# Patient Record
Sex: FEMALE | Race: WHITE | Employment: UNEMPLOYED | URBAN - METROPOLITAN AREA
[De-identification: names, ages, dates, MRNs, and addresses within clinical notes are randomized per-mention and may not be internally consistent; named-entity substitution may affect disease eponyms.]

---

## 2021-02-08 ENCOUNTER — HOSPITAL ENCOUNTER (EMERGENCY)
Age: 21
Discharge: HOME | End: 2021-02-08
Attending: EMERGENCY MEDICINE
Payer: COMMERCIAL

## 2021-02-08 VITALS
RESPIRATION RATE: 16 BRPM | WEIGHT: 146 LBS | DIASTOLIC BLOOD PRESSURE: 68 MMHG | BODY MASS INDEX: 29.43 KG/M2 | HEART RATE: 88 BPM | SYSTOLIC BLOOD PRESSURE: 107 MMHG | OXYGEN SATURATION: 100 % | HEIGHT: 59 IN | TEMPERATURE: 98.2 F

## 2021-02-08 DIAGNOSIS — N39.0 ACUTE UTI: ICD-10-CM

## 2021-02-08 DIAGNOSIS — R10.11 RIGHT UPPER QUADRANT ABDOMINAL PAIN: Primary | ICD-10-CM

## 2021-02-08 LAB
-: ABNORMAL
ABSOLUTE EOS #: 0.2 K/UL (ref 0–0.4)
ABSOLUTE IMMATURE GRANULOCYTE: ABNORMAL K/UL (ref 0–0.3)
ABSOLUTE LYMPH #: 1.7 K/UL (ref 1.2–5.2)
ABSOLUTE MONO #: 0.5 K/UL (ref 0.1–1.4)
ALBUMIN SERPL-MCNC: 3.8 G/DL (ref 3.5–5.2)
ALBUMIN/GLOBULIN RATIO: 1.3 (ref 1–2.5)
ALP BLD-CCNC: 55 U/L (ref 35–104)
ALT SERPL-CCNC: 7 U/L (ref 5–33)
AMORPHOUS: ABNORMAL
AMYLASE: 56 U/L (ref 28–100)
ANION GAP SERPL CALCULATED.3IONS-SCNC: 12 MMOL/L (ref 9–17)
AST SERPL-CCNC: 11 U/L
BACTERIA: ABNORMAL
BASOPHILS # BLD: 0 % (ref 0–2)
BASOPHILS ABSOLUTE: 0 K/UL (ref 0–0.2)
BILIRUB SERPL-MCNC: 0.24 MG/DL (ref 0.3–1.2)
BILIRUBIN DIRECT: 0.08 MG/DL
BILIRUBIN URINE: NEGATIVE
BILIRUBIN, INDIRECT: 0.16 MG/DL (ref 0–1)
BUN BLDV-MCNC: 8 MG/DL (ref 6–20)
BUN/CREAT BLD: ABNORMAL (ref 9–20)
CALCIUM SERPL-MCNC: 9.1 MG/DL (ref 8.6–10.4)
CASTS UA: ABNORMAL /LPF
CHLORIDE BLD-SCNC: 101 MMOL/L (ref 98–107)
CO2: 20 MMOL/L (ref 20–31)
COLOR: YELLOW
COMMENT UA: ABNORMAL
CREAT SERPL-MCNC: <0.4 MG/DL (ref 0.5–0.9)
CRYSTALS, UA: ABNORMAL /HPF
DIFFERENTIAL TYPE: ABNORMAL
EOSINOPHILS RELATIVE PERCENT: 2 % (ref 1–4)
EPITHELIAL CELLS UA: ABNORMAL /HPF (ref 0–5)
GFR AFRICAN AMERICAN: ABNORMAL ML/MIN
GFR NON-AFRICAN AMERICAN: ABNORMAL ML/MIN
GFR SERPL CREATININE-BSD FRML MDRD: ABNORMAL ML/MIN/{1.73_M2}
GFR SERPL CREATININE-BSD FRML MDRD: ABNORMAL ML/MIN/{1.73_M2}
GLOBULIN: ABNORMAL G/DL (ref 1.5–3.8)
GLUCOSE BLD-MCNC: 80 MG/DL (ref 70–99)
GLUCOSE URINE: NEGATIVE
HCG QUANTITATIVE: ABNORMAL IU/L
HCT VFR BLD CALC: 37.4 % (ref 36–46)
HEMOGLOBIN: 12.3 G/DL (ref 12–16)
IMMATURE GRANULOCYTES: ABNORMAL %
KETONES, URINE: ABNORMAL
LEUKOCYTE ESTERASE, URINE: NEGATIVE
LIPASE: 16 U/L (ref 13–60)
LYMPHOCYTES # BLD: 16 % (ref 25–45)
MCH RBC QN AUTO: 28.5 PG (ref 26–34)
MCHC RBC AUTO-ENTMCNC: 32.9 G/DL (ref 31–37)
MCV RBC AUTO: 86.6 FL (ref 80–100)
MONOCYTES # BLD: 5 % (ref 2–8)
MUCUS: ABNORMAL
NITRITE, URINE: NEGATIVE
NRBC AUTOMATED: ABNORMAL PER 100 WBC
OTHER OBSERVATIONS UA: ABNORMAL
PDW BLD-RTO: 14.3 % (ref 12.5–15.4)
PH UA: 6 (ref 5–8)
PLATELET # BLD: 279 K/UL (ref 140–450)
PLATELET ESTIMATE: ABNORMAL
PMV BLD AUTO: 6.9 FL (ref 6–12)
POTASSIUM SERPL-SCNC: 3.6 MMOL/L (ref 3.7–5.3)
PROTEIN UA: NEGATIVE
RBC # BLD: 4.31 M/UL (ref 4–5.2)
RBC # BLD: ABNORMAL 10*6/UL
RBC UA: ABNORMAL /HPF (ref 0–2)
RENAL EPITHELIAL, UA: ABNORMAL /HPF
SEG NEUTROPHILS: 77 % (ref 34–64)
SEGMENTED NEUTROPHILS ABSOLUTE COUNT: 8.3 K/UL (ref 1.8–8)
SODIUM BLD-SCNC: 133 MMOL/L (ref 135–144)
SPECIFIC GRAVITY UA: 1.01 (ref 1–1.03)
TOTAL PROTEIN: 6.8 G/DL (ref 6.4–8.3)
TRICHOMONAS: ABNORMAL
TURBIDITY: CLEAR
URINE HGB: ABNORMAL
UROBILINOGEN, URINE: NORMAL
WBC # BLD: 10.7 K/UL (ref 4.5–13.5)
WBC # BLD: ABNORMAL 10*3/UL
WBC UA: ABNORMAL /HPF (ref 0–5)
YEAST: ABNORMAL

## 2021-02-08 PROCEDURE — 81001 URINALYSIS AUTO W/SCOPE: CPT

## 2021-02-08 PROCEDURE — 85025 COMPLETE CBC W/AUTO DIFF WBC: CPT

## 2021-02-08 PROCEDURE — 83690 ASSAY OF LIPASE: CPT

## 2021-02-08 PROCEDURE — 80076 HEPATIC FUNCTION PANEL: CPT

## 2021-02-08 PROCEDURE — 82150 ASSAY OF AMYLASE: CPT

## 2021-02-08 PROCEDURE — 36415 COLL VENOUS BLD VENIPUNCTURE: CPT

## 2021-02-08 PROCEDURE — 80048 BASIC METABOLIC PNL TOTAL CA: CPT

## 2021-02-08 PROCEDURE — 84702 CHORIONIC GONADOTROPIN TEST: CPT

## 2021-02-08 PROCEDURE — 99283 EMERGENCY DEPT VISIT LOW MDM: CPT

## 2021-02-08 RX ORDER — NITROFURANTOIN 25; 75 MG/1; MG/1
100 CAPSULE ORAL 2 TIMES DAILY
Qty: 14 CAPSULE | Refills: 0 | Status: SHIPPED | OUTPATIENT
Start: 2021-02-08 | End: 2021-02-15

## 2021-02-08 ASSESSMENT — PAIN SCALES - GENERAL: PAINLEVEL_OUTOF10: 7

## 2021-02-08 ASSESSMENT — PAIN DESCRIPTION - LOCATION: LOCATION: ABDOMEN

## 2021-02-08 ASSESSMENT — PAIN DESCRIPTION - ORIENTATION: ORIENTATION: RIGHT;UPPER

## 2021-02-08 NOTE — ED PROVIDER NOTES
[id number] [facility] ED  [address] [facility] [address] [facility] [address]  Phone: [phone]  Fax: Rocío Hutchison 6867      Pt Name: Radha Naqvi  MRN: [id number]  [place] 2000  Date of evaluation: 2/8/2021    CHIEF COMPLAINT       Chief Complaint   Patient presents with    Abdominal Pain       HISTORY OF PRESENT ILLNESS    Radha Naqvi is a 24 y.o. female who presents to the emergency department complaining of right upper quadrant and right lower quadrant abdominal pain that started today. 10 weeks pregnant has had a formal ultrasound at 6 weeks. Denies vaginal bleeding. No hematuria or dysuria. No fevers or chills. No diarrhea or constipation. Denies any other complaints. Called her OB who told her to come to the emergency department at [facility] and presented here. Denies any other symptoms. Nothing makes the pain better or worse. Nonradiating. REVIEW OF SYSTEMS       Constitutional: No fevers or chills   HEENT: No sore throat, rhinorrhea, or earache   Eyes: No blurry vision or double vision no drainage   Cardiovascular: No chest pain or tachycardia   Respiratory: No wheezing or shortness of breath no cough   Gastrointestinal: No nausea, vomiting, diarrhea, constipation, positive abdominal pain   : No hematuria or dysuria   Musculoskeletal: No swelling or pain   Skin: No rash   Neurological: No focal neurologic complaints, paresthesias, weakness, or headache     PAST MEDICAL HISTORY    has no past medical history on file. SURGICAL HISTORY      has no past surgical history on file. CURRENT MEDICATIONS       Previous Medications    No medications on file       ALLERGIES     has no allergies on file. FAMILY HISTORY     has no family status information on file. family history is not on file. SOCIAL HISTORY      reports that she has never smoked. She has never used smokeless tobacco. She reports previous alcohol use. She reports current drug use. Drug: Marijuana. PHYSICAL EXAM       ED Triage Vitals [02/08/21 1503]   BP Temp Temp Source Pulse Resp SpO2 Height Weight   107/68 98.2 °F (36.8 °C) Temporal 88 16 100 % 4' 11\" (1.499 m) 146 lb (66.2 kg)       Constitutional: Alert, oriented x3, nontoxic, answering questions appropriately, acting properly for age, in no acute distress   HEENT: Extraocular muscles intact,  Neck: Trachea midline   Cardiovascular: Regular rhythm and rate no S3, S4, or murmurs   Respiratory: Clear to auscultation bilaterally no wheezes, rhonchi, rales, no respiratory distress no tachypnea no retractions no hypoxia  Gastrointestinal: Soft, mild right upper quadrant and right middle abdominal tenderness to palpation, nondistended, positive bowel sounds. No rebound, rigidity, or guarding. Musculoskeletal: No extremity pain or swelling   Neurologic: Moving all 4 extremities without difficulty there are no gross focal neurologic deficits   Skin: Warm and dry       DIFFERENTIAL DIAGNOSIS/ MDM:     Fetal heart tones. Labs.     DIAGNOSTIC RESULTS     EKG: All EKG's are interpreted by the Emergency Department Physician who either signs or Co-signs this chart in the absence of a cardiologist.        Not indicated unless otherwise documented above    LABS:  Results for orders placed or performed during the hospital encounter of 02/08/21   CBC Auto Differential   Result Value Ref Range    WBC 10.7 4.5 - 13.5 k/uL    RBC 4.31 4.0 - 5.2 m/uL    Hemoglobin 12.3 12.0 - 16.0 g/dL    Hematocrit 37.4 36 - 46 %    MCV 86.6 80 - 100 fL    MCH 28.5 26 - 34 pg    MCHC 32.9 31 - 37 g/dL    RDW 14.3 12.5 - 15.4 %    Platelets 156 300 - 755 k/uL    MPV 6.9 6.0 - 12.0 fL    NRBC Automated NOT REPORTED per 100 WBC    Differential Type NOT REPORTED     Seg Neutrophils 77 (H) 34 - 64 %    Lymphocytes 16 (L) 25 - 45 %    Monocytes 5 2 - 8 %    Eosinophils % 2 1 - 4 %    Basophils 0 0 - 2 %    Immature Granulocytes NOT REPORTED 0 %    Segs Absolute 8.30 (H) 1.8 Quantitative, Pregnancy   Result Value Ref Range    hCG Quant 78,144 (H) <5 IU/L   Microscopic Urinalysis   Result Value Ref Range    -          WBC, UA 2 TO 5 0 - 5 /HPF    RBC, UA 5 TO 10 0 - 2 /HPF    Casts UA NOT REPORTED /LPF    Crystals, UA NOT REPORTED None /HPF    Epithelial Cells UA 5 TO 10 0 - 5 /HPF    Renal Epithelial, UA NOT REPORTED 0 /HPF    Bacteria, UA FEW (A) None    Mucus, UA 1+ (A) None    Trichomonas, UA NOT REPORTED None    Amorphous, UA NOT REPORTED None    Other Observations UA (A) NOT REQ. Utilizing a urinalysis as the only screening method to exclude a potential uropathogen can be unreliable in many patient populations. Rapid screening tests are less sensitive than culture and if UTI is a clinical possibility, culture should be considered despite a negative urinalysis. Yeast, UA NOT REPORTED None       Not indicated unless otherwise documented above    RADIOLOGY:   I reviewed the radiologist interpretations:    No orders to display       Not indicated unless otherwise documented above    EMERGENCY DEPARTMENT COURSE:     The patient was given the following medications:  Orders Placed This Encounter   Medications    nitrofurantoin, macrocrystal-monohydrate, (MACROBID) 100 MG capsule     Sig: Take 1 capsule by mouth 2 times daily for 7 days     Dispense:  14 capsule     Refill:  0        Vitals:   -------------------------  /68   Pulse 88   Temp 98.2 °F (36.8 °C) (Temporal)   Resp 16   Ht 4' 11\" (1.499 m)   Wt 66.2 kg (146 lb)   SpO2 100%   BMI 29.49 kg/m²     4:10 PM awaiting remaining labs. Urinalysis shows moderate ketones and small amount hemoglobin. Urinalysis 5-10 white blood cells with a few bacteria. Will cover for UTI secondary to pregnancy. Normal white blood cell count and no anemia. 4:35 PM remaining labs unremarkable. Will treat for possible UTI recommend following up with obstetrician. Fetal heart tones 156.   Return if any worsening pain, fevers, vaginal bleeding or any other concerns. The patient understands that at this time there is no evidence for a more malignant underlying process, but they also understand that early in the process of an illness or injury, an emergency department workup can be falsely reassuring. Routine discharge counseling was given, and they understand that worsening, changing or persistent symptoms should prompt an immediate call or follow up with their primary physician or return to the emergency department. The importance of appropriate follow up was also discussed. I have reviewed the disposition diagnosis with them. I have answered their questions and given discharge instructions. They voiced understanding of these instructions and did not have any further questions or complaints. CRITICAL CARE:    None    CONSULTS:    None    PROCEDURES:    None      OARRS Report if indicated             FINAL IMPRESSION      1. Right upper quadrant abdominal pain    2.  Acute UTI          DISPOSITION/PLAN   DISPOSITION Decision To Discharge 02/08/2021 04:35:55 PM        CONDITION ON DISPOSITION: STABLE       PATIENT REFERRED TO:  Obstetrician    Call in 1 day        DISCHARGE MEDICATIONS:  New Prescriptions    NITROFURANTOIN, MACROCRYSTAL-MONOHYDRATE, (MACROBID) 100 MG CAPSULE    Take 1 capsule by mouth 2 times daily for 7 days       (Please note that portions of thisnote were completed with a voice recognition program.  Efforts were made to edit the dictations but occasionally words are mis-transcribed.)    [facility],, DO  Attending Emergency Physician     [facility], DO  02/08/21 [address] [facility] 9[facility], DO  02/08/21 3006

## 2021-02-11 ENCOUNTER — OFFICE VISIT (OUTPATIENT)
Dept: PRIMARY CARE CLINIC | Age: 21
End: 2021-02-11
Payer: COMMERCIAL

## 2021-02-11 VITALS
WEIGHT: 149.6 LBS | HEART RATE: 89 BPM | BODY MASS INDEX: 30.16 KG/M2 | DIASTOLIC BLOOD PRESSURE: 74 MMHG | HEIGHT: 59 IN | SYSTOLIC BLOOD PRESSURE: 122 MMHG | RESPIRATION RATE: 16 BRPM | OXYGEN SATURATION: 99 % | TEMPERATURE: 97 F

## 2021-02-11 DIAGNOSIS — J45.20 MILD INTERMITTENT ASTHMA WITHOUT COMPLICATION: ICD-10-CM

## 2021-02-11 DIAGNOSIS — Z00.00 ANNUAL PHYSICAL EXAM: Primary | ICD-10-CM

## 2021-02-11 DIAGNOSIS — Z23 NEED FOR INFLUENZA VACCINATION: ICD-10-CM

## 2021-02-11 PROBLEM — J45.909 ASTHMA: Status: ACTIVE | Noted: 2021-02-11

## 2021-02-11 PROCEDURE — 90471 IMMUNIZATION ADMIN: CPT | Performed by: NURSE PRACTITIONER

## 2021-02-11 PROCEDURE — 99385 PREV VISIT NEW AGE 18-39: CPT | Performed by: NURSE PRACTITIONER

## 2021-02-11 PROCEDURE — 90686 IIV4 VACC NO PRSV 0.5 ML IM: CPT | Performed by: NURSE PRACTITIONER

## 2021-02-11 PROCEDURE — G8482 FLU IMMUNIZE ORDER/ADMIN: HCPCS | Performed by: NURSE PRACTITIONER

## 2021-02-11 RX ORDER — DOXYLAMINE SUCCINATE AND PYRIDOXINE HYDROCHLORIDE, DELAYED RELEASE TABLETS 10 MG/10 MG 10; 10 MG/1; MG/1
1 TABLET, DELAYED RELEASE ORAL EVERY 12 HOURS PRN
COMMUNITY
Start: 2021-01-14 | End: 2021-04-29 | Stop reason: SDUPTHER

## 2021-02-11 SDOH — ECONOMIC STABILITY: INCOME INSECURITY: HOW HARD IS IT FOR YOU TO PAY FOR THE VERY BASICS LIKE FOOD, HOUSING, MEDICAL CARE, AND HEATING?: NOT HARD AT ALL

## 2021-02-11 SDOH — ECONOMIC STABILITY: TRANSPORTATION INSECURITY
IN THE PAST 12 MONTHS, HAS THE LACK OF TRANSPORTATION KEPT YOU FROM MEDICAL APPOINTMENTS OR FROM GETTING MEDICATIONS?: NO

## 2021-02-11 SDOH — ECONOMIC STABILITY: FOOD INSECURITY: WITHIN THE PAST 12 MONTHS, THE FOOD YOU BOUGHT JUST DIDN'T LAST AND YOU DIDN'T HAVE MONEY TO GET MORE.: NEVER TRUE

## 2021-02-11 SDOH — ECONOMIC STABILITY: TRANSPORTATION INSECURITY
IN THE PAST 12 MONTHS, HAS LACK OF TRANSPORTATION KEPT YOU FROM MEETINGS, WORK, OR FROM GETTING THINGS NEEDED FOR DAILY LIVING?: NO

## 2021-02-11 ASSESSMENT — ENCOUNTER SYMPTOMS
SINUS PRESSURE: 0
SINUS PAIN: 0
VOMITING: 0
WHEEZING: 0
ABDOMINAL PAIN: 0
SHORTNESS OF BREATH: 0
DIARRHEA: 0
EYE ITCHING: 0
TROUBLE SWALLOWING: 0
EYE REDNESS: 0
EYE DISCHARGE: 0
CHEST TIGHTNESS: 0
COUGH: 0
SORE THROAT: 0
NAUSEA: 0

## 2021-02-11 ASSESSMENT — PATIENT HEALTH QUESTIONNAIRE - PHQ9
SUM OF ALL RESPONSES TO PHQ QUESTIONS 1-9: 0
2. FEELING DOWN, DEPRESSED OR HOPELESS: 0
SUM OF ALL RESPONSES TO PHQ QUESTIONS 1-9: 0
SUM OF ALL RESPONSES TO PHQ QUESTIONS 1-9: 0

## 2021-02-11 NOTE — PROGRESS NOTES
[address] PRIMARY CARE  [address] [facility] [address]  [address] [address]  Dept: [phone]  Dept Fax: [phone]    Darrel Georges is a 24 y.o. female who presents today for her medical conditions/complaintsas noted below. Darrel Georges is c/o of New Patient ([facility], Almita Hopers shooting pain in left leg)        HPI:     Pt presents to establish care. Her last pcp was her pediatrician   bp stable  Weight stable    Pt presents to establish care. She is 11 weeks pregnant. [id number]. She is going to be following with Dr. Dahiana Rain office. No complications. LMP 20    Hx of asthma. She has an albuterol inhaler. She does not sure any other inhalers. She uses it more in the winter d/t the cold air. She works at [employer]. She was having issues with nausea but its improved. Now its intermittent. If she eats more frequently the symptoms improve  Not . Lives with boyfriend    She does c/o a pain to the anterior L shin. No fall or injury. Started about a week ago. Constant. Tylenol does not help. No cp or sob. No personal or family history of a blood clot. No recent travel. History reviewed. No pertinent past medical history. History reviewed. No pertinent surgical history. History reviewed. No pertinent family history. Social History     Tobacco Use    Smoking status: Never Smoker    Smokeless tobacco: Never Used   Substance Use Topics    Alcohol use: Not Currently      Current Outpatient Medications   Medication Sig Dispense Refill    Prenatal Vit-Fe Fumarate-FA (PRENATAL/FOLIC ACID PO) Take by mouth      doxylamine-pyridoxine 10-10 MG TBEC Take 1 tablet by mouth every 12 hours as needed      nitrofurantoin, macrocrystal-monohydrate, (MACROBID) 100 MG capsule Take 1 capsule by mouth 2 times daily for 7 days 14 capsule 0     No current facility-administered medications for this visit.       No Known Allergies    Health Maintenance   Topic Date Due    Hepatitis C screen  2000    Varicella vaccine (1 of 2 - 2-dose childhood series) 04/16/2002    HPV vaccine (1 - 2-dose series) 04/16/2012    HIV screen  04/16/2016    Chlamydia screen  04/16/2017    DTaP/Tdap/Td vaccine (1 - Tdap) 04/16/2020    Flu vaccine  Completed    Hepatitis A vaccine  Aged Out    Hepatitis B vaccine  Aged Out    Hib vaccine  Aged Out    Meningococcal (ACWY) vaccine  Aged Out    Pneumococcal 0-64 years Vaccine  Aged Out       :     Review of Systems   Constitutional: Negative for chills, fatigue and fever. HENT: Negative for ear discharge, ear pain, sinus pressure, sinus pain, sore throat and trouble swallowing. Eyes: Negative for discharge, redness and itching. Respiratory: Negative for cough, chest tightness, shortness of breath and wheezing. Cardiovascular: Negative for chest pain. Gastrointestinal: Negative for abdominal pain, diarrhea, nausea and vomiting. Genitourinary: Negative for difficulty urinating. Musculoskeletal: Positive for arthralgias. Negative for neck pain. Skin: Negative for rash. Neurological: Negative for dizziness, weakness, light-headedness and headaches. All other systems reviewed and are negative. Objective:     Physical Exam  Constitutional:       Appearance: Normal appearance. She is normal weight. HENT:      Head: Normocephalic and atraumatic. Nose: Nose normal.   Eyes:      Extraocular Movements: Extraocular movements intact. Conjunctiva/sclera: Conjunctivae normal.      Pupils: Pupils are equal, round, and reactive to light. Neck:      Musculoskeletal: Neck supple. Cardiovascular:      Rate and Rhythm: Normal rate and regular rhythm. Pulses: Normal pulses. Heart sounds: Normal heart sounds. Pulmonary:      Effort: Pulmonary effort is normal.      Breath sounds: Normal breath sounds. Abdominal:      General: Abdomen is flat. Palpations: Abdomen is soft.    Musculoskeletal: Normal range of motion. Left lower leg: She exhibits no tenderness, no bony tenderness and no swelling. No edema. Comments: Minimal TTP   Skin:     General: Skin is warm and dry. Capillary Refill: Capillary refill takes less than 2 seconds. Neurological:      General: No focal deficit present. Mental Status: She is alert and oriented to person, place, and time. Psychiatric:         Mood and Affect: Mood normal.       /74   Pulse 89   Temp 97 °F (36.1 °C) (Temporal)   Resp 16   Ht 4' 11\" (1.499 m)   Wt 149 lb 9.6 oz (67.9 kg)   SpO2 99%   BMI 30.22 kg/m²     Assessment:       Diagnosis Orders   1. Annual physical exam     2. Need for influenza vaccination  INFLUENZA, QUADV, 3 YRS AND OLDER, IM PF, PREFILL SYR OR SDV, 0.5ML (AFLURIA QUADV, PF)   3. Mild intermittent asthma without complication         :      Return in about 1 year (around 2/11/2022) for physical.   1.  Annual physical  -Patient already had lab work done a few days ago. This was reviewed with the patient in the office. No abnormalities at this time. We can repeat lab work in 1 year  2. Need for influenza vaccine  -Flu shot given in the office  3. Mild asthma  -Continue with albuterol inhaler as needed. No maintenance inhalers needed at this time. Follow-up in 1 year for annual physical or as needed    Orders Placed This Encounter   Procedures    INFLUENZA, QUADV, 3 YRS AND OLDER, IM PF, PREFILL SYR OR SDV, 0.5ML (AFLURIA QUADV, PF)     No orders of the defined types were placed in this encounter. Patient given educational materials - seepatient instructions. Discussed use, benefit, and side effects of prescribed medications. All patient questions answered. Pt voiced understanding. Reviewed health maintenance. Instructed to continue current medications, diet and exercise. Patient agreedwith treatment plan. Follow up as directed.       Electronically signed by BEBA Jesus CNP on 2/11/2021at 11:44 AM

## 2021-02-21 ENCOUNTER — HOSPITAL ENCOUNTER (EMERGENCY)
Age: 21
Discharge: HOME | End: 2021-02-21
Attending: EMERGENCY MEDICINE
Payer: COMMERCIAL

## 2021-02-21 VITALS
OXYGEN SATURATION: 100 % | HEIGHT: 59 IN | HEART RATE: 82 BPM | WEIGHT: 150 LBS | RESPIRATION RATE: 20 BRPM | TEMPERATURE: 98.1 F | DIASTOLIC BLOOD PRESSURE: 48 MMHG | BODY MASS INDEX: 30.24 KG/M2 | SYSTOLIC BLOOD PRESSURE: 110 MMHG

## 2021-02-21 DIAGNOSIS — O21.0 HYPEREMESIS GRAVIDARUM: Primary | ICD-10-CM

## 2021-02-21 LAB
ABSOLUTE EOS #: 0.15 K/UL (ref 0–0.44)
ABSOLUTE IMMATURE GRANULOCYTE: 0.06 K/UL (ref 0–0.3)
ABSOLUTE LYMPH #: 2.11 K/UL (ref 1.2–5.2)
ABSOLUTE MONO #: 0.68 K/UL (ref 0.1–1.4)
ALBUMIN SERPL-MCNC: 4.1 G/DL (ref 3.5–5.2)
ALBUMIN/GLOBULIN RATIO: ABNORMAL (ref 1–2.5)
ALP BLD-CCNC: 60 U/L (ref 35–104)
ALT SERPL-CCNC: 9 U/L (ref 5–33)
ANION GAP SERPL CALCULATED.3IONS-SCNC: 15 MMOL/L (ref 9–17)
APPEARANCE: CLEAR
AST SERPL-CCNC: 16 U/L
BASOPHILS # BLD: 1 % (ref 0–2)
BASOPHILS ABSOLUTE: 0.06 K/UL (ref 0–0.2)
BILIRUB SERPL-MCNC: 0.27 MG/DL (ref 0.3–1.2)
BILIRUBIN, POC: NEGATIVE
BLOOD URINE, POC: NORMAL
BUN BLDV-MCNC: 6 MG/DL (ref 6–20)
BUN/CREAT BLD: ABNORMAL (ref 9–20)
CALCIUM SERPL-MCNC: 9.1 MG/DL (ref 8.6–10.4)
CHLORIDE BLD-SCNC: 99 MMOL/L (ref 98–107)
CHP ED QC CHECK: YES
CLARITY, POC: CLEAR
CO2: 20 MMOL/L (ref 20–31)
COLOR, POC: YELLOW
CREAT SERPL-MCNC: <0.4 MG/DL (ref 0.5–0.9)
DIFFERENTIAL TYPE: ABNORMAL
EOSINOPHILS RELATIVE PERCENT: 1 % (ref 1–4)
GFR AFRICAN AMERICAN: ABNORMAL ML/MIN
GFR NON-AFRICAN AMERICAN: ABNORMAL ML/MIN
GFR SERPL CREATININE-BSD FRML MDRD: ABNORMAL ML/MIN/{1.73_M2}
GFR SERPL CREATININE-BSD FRML MDRD: ABNORMAL ML/MIN/{1.73_M2}
GLUCOSE BLD-MCNC: 83 MG/DL (ref 70–99)
GLUCOSE URINE, POC: NEGATIVE
HCG QUANTITATIVE: ABNORMAL IU/L
HCT VFR BLD CALC: 40.3 % (ref 36.3–47.1)
HEMOGLOBIN: 12.9 G/DL (ref 11.9–15.1)
IMMATURE GRANULOCYTES: 1 %
KETONES, POC: NEGATIVE
LEUKOCYTE EST, POC: NEGATIVE
LIPASE: 13 U/L (ref 13–60)
LYMPHOCYTES # BLD: 17 % (ref 25–45)
MCH RBC QN AUTO: 28.4 PG (ref 25.2–33.5)
MCHC RBC AUTO-ENTMCNC: 32 G/DL (ref 28.4–34.8)
MCV RBC AUTO: 88.6 FL (ref 82.6–102.9)
MONOCYTES # BLD: 6 % (ref 2–8)
NITRITE, POC: NEGATIVE
NRBC AUTOMATED: 0 PER 100 WBC
PDW BLD-RTO: 13.4 % (ref 11.8–14.4)
PH, POC: 6.5
PLATELET # BLD: 263 K/UL (ref 138–453)
PLATELET ESTIMATE: ABNORMAL
PMV BLD AUTO: 8.8 FL (ref 8.1–13.5)
POTASSIUM SERPL-SCNC: 3.3 MMOL/L (ref 3.7–5.3)
PROTEIN, POC: NEGATIVE
RBC # BLD: 4.55 M/UL (ref 3.95–5.11)
RBC # BLD: ABNORMAL 10*6/UL
SEG NEUTROPHILS: 74 % (ref 34–64)
SEGMENTED NEUTROPHILS ABSOLUTE COUNT: 9.29 K/UL (ref 1.8–8)
SODIUM BLD-SCNC: 134 MMOL/L (ref 135–144)
SPECIFIC GRAVITY, POC: 1.01
TOTAL PROTEIN: 6.9 G/DL (ref 6.4–8.3)
UROBILINOGEN, POC: 0.2
WBC # BLD: 12.4 K/UL (ref 4.5–13.5)
WBC # BLD: ABNORMAL 10*3/UL

## 2021-02-21 PROCEDURE — 96375 TX/PRO/DX INJ NEW DRUG ADDON: CPT

## 2021-02-21 PROCEDURE — 85025 COMPLETE CBC W/AUTO DIFF WBC: CPT

## 2021-02-21 PROCEDURE — 81003 URINALYSIS AUTO W/O SCOPE: CPT

## 2021-02-21 PROCEDURE — 2500000003 HC RX 250 WO HCPCS: Performed by: PHYSICIAN ASSISTANT

## 2021-02-21 PROCEDURE — 83690 ASSAY OF LIPASE: CPT

## 2021-02-21 PROCEDURE — 84702 CHORIONIC GONADOTROPIN TEST: CPT

## 2021-02-21 PROCEDURE — 99283 EMERGENCY DEPT VISIT LOW MDM: CPT

## 2021-02-21 PROCEDURE — 96374 THER/PROPH/DIAG INJ IV PUSH: CPT

## 2021-02-21 PROCEDURE — 80053 COMPREHEN METABOLIC PANEL: CPT

## 2021-02-21 PROCEDURE — 2580000003 HC RX 258: Performed by: PHYSICIAN ASSISTANT

## 2021-02-21 PROCEDURE — 81025 URINE PREGNANCY TEST: CPT

## 2021-02-21 PROCEDURE — 6360000002 HC RX W HCPCS: Performed by: PHYSICIAN ASSISTANT

## 2021-02-21 RX ORDER — FAMOTIDINE 20 MG/1
20 TABLET, FILM COATED ORAL 2 TIMES DAILY
Qty: 60 TABLET | Refills: 0 | Status: SHIPPED | OUTPATIENT
Start: 2021-02-21 | End: 2021-04-29 | Stop reason: SDUPTHER

## 2021-02-21 RX ORDER — METOCLOPRAMIDE HYDROCHLORIDE 5 MG/ML
10 INJECTION INTRAMUSCULAR; INTRAVENOUS ONCE
Status: COMPLETED | OUTPATIENT
Start: 2021-02-21 | End: 2021-02-21

## 2021-02-21 RX ORDER — DIPHENHYDRAMINE HYDROCHLORIDE 25 MG/1
25 CAPSULE ORAL NIGHTLY
Qty: 30 TABLET | Refills: 0 | Status: SHIPPED | OUTPATIENT
Start: 2021-02-21 | End: 2021-04-23 | Stop reason: SDUPTHER

## 2021-02-21 RX ORDER — 0.9 % SODIUM CHLORIDE 0.9 %
1000 INTRAVENOUS SOLUTION INTRAVENOUS ONCE
Status: COMPLETED | OUTPATIENT
Start: 2021-02-21 | End: 2021-02-21

## 2021-02-21 RX ADMIN — FAMOTIDINE 20 MG: 10 INJECTION, SOLUTION INTRAVENOUS at 20:29

## 2021-02-21 RX ADMIN — SODIUM CHLORIDE 1000 ML: 9 INJECTION, SOLUTION INTRAVENOUS at 20:29

## 2021-02-21 RX ADMIN — METOCLOPRAMIDE 10 MG: 5 INJECTION, SOLUTION INTRAMUSCULAR; INTRAVENOUS at 21:01

## 2021-02-22 NOTE — ED PROVIDER NOTES
Except as noted above the remainder of the review of systems was reviewed and negative. PHYSICAL EXAM    (up to 7 for level 4, 8 or more for level 5)     ED Triage Vitals   BP Temp Temp Source Pulse Resp SpO2 Height Weight   02/21/21 1939 02/21/21 1939 02/21/21 1939 02/21/21 1936 02/21/21 1936 02/21/21 1936 02/21/21 1936 02/21/21 1936   (!) 141/53 98.1 °F (36.7 °C) Oral 91 18 100 % 4' 11\" (1.499 m) 150 lb (68 kg)      Physical Exam  Constitutional:       Appearance: She is well-developed. HENT:      Head: Normocephalic and atraumatic. Neck:      Musculoskeletal: Normal range of motion and neck supple. Cardiovascular:      Rate and Rhythm: Normal rate and regular rhythm. Pulmonary:      Effort: Pulmonary effort is normal.      Breath sounds: Normal breath sounds. Abdominal:      Palpations: Abdomen is soft. Tenderness: There is no abdominal tenderness. Musculoskeletal: Normal range of motion. Skin:     General: Skin is warm. Findings: No rash. Neurological:      Mental Status: She is alert and oriented to person, place, and time.    Psychiatric:         Behavior: Behavior normal.                 DIAGNOSTIC RESULTS     EKG: All EKG's are interpreted by the Emergency Department Physician who either signs or Co-signs this chart in the absence of a cardiologist.        RADIOLOGY:   Non-plain film images such as CT, Ultrasound and MRI are read by the radiologist. Plain radiographic images arevisualized and preliminarily interpreted by the emergency physician with the below findings:        Interpretation per the Radiologist below, if available at thetime of this note:          ED BEDSIDE ULTRASOUND:   Performed by ED Physician - none    LABS:  Labs Reviewed   CBC WITH AUTO DIFFERENTIAL - Abnormal; Notable for the following components:       Result Value    Seg Neutrophils 74 (*)     Lymphocytes 17 (*)     Immature Granulocytes 1 (*)     Segs Absolute 9.29 (*) All other components within normal limits   COMPREHENSIVE METABOLIC PANEL - Abnormal; Notable for the following components:    CREATININE <0.40 (*)     Sodium 134 (*)     Potassium 3.3 (*)     Total Bilirubin 0.27 (*)     All other components within normal limits   HCG, QUANTITATIVE, PREGNANCY - Abnormal; Notable for the following components:    hCG Quant 03,188 (*)     All other components within normal limits   POCT URINALYSIS DIPSTICK - Normal   LIPASE       All other labs were within normal range or not returned as of this dictation. EMERGENCY DEPARTMENT COURSE and DIFFERENTIAL DIAGNOSIS/MDM:   Vitals:    Vitals:    02/21/21 1936 02/21/21 1939 02/21/21 2159   BP:  (!) 141/53 (!) 110/48   Pulse: 91  82   Resp: 18  20   Temp:  98.1 °F (36.7 °C)    TempSrc:  Oral    SpO2: 100%  100%   Weight: 150 lb (68 kg)     Height: 4' 11\" (1.499 m)       Patient will be discharged home. Bedside ultrasound was done. Patient feels better after IV fluids and antiemetics. Discharged home with vitamin B6 and Unisom. Instructed to follow-up with her obgyn on Thursday but to call tomorrow as well. CONSULTS:  None    PROCEDURES:  Procedures        FINAL IMPRESSION      1. Hyperemesis gravidarum          DISPOSITION/PLAN   DISPOSITION Decision To Discharge 02/21/2021 09:48:29 PM      PATIENTREFERRED TO:   BEBA Bennett - CNP  [address]  Dr. Real Owens [address] [place] [id number]  [phone]    In 3 days        DISCHARGE MEDICATIONS:     Discharge Medication List as of 2/21/2021  9:52 PM      START taking these medications    Details   doxyLAMINE succinate (UNISOM SLEEPTABS) 25 MG tablet Take 1 tablet by mouth nightly, Disp-30 tablet, R-0Print      vitamin B-6 (PYRIDOXINE) 25 MG tablet Take 1 tablet by mouth nightly, Disp-30 tablet, R-0Print      famotidine (PEPCID) 20 MG tablet Take 1 tablet by mouth 2 times daily, Disp-60 tablet, R-0Print (Please note that portions of this note were completed with a voice recognition program.  Efforts were made to edit thedictations but occasionally words are mis-transcribed.)    FRANSISCO Hampton PA-C  02/21/21 2211       Vick Verde MD  02/28/21 2123

## 2021-02-23 LAB — HCG, PREGNANCY URINE (POC): POSITIVE

## 2021-02-25 ENCOUNTER — INITIAL PRENATAL (OUTPATIENT)
Dept: OBGYN CLINIC | Age: 21
End: 2021-02-25

## 2021-02-25 VITALS — WEIGHT: 153.5 LBS | BODY MASS INDEX: 31 KG/M2 | DIASTOLIC BLOOD PRESSURE: 72 MMHG | SYSTOLIC BLOOD PRESSURE: 116 MMHG

## 2021-02-25 DIAGNOSIS — Z3A.13 13 WEEKS GESTATION OF PREGNANCY: ICD-10-CM

## 2021-02-25 DIAGNOSIS — Z34.01 SUPERVISION OF NORMAL FIRST TEEN PREGNANCY IN FIRST TRIMESTER: Primary | ICD-10-CM

## 2021-02-25 PROCEDURE — 0500F INITIAL PRENATAL CARE VISIT: CPT | Performed by: NURSE PRACTITIONER

## 2021-02-25 NOTE — PROGRESS NOTES
OB History    Para Term  AB Living   1             SAB TAB Ectopic Molar Multiple Live Births                    # Outcome Date GA Lbr Ric/2nd Weight Sex Delivery Anes PTL Lv   1 Current               Michelletis Laurence is being seen today for her new obstetrical visit. She is transferring care from previous OB. The pregnancy is not a planned pregnancy. She is  accepting at this time. Her last period was Patient's last menstrual period was 2020. Yandy Iglesias This was a sure and definite menses. She was not on OCP at conception. Gestation 13w1d  Estimated Date of Delivery: 21    Last PAP has never had. Initial prenatal labs completed through previous OB: in care everywhere-   nonimmune rubella  Blood type O negative  +THC on UDS    Plans to deliver at: unsure  Plans to breastfeed: yes  SO/Partner:  Anibal Jurist  Involved:  yes  Patient Ethnicity:    FOB Ethnicity:         Occupation:  [employer]       Pets:  dog    Teratogen exposure since LMP: (OTC/prescription/ETOH/drugs):  Nicotine quit when she found out pregnant. She is still intermittently using THC for sleep/nausea-  Counseled on THC/marijuana use for N/V, prefer ACOG approved therapy and patient understands. She was on amoxicillin and hydrocodone for wisdom teeth when she found out pregnant she stopped. History of prior GBS-infected child:  NA  Recent travel outside of country (partner also):  no    Known COVID/Zika exposure (partner also): no  Any history of genetic or chromosomal aberations in herself the father to be or their respective families: no  Any histories of spina bifida or abdominal wall defects; omphalocele's or gastroschisis no  Hx Hypothyroidism: denies  Hx preeclampsia or HTN:  denies  Hx PPD: n/a  Hx Diabetes: denies  Hx GDM: n/a  First degree relative with diabetes: denies    She has hx of headaches, states tylenol has not been helping at all.   Encouarged increased water try plain excedrin tell us if persist worsen. No Known Allergies  Current Outpatient Medications   Medication Sig Dispense Refill    doxyLAMINE succinate (UNISOM SLEEPTABS) 25 MG tablet Take 1 tablet by mouth nightly 30 tablet 0    vitamin B-6 (PYRIDOXINE) 25 MG tablet Take 1 tablet by mouth nightly 30 tablet 0    famotidine (PEPCID) 20 MG tablet Take 1 tablet by mouth 2 times daily 60 tablet 0    Prenatal Vit-Fe Fumarate-FA (PRENATAL/FOLIC ACID PO) Take by mouth      doxylamine-pyridoxine 10-10 MG TBEC Take 1 tablet by mouth every 12 hours as needed       No current facility-administered medications for this visit. No past medical history on file. No past surgical history on file. Swelling: no  Bleeding: no  Discharge: no   Dysuria: no  Nausea: yes -  encouraged small frequent meals, and vitamin B6 and unisom if needed. Heartburn: yes - er called in pepcid she is going to start today. Office protocol reviewed, After hours number provided. Diet and exercise reveiwed  Warning signs reviewed  Testing reviewed     Q&A  Discussed in detail referral/orders for  for 1st trimester screen vs NIPSinfo provided for screening  Discussed with patient that if they proceed with the NIPs testing then they will be opting out of 1st trimester screening which can provide information in regards to placental health, congenital heart defects, metabolic abnormalities, and anatomic abnormalities. Patient is aware and has decided to: NIPs  Discussed dates and orders for Anatomy USd and fetal echo if indicated. Breastfeeding education. She verbally consented to HIV testing and drug screening. She was counseled on Toxoplasmosis/Listeriosis (cats/raw meat). Prenatal Vitamins recommended. Prenatal labs and dating us ordered.        RV prn/ 4 weeks     Of the 30 minute duration appointment visit, Ty Agrawal CNP spent at least 50% of the face-to-face time in counseling, explanation of diagnosis, planning of further management, and answering all questions.

## 2021-02-25 NOTE — PATIENT INSTRUCTIONS
After Hours Number: You can call the office [phone]  or [phone]  Call if you have:  1. Bleeding like a period  2. Cramps or contractions greater than 2 hours  3. If you are leaking fluid  4. If you've a fever greater than 100°  5. If you feel as if baby is not moving  6. If you have continuous vomiting over 3-4 hours        Patient was counseled on weight gain in pregnancy with the following recommendation made based on her BMI:     Singletons:  BMI <18.5: 28-40 lbs weight gain  BMI 18.5-24.9: 25-35 lbs weight gain   BMI 25-29.9: 15-25 lbs weight gain  BMI >/= 30: 11-20 lbs weight gain    Up to 16 weeks around 1 pound a month  16-28 weeks- 2-4 pounds a month  >28 weeks - around 1 pound a week due to increased growth and blood volume      Twins:  BMI <18.5: no reccomendations  BMI 18.5-24.9: 37-45 lbs weight gain  BMI 25-29.9: 31-50 lbs weight gain  BMI >/= 30: 25-42 lbs weight gain    During Pregnancy: Exercises  Your Care Instructions  Here are some examples of exercises to do during your pregnancy. Start each exercise slowly. Ease off the exercise if you start to have pain. Your doctor or physical therapist will tell you when you can start these exercises and which ones will work best for you. How to do the exercises  Neck rotation    1. Sit in a firm chair, or stand up straight. 2. Keeping your chin level, turn your head to the right, and hold for 15 to 30 seconds. 3. Turn your head to the left and hold for 15 to 30 seconds. 4. Repeat 2 to 4 times to each side. Forward neck flexion    1. Sit in a firm chair, or stand up straight. 2. Bend your head forward. 3. Hold for 15 to 30 seconds. 4. Repeat 2 to 4 times. Back press    1. Place your feet 10 to 12 inches from the wall. 2. Rest your back flat against the wall and slide down the wall until your knees are slightly bent. 3. Press your lower back against the wall by pulling in your stomach muscles.   4. Hold for 6 seconds, and then relax your stomach muscles and slide back up the wall. 5. Repeat 8 to 12 times. Full body twist    1. Sit with your legs crossed. 2. Reach your left hand toward your right foot, and place your right hand at your side for support. 3. Slowly twist your torso to your right. 4. Switch your hands and twist to your left. 5. Repeat 2 to 4 times. Pelvic rocking    1. Kneeling on hands and knees, place your hands directly under your shoulders and your knees under your hips. 2. Breathe in deeply. Tuck your head downward and round your back up, making a curve with your back in the shape of the letter C. Hold this position for a count of 6.  3. Breathe out slowly and bring your head back up. Relax, keeping your back straight (don't allow it to curve toward the floor). Hold this for a count of 6.  4. Do this exercise 8 times or to your comfort level. Pelvic tilt    1. Lie on your back. 2. Keep your knees relaxed. 3. Tighten your belly and buttocks muscles. 4. At the same time, gently shift your pelvis upward. This should flatten the curve in your back. 5. Hold for 6 seconds and then relax. 6. Gradually increase the number of tilts you do each day, to your comfort level. Backward stretch    1. Kneel on hands and knees with your knees 8 to 10 inches apart, hands directly under your shoulders, and arms and back straight. 2. Keeping your arms straight, slowly lower your buttocks toward your heels and tuck your head toward your knees. Hold for 15 to 30 seconds. 3. Slowly return to the kneeling position. 4. Repeat 2 to 4 times. Forward bend    1. Sit comfortably in a chair, with your arms relaxed. 2. Slowly bend forward, allowing your arms to hang down in front of you. Lean only as far as you can without feeling discomfort or pressure on your belly. 3. Hold for 15 to 30 seconds and then slowly sit up straight. 4. Repeat 2 to 4 times or to your comfort level. Leg lift crawl    1.  Kneeling on hands and knees, place your hands directly under your shoulders and straighten your arms. 2. Tighten your belly muscles by pulling in your belly button toward your spine. Be sure you continue to breathe normally and do not hold your breath. 3. Lift your left knee and bring it toward your elbow. 4. Slowly extend your leg behind you without completely straightening it. Be careful not to let your hip drop down. Avoid arching your back. 5. Hold your leg behind you for about 6 seconds. 6. Return to your starting position. 7. Do the same exercise with your other leg. 8. Repeat 8 to 12 times for each leg. Tailor sitting    1. Sit on the floor. 2. Bring your feet close to your body while crossing your ankles. 3. Hold this position for as long as you are comfortable. Tailor stretching    1. Sit on the floor with your back straight, legs about 12 inches apart, and feet relaxed outward. 2. Stretch your hands forward toward your left foot, then sit up. 3. Stretch your hands straight forward, then sit up. 4. Stretch your hands forward toward your right foot, then sit up. 5. Hold each stretch for 15 to 30 seconds. 6. Repeat 2 to 4 times. Follow-up care is a key part of your treatment and safety. Be sure to make and go to all appointments, and call your doctor if you are having problems. It's also a good idea to know your test results and keep a list of the medicines you take. Where can you learn more? Go to https://Adept CloudpeAircraft Logs.NephroPlus. org and sign in to your [facility] account. Enter O821 in the [facility] box to learn more about \"During Pregnancy: Exercises. \"     If you do not have an account, please click on the \"Sign Up Now\" link. Current as of: September 5, 2018  Content Version: 12.0  © 7992-6090 Healthwise, Incorporated. Care instructions adapted under license by [facility] ([facility]).  If you have questions about a medical condition or this instruction, always ask your healthcare professional. eat meat, pick lower-fat types. Good choices include lean cuts of meat and chicken or turkey without the skin. · Do not eat shark, swordfish, rema mackerel, or tilefish. They have high levels of mercury, which is dangerous to your baby. You can eat up to 12 ounces a week of fish or shellfish that have low mercury levels. Good choices include shrimp, wild salmon, pollock, and catfish. Do not eat more than 6 ounces of tuna each week. · Heat lunch meats (such as turkey, ham, or bologna) to 165°F before you eat them. This reduces your risk of getting sick from a kind of bacteria that can be found in lunch meats. · Do not eat unpasteurized soft cheeses, such as brie, feta, fresh mozzarella, and blue cheese. They have a bacteria that could harm your baby. · Limit caffeine. If you drink coffee or tea, have no more than 1 cup a day. Caffeine is also found in brenda. · Do not drink any alcohol. No amount of alcohol has been found to be safe during pregnancy. · Do not diet or try to lose weight. For example, do not follow a low-carbohydrate diet. If you are overweight at the start of your pregnancy, your doctor will work with you to manage your weight gain. · Tell your doctor about all vitamins and supplements you take. When should you call for help? Watch closely for changes in your health, and be sure to contact your doctor if you have any problems. Where can you learn more? Go to https://ForceManagerwanderIngen Technologies.healthE-Duction. org and sign in to your Cloudsnap account. Enter Y785 in the Applause box to learn more about \"Nutrition During Pregnancy: Care Instructions. \"     If you do not have an account, please click on the \"Sign Up Now\" link. Current as of: September 5, 2018  Content Version: 12.0  © 7705-8386 Healthwise, Incorporated. Care instructions adapted under license by [facility] ([facility]).  If you have questions about a medical condition or this instruction, always ask your healthcare professional. [employer] disclaims any warranty or liability for your use of this information. Managing Morning Sickness: Care Instructions  Your Care Instructions    For many women, the toughest part of early pregnancy is morning sickness. Morning sickness can range from mild nausea to severe nausea with bouts of vomiting. Symptoms may be worse in the morning, although they can strike at any time of the day or night. If you have nausea, vomiting, or both, look for safe measures that can bring you relief. You can take simple steps at home to manage morning sickness. These steps include changing what and when you eat and avoiding certain foods and smells. Some women find that acupuncture and acupressure wristbands also help. Follow-up care is a key part of your treatment and safety. Be sure to make and go to all appointments, and call your doctor if you are having problems. It's also a good idea to know your test results and keep a list of the medicines you take. How can you care for yourself at home? · Keep food in your stomach, but not too much at once. Your nausea may be worse if your stomach is empty. Eat five or six small meals a day instead of three large meals. · For morning nausea, eat a small snack, such as a couple of crackers or dry biscuits, before rising. Allow a few minutes for your stomach to settle before you get out of bed slowly. · Drink plenty of fluids, enough so that your urine is light yellow or clear like water. If you have kidney, heart, or liver disease and have to limit fluids, talk with your doctor before you increase the amount of fluids you drink. Some women find that peppermint tea helps with nausea. · Eat more protein, such as chicken, fish, lean meat, beans, nuts, and seeds. · Eat carbohydrate foods, such as potatoes, whole-grain cereals, rice, and pasta. · Avoid smells and foods that make you feel nauseated.  Spicy or high-fat foods, citrus juice, milk, coffee, and tea with caffeine often make nausea worse. · Do not drink alcohol. · Do not smoke. Try not to be around others who smoke. If you need help quitting, talk to your doctor about stop-smoking programs and medicines. These can increase your chances of quitting for good. · If you are taking iron supplements, ask your doctor if they are necessary. Iron can make nausea worse. · Get lots of rest. Stress and fatigue can make your morning sickness worse. · Ask your doctor about taking prescription medicine, or over-the-counter products such as vitamin B6, doxylamine, or emily, to relieve your symptoms. Your doctor can tell you the doses that are safe for you. · Take your prenatal vitamins at night on a full stomach. When should you call for help? Call 911 anytime you think you may need emergency care. For example, call if:    · You passed out (lost consciousness). Call your doctor now or seek immediate medical care if:    · You are sick to your stomach or cannot drink fluids. · You have symptoms of dehydration, such as:  ? Dry eyes and a dry mouth. ? Passing only a little urine. ? Feeling thirstier than usual.     · You are not able to keep down your medicine. · You have pain in your belly or pelvis. Watch closely for changes in your health, and be sure to contact your doctor if:    · You do not get better as expected. Where can you learn more? Go to https://Hennessey Wellnesspejaydeneb.Zhengtai Data. org and sign in to your Kadenze account. Enter W469 in the [facility] box to learn more about \"Managing Morning Sickness: Care Instructions. \"     If you do not have an account, please click on the \"Sign Up Now\" link. Current as of: September 5, 2018  Content Version: 12.0  © 2519-3600 Healthwise, Incorporated. Care instructions adapted under license by [facility] ([facility]).  If you have questions about a medical condition or this instruction, always ask your healthcare professional. [address] position keeps your baby off your belly. Tuck your baby under your arm, with his or her body along the side you will be feeding on. Support your baby's upper body with your arm. With that hand you can control your baby's head to bring his or her mouth to your breast.  ? Try different positions with each feeding. If you are having problems, ask for help from your doctor or a lactation consultant. · To get your baby to latch on:  ? Support and narrow your breast with one hand using a \"U hold,\" with your thumb on the outer side of your breast and your fingers on the inner side. You can also use a \"C hold,\" with all your fingers below the nipple and your thumb above it. Try the different holds to get the deepest latch for whichever breastfeeding position you use. Your other arm is behind your baby's back, with your hand supporting the base of the baby's head. Position your fingers and thumb to point toward your baby's ears. ? You can touch your baby's lower lip with your nipple to get your baby to open his or her mouth. Wait until your baby opens up really wide, like a big yawn. Then be sure to bring the baby quickly to your breast--not your breast to the baby. As you bring your baby toward your breast, use your other hand to support the breast and guide it into his or her mouth. ? Both the nipple and a large portion of the darker area around the nipple (areola) should be in the baby's mouth. The baby's lips should be flared outward, not folded in (inverted). ? Listen for a regular sucking and swallowing pattern while the baby is feeding. If you cannot see or hear a swallowing pattern, watch the baby's ears, which will wiggle slightly when the baby swallows. If the baby's nose appears to be blocked by your breast, bring your baby's body closer to you. This will help tilt the baby's head back slightly, so just the edge of one nostril is clear for breathing. ?  When your baby is latched, you can usually remove your hand from supporting your breast and bring it under your baby to cradle him or her. Now just relax and breastfeed your baby. · You will know that your baby is feeding well when:  ? His or her mouth covers a lot of the areola, and the lips are flared out.  ? His or her chin and nose rest against your breast.  ? Sucking is deep and rhythmic, with short pauses. ? You are able to see and hear your baby swallowing. ? You do not feel pain in your nipple. · Offer both breasts to your baby at each feeding. Each time you breastfeed, switch which breast you start with. · Anytime you need to remove your baby from the breast, put one finger in the corner of his or her mouth. Push your finger between your baby's gums to gently break the seal. If you do not break the tight seal before you remove your baby, your nipples can become sore, cracked, or bruised. · After feeding your baby, gently pat his or her back to let out any swallowed air. After your baby burps, offer the breast again, or offer the other breast. Sometimes a baby will want to keep feeding after being burped. When should you call for help? Call your doctor now or seek immediate medical care if:    · You have symptoms of a breast infection, such as:  ? Increased pain, swelling, redness, or warmth around a breast.  ? Red streaks extending from the breast.  ? Pus draining from a breast.  ? A fever. · Your baby has no wet diapers for 6 hours. Watch closely for changes in your health, and be sure to contact your doctor if:    · Your baby has trouble latching on to your breast.     · You continue to have pain or discomfort when breastfeeding. · You have other questions or concerns. Where can you learn more? Go to https://MD2UwanderGoEuro.Learnpedia Edutech Solutions. org and sign in to your Only-apartments account. Enter P492 in the Mediamind box to learn more about \"Breastfeeding: Care Instructions. \"     If you do not have an account, please click on the \"Sign Up Now\" link. Current as of: September 5, 2018  Content Version: 12.0  © 5873-3511 SkillsTrak. Care instructions adapted under license by [facility] ([facility]). If you have questions about a medical condition or this instruction, always ask your healthcare professional. [address] any warranty or liability for your use of this information. Learning About Birth Defects Testing  What is birth defects testing? Birth defects testing is done during pregnancy to look for possible problems with the baby (fetus). A birth defect may have only a minor impact on a child's life. Or it can have a major effect on quality or length of life. You and your doctor can choose from many tests. You may have no tests, one test, or many tests. Talking with a genetic counselor may help you make decisions about testing. The counselor is trained to help you understand these tests. He or she can also help you find resources for support. What are the types of tests? You may have a screening test or a diagnostic test. Or you may have both types of tests. Screening tests show the chance that a baby has a certain birth defect, such as Down syndrome, spina bifida, or trisomy 25. Diagnostic tests show if a baby has a certain birth defect. · Screening tests and when they are done:  ? Blood tests at 10 to 13 weeks (first trimester)  ? Cell free fetal DNA test at 10 weeks or later (first trimester)  ? Nuchal translucency test at 11 to 14 weeks (first trimester)  ? Triple or quad screening at 15 to 20 weeks (second trimester)  ? Ultrasound at 18 to 20 weeks (second trimester)  · Diagnostic tests and when they are done:  ? Chorionic villus sampling (CVS) at 10 to 13 weeks (first trimester)  ? Amniocentesis at 13 to 20 weeks (second trimester)  In some cases, the doctors look at the combined screenings that you've had over a period of time. This is called an integrated screening.   What are the screening tests?  · Blood tests are done to look at different substances in your blood. These tests include cell free fetal DNA and triple or quadruple (quad) blood tests. Both of these tests can help find genetic problems. · Nuchal translucency test uses ultrasound to measure the thickness of the area at the back of the baby's neck. An increase in thickness can be an early sign of certain birth defects. Ultrasound is a tool that uses sound waves to make pictures of your baby and placenta inside the uterus. · Ultrasound lets your doctor see an image of your baby. It can help your doctor look for problems of the heart, spine, belly, or other areas. What are the diagnostic tests? Chorionic villus sampling (CVS) looks at cells from the placenta. To do the test, your doctor may put a thin tube through your vagina and cervix to take out a small piece of the placenta. Or the doctor may take out the piece through a needle in your belly. This test can diagnose many genetic diseases. But it can't find problems with the spinal cord. Amniocentesis looks at the amniotic fluid that surrounds your baby. Your doctor will put a needle through your belly into your uterus and take out a very small amount of fluid to test.  What are the risks of these tests? There is a small risk of a miscarriage after a CVS or amniocentesis. Your doctor or genetic counselor can help you understand this risk. These tests are generally very safe. Where can you learn more? Go to https://VenaxispeNotch Wearable Movement Capture."[employer].". org and sign in to your Sweeten account. Enter G030 in the [facility] box to learn more about \"Learning About Birth Defects Testing. \"     If you do not have an account, please click on the \"Sign Up Now\" link. Current as of: September 5, 2018  Content Version: 12.0  © 8171-9517 Healthwise, Incorporated. Care instructions adapted under license by [facility] ([facility]).  If you have questions about a medical condition or this instruction, always ask your healthcare professional. [place] [address] any warranty or liability for your use of this information.

## 2021-02-26 ENCOUNTER — NURSE ONLY (OUTPATIENT)
Dept: PRIMARY CARE CLINIC | Age: 21
End: 2021-02-26
Payer: COMMERCIAL

## 2021-02-26 DIAGNOSIS — Z11.1 VISIT FOR MANTOUX TEST: Primary | ICD-10-CM

## 2021-02-26 PROCEDURE — 86580 TB INTRADERMAL TEST: CPT | Performed by: NURSE PRACTITIONER

## 2021-02-26 NOTE — PROGRESS NOTES
Tuberculin skin test applied to left ventral forearm. Explained how to read the test, measuring induration not just erythema; she will come into office if test appears positive. Patient will return Monday to be read.

## 2021-03-01 ENCOUNTER — NURSE ONLY (OUTPATIENT)
Dept: PRIMARY CARE CLINIC | Age: 21
End: 2021-03-01

## 2021-03-01 DIAGNOSIS — Z11.1 ENCOUNTER FOR PPD SKIN TEST READING: Primary | ICD-10-CM

## 2021-03-01 NOTE — PROGRESS NOTES
PPD Reading Note  PPD read and results entered in [address]. Result: 0 mm induration.   Interpretation: negative  If test not read within 48-72 hours of initial placement, patient advised to repeat in other arm 1-3 weeks after this test.  Allergic reaction: no

## 2021-03-01 NOTE — ED PROVIDER NOTES
[address]     Pt Name: Jm Rogers  MRN: [id number]  [place] 2000  Date of evaluation: 2/28/21   Jm Rogers is a 24 y.o. female with CC: Abdominal Pain, Nausea, and Headache    MDM:            CRITICAL CARE:       EKG: All EKG's are interpreted by the Emergency Department Physician who either signs or Co-signs this chart in the absence of a cardiologist.      RADIOLOGY:All plain film, CT, MRI, and formal ultrasound images (except ED bedside ultrasound) are read by the radiologist, see reports below, unless otherwise noted in MDM or here. No orders to display     LABS: All lab results were reviewed by myself, and all abnormals are listed below. Labs Reviewed   CBC WITH AUTO DIFFERENTIAL - Abnormal; Notable for the following components:       Result Value    Seg Neutrophils 74 (*)     Lymphocytes 17 (*)     Immature Granulocytes 1 (*)     Segs Absolute 9.29 (*)     All other components within normal limits   COMPREHENSIVE METABOLIC PANEL - Abnormal; Notable for the following components:    CREATININE <0.40 (*)     Sodium 134 (*)     Potassium 3.3 (*)     Total Bilirubin 0.27 (*)     All other components within normal limits   HCG, QUANTITATIVE, PREGNANCY - Abnormal; Notable for the following components:    hCG Quant 69,455 (*)     All other components within normal limits   POCT URINE PREGNANCY - Abnormal; Notable for the following components:    HCG, Pregnancy Urine (POC) POSITIVE (*)     All other components within normal limits   POCT URINALYSIS DIPSTICK - Normal   LIPASE     CONSULTS:  None  FINAL IMPRESSION      1. Hyperemesis gravidarum            PASTMEDICAL HISTORY   History reviewed. No pertinent past medical history. SURGICAL HISTORY     History reviewed. No pertinent surgical history.   CURRENT MEDICATIONS       Discharge Medication List as of 2/21/2021  9:52 PM      CONTINUE these medications which have NOT CHANGED    Details Prenatal Vit-Fe Fumarate-FA (PRENATAL/FOLIC ACID PO) Take by mouthHistorical Med      doxylamine-pyridoxine 10-10 MG TBEC Take 1 tablet by mouth every 12 hours as neededHistorical Med           ALLERGIES     has No Known Allergies. FAMILY HISTORY     has no family status information on file. SOCIAL HISTORY       Social History     Tobacco Use    Smoking status: Never Smoker    Smokeless tobacco: Never Used   Substance Use Topics    Alcohol use: Not Currently    Drug use: Yes     Types: Marijuana       I personally evaluated and examined the patient in conjunction with the APC and agree with the assessment, treatment plan, and disposition of the patient as recorded by the APC.    Janice Michelle MD  Attending Emergency Physician        Petey Maciel MD  02/28/21 [id number]

## 2021-03-03 ENCOUNTER — TELEPHONE (OUTPATIENT)
Dept: OBGYN CLINIC | Age: 21
End: 2021-03-03

## 2021-03-03 NOTE — TELEPHONE ENCOUNTER
NEG cfDNA/NIPS, please note in care coordination.   If she doesn't know gender and would like to know please notify her

## 2021-03-15 ENCOUNTER — TELEPHONE (OUTPATIENT)
Dept: OBGYN CLINIC | Age: 21
End: 2021-03-15

## 2021-03-15 NOTE — TELEPHONE ENCOUNTER
Analucita Gary called she had her wisdom teeth out on Thursday,wasnot able to get any \"after care meds\" because she is pregnant. She has a call out to the oral surgeon because she is still having jaw pain pretty bad, and a migraine since Thursday after the procedure. She is asking if they can not write her an off work note, are we able to for today- since she was not able to get anything due to being pregnant? Asked if she was taking/trying tylemol she stated she did no help.

## 2021-03-15 NOTE — TELEPHONE ENCOUNTER
Had an appointment with surgeon today- was going to call and let me know if she needed us to write note.

## 2021-03-22 ENCOUNTER — TELEPHONE (OUTPATIENT)
Dept: OBGYN CLINIC | Age: 21
End: 2021-03-22

## 2021-03-22 RX ORDER — PROMETHAZINE HYDROCHLORIDE 25 MG/1
25 TABLET ORAL 3 TIMES DAILY PRN
Qty: 30 TABLET | Refills: 0 | Status: SHIPPED | OUTPATIENT
Start: 2021-03-22 | End: 2021-03-29

## 2021-03-22 NOTE — TELEPHONE ENCOUNTER
Letter written.  She is only taking b12 so she is fine if something else can be called in.     Juan A Sibley

## 2021-03-22 NOTE — TELEPHONE ENCOUNTER
I sent in phenergan let her know can cause drowsiness if she continues to get nausea at work we can send her in zofran.

## 2021-03-22 NOTE — TELEPHONE ENCOUNTER
Works for [employer], she had vomiting all day Saturday and Sunday. They were not going to send her home unless she uses her own personal time. She ended up calling the on call doc on Sunday and they told her to call here today to get a note due to being sick. They are only allowing her to take an extended leave due to being sick this weekend. Feels woozy today, but feels much better. She needs a letter for Sun and Mon that she will miss and they are forcing her to take today off until she gets a letter. Is this OK to write? She can return tomorrow with a note if able.

## 2021-03-26 ENCOUNTER — ROUTINE PRENATAL (OUTPATIENT)
Dept: OBGYN CLINIC | Age: 21
End: 2021-03-26

## 2021-03-26 ENCOUNTER — TELEPHONE (OUTPATIENT)
Dept: PRIMARY CARE CLINIC | Age: 21
End: 2021-03-26

## 2021-03-26 VITALS — SYSTOLIC BLOOD PRESSURE: 120 MMHG | BODY MASS INDEX: 31.71 KG/M2 | WEIGHT: 157 LBS | DIASTOLIC BLOOD PRESSURE: 70 MMHG

## 2021-03-26 DIAGNOSIS — Z34.02 SUPERVISION OF NORMAL FIRST TEEN PREGNANCY IN SECOND TRIMESTER: Primary | ICD-10-CM

## 2021-03-26 DIAGNOSIS — Z3A.17 17 WEEKS GESTATION OF PREGNANCY: ICD-10-CM

## 2021-03-26 PROCEDURE — 0502F SUBSEQUENT PRENATAL CARE: CPT | Performed by: OBSTETRICS & GYNECOLOGY

## 2021-03-26 NOTE — TELEPHONE ENCOUNTER
Ok sounds good. I am not in today but anyone can see her. Since she is pregnant i'm not sure what we can do. i'd have to talk to maybe our surgeon.     Marquis Glez

## 2021-03-26 NOTE — TELEPHONE ENCOUNTER
Patient called stating she has been having what she thinks is GB pain, right sided pain under ribs to shoulder blade. She went to ER previously for this but because she is pregnant they did not want to do too much testing per patient. She has appt with her OB  today at 11:15, advised patient to see OB then if he thinks she should see PCP just walk across [address] and see if our office can see her.

## 2021-03-29 NOTE — PROGRESS NOTES
Koby Carmona is a 24 y.o. female 12w2d        OB History    Para Term  AB Living   1             SAB TAB Ectopic Molar Multiple Live Births                    # Outcome Date GA Lbr Ric/2nd Weight Sex Delivery Anes PTL Lv   1 Current                      Vitals  BP: 120/70  Weight: 157 lb (71.2 kg)  Patient Position: Sitting  Albumin: Negative  Glucose: Negative  Fetal Heart Rate: 155    Irregular fetal movement  No complaints or issues  Rh NEGATIVE  NO VB  NO LOF  NO CTX    NIPS Testing ordered- 21 Results NEGATIVE Gender- BOY! Fm Hx of Autism Sister   Initial prenatal labs in care everywhere:   nonimmune rubella  Blood type O negative  +THC on UDS        Assessment:  Ivonne Carmona is a 24 y.o. female  2.   3. 17w2d    Patient Active Problem List    Diagnosis Date Noted    Asthma 2021        Diagnosis Orders   1.  Supervision of normal first teen pregnancy in second trimester  Ambulatory referral to Maternal Fetal   2. 17 weeks gestation of pregnancy  Ambulatory referral to Maternal Fetal         Plan:  RTO 4 weeks routine OBV   Rx for MFM consult for fetal anatomy US sent

## 2021-04-01 ENCOUNTER — ROUTINE PRENATAL (OUTPATIENT)
Dept: OBGYN CLINIC | Age: 21
End: 2021-04-01

## 2021-04-01 VITALS — SYSTOLIC BLOOD PRESSURE: 118 MMHG | WEIGHT: 160 LBS | DIASTOLIC BLOOD PRESSURE: 70 MMHG | BODY MASS INDEX: 32.32 KG/M2

## 2021-04-01 DIAGNOSIS — Z3A.18 18 WEEKS GESTATION OF PREGNANCY: ICD-10-CM

## 2021-04-01 DIAGNOSIS — M54.50 LOW BACK PAIN, UNSPECIFIED BACK PAIN LATERALITY, UNSPECIFIED CHRONICITY, UNSPECIFIED WHETHER SCIATICA PRESENT: ICD-10-CM

## 2021-04-01 DIAGNOSIS — M79.604 PAIN IN BOTH LOWER EXTREMITIES: ICD-10-CM

## 2021-04-01 DIAGNOSIS — Z34.02 SUPERVISION OF NORMAL FIRST TEEN PREGNANCY IN SECOND TRIMESTER: Primary | ICD-10-CM

## 2021-04-01 DIAGNOSIS — M79.605 PAIN IN BOTH LOWER EXTREMITIES: ICD-10-CM

## 2021-04-01 PROCEDURE — 0502F SUBSEQUENT PRENATAL CARE: CPT | Performed by: OBSTETRICS & GYNECOLOGY

## 2021-04-01 NOTE — PROGRESS NOTES
Larisa Terry is a 24 y.o. female 21w2d        OB History    Para Term  AB Living   1             SAB TAB Ectopic Molar Multiple Live Births                    # Outcome Date GA Lbr Ric/2nd Weight Sex Delivery Anes PTL Lv   1 Current                Vitals  BP: 118/70  Weight: 160 lb (72.6 kg)  Patient Position: Sitting  Albumin: Negative  Glucose: Negative    The patient was seen and evaluated. There was positive fetal movements. No contractions or leakage of fluid. Signs and symptoms of  labor as well as labor were reviewed. NIPT reviewed - normal. The patients anatomy ultrasound has been scheduled and reviewed with patient. The S/S of Pre-Eclampsia were reviewed with the patient in detail. She is to report any of these if they occur. She currently denies any of these. Patient has been having some increase in lower back pain and instability with pregnancy. She is seeing a chiropractor and she states that this provides some relief but it is very hard to work on her feet for 8-10 hours and she is concerned about fulfilling her duties at work. She has nerve pain down both legs and tingling. She has been getting upper back pain and headaches as well. Due to her muskuloskeletal dysfunction she is having mild early LE swelling. The patient is RH negative Rhogam Ordered no - to be ordered for 28 weeks    The patient was instructed on fetal kick counts and was given a kick sheet to complete every 8 hours. This is to begin at 28 weeks gestation. She was instructed that the baby should move at a minimum of ten times within one hour after a meal. The patient was instructed to lay down on her left side twenty minutes after eating and count movements for up to one hour with a target value of ten movements. She was instructed to notify the office if she did not make that target after two attempts or if after any attempt there was less than four movements.       NIPS Testing ordered- 21 Results NEGATIVE Gender- BOY! Fm Hx of Autism Sister   Initial prenatal labs in care everywhere:   nonimmune rubella  Blood type O negative  +THC on UDS      Assessment:  1. Luly Yeager is a 24 y.o. female  2.   3. 18w1d    Patient Active Problem List    Diagnosis Date Noted    Asthma 2021        Diagnosis Orders   1. Supervision of normal first teen pregnancy in second trimester     2. 18 weeks gestation of pregnancy     3. Low back pain, unspecified back pain laterality, unspecified chronicity, unspecified whether sciatica present     4. Pain in both lower extremities           Plan:  The patient will return to the office for her next visit in 4 weeks. See antepartum flow sheet. LE mobile SCD, back brace recommended and referral sent  Continue with chiropractor, tylenol        Patient was seen with total face to face time of 20 minutes. More than 50% of this visit was on counseling and education regarding her    Diagnosis Orders   1. Supervision of normal first teen pregnancy in second trimester     2. 18 weeks gestation of pregnancy     3. Low back pain, unspecified back pain laterality, unspecified chronicity, unspecified whether sciatica present     4. Pain in both lower extremities      and her options. She was also counseled on her preventative health maintenance recommendations and follow-up.

## 2021-04-13 ENCOUNTER — NURSE TRIAGE (OUTPATIENT)
Dept: OTHER | Age: 21
End: 2021-04-13

## 2021-04-13 ENCOUNTER — OFFICE VISIT (OUTPATIENT)
Dept: PRIMARY CARE CLINIC | Age: 21
End: 2021-04-13
Payer: COMMERCIAL

## 2021-04-13 VITALS
BODY MASS INDEX: 32.9 KG/M2 | SYSTOLIC BLOOD PRESSURE: 102 MMHG | WEIGHT: 163.2 LBS | HEART RATE: 96 BPM | HEIGHT: 59 IN | DIASTOLIC BLOOD PRESSURE: 54 MMHG | OXYGEN SATURATION: 98 % | RESPIRATION RATE: 16 BRPM

## 2021-04-13 DIAGNOSIS — L25.5 DERMATITIS DUE TO PLANTS, INCLUDING POISON IVY, SUMAC, AND OAK: Primary | ICD-10-CM

## 2021-04-13 PROCEDURE — 99213 OFFICE O/P EST LOW 20 MIN: CPT | Performed by: NURSE PRACTITIONER

## 2021-04-13 PROCEDURE — G8427 DOCREV CUR MEDS BY ELIG CLIN: HCPCS | Performed by: NURSE PRACTITIONER

## 2021-04-13 PROCEDURE — 1036F TOBACCO NON-USER: CPT | Performed by: NURSE PRACTITIONER

## 2021-04-13 PROCEDURE — G8417 CALC BMI ABV UP PARAM F/U: HCPCS | Performed by: NURSE PRACTITIONER

## 2021-04-13 RX ORDER — HYDROXYZINE HYDROCHLORIDE 25 MG/1
25 TABLET, FILM COATED ORAL EVERY 8 HOURS PRN
Qty: 30 TABLET | Refills: 0 | Status: SHIPPED | OUTPATIENT
Start: 2021-04-13 | End: 2021-04-23

## 2021-04-13 RX ORDER — CETIRIZINE HYDROCHLORIDE 10 MG/1
10 TABLET ORAL DAILY
Qty: 30 TABLET | Refills: 0 | Status: SHIPPED | OUTPATIENT
Start: 2021-04-13 | End: 2021-04-29

## 2021-04-13 RX ORDER — FAMOTIDINE 40 MG/1
40 TABLET, FILM COATED ORAL EVERY EVENING
Qty: 30 TABLET | Refills: 0 | Status: SHIPPED | OUTPATIENT
Start: 2021-04-13 | End: 2021-04-23 | Stop reason: SDUPTHER

## 2021-04-13 ASSESSMENT — ENCOUNTER SYMPTOMS
EYE REDNESS: 0
NAUSEA: 0
ABDOMINAL PAIN: 0
EYE PAIN: 0
VOMITING: 0
WHEEZING: 0
SINUS PRESSURE: 0
EYE ITCHING: 0
SORE THROAT: 0
SINUS PAIN: 0
EYE DISCHARGE: 0
TROUBLE SWALLOWING: 0
RHINORRHEA: 0
COUGH: 0
NAIL CHANGES: 0
DIARRHEA: 0
CHEST TIGHTNESS: 0
SHORTNESS OF BREATH: 0

## 2021-04-13 NOTE — PROGRESS NOTES
[address] PRIMARY CARE  [facility] [address] [facility] [address]  [address] [address]  Dept: [phone]  Dept Fax: [phone]    Agnieszka Ng is a 24 y.o. female who presents today for her medical conditions/complaintsas noted below. Agnieszka Ng is c/o of Poison Ivy (since 2021, on arms and rt cheeck, hx of swelling when getting poison ivy, has used Benadryl and Calamine lotion with no relief)        HPI:     Pt presents for a same day appointment. bp stable  Weight stable. 19w6d pregnant    Pt presents with concerns of dermatitis d/t poison ivy. She was Helping boyfriend at work outside over the weekend. Her boyfriend has it really bad. Started on her arm, leg, vagina, And face. She has taken some benadryl. Rash started today. Last time she had poison ivy her face swelled. She denies any thickness of there tongue or SOB. No facial swelling. She is almost 20 weeks pregnant. Poison Timothy Juliano  This is a new problem. The problem has been gradually worsening since onset. The affected locations include the right lower leg, right arm, left arm, face and genitalia. The rash is characterized by itchiness and blistering. She was exposed to plant contact. Pertinent negatives include no anorexia, congestion, cough, diarrhea, eye pain, facial edema, fatigue, fever, joint pain, nail changes, rhinorrhea, shortness of breath, sore throat or vomiting. Past treatments include antihistamine. The treatment provided mild relief. Her past medical history is significant for asthma. There is no history of allergies or varicella. History reviewed. No pertinent past medical history. History reviewed. No pertinent surgical history. History reviewed. No pertinent family history.     Social History     Tobacco Use    Smoking status: Never Smoker    Smokeless tobacco: Never Used   Substance Use Topics    Alcohol use: Not Currently      Current Outpatient Medications   Medication Sig Dispense Refill    triamcinolone (KENALOG) 0.1 % ointment Apply topically 2 times daily for 7 days 30 g 1    famotidine (PEPCID) 40 MG tablet Take 1 tablet by mouth every evening 30 tablet 0    cetirizine (ZYRTEC) 10 MG tablet Take 1 tablet by mouth daily 30 tablet 0    hydrOXYzine (ATARAX) 25 MG tablet Take 1 tablet by mouth every 8 hours as needed for Itching 30 tablet 0    doxyLAMINE succinate (UNISOM SLEEPTABS) 25 MG tablet Take 1 tablet by mouth nightly 30 tablet 0    vitamin B-6 (PYRIDOXINE) 25 MG tablet Take 1 tablet by mouth nightly 30 tablet 0    famotidine (PEPCID) 20 MG tablet Take 1 tablet by mouth 2 times daily 60 tablet 0    Prenatal Vit-Fe Fumarate-FA (PRENATAL/FOLIC ACID PO) Take by mouth      doxylamine-pyridoxine 10-10 MG TBEC Take 1 tablet by mouth every 12 hours as needed       No current facility-administered medications for this visit. No Known Allergies    Health Maintenance   Topic Date Due    Hepatitis C screen  Never done    Pneumococcal 0-64 years Vaccine (1 of 1 - PPSV23) 04/16/2007    HIV screen  Never done    COVID-19 Vaccine (1) Never done    Chlamydia screen  Never done    DTaP/Tdap/Td vaccine (7 - Td) 08/26/2023    Hepatitis A vaccine  Completed    Hepatitis B vaccine  Completed    Hib vaccine  Completed    HPV vaccine  Completed    Varicella vaccine  Completed    Meningococcal (ACWY) vaccine  Completed    Flu vaccine  Completed       :     Review of Systems   Constitutional: Negative for chills, fatigue and fever. HENT: Negative for congestion, ear discharge, ear pain, rhinorrhea, sinus pressure, sinus pain, sore throat and trouble swallowing. Eyes: Negative for pain, discharge, redness and itching. Respiratory: Negative for cough, chest tightness, shortness of breath and wheezing. Cardiovascular: Negative for chest pain. Gastrointestinal: Negative for abdominal pain, anorexia, diarrhea, nausea and vomiting.    Genitourinary: Negative for difficulty urinating. Musculoskeletal: Negative for arthralgias, joint pain and neck pain. Skin: Positive for rash. Negative for nail changes. Neurological: Negative for dizziness, weakness, light-headedness and headaches. All other systems reviewed and are negative. Objective:     Physical Exam  Constitutional:       Appearance: Normal appearance. She is normal weight. HENT:      Head: Normocephalic and atraumatic. Nose: Nose normal.   Eyes:      Extraocular Movements: Extraocular movements intact. Conjunctiva/sclera: Conjunctivae normal.      Pupils: Pupils are equal, round, and reactive to light. Neck:      Musculoskeletal: Neck supple. Cardiovascular:      Rate and Rhythm: Normal rate and regular rhythm. Pulses: Normal pulses. Heart sounds: Normal heart sounds. Pulmonary:      Effort: Pulmonary effort is normal.      Breath sounds: Normal breath sounds. Abdominal:      General: Abdomen is flat. Palpations: Abdomen is soft. Musculoskeletal: Normal range of motion. Skin:     General: Skin is warm and dry. Capillary Refill: Capillary refill takes less than 2 seconds. Findings: Rash present. Rash is macular, papular and urticarial.      Comments: Rash noted to her bilateral forearms. Only a few small areas at this time. Small area noted on right side of cheek next to her nose. No facial swelling or angioedema. No vesicles yet. Minimal erythema. Neurological:      General: No focal deficit present. Mental Status: She is alert and oriented to person, place, and time. Psychiatric:         Mood and Affect: Mood normal.       BP (!) 102/54 (Site: Left Upper Arm, Position: Sitting, Cuff Size: Medium Adult)   Pulse 96   Resp 16   Ht 4' 11\" (1.499 m)   Wt 163 lb 3.2 oz (74 kg)   LMP 11/25/2020   SpO2 98%   Breastfeeding No   BMI 32.96 kg/m²     Assessment:       Diagnosis Orders   1.  Dermatitis due to plants, including poison ivy, sumac, and oak :      Return if symptoms worsen or fail to improve. 1. Dermatitis  -rx for kenalog, zyrtec, pepcid and atarax. She is aware we have to avoid steroids while pregnant. She can also use OTC calamine and benadryl cream.     F/u PRN. Orders Placed This Encounter   Medications    triamcinolone (KENALOG) 0.1 % ointment     Sig: Apply topically 2 times daily for 7 days     Dispense:  30 g     Refill:  1    famotidine (PEPCID) 40 MG tablet     Sig: Take 1 tablet by mouth every evening     Dispense:  30 tablet     Refill:  0    cetirizine (ZYRTEC) 10 MG tablet     Sig: Take 1 tablet by mouth daily     Dispense:  30 tablet     Refill:  0    hydrOXYzine (ATARAX) 25 MG tablet     Sig: Take 1 tablet by mouth every 8 hours as needed for Itching     Dispense:  30 tablet     Refill:  0       Patient given educational materials - seepatient instructions. Discussed use, benefit, and side effects of prescribed medications. All patient questions answered. Pt voiced understanding. Reviewed health maintenance. Instructed to continue current medications, diet and exercise. Patient agreedwith treatment plan. Follow up as directed.       Electronically signed by BEBA Cifuentes CNP on 4/13/2021at 10:19 AM

## 2021-04-13 NOTE — TELEPHONE ENCOUNTER
Reason for Disposition   [1] Increasing redness around poison ivy AND [2] larger than 2 inches (5 cm)    Answer Assessment - Initial Assessment Questions  1. APPEARANCE of RASH: \"Describe the rash. \"       Some are blisters and some are mosquito bite  2. LOCATION: \"Where is the rash located? \"       On both arms and also on her face  3. SIZE: \"How large is the rash? \"       It is mostly on the inside of her arms but is also on the outer side of her arms and the right side of her face  4. ONSET: \"When did the rash begin? \"       yesterday  5. ITCHING: \"Does the rash itch? \" If so, ask: \"How bad is it? \"    - MILD - doesn't interfere with normal activities    - MODERATE-SEVERE: interferes with work, school, sleep, or other activities       It itches really bad  6. PREGNANCY: \"Is there any chance you are pregnant? \" \"When was your last menstrual period? \"      She is 19 weeks pregnant    Protocols used: POISON IVY - OAK - [facility]-ADULT-    Caller believes that she got into some poison ivy over the weekend and now has a rash on both arms and one side of her face. When she was 15 or 16 she had a bad reaction to it. Writer gave care advice and I asked her to call the office as soon as they opened for an appointment.

## 2021-04-13 NOTE — LETTER
[facility] Primary Care  [address] [address]  Phone: [phone]  Fax: [phone]    BEBA Dunbar CNP        April 13, 2021     Patient: Smith Booker   YOB: 2000   Date of Visit: 4/13/2021       To Whom It May Concern: It is my medical opinion that Smith Booker may return to work on 4/15/21. If you have any questions or concerns, please don't hesitate to call.     Sincerely,        BEBA Dunbar CNP

## 2021-04-13 NOTE — PATIENT INSTRUCTIONS
Schedule a Vaccine  When you qualify to receive the vaccine, call the [facility]) COVID-19 Vaccination Hotline to schedule your appointment or to get additional information about the [facility]) locations which are offering the COVID-19 vaccine. To be 94% effective, it's important that you receive two doses of one of the COVID-19 vaccines. -If you are receiving the Ivan Peter vaccine, your second shot will be scheduled as close to 21 days after the first shot as possible. -If you are receiving the Moderna vaccine, your second shot will be scheduled as close to 28 days after the first shot as possible. [facility]) COVID-19 Vaccination Hotline: [phone]    Links to [facility]) website and [facility] website:    [employer]/[facility]-monitoring-coronavirus-covid-19/covid-19-vaccine/[place]-vaccine    [email or web address]  Patient Education        Poison [place], [place], and Sum[place]: Care Instructions  Your Care Instructions     Poison ivy, poison oak, and poison sumac are plants that can cause a skin rash upon contact. The red, itchy rash often shows up in lines or streaks and may cause fluid-filled blisters or large, raised hives. The rash is caused by an allergic reaction to an oil in poison ivy, oak, and sumac. The rash may occur when you touch the plant or when you touch clothing, pet fur, sporting gear, gardening tools, or other objects that have come in contact with one of these plants. You cannot catch or spread the rash, even if you touch it or the blister fluid, because the plant oil will already have been absorbed or washed off the skin. The rash may seem to be spreading, but either it is still developing from earlier contact or you have touched something that still has the plant oil on it. Follow-up care is a key part of your treatment and safety. Be sure to make and go to all appointments, and call your doctor if you are having problems.  It's also a good idea to know your test results and keep a list of the medicines you take. How can you care for yourself at home? · If your doctor prescribed a cream, use it as directed. If your doctor prescribed medicine, take it exactly as prescribed. Call your doctor if you think you are having a problem with your medicine. · Use cold, wet cloths to reduce itching. · Keep cool, and stay out of the sun. · Leave the rash open to the air. · Wash all clothing or other things that may have come in contact with the plant oil. · Avoid most lotions and ointments until the rash heals. Calamine lotion may help relieve symptoms of a plant rash. Use it 3 or 4 times a day. To prevent poison ivy exposure  If you know that you will be near poison ivy, oak, or sumac, you can try these options:  · Use a product designed to help prevent plant oil from getting on the skin. These products, such as Ivy X Pre-Contact Skin Solution, come in lotions, sprays, or towelettes. You put the product on your skin right before you go outdoors. · If you did not use a preventive product and you have had contact with plant oil, clean it off your skin as soon as possible. Use a product such as Tecnu Original Outdoor Skin Cleanser. These products can also be used to clean plant oil from clothing or tools. When should you call for help? Call your doctor now or seek immediate medical care if:    · Your rash gets worse, and you start to feel bad and have a fever, a stiff neck, nausea, and vomiting.     · You have signs of infection, such as:  ? Increased pain, swelling, warmth, or redness. ? Red streaks leading from the rash. ? Pus draining from the rash. ? A fever.    Watch closely for changes in your health, and be sure to contact your doctor if:    · You have new blisters or bruises, or the rash spreads and looks like a sunburn.     · The rash gets worse, or it comes back after nearly disappearing.     · You think a medicine you are using is making your rash worse.     · Your rash does not clear up after 1 to 2 weeks of home treatment.     · You have joint aches or body aches with your rash. Where can you learn more? Go to https://nuPSYSpesanchezeweb.[employer] Technologies. org and sign in to your [employer] account. Enter G925 in the [facility] box to learn more about \"Poison [place], [place], and [place]: Care Instructions. \"     If you do not have an account, please click on the \"Sign Up Now\" link. Current as of: July 2, 2020               Content Version: 12.8  © 7608-2957 [employer]. Care instructions adapted under license by [facility] ([facility]). If you have questions about a medical condition or this instruction, always ask your healthcare professional. [address] any warranty or liability for your use of this information. Patient Education        Dermatitis: Care Instructions  Your Care Instructions  Dermatitis is the general name used for any rash or inflammation of the skin. Different kinds of dermatitis cause different kinds of rashes. Common causes of a rash include new medicines, plants (such as poison oak or poison ivy), heat, and stress. Certain illnesses can also cause a rash. An allergic reaction to something that touches your skin, such as latex, nickel, or poison ivy, is called contact dermatitis. Contact dermatitis may also be caused by something that irritates the skin, such as bleach, a chemical, or soap. These types of rashes cannot be spread from person to person. How long your rash will last depends on what caused it. Rashes may last a few days or months. Follow-up care is a key part of your treatment and safety. Be sure to make and go to all appointments, and call your doctor if you are having problems. It's also a good idea to know your test results and keep a list of the medicines you take. How can you care for yourself at home? · Do not scratch the rash.  Cut your nails short, and file them smooth. Or wear gloves if this helps keep you from scratching. · Wash the area with water only. Pat dry. · Put cold, wet cloths on the rash to reduce itching. · Keep cool, and stay out of the sun. · Leave the rash open to the air as much as possible. · If the rash itches, use hydrocortisone cream. Follow the directions on the label. Calamine lotion may help for plant rashes. · Take an over-the-counter antihistamine, such as diphenhydramine (Benadryl) or loratadine (Claritin), to help calm the itching. Read and follow all instructions on the label. · If your doctor prescribed a cream, use it as directed. If your doctor prescribed medicine, take it exactly as directed. When should you call for help? Call your doctor now or seek immediate medical care if:    · You have symptoms of infection, such as:  ? Increased pain, swelling, warmth, or redness. ? Red streaks leading from the area. ? Pus draining from the area. ? A fever.     · You have joint pain along with the rash. Watch closely for changes in your health, and be sure to contact your doctor if:    · Your rash is changing or getting worse.     · You are not getting better as expected. Where can you learn more? Go to https://Kawaii Museum.[employer]. org and sign in to your my[employer] account. Enter [phone] in the [facility] box to learn more about \"Dermatitis: Care Instructions. \"     If you do not have an account, please click on the \"Sign Up Now\" link. Current as of: July 2, 2020               Content Version: 12.8  © 5412-1568 [employer]. Care instructions adapted under license by [facility]. If you have questions about a medical condition or this instruction, always ask your healthcare professional. [place] [address] any warranty or liability for your use of this information.

## 2021-04-18 ENCOUNTER — HOSPITAL ENCOUNTER (EMERGENCY)
Age: 21
Discharge: HOME | End: 2021-04-18
Attending: EMERGENCY MEDICINE
Payer: COMMERCIAL

## 2021-04-18 VITALS
DIASTOLIC BLOOD PRESSURE: 70 MMHG | OXYGEN SATURATION: 97 % | HEART RATE: 85 BPM | RESPIRATION RATE: 17 BRPM | SYSTOLIC BLOOD PRESSURE: 120 MMHG

## 2021-04-18 DIAGNOSIS — L23.7 POISON IVY DERMATITIS: Primary | ICD-10-CM

## 2021-04-18 PROCEDURE — 99283 EMERGENCY DEPT VISIT LOW MDM: CPT

## 2021-04-18 RX ORDER — PREDNISONE 20 MG/1
10 TABLET ORAL DAILY
Qty: 16 TABLET | Refills: 0 | Status: SHIPPED | OUTPATIENT
Start: 2021-04-18 | End: 2021-05-02

## 2021-04-18 RX ORDER — PREDNISONE 20 MG/1
10 TABLET ORAL DAILY
Qty: 7 TABLET | Refills: 0
Start: 2021-04-18 | End: 2021-04-18 | Stop reason: SDUPTHER

## 2021-04-18 RX ORDER — PREDNISONE 20 MG/1
10 TABLET ORAL DAILY
Qty: 7 TABLET | Refills: 0 | Status: SHIPPED | OUTPATIENT
Start: 2021-04-18 | End: 2021-04-18 | Stop reason: SDUPTHER

## 2021-04-18 ASSESSMENT — ENCOUNTER SYMPTOMS
SORE THROAT: 0
CHEST TIGHTNESS: 0
CHOKING: 0
ABDOMINAL DISTENTION: 0
APNEA: 0
VOMITING: 0
WHEEZING: 0
RHINORRHEA: 0
EYE PAIN: 0
STRIDOR: 0
DIARRHEA: 0
FACIAL SWELLING: 0
BACK PAIN: 0
ABDOMINAL PAIN: 0
SHORTNESS OF BREATH: 0
NAUSEA: 0
COUGH: 0
VOICE CHANGE: 0
BLOOD IN STOOL: 0
TROUBLE SWALLOWING: 0

## 2021-04-18 ASSESSMENT — PAIN DESCRIPTION - LOCATION: LOCATION: OTHER (COMMENT)

## 2021-04-18 ASSESSMENT — PAIN SCALES - GENERAL: PAINLEVEL_OUTOF10: 9

## 2021-04-18 NOTE — ED PROVIDER NOTES
[facility]  Emergency Department Encounter  Emergency Medicine Resident     Pt Name: Virgie Xiong  MRN: [id number]  [place] 2000  Date of evaluation: 4/18/21  PCP:  BEBA Canela CNP    CHIEF COMPLAINT       Chief Complaint   Patient presents with   [facility]       HISTORY OF PRESENT ILLNESS  (Location/Symptom, Timing/Onset, Context/Setting, Quality, Duration, Modifying Factors, Severity.)    Virgie Xiong is a 22 y.o. female no significant past medical history currently pregnant (20 week GA) who presents with dermatitis x1 week. Patient was seen by her PCP on the 13th for poison ivy exposure. At the time rash was on her arms and right cheek. She was assisting her boyfriend with yard work when she was exposed. She states the rash has spread and includes her bilateral upper extremities, and lower back. Facial and neck rash have improved since onset. History of periorbital swelling and facial swelling s/p poison ivy exposure at the age of 16. Patient is using over-the-counter Benadryl and calamine lotion for symptom relief. PCP prescribed topical Kenalog, Zyrtec, Pepcid and Atarax. Patient denies fever, chills, cough, rhinorrhea, oral lesions, shortness of breath, chest pain,  leg swelling, abdominal pain, vaginal bleeding or pain, weakness, numbness or tingling. PAST MEDICAL / SURGICAL / SOCIAL / FAMILY HISTORY    has no past medical history on file. Currently 20 week GA with appropriate prenatal care.     has no past surgical history on file.     Social History     Socioeconomic History    Marital status: Single     Spouse name: Not on file    Number of children: Not on file    Years of education: Not on file    Highest education level: Not on file   Occupational History    Not on file   Social Needs    Financial resource strain: Not hard at all    Food insecurity     Worry: Never true     Inability: Never true   [employer] needs     Medical: No Non-medical: No   Tobacco Use    Smoking status: Never Smoker    Smokeless tobacco: Never Used   Substance and Sexual Activity    Alcohol use: Not Currently    Drug use: Yes     Types: Marijuana    Sexual activity: Not on file   Lifestyle    Physical activity     Days per week: Not on file     Minutes per session: Not on file    Stress: Not on file   Relationships    Social connections     Talks on phone: Not on file     Gets together: Not on file     Attends Buddhism service: Not on file     Active member of club or organization: Not on file     Attends meetings of clubs or organizations: Not on file     Relationship status: Not on file    Intimate partner violence     Fear of current or ex partner: Not on file     Emotionally abused: Not on file     Physically abused: Not on file     Forced sexual activity: Not on file   Other Topics Concern    Not on file   Social History Narrative    Not on file       History reviewed. No pertinent family history. Allergies:    Patient has no known allergies. Home Medications:  Prior to Admission medications    Medication Sig Start Date End Date Taking? Authorizing Provider   predniSONE (DELTASONE) 20 MG tablet Take 0.5 tablets by mouth daily for 14 days Take 2 tablets daily for day 1-3. Take 1.5 tablets daily for day 4-6  Take 1 tablets daily for day 7-9.    Take 1/2 tablet daily for day 9-12  Take 1/4 tablet daily for day 12-14 4/18/21 5/2/21 Yes Roberta Friedman MD   triamcinolone (KENALOG) 0.1 % ointment Apply topically 2 times daily for 7 days 4/13/21 4/20/21  BEBA Cerna CNP   famotidine (PEPCID) 40 MG tablet Take 1 tablet by mouth every evening 4/13/21 5/13/21  BEBA Cerna CNP   cetirizine (ZYRTEC) 10 MG tablet Take 1 tablet by mouth daily 4/13/21 5/13/21  BEBA Cerna CNP   hydrOXYzine (ATARAX) 25 MG tablet Take 1 tablet by mouth every 8 hours as needed for Itching 4/13/21 4/23/21  BEBA Cerna CNP doxyLAMINE succinate (UNISOM SLEEPTABS) 25 MG tablet Take 1 tablet by mouth nightly 2/21/21   Becky Leonard PA-C   vitamin B-6 (PYRIDOXINE) 25 MG tablet Take 1 tablet by mouth nightly 2/21/21   Becky Leonard PA-C   famotidine (PEPCID) 20 MG tablet Take 1 tablet by mouth 2 times daily 2/21/21 4/13/21  Becky Leonard PA-C   Prenatal Vit-Fe Fumarate-FA (PRENATAL/FOLIC ACID PO) Take by mouth    Historical Provider, MD   doxylamine-pyridoxine 10-10 MG TBEC Take 1 tablet by mouth every 12 hours as needed 1/14/21   Historical Provider, MD       REVIEW OF SYSTEMS    (2-9 systems for level 4, 10 or more for level 5)    Review of Systems   Constitutional: Negative for appetite change, chills, diaphoresis, fatigue and fever. HENT: Negative for congestion, dental problem, ear pain, facial swelling, mouth sores, nosebleeds, postnasal drip, rhinorrhea, sore throat, trouble swallowing and voice change. Eyes: Negative for pain. Respiratory: Negative for apnea, cough, choking, chest tightness, shortness of breath, wheezing and stridor. Cardiovascular: Negative for chest pain, palpitations and leg swelling. Gastrointestinal: Negative for abdominal distention, abdominal pain, blood in stool, diarrhea, nausea and vomiting. Genitourinary: Negative for difficulty urinating, dysuria, vaginal bleeding, vaginal discharge and vaginal pain. Musculoskeletal: Negative for arthralgias and back pain. Skin: Positive for rash. Neurological: Negative for dizziness, seizures, weakness, numbness and headaches. Hematological: Negative for adenopathy. Psychiatric/Behavioral: Negative for agitation. PHYSICAL EXAM   (up to 7 for level 4, 8 or more for level 5)    INITIAL VITALS:   ED Triage Vitals [04/18/21 0925]   BP Temp Temp src Pulse Resp SpO2 Height Weight   120/70 -- -- 85 17 97 % -- --       Physical Exam  Vitals signs and nursing note reviewed.    Constitutional: 0.5 tablets by mouth daily for 14 days Take 2 tablets daily for day 1-3. Take 1.5 tablets daily for day 4-6  Take 1 tablets daily for day 7-9. Take 1/2 tablet daily for day 9-12  Take 1/4 tablet daily for day 12-14     Dispense:  7 tablet     Refill:  0         DIAGNOSTIC RESULTS / EMERGENCYDEPARTMENT COURSE / MDM   LABS:  Labs Reviewed - No data to display    RADIOLOGY:  No results found. EKG    None    Impression:  22year old female without significant PMHX and currently 20 weeks GA presents with diffuse rash consistent with poison ivy (contact dermatitis). This is her second visit since onset (first with PCP). Advanced treatment to systemic steroids with taper plan for the next 2 weeks (exposure was 1 week ago). EMERGENCY DEPARTMENT COURSE:  ED Course as of Apr 18 1021   Sun Apr 18, 2021   3560 Discussed plan with patient and advised close follow up with PCP. Explained steroid taper to patient. She was able to repeat dosing plan and verbalized understanding of taper. Discussed return precautions. [TH]      ED Course User Index  [TH] Ander Thapa MD       MDM    PROCEDURES:  None    CONSULTS:  None    CRITICAL CARE:  Please see attending note    FINAL IMPRESSION     1. Poison ivy dermatitis        DISPOSITION / PLAN   DISPOSITION Decision To Discharge 04/18/2021 09:42:37 AM      Evaluation and treatment course in the ED, and plan of care upon discharge was discussed in length with the patient. Patient had no further questions prior to being discharged and was instructed to return to the ED for new or worsening symptoms. Any changes to existing medications or new prescriptions were reviewed with patient and they expressed understanding of how to correctly take their medications and the possible side effects. PATIENT REFERRED TO:  BEBA Mayer - CNP  [address]  [address] [facility] [id number] [phone]    In 1 day  Follow up with PCP in 1-2 days.       DISCHARGE MEDICATIONS:  Discharge Medication List as of 4/18/2021 10:04 AM          Frank Renner MD  Emergency Medicine Resident Physician, PGY-1    (Please note that portions of this note were completed with a voice recognition program.  Efforts were made to edit the dictations but occasionally words are mis-transcribed.)       Frank Renner MD  Resident  04/18/21 [phone]

## 2021-04-19 ENCOUNTER — TELEPHONE (OUTPATIENT)
Dept: PRIMARY CARE CLINIC | Age: 21
End: 2021-04-19

## 2021-04-19 ENCOUNTER — PATIENT MESSAGE (OUTPATIENT)
Dept: PRIMARY CARE CLINIC | Age: 21
End: 2021-04-19

## 2021-04-19 NOTE — TELEPHONE ENCOUNTER
From: Miguel Hernández  To: Behzad Macedo, APRN - CNP  Sent: 4/19/2021 9:02 AM EDT  Subject: Non-Urgent Medical Question    Payton Peña I just put in for a request to see if you could write me some more of that ointment it seems to be the only thing working, I ended up going into the ER yesterday because my poison ivy got so bad and they wrote me another pill to try. I was also wondering if there was anyway I could get a letter for work today [employer] wouldn't let me just have the one day off yesterday when I went to the ER I already got a letter from the ER for being there yesterday but I need one for today too or even just another one for today and yesterday for it to be excused. If you could do that for me that be great.

## 2021-04-19 NOTE — TELEPHONE ENCOUNTER
Pt called in stating she saw you for poison ivy 4/13 & ended up in the ER yesterday as well because it has not fully cleared. Pt states the cream that you wrote a script for is the only thing that seems to be working for the redness & was hoping you would send in another script as she just picked up the last refill the other day. Please advise.

## 2021-04-23 ENCOUNTER — ROUTINE PRENATAL (OUTPATIENT)
Dept: OBGYN CLINIC | Age: 21
End: 2021-04-23

## 2021-04-23 VITALS
BODY MASS INDEX: 33.15 KG/M2 | WEIGHT: 164.13 LBS | DIASTOLIC BLOOD PRESSURE: 68 MMHG | SYSTOLIC BLOOD PRESSURE: 114 MMHG

## 2021-04-23 DIAGNOSIS — Z34.92 NORMAL PREGNANCY IN SECOND TRIMESTER: Primary | ICD-10-CM

## 2021-04-23 DIAGNOSIS — Z3A.21 21 WEEKS GESTATION OF PREGNANCY: ICD-10-CM

## 2021-04-23 PROCEDURE — 0502F SUBSEQUENT PRENATAL CARE: CPT | Performed by: OBSTETRICS & GYNECOLOGY

## 2021-04-23 RX ORDER — DIPHENHYDRAMINE HYDROCHLORIDE 25 MG/1
25 CAPSULE ORAL NIGHTLY
Qty: 30 TABLET | Refills: 2 | Status: ON HOLD | OUTPATIENT
Start: 2021-04-23 | End: 2021-08-19 | Stop reason: HOSPADM

## 2021-04-23 RX ORDER — FAMOTIDINE 40 MG/1
40 TABLET, FILM COATED ORAL EVERY EVENING
Qty: 30 TABLET | Refills: 2 | Status: SHIPPED | OUTPATIENT
Start: 2021-04-23 | End: 2021-07-20 | Stop reason: ALTCHOICE

## 2021-04-23 NOTE — PROGRESS NOTES
Familia Cerda is a 22 y.o. female 22w2d        OB History    Para Term  AB Living   1             SAB TAB Ectopic Molar Multiple Live Births                    # Outcome Date GA Lbr Ric/2nd Weight Sex Delivery Anes PTL Lv   1 Current                Vitals  BP: 114/68  Weight: 164 lb 2 oz (74.4 kg)  Patient Position: Sitting    The patient was seen and evaluated. There was positive fetal movements. No contractions or leakage of fluid. Signs and symptoms of  labor as well as labor were reviewed. NIPS done, normal, BOY. The patients anatomy ultrasound has been scheduled and reviewed with patient. [facility] Reviewed. A 28 week lab panel to be ordered at next appt. This includes a (HH, 1 hr GTT, U/A C&S). The S/S of Pre-Eclampsia were reviewed with the patient in detail. She is to report any of these if they occur. She currently denies any of these. The patient is RH negative Rhogam needed at 28 weeks    The patient was instructed on fetal kick counts and was given a kick sheet to complete every 8 hours. This is to begin at 28 weeks gestation. She was instructed that the baby should move at a minimum of ten times within one hour after a meal. The patient was instructed to lay down on her left side twenty minutes after eating and count movements for up to one hour with a target value of ten movements. She was instructed to notify the office if she did not make that target after two attempts or if after any attempt there was less than four movements. NIPS Testing ordered- 21 Results NEGATIVE Gender- BOY! Fm Hx of Autism Sister   Initial prenatal labs in care everywhere:   nonimmune rubella  Blood type O negative  +THC on UDS      Assessment:  1. Familia Cerda is a 22 y.o. female  2.   3. 21w2d    Patient Active Problem List    Diagnosis Date Noted    Asthma 2021        Diagnosis Orders   1.  Normal pregnancy in second trimester     2. 21 weeks gestation of pregnancy Plan:  The patient will return to the office for her next visit in 4 weeks. See antepartum flow sheet. Counseled on s/s of preeclampsia. Counseled on s/s of  labor. Patient was seen with total face to face time of 20 minutes. More than 50% of this visit was on counseling and education regarding her    Diagnosis Orders   1. Normal pregnancy in second trimester     2. 21 weeks gestation of pregnancy      and her options. She was also counseled on her preventative health maintenance recommendations and follow-up.

## 2021-04-29 ENCOUNTER — ROUTINE PRENATAL (OUTPATIENT)
Dept: PERINATAL CARE | Age: 21
End: 2021-04-29
Payer: COMMERCIAL

## 2021-04-29 VITALS
BODY MASS INDEX: 33.26 KG/M2 | TEMPERATURE: 97.2 F | SYSTOLIC BLOOD PRESSURE: 114 MMHG | HEART RATE: 80 BPM | DIASTOLIC BLOOD PRESSURE: 70 MMHG | WEIGHT: 165 LBS | RESPIRATION RATE: 16 BRPM | HEIGHT: 59 IN

## 2021-04-29 DIAGNOSIS — O99.212 OBESITY AFFECTING PREGNANCY IN SECOND TRIMESTER: ICD-10-CM

## 2021-04-29 DIAGNOSIS — O35.2XX0 HEREDITARY FAMILIAL DISEASE AFFECTING MANAGEMENT OF MOTHER AND POSSIBLY AFFECTING FETUS, ANTEPARTUM, SINGLE OR UNSPECIFIED FETUS: Primary | ICD-10-CM

## 2021-04-29 DIAGNOSIS — Z3A.22 22 WEEKS GESTATION OF PREGNANCY: ICD-10-CM

## 2021-04-29 DIAGNOSIS — O99.512 ASTHMA AFFECTING PREGNANCY IN SECOND TRIMESTER: ICD-10-CM

## 2021-04-29 DIAGNOSIS — J45.909 ASTHMA AFFECTING PREGNANCY IN SECOND TRIMESTER: ICD-10-CM

## 2021-04-29 DIAGNOSIS — Z36.86 SCREENING, ANTENATAL, FOR RISK OF PRE-TERM LABOR: ICD-10-CM

## 2021-04-29 LAB
ABDOMINAL CIRCUMFERENCE: NORMAL
ABDOMINAL CIRCUMFERENCE: NORMAL
BIPARIETAL DIAMETER: NORMAL
BIPARIETAL DIAMETER: NORMAL
ESTIMATED FETAL WEIGHT: NORMAL
ESTIMATED FETAL WEIGHT: NORMAL
FEMORAL DIAMETER: NORMAL
FEMORAL DIAMETER: NORMAL
HC/AC: NORMAL
HC/AC: NORMAL
HEAD CIRCUMFERENCE: NORMAL
HEAD CIRCUMFERENCE: NORMAL

## 2021-04-29 PROCEDURE — 76817 TRANSVAGINAL US OBSTETRIC: CPT | Performed by: OBSTETRICS & GYNECOLOGY

## 2021-04-29 PROCEDURE — 76811 OB US DETAILED SNGL FETUS: CPT | Performed by: OBSTETRICS & GYNECOLOGY

## 2021-04-29 PROCEDURE — 99999 PR OFFICE/OUTPT VISIT,PROCEDURE ONLY: CPT | Performed by: OBSTETRICS & GYNECOLOGY

## 2021-05-03 ENCOUNTER — HOSPITAL ENCOUNTER (OUTPATIENT)
Age: 21
Setting detail: SPECIMEN
Discharge: HOME | End: 2021-05-03
Payer: COMMERCIAL

## 2021-05-09 LAB
AFP INTERPRETATION: NORMAL
AFP MOM: 0.99
AFP SPECIMEN: NORMAL
AFP: 82 NG/ML
DATE OF BIRTH: NORMAL
DATING METHOD: NORMAL
DETERMINED BY: NORMAL
DIABETIC: NEGATIVE
DONOR EGG?: NORMAL
DUE DATE: NORMAL
ESTIMATED DUE DATE: NORMAL
FAMILY HISTORY NTD: NEGATIVE
GESTATIONAL AGE: NORMAL
IN VITRO FERTILIZATION: NORMAL
INSULIN REQ DIABETES: NO
LAST MENSTRUAL PERIOD: NORMAL
MATERNAL AGE AT EDD: 20.4 YR
MATERNAL WEIGHT: 165
MONOCHORIONIC TWINS: NORMAL
NUMBER OF FETUSES: NORMAL
PATIENT WEIGHT UNITS: NORMAL
PATIENT WEIGHT: NORMAL
RACE (MATERNAL): NORMAL
RACE: NORMAL
REPEAT SPECIMEN?: NORMAL
SMOKING: NORMAL
SMOKING: NORMAL
VALPROIC/CARBAMAZEP: NORMAL
ZZ NTE CLEAN UP: HISTORY: NO

## 2021-05-19 ENCOUNTER — HOSPITAL ENCOUNTER (OUTPATIENT)
Age: 21
Discharge: HOME | End: 2021-05-19
Attending: OBSTETRICS & GYNECOLOGY | Admitting: OBSTETRICS & GYNECOLOGY
Payer: COMMERCIAL

## 2021-05-19 VITALS
TEMPERATURE: 98.2 F | RESPIRATION RATE: 16 BRPM | DIASTOLIC BLOOD PRESSURE: 68 MMHG | SYSTOLIC BLOOD PRESSURE: 127 MMHG | HEART RATE: 98 BPM

## 2021-05-19 PROBLEM — Z3A.25 25 WEEKS GESTATION OF PREGNANCY: Status: ACTIVE | Noted: 2021-05-19

## 2021-05-19 LAB
-: NORMAL
AMORPHOUS: NORMAL
BACTERIA: NORMAL
BILIRUBIN URINE: NEGATIVE
CASTS UA: NORMAL /LPF (ref 0–8)
COLOR: YELLOW
CRYSTALS, UA: NORMAL /HPF
EPITHELIAL CELLS UA: NORMAL /HPF (ref 0–5)
GLUCOSE URINE: NEGATIVE
KETONES, URINE: NEGATIVE
LEUKOCYTE ESTERASE, URINE: NEGATIVE
MUCUS: NORMAL
NITRITE, URINE: NEGATIVE
OTHER OBSERVATIONS UA: NORMAL
PH UA: 7 (ref 5–8)
PROTEIN UA: NEGATIVE
RBC UA: NORMAL /HPF (ref 0–4)
RENAL EPITHELIAL, UA: NORMAL /HPF
SPECIFIC GRAVITY UA: 1.01 (ref 1–1.03)
TRICHOMONAS: NORMAL
TURBIDITY: CLEAR
URINE HGB: NEGATIVE
UROBILINOGEN, URINE: NORMAL
WBC UA: NORMAL /HPF (ref 0–5)
YEAST: NORMAL

## 2021-05-19 PROCEDURE — 6370000000 HC RX 637 (ALT 250 FOR IP): Performed by: STUDENT IN AN ORGANIZED HEALTH CARE EDUCATION/TRAINING PROGRAM

## 2021-05-19 PROCEDURE — 87086 URINE CULTURE/COLONY COUNT: CPT

## 2021-05-19 PROCEDURE — 87491 CHLMYD TRACH DNA AMP PROBE: CPT

## 2021-05-19 PROCEDURE — G0463 HOSPITAL OUTPT CLINIC VISIT: HCPCS

## 2021-05-19 PROCEDURE — 81001 URINALYSIS AUTO W/SCOPE: CPT

## 2021-05-19 PROCEDURE — 87591 N.GONORRHOEAE DNA AMP PROB: CPT

## 2021-05-19 PROCEDURE — 87210 SMEAR WET MOUNT SALINE/INK: CPT

## 2021-05-19 PROCEDURE — 99213 OFFICE O/P EST LOW 20 MIN: CPT

## 2021-05-19 RX ORDER — ACETAMINOPHEN 500 MG
1000 TABLET ORAL ONCE
Status: COMPLETED | OUTPATIENT
Start: 2021-05-19 | End: 2021-05-19

## 2021-05-19 RX ADMIN — ACETAMINOPHEN 1000 MG: 500 TABLET ORAL at 12:23

## 2021-05-19 NOTE — PROGRESS NOTES
OB/GYN Note    Patient reevaluated. She reports she feels much better and is requesting discharge home. Her cramping has resolved and she is feeling good fetal movement. She has no other complaints. Fetal tracing reviewed and reassuring for GA. LBUS performed and is reassuring with good fetal movement, anterior placenta, >2 x2cm pocket of fluid. Patient is stable for discharge. For all patients over 28 weeks gestation, Kick sheet parameters every 8 hours were reviewed and recommended. All questions answered. Patient vocalized understanding.       Patient is aware that she should return to the hospital if she has worsening contractions, LOF, VB or decreased fetal movements. She voices understanding. Patient is aware that she should return to hospital if she experiences unrelenting headache, vision changes, RUQ pain, nausea, vomiting, and peripheral edema. She voices understanding.     Vitals:    05/19/21 1013   BP: 127/68   Pulse: 98   Resp: 16   Temp: 98.2 °F (36.8 °C)     Palak Sparks  Ob-Gyn Resident PGY-4  Pager: [phone]

## 2021-05-19 NOTE — H&P
OBSTETRICAL HISTORY [facility]    Date: 2021       Time: 11:07 AM   Patient Name: Laury Blanca     Patient : 2000  Room/Bed: 8222/5518-13    Admission Date/Time: 2021  9:57 AM      CC: Abdominal cramping and decreased FM x 3days     HPI: Laury Blanca is a 22 y.o.  at 25w0d who presents with abdominal cramping for a few days that is in bilateral groin and radiating up to her bilateral sides. The patient reports fetal movement is present now but it has been 3 days with no fetal movement prior to presenting to triage today, unsure of contractions, denies loss of fluid, denies vaginal bleeding. Patient denies headache, vision changes, nausea, vomiting, fever, chills, shortness of breath, chest pain, RUQ pain, diarrhea, change in color/amount/odor of vaginal discharge, dysuria or, hematuria. She denies any hx of UTI, kidney stones. She last had sexual intercourse yesterday evening. DATING:  LMP: Patient's last menstrual period was 2020.   Estimated Date of Delivery: 21   Based on: LMP    PREGNANCY RISK FACTORS:  Patient Active Problem List   Diagnosis    Asthma    25 weeks gestation of pregnancy        Steroids Given In This Pregnancy:  no     REVIEW OF SYSTEMS:   Constitutional: negative fever, negative chills, negative weight changes   HEENT: negative visual disturbances, negative headaches, negative dizziness  Breast: negative breast abnormalities, negative breast lumps, negative nipple discharge  Respiratory: negative dyspnea, negative cough, negative SOB  Cardiovascular: negative chest pain,  negative palpitations, negative arrhythmia, negative syncope   Gastrointestinal: +abdominal pain, negative RUQ pain, negative N/V, negative diarrhea, negative constipation, negative bowel changes  Genitourinary: negative dysuria, negative hematuria, negative urinary incontinence, negative vaginal discharge, negative vaginal bleeding  Dermatological: negative rash, negative pruritis, negative mole or other skin changes  Hematologic: negative bruising, negative personal/family history of DVT/PE  Immunologic/Lymphatic: negative recent illness, negative recent sick contact  Musculoskeletal: negative back pain, negative myalgias, negative arthralgias  Neurological:  negative dizziness, negative migraines, negative seizures, negative weakness  Behavior/Psych: negative depression, negative anxiety, negative SI, negative HI    OBSTETRICAL HISTORY:   OB History    Para Term  AB Living   1 0 0 0 0 0   SAB TAB Ectopic Molar Multiple Live Births   0 0 0 0 0 0      # Outcome Date GA Lbr Ric/2nd Weight Sex Delivery Anes PTL Lv   1 Current                PAST MEDICAL HISTORY:   has no past medical history on file. PAST SURGICAL HISTORY:   has no past surgical history on file. ALLERGIES:  has No Known Allergies. MEDICATIONS:  Prior to Admission medications    Medication Sig Start Date End Date Taking? Authorizing Provider   vitamin B-6 (PYRIDOXINE) 25 MG tablet Take 1 tablet by mouth nightly 21  Yes Harsh Barlow DO   Prenatal Vit-Fe Fumarate-FA (PRENATAL/FOLIC ACID PO) Take by mouth   Yes Historical Provider, MD   famotidine (PEPCID) 40 MG tablet Take 1 tablet by mouth every evening 21  Harsh Barlow,    doxyLAMINE succinate (UNISOM SLEEPTABS) 25 MG tablet Take 1 tablet by mouth nightly  Patient not taking: Reported on 2021   Daryl Ashton PA-C       FAMILY HISTORY:  family history is not on file. SOCIAL HISTORY:   reports that she has never smoked. She has never used smokeless tobacco. She reports previous alcohol use. She reports current drug use. Drug: Marijuana.     VITALS:  Vitals:    21 1013   BP: 127/68   Pulse: 98   Resp: 16   Temp: 98.2 °F (36.8 °C)       PHYSICAL EXAM:  Fetal Heart Monitor:  Baseline Heart Rate 145, moderate variability, present accelerations, absent decelerations  [place]: none    General appearance:  no apparent distress, alert and cooperative  HEENT: head atraumatic, normocephalic, moist mucous membranes, trachea midline  Neurologic:  alert, oriented, normal speech, no focal findings or movement disorder noted  Lungs:  No increased work of breathing, good air exchange, clear to auscultation bilaterally, no crackles or wheezing  Heart:  regular rate and rhythm and no murmur, rubs, gallops  Abdomen:  soft, gravid, no rebound, guarding, rigidity, non-tender, no right upper quadrant tenderness, uterus non-tender, no signs of abruption and no signs of chorioamnionitis, Reproducible pain with distraction of the uterus with bilaterally with displacement and with cephalad traction, worse on the right side, bilateral TTP with deep palpation of the bilateral groin (round ligaments).   Extremities:  no calf tenderness, non edematous  Musculoskeletal: Gross strength equal and intact throughout, CVA tenderness: none  Psychiatric: Mood appropriate, normal affect   Rectal Exam: not indicated   Pelvic exam:  Chaperone for Intimate Exam: Chaperone was present for entire exam, Chaperone Name: Valentín Wolff RN    Sterile Speculum Exam:   Urethral meatus: normal appearing   Vulva: Normal hair distribution, normal appearing vulva, no masses, tenderness or lesions, normal clitoris   Vagina: Normal appearing vaginal mucosa without lesions, physiologic appearing vaginal discharge noted in the posterior vault, no lacerations   Cervix: Normal appearing cervix without lesions, external os visibly closed, no lacerations or abnormal lesions visualized    DATA:  Membranes Ruptured: No  Valsalva/Pooling: absent  Vaginal Bleeding: absent    Wet prep: Clue cells Absent , Trichomonas Absent   KOH:  Yeast or Hyphae Absent   Whiff Test: negative     OMM Structural Exam:  Chief Complaint:  Pregnancy    Anterior/ Posterior Spinal Curves: Lumbar Lordosis -  Increased  Scoliosis (Lateral Spinal Curves): None  Assessment Tool:  T= Tenderness, A= Asymmetry, R= Restricted Motion (A=Active, P=Passive), T=Tissue Texture Changes  Region Evaluated : Severity / Specific of Major Somatic Dysfunction  M99.03 Lumbar -  Minor TART - more than BG levels -   Major Correlations with:  Genitourinary  Structural Diagnosis: Increased lumbar lordosis  Treatment Plan: Outpatient     PRENATAL LAB RESULTS:   Blood Type/Rh: O neg  Antibody Screen: negative  Hemoglobin, Hematocrit, Platelets: 63.9 / 56.4 / 263  Rubella: non-immune  T. Pallidum, IgG: non-reactive   Hepatitis B Surface Antigen: non-reactive   HIV: non-reactive   Sickle Cell Screen: negative  Gonorrhea: negative  Chlamydia: negative  Urine culture: negative, date: 21    Early 1 hour Glucose Tolerance Test: not done  Early 3 hour Glucose Tolerance Test: not done  1 hour Glucose Tolerance Test: not done  3 hour Glucose Tolerance Test: not done    Group B Strep: not done  Cystic Fibrosis Screen: negative  First Trimester Screen: not done  MSAFP/Multiple Markers: normal  Non-Invasive Prenatal Testing: no aneuploidy detected  Anatomy US: Cephalic, anterior, 3VC, male,  Normal anatomy    ASSESSMENT & PLAN:  Bozena Tabares is a 22 y.o. female  at 19w0d IUP   - GBS unknown / Rh negative / R immune   - Pen G for GBS prophylaxis if delivery imminent    Abdominal pain   - VSS   - SSE: External os visually closed   - Wet prep negative for Trich, negative for yeast, negative for BV    - GC/C, UA/UCx pending   - Will try tylenol 1000 PO x1   - Pain is most likely musculoskeletal in nature. Encourged PO hydration, Tylenol PRN, belly band, heat PRN for discomfort.     Decreased FM   - Cat I FHT, Contractions none   - Will consider BSUS before DC for reassurance    Asthma   - Stable on albuterol inhaler PRN    FHx chiari malformation type 1 and cleft lip (FOB 1/2 bro)    FHx autism/dev delay (pt's sister)    THC use   - Encouraged cessation    Maternal short stature    Patient Active Problem List    Diagnosis Date Noted    25 weeks gestation of pregnancy 05/19/2021    Asthma 02/11/2021       Plan discussed with Dr. Gabriela Canales, who is agreeable. Steroids given this admission: No    Risks, benefits, alternatives and possible complications have been discussed in detail with the patient. Admission, and post admission procedures and expectations were discussed in detail. All questions were answered.     Attending's Name: Dr. Chanel Ochoa DO  Ob/Gyn Resident  5/19/2021, 11:07 AM

## 2021-05-20 LAB
C TRACH DNA GENITAL QL NAA+PROBE: NEGATIVE
CULTURE: NORMAL
Lab: NORMAL
N. GONORRHOEAE DNA: NEGATIVE
SPECIMEN DESCRIPTION: NORMAL
SPECIMEN DESCRIPTION: NORMAL

## 2021-05-24 LAB
KOH (POC): NORMAL
WET PREP (POC): NORMAL

## 2021-05-26 ENCOUNTER — TELEPHONE (OUTPATIENT)
Dept: OBGYN CLINIC | Age: 21
End: 2021-05-26

## 2021-05-26 NOTE — TELEPHONE ENCOUNTER
Has she tried imodium? If not I would recommend that and  [employer], Bananas, Toast, rice. She may need to see pcp also if diarrhea persists for further testing.

## 2021-05-26 NOTE — TELEPHONE ENCOUNTER
Lisbet Lainez was seen in ER a week ago for abdominal cramping- she complains of having diarrhea for about 1 week now. No other symptoms, denies fevers/chills, vomiting. She is able to keep food down and tries to stay hydrated. She stated there are times where she is having diarrhea up to 13 times a day. Any recommendations?     She has an appt with Dr. Cristobal Tafoya tomorrow at [address]

## 2021-05-27 ENCOUNTER — ROUTINE PRENATAL (OUTPATIENT)
Dept: OBGYN CLINIC | Age: 21
End: 2021-05-27

## 2021-05-27 VITALS — DIASTOLIC BLOOD PRESSURE: 72 MMHG | BODY MASS INDEX: 34.13 KG/M2 | SYSTOLIC BLOOD PRESSURE: 118 MMHG | WEIGHT: 169 LBS

## 2021-05-27 DIAGNOSIS — O26.892 RH NEGATIVE STATE IN ANTEPARTUM PERIOD, SECOND TRIMESTER: ICD-10-CM

## 2021-05-27 DIAGNOSIS — Z34.92 NORMAL PREGNANCY IN SECOND TRIMESTER: Primary | ICD-10-CM

## 2021-05-27 DIAGNOSIS — Z67.91 RH NEGATIVE STATE IN ANTEPARTUM PERIOD, SECOND TRIMESTER: ICD-10-CM

## 2021-05-27 DIAGNOSIS — Z3A.26 26 WEEKS GESTATION OF PREGNANCY: ICD-10-CM

## 2021-05-27 PROCEDURE — 0502F SUBSEQUENT PRENATAL CARE: CPT | Performed by: OBSTETRICS & GYNECOLOGY

## 2021-05-30 NOTE — PROGRESS NOTES
Chencho Summers is a 22 y.o. female 30w1d        OB History    Para Term  AB Living   1             SAB TAB Ectopic Molar Multiple Live Births                    # Outcome Date GA Lbr Ric/2nd Weight Sex Delivery Anes PTL Lv   1 Current                Vitals  BP: 118/72  Weight: 169 lb (76.7 kg)  Patient Position: Sitting    The patient was seen and evaluated. There was positive fetal movements. No contractions or leakage of fluid. Signs and symptoms of  labor as well as labor were reviewed. The patients anatomy ultrasound has been completed and reviewed with patient. TOP  OH Reviewed. A 28 week lab panel was ordered. This includes a (HH, 1 hr GTT, U/A C&S). The patient is to complete this in the next two to four weeks. The S/S of Pre-Eclampsia were reviewed with the patient in detail. She is to report any of these if they occur. She currently denies any of these. The patient is RH negative Rhogam Ordered yes    The patient was instructed on fetal kick counts and was given a kick sheet to complete every 8 hours. This is to begin at 28 weeks gestation. She was instructed that the baby should move at a minimum of ten times within one hour after a meal. The patient was instructed to lay down on her left side twenty minutes after eating and count movements for up to one hour with a target value of ten movements. She was instructed to notify the office if she did not make that target after two attempts or if after any attempt there was less than four movements. NIPS Testing ordered- 21 Results NEGATIVE Gender- BOY! - Enriqueta Phlegm  Fm Hx of Autism Sister   Initial prenatal labs in care everywhere:   nonimmune rubella  Blood type O negative  +THC on UDS  Will need Rhogam at 28wks      Assessment:  1Amparo Summers is a 22 y.o. female  2.    3. 26w4d    Patient Active Problem List    Diagnosis Date Noted    25 weeks gestation of pregnancy 2021    Asthma 2021        Diagnosis Orders   1. Normal pregnancy in second trimester  Glucose tolerance, 1 hour    CBC Auto Differential    Urinalysis Reflex to Culture    Antibody Screen   2. 26 weeks gestation of pregnancy     3. Rh negative state in antepartum period, second trimester  Antibody Screen         Plan:  The patient will return to the office for her next visit in 3 weeks. See antepartum flow sheet. Counseled on s/s of preeclampsia. Counseled on s/s of  labor. Patient was seen with total face to face time of 20 minutes. More than 50% of this visit was on counseling and education regarding her    Diagnosis Orders   1. Normal pregnancy in second trimester  Glucose tolerance, 1 hour    CBC Auto Differential    Urinalysis Reflex to Culture    Antibody Screen   2. 26 weeks gestation of pregnancy     3. Rh negative state in antepartum period, second trimester  Antibody Screen    and her options. She was also counseled on her preventative health maintenance recommendations and follow-up.

## 2021-06-03 ENCOUNTER — HOSPITAL ENCOUNTER (OUTPATIENT)
Age: 21
Setting detail: SPECIMEN
Discharge: HOME | End: 2021-06-03
Payer: COMMERCIAL

## 2021-06-03 DIAGNOSIS — Z67.91 RH NEGATIVE STATE IN ANTEPARTUM PERIOD, SECOND TRIMESTER: ICD-10-CM

## 2021-06-03 DIAGNOSIS — Z34.92 NORMAL PREGNANCY IN SECOND TRIMESTER: ICD-10-CM

## 2021-06-03 DIAGNOSIS — O26.892 RH NEGATIVE STATE IN ANTEPARTUM PERIOD, SECOND TRIMESTER: ICD-10-CM

## 2021-06-03 LAB
ABSOLUTE EOS #: 0.11 K/UL (ref 0–0.44)
ABSOLUTE IMMATURE GRANULOCYTE: 0.18 K/UL (ref 0–0.3)
ABSOLUTE LYMPH #: 1.81 K/UL (ref 1.2–5.2)
ABSOLUTE MONO #: 0.69 K/UL (ref 0.1–1.4)
ANTIBODY SCREEN: NEGATIVE
BASOPHILS # BLD: 1 % (ref 0–2)
BASOPHILS ABSOLUTE: 0.06 K/UL (ref 0–0.2)
BILIRUBIN URINE: NEGATIVE
COLOR: YELLOW
COMMENT UA: NORMAL
DIFFERENTIAL TYPE: ABNORMAL
EOSINOPHILS RELATIVE PERCENT: 1 % (ref 1–4)
GLUCOSE ADMINISTRATION: NORMAL
GLUCOSE TOLERANCE SCREEN 50G: 78 MG/DL (ref 70–135)
GLUCOSE URINE: NEGATIVE
HCT VFR BLD CALC: 39.2 % (ref 36.3–47.1)
HEMOGLOBIN: 11.9 G/DL (ref 11.9–15.1)
IMMATURE GRANULOCYTES: 2 %
KETONES, URINE: NEGATIVE
LEUKOCYTE ESTERASE, URINE: NEGATIVE
LYMPHOCYTES # BLD: 18 % (ref 25–45)
MCH RBC QN AUTO: 28.3 PG (ref 25.2–33.5)
MCHC RBC AUTO-ENTMCNC: 30.4 G/DL (ref 28.4–34.8)
MCV RBC AUTO: 93.3 FL (ref 82.6–102.9)
MONOCYTES # BLD: 7 % (ref 2–8)
NITRITE, URINE: NEGATIVE
NRBC AUTOMATED: 0 PER 100 WBC
PDW BLD-RTO: 14.3 % (ref 11.8–14.4)
PH UA: 7.5 (ref 5–8)
PLATELET # BLD: 304 K/UL (ref 138–453)
PLATELET ESTIMATE: ABNORMAL
PMV BLD AUTO: 9.6 FL (ref 8.1–13.5)
PROTEIN UA: NEGATIVE
RBC # BLD: 4.2 M/UL (ref 3.95–5.11)
RBC # BLD: ABNORMAL 10*6/UL
SEG NEUTROPHILS: 71 % (ref 34–64)
SEGMENTED NEUTROPHILS ABSOLUTE COUNT: 7.36 K/UL (ref 1.8–8)
SPECIFIC GRAVITY UA: 1.02 (ref 1–1.03)
TURBIDITY: CLEAR
URINE HGB: NEGATIVE
UROBILINOGEN, URINE: NORMAL
WBC # BLD: 10.2 K/UL (ref 4.5–13.5)
WBC # BLD: ABNORMAL 10*3/UL

## 2021-06-04 ENCOUNTER — NURSE ONLY (OUTPATIENT)
Dept: OBGYN CLINIC | Age: 21
End: 2021-06-04
Payer: COMMERCIAL

## 2021-06-04 DIAGNOSIS — O26.892 RH NEGATIVE STATE IN ANTEPARTUM PERIOD, SECOND TRIMESTER: Primary | ICD-10-CM

## 2021-06-04 DIAGNOSIS — Z3A.27 27 WEEKS GESTATION OF PREGNANCY: ICD-10-CM

## 2021-06-04 DIAGNOSIS — Z67.91 RH NEGATIVE STATE IN ANTEPARTUM PERIOD, SECOND TRIMESTER: Primary | ICD-10-CM

## 2021-06-04 PROCEDURE — 96372 THER/PROPH/DIAG INJ SC/IM: CPT | Performed by: OBSTETRICS & GYNECOLOGY

## 2021-06-15 ENCOUNTER — HOSPITAL ENCOUNTER (OUTPATIENT)
Age: 21
Discharge: HOME | End: 2021-06-15
Attending: OBSTETRICS & GYNECOLOGY | Admitting: OBSTETRICS & GYNECOLOGY
Payer: COMMERCIAL

## 2021-06-15 ENCOUNTER — TELEPHONE (OUTPATIENT)
Dept: OBGYN CLINIC | Age: 21
End: 2021-06-15

## 2021-06-15 VITALS
HEART RATE: 91 BPM | DIASTOLIC BLOOD PRESSURE: 62 MMHG | RESPIRATION RATE: 14 BRPM | TEMPERATURE: 98.6 F | SYSTOLIC BLOOD PRESSURE: 119 MMHG

## 2021-06-15 PROBLEM — O09.92 HIGH-RISK PREGNANCY IN SECOND TRIMESTER: Status: ACTIVE | Noted: 2021-06-15

## 2021-06-15 LAB
BILIRUBIN URINE: NEGATIVE
COLOR: YELLOW
COMMENT UA: NORMAL
DIRECT EXAM: ABNORMAL
GLUCOSE URINE: NEGATIVE
KETONES, URINE: NEGATIVE
LEUKOCYTE ESTERASE, URINE: NEGATIVE
Lab: ABNORMAL
NITRITE, URINE: NEGATIVE
PH UA: 7 (ref 5–8)
PROTEIN UA: NEGATIVE
SPECIFIC GRAVITY UA: 1.01 (ref 1–1.03)
SPECIMEN DESCRIPTION: ABNORMAL
TURBIDITY: CLEAR
URINE HGB: NEGATIVE
UROBILINOGEN, URINE: NORMAL

## 2021-06-15 PROCEDURE — 87591 N.GONORRHOEAE DNA AMP PROB: CPT

## 2021-06-15 PROCEDURE — 87660 TRICHOMONAS VAGIN DIR PROBE: CPT

## 2021-06-15 PROCEDURE — 87510 GARDNER VAG DNA DIR PROBE: CPT

## 2021-06-15 PROCEDURE — 81003 URINALYSIS AUTO W/O SCOPE: CPT

## 2021-06-15 PROCEDURE — 87491 CHLMYD TRACH DNA AMP PROBE: CPT

## 2021-06-15 PROCEDURE — 87480 CANDIDA DNA DIR PROBE: CPT

## 2021-06-15 PROCEDURE — 99213 OFFICE O/P EST LOW 20 MIN: CPT

## 2021-06-15 PROCEDURE — G0463 HOSPITAL OUTPT CLINIC VISIT: HCPCS

## 2021-06-15 NOTE — H&P
OBSTETRICAL HISTORY [facility]    Date: 6/15/2021       Time: 11:48 AM   Patient Name: Enrico Knight     Patient : 2000  Room[facility]Bed: [facility]01    Admission Date/Time: 6/15/2021  9:23 AM      CC: Vaginal Bleeding and loss of fluid     HPI: Enrico Knight is a 22 y.o.  at 30w11d who presents vaginal bleeding and loss of fluid. Pt states while she was at work around 7:30am, she went to go use the bathroom and noticed some spotting on the tissue paper, she did not have any recurrent bleeding. Pt states she also noticed that her underwear and pants were soaked with fluid, which she states she does not attribute to urine. The patient reports fetal movement is present, denies contractions. . Denies any chest pain, shortness of breath, RUQ pain, and bilateral lower extremity swelling      Pregnancy is complicated by asthma    DATING:  LMP: Patient's last menstrual period was 2020. Estimated Date of Delivery: 21   Based on: early ultrasound, at 15 3/7 weeks GA    PREGNANCY RISK FACTORS:  Patient Active Problem List   Diagnosis    Asthma    25 weeks gestation of pregnancy    High-risk pregnancy in second trimester        Steroids Given In This Pregnancy:  no     REVIEW OF SYSTEMS:   Constitutional: negative fever, negative chills, negative weight changes   HEENT: negative visual disturbances, negative headaches, negative dizziness, negative hearing loss  Breast: Negative breast abnormalities, negative breast lumps, negative nipple discharge  Respiratory: negative dyspnea, negative cough, negative SOB  Cardiovascular: negative chest pain,  negative palpitations, negative arrhythmia, negative syncope   Gastrointestinal: positive for abdominal pain/cramping, negative RUQ pain, negative N/V, negative diarrhea, negative constipation, negative bowel changes, negative heartburn   Genitourinary: positive for vaginal bleed, positive for loss of fluid.  negative decelerations  [place]: contractions, none    General appearance:  no apparent distress, alert and cooperative  HEENT: head atraumatic, normocephalic, trachea midline, moist mucous membranes   Neurologic:  alert, oriented, normal speech, no focal findings or movement disorder noted  Lungs:  no increased work of breathing, good air exchange, clear to auscultation bilaterally, no crackles or wheezing  Heart:  regular rate and rhythm and no murmur    Abdomen:  soft, gravid, non-tender on palpation, no right upper quadrant tenderness, no CVA tenderness bilaterally, uterus non-tender, no signs of abruption and no signs of chorioamnionitis  Extremities:  no calf tenderness bilaterally, non-edematous bilaterally, DTRs: normal    Musculoskeletal: no gross abnormalities, range of motion appropriate for age   Psychiatric: mood appropriate, normal affect   Pelvic Exam:   Vulva: normal appearing vulva, no masses, tenderness or lesions, normal clitoris    Vagina: normal appearing urethral meatus, normal appearing vaginal mucosa with normal color and discharge, no lesions, no vaginal bleeding , no fluid noted in the vaginal vault   Cervix: normal appearing cervix without pathologic appearing discharge or lesions, no cervical motion tenderness   Uterus: is gravid, normal size, shape, consistency and non-tender    Adnexa: non-tender, no palpable masses   Rectal Exam: not indicated     DATA:  Membranes Ruptured: No  Fern: negative  Nitrazine: Negative  Valsalva/Pooling: absent  Vaginal Bleeding: absent    OMM Structural Exam:  Chief Complaint:  Pregnancy    Anterior/ Posterior Spinal Curves: Lumbar Lordosis -  Increased  Scoliosis (Lateral Spinal Curves): None  Assessment Tool:  T= Tenderness, A= Asymmetry, R= Restricted Motion (A=Active, P=Passive), T=Tissue Texture Changes  Region Evaluated : Severity / Specific of Major Somatic Dysfunction  M99.03 Lumbar -  Minor TART - more than BG levels -   Major Correlations

## 2021-06-15 NOTE — FLOWSHEET NOTE
Pt presents to L and D, accompanied by FOB, via wheelchair. Pt here for vaginal bleeding at work this morning around 0730 am. Pt states she noticed bright red blood when wiping, but when she went to check again there was nothing. Now she just feels \"wet. \" Pt states she had some cramping a few days ago, but thought it was because baby moves so much; she's in no acute discomfort today. Pt reports +FM. Pt gowned and in bed, oriented to room and call light. EFM explained and applied. Dr. Edi Spain notified of admission.

## 2021-06-15 NOTE — FLOWSHEET NOTE
Pt given discharge instructions,  labor precautions and educated on fetal kick counts. Pt agreeable. Pt aware of upcoming appts and offers no concerns at this time. Pt ambulates with FOB to leave L&D.

## 2021-06-15 NOTE — FLOWSHEET NOTE
Pt resting comfortably on the stretcher, but wishes to get up and move around. EFM tracing intermittently. Pt requests to order food while she waits.

## 2021-06-16 ENCOUNTER — PATIENT MESSAGE (OUTPATIENT)
Dept: OBGYN CLINIC | Age: 21
End: 2021-06-16

## 2021-06-16 LAB
C TRACH DNA GENITAL QL NAA+PROBE: NEGATIVE
N. GONORRHOEAE DNA: NEGATIVE
SPECIMEN DESCRIPTION: NORMAL

## 2021-06-16 NOTE — TELEPHONE ENCOUNTER
From: Luís Hogan  To: Fred Chanel DO  Sent: 6/16/2021 1:10 PM EDT  Subject: Test Results Question    I saw that it said a positive result for canida species which is a yeast infection i believe? Am i okay to use monistat for that? Or should i use something else? Ramy Lopez Thank you.

## 2021-06-22 ENCOUNTER — HOSPITAL ENCOUNTER (OUTPATIENT)
Age: 21
Discharge: HOME | End: 2021-06-22
Attending: OBSTETRICS & GYNECOLOGY | Admitting: OBSTETRICS & GYNECOLOGY
Payer: COMMERCIAL

## 2021-06-22 VITALS
RESPIRATION RATE: 20 BRPM | TEMPERATURE: 98.6 F | SYSTOLIC BLOOD PRESSURE: 112 MMHG | DIASTOLIC BLOOD PRESSURE: 50 MMHG | HEART RATE: 100 BPM

## 2021-06-22 PROBLEM — Z3A.29 29 WEEKS GESTATION OF PREGNANCY: Status: ACTIVE | Noted: 2021-06-22

## 2021-06-22 LAB
ABSOLUTE EOS #: 0.09 K/UL (ref 0–0.44)
ABSOLUTE IMMATURE GRANULOCYTE: 0.15 K/UL (ref 0–0.3)
ABSOLUTE LYMPH #: 2.03 K/UL (ref 1.2–5.2)
ABSOLUTE MONO #: 0.78 K/UL (ref 0.1–1.4)
ALBUMIN SERPL-MCNC: 3.5 G/DL (ref 3.5–5.2)
ALBUMIN/GLOBULIN RATIO: 1.3 (ref 1–2.5)
ALP BLD-CCNC: 89 U/L (ref 35–104)
ALT SERPL-CCNC: 11 U/L (ref 5–33)
ANION GAP SERPL CALCULATED.3IONS-SCNC: 8 MMOL/L (ref 9–17)
AST SERPL-CCNC: 18 U/L
BASOPHILS # BLD: 0 % (ref 0–2)
BASOPHILS ABSOLUTE: 0.04 K/UL (ref 0–0.2)
BILIRUB SERPL-MCNC: 0.18 MG/DL (ref 0.3–1.2)
BILIRUBIN URINE: NEGATIVE
BUN BLDV-MCNC: 7 MG/DL (ref 6–20)
BUN/CREAT BLD: ABNORMAL (ref 9–20)
CALCIUM SERPL-MCNC: 8.5 MG/DL (ref 8.6–10.4)
CHLORIDE BLD-SCNC: 98 MMOL/L (ref 98–107)
CO2: 24 MMOL/L (ref 20–31)
COLOR: YELLOW
COMMENT UA: NORMAL
CREAT SERPL-MCNC: 0.54 MG/DL (ref 0.5–0.9)
CREATININE URINE: 74.6 MG/DL (ref 28–217)
DIFFERENTIAL TYPE: ABNORMAL
EOSINOPHILS RELATIVE PERCENT: 1 % (ref 1–4)
GFR AFRICAN AMERICAN: >60 ML/MIN
GFR NON-AFRICAN AMERICAN: >60 ML/MIN
GFR SERPL CREATININE-BSD FRML MDRD: ABNORMAL ML/MIN/{1.73_M2}
GFR SERPL CREATININE-BSD FRML MDRD: ABNORMAL ML/MIN/{1.73_M2}
GLUCOSE BLD-MCNC: 78 MG/DL (ref 70–99)
GLUCOSE URINE: NEGATIVE
HCT VFR BLD CALC: 34.8 % (ref 36.3–47.1)
HEMOGLOBIN: 11 G/DL (ref 11.9–15.1)
IMMATURE GRANULOCYTES: 1 %
KETONES, URINE: NEGATIVE
LEUKOCYTE ESTERASE, URINE: NEGATIVE
LYMPHOCYTES # BLD: 18 % (ref 25–45)
MCH RBC QN AUTO: 28.6 PG (ref 25.2–33.5)
MCHC RBC AUTO-ENTMCNC: 31.6 G/DL (ref 28.4–34.8)
MCV RBC AUTO: 90.6 FL (ref 82.6–102.9)
MONOCYTES # BLD: 7 % (ref 2–8)
NITRITE, URINE: NEGATIVE
NRBC AUTOMATED: 0 PER 100 WBC
PDW BLD-RTO: 14.6 % (ref 11.8–14.4)
PH UA: 7.5 (ref 5–8)
PLATELET # BLD: 274 K/UL (ref 138–453)
PLATELET ESTIMATE: ABNORMAL
PMV BLD AUTO: 9 FL (ref 8.1–13.5)
POTASSIUM SERPL-SCNC: 3.7 MMOL/L (ref 3.7–5.3)
PROTEIN UA: NEGATIVE
RBC # BLD: 3.84 M/UL (ref 3.95–5.11)
RBC # BLD: ABNORMAL 10*6/UL
SEG NEUTROPHILS: 73 % (ref 34–64)
SEGMENTED NEUTROPHILS ABSOLUTE COUNT: 8.36 K/UL (ref 1.8–8)
SODIUM BLD-SCNC: 130 MMOL/L (ref 135–144)
SPECIFIC GRAVITY UA: 1.01 (ref 1–1.03)
TOTAL PROTEIN, URINE: 6 MG/DL
TOTAL PROTEIN: 6.1 G/DL (ref 6.4–8.3)
TURBIDITY: CLEAR
URINE HGB: NEGATIVE
URINE TOTAL PROTEIN CREATININE RATIO: 0.08 (ref 0–0.2)
UROBILINOGEN, URINE: NORMAL
WBC # BLD: 11.5 K/UL (ref 4.5–13.5)
WBC # BLD: ABNORMAL 10*3/UL

## 2021-06-22 PROCEDURE — 80053 COMPREHEN METABOLIC PANEL: CPT

## 2021-06-22 PROCEDURE — 99213 OFFICE O/P EST LOW 20 MIN: CPT

## 2021-06-22 PROCEDURE — 85025 COMPLETE CBC W/AUTO DIFF WBC: CPT

## 2021-06-22 PROCEDURE — 82570 ASSAY OF URINE CREATININE: CPT

## 2021-06-22 PROCEDURE — 36415 COLL VENOUS BLD VENIPUNCTURE: CPT

## 2021-06-22 PROCEDURE — 84156 ASSAY OF PROTEIN URINE: CPT

## 2021-06-22 PROCEDURE — 81003 URINALYSIS AUTO W/O SCOPE: CPT

## 2021-06-22 RX ORDER — ACETAMINOPHEN 500 MG
1000 TABLET ORAL EVERY 6 HOURS PRN
Status: DISCONTINUED | OUTPATIENT
Start: 2021-06-22 | End: 2021-06-22 | Stop reason: HOSPADM

## 2021-06-22 NOTE — H&P
OBSTETRICAL HISTORY [facility]    Date: 2021       Time: 1:38 PM   Patient Name: Marito Huston     Patient : 2000  Room/Bed: 0710/0710-01    Admission Date/Time: 2021  1:29 PM      CC: Lightheadedness and palpations      HPI: Marito Huston is a 22 y.o.  at 29w6d who presents to L&D from her work reporting an episode of lightheadedness and racing heart. She states prior to the episode she had just come back from break and began lifting boxes. She suddenly felt faint and her heart began to race. She sat down and her symptoms improved slightly. Her BP was taken by the \"safety team\" and was told \"it was elevated\" but she does not know what it was. Upon arrival her symptoms had resolved. She states she drinks lots of water throughout the day and this has never happened before. The patient reports fetal movement is present, denies contractions, denies loss of fluid, denies vaginal bleeding. DATING:  LMP: Patient's last menstrual period was 2020.   Estimated Date of Delivery: 21   Based on: early ultrasound, at 15 3/7 weeks GA    PREGNANCY RISK FACTORS:  Patient Active Problem List   Diagnosis    Asthma    25 weeks gestation of pregnancy    High-risk pregnancy in second trimester    29 weeks gestation of pregnancy        Steroids Given In This Pregnancy:  no     REVIEW OF SYSTEMS:   Constitutional: negative fever, negative chills, negative weight changes   HEENT: negative visual disturbances, negative headaches, negative dizziness, negative hearing loss  Breast: Negative breast abnormalities, negative breast lumps, negative nipple discharge  Respiratory: negative dyspnea, negative cough, negative SOB  Cardiovascular: negative chest pain,  negative palpitations, negative arrhythmia, negative syncope   Gastrointestinal: negative abdominal pain, negative RUQ pain, negative N/V, negative diarrhea, negative constipation, negative bowel changes, negative heartburn   Genitourinary: negative dysuria, negative hematuria, negative urinary incontinence, negative vaginal discharge, negative vaginal bleeding or spotting  Dermatological: negative rash, negative pruritis, negative mole or other skin changes  Hematologic: negative bruising  Immunologic/Lymphatic: negative recent illness, negative recent sick contact  Musculoskeletal: negative back pain, negative myalgias, negative arthralgias  Neurological:  negative dizziness, negative migraines, negative seizures, negative weakness  Behavior/Psych: negative depression, negative anxiety, negative SI, negative HI    OBSTETRICAL HISTORY:   OB History    Para Term  AB Living   1 0 0 0 0 0   SAB TAB Ectopic Molar Multiple Live Births   0 0 0 0 0 0      # Outcome Date GA Lbr Ric/2nd Weight Sex Delivery Anes PTL Lv   1 Current                PAST MEDICAL HISTORY:   has no past medical history on file. PAST SURGICAL HISTORY:   has no past surgical history on file. ALLERGIES:  has No Known Allergies. MEDICATIONS:  Prior to Admission medications    Medication Sig Start Date End Date Taking? Authorizing Provider   miconazole (MICONAZOLE 7) 2 % vaginal cream Place vaginally nightly for 7 nights. 21  BEBA Vyas - CNP   famotidine (PEPCID) 40 MG tablet Take 1 tablet by mouth every evening 21  Prince Barlow,    vitamin B-6 (PYRIDOXINE) 25 MG tablet Take 1 tablet by mouth nightly 21   Prince Barlow,    doxyLAMINE succinate (UNISOM SLEEPTABS) 25 MG tablet Take 1 tablet by mouth nightly  Patient not taking: Reported on 2021   Christiano Schultz PA-C   Prenatal Vit-Fe Fumarate-FA (PRENATAL/FOLIC ACID PO) Take by mouth    Historical Provider, MD       FAMILY HISTORY:  family history is not on file. SOCIAL HISTORY:   reports that she has never smoked. She has never used smokeless tobacco. She reports previous alcohol use.  She reports current drug use. Drug: Marijuana. VITALS:  Vitals:    21 1404   BP: 112/66   Pulse: 95   Resp: 17   Temp: 98.6 °F (37 °C)       PHYSICAL EXAM:  Fetal Heart Monitor:  Baseline Heart Rate 135, moderate variability, present accelerations, absent decelerations  [place]: contractions, none    General appearance:  no apparent distress, alert and cooperative  HEENT: head atraumatic, normocephalic, moist mucous membranes, trachea midline  Neurologic:  alert, oriented, normal speech, no focal findings or movement disorder noted  Lungs:  No increased work of breathing, good air exchange, clear to auscultation bilaterally, no crackles or wheezing  Heart:  regular rate and rhythm and no murmur, rubs, gallops  Abdomen:  soft, gravid, non-tender, no rebound, guarding, or rigidity, no RUQ or epigastric tenderness, no signs or symptoms of abruption, no signs or symptoms of chorioamnionitis  Extremities:  no calf tenderness, non edematous, no varicosities, full range of motion in all four extremities  Musculoskeletal: Gross strength equal and intact throughout, no gross abnormalities, range of motion normal in hips, knees, shoulders and spine  Psychiatric: Mood appropriate, normal affect   Rectal Exam: not indicated    PRENATAL LAB RESULTS:   Blood Type/Rh: O neg  Antibody Screen: negative  Hemoglobin, Hematocrit, Platelets: 78.7 / 97.7 / 251  Rubella: non-immune  T.  Pallidum, IgG: non-reactive   Hepatitis B Surface Antigen: non-reactive   HIV: non-reactive   Sickle Cell Screen: not done  Gonorrhea: negative  Chlamydia: negative  Urine culture: negative, date: 21    1 hour Glucose Tolerance Test: 78    Group B Strep: not done  Cystic Fibrosis Screen: negative  First Trimester Screen: not available  MSAFP/Multiple Markers: normal  Non-Invasive Prenatal Testing: no aneuploidy detected  Anatomy US: Male anatomy, 3VC, normal insertion, Anterior placenta     ASSESSMENT & PLAN:  Laury Blanca is a 22 y.o. female  at 29w6d    - GBS unknown / Rh positive / R immune   - No indication for GBS prophylaxis     Lightheadedness/Palpatations    - VSS, Afebrile              - cEFM/TOCO: Cat 1, no contractions               - CBC, CMP unremarkable   - UA low suspicions for UTI   - PO hydration encouraged   - Tylenol for pain control    - She reports resolution of symptoms upon arrival to L&D   - Her next Ob apt is 6/24/21   - Will plan to DC at this time. Patient is aware that she should return to the hospital if she has worsening contractions, LOF, VB or decreased fetal movements. She voices understanding       Asthma   - Albuterol prn    THC Use   - Cessation encouraged     FHx of Chiari Malformation (FOB half brother)    FHx of Autism (Pts Sister)        Patient Active Problem List    Diagnosis Date Noted    33 weeks gestation of pregnancy 06/22/2021    High-risk pregnancy in second trimester 06/15/2021    25 weeks gestation of pregnancy 05/19/2021    Asthma 02/11/2021       Plan discussed with Dr. Kirit White, who is agreeable. Steroids given this admission: No    Risks, benefits, alternatives and possible complications have been discussed in detail with the patient. Admission, and post admission procedures and expectations were discussed in detail. All questions were answered.     Attending's Name: Dr. Idris Britt,   Ob/Gyn Resident  6/22/2021, 1:38 PM

## 2021-06-24 ENCOUNTER — ROUTINE PRENATAL (OUTPATIENT)
Dept: OBGYN CLINIC | Age: 21
End: 2021-06-24
Payer: COMMERCIAL

## 2021-06-24 VITALS — SYSTOLIC BLOOD PRESSURE: 110 MMHG | DIASTOLIC BLOOD PRESSURE: 62 MMHG | BODY MASS INDEX: 34.13 KG/M2 | WEIGHT: 169 LBS

## 2021-06-24 DIAGNOSIS — O99.519 ASTHMA DURING PREGNANCY: ICD-10-CM

## 2021-06-24 DIAGNOSIS — J45.909 ASTHMA DURING PREGNANCY: ICD-10-CM

## 2021-06-24 DIAGNOSIS — Z34.93 NORMAL PREGNANCY IN THIRD TRIMESTER: Primary | ICD-10-CM

## 2021-06-24 DIAGNOSIS — Z3A.30 30 WEEKS GESTATION OF PREGNANCY: ICD-10-CM

## 2021-06-24 PROCEDURE — 90471 IMMUNIZATION ADMIN: CPT | Performed by: NURSE PRACTITIONER

## 2021-06-24 PROCEDURE — 90715 TDAP VACCINE 7 YRS/> IM: CPT | Performed by: NURSE PRACTITIONER

## 2021-06-24 PROCEDURE — 0502F SUBSEQUENT PRENATAL CARE: CPT | Performed by: NURSE PRACTITIONER

## 2021-06-24 NOTE — PATIENT INSTRUCTIONS
than normal.    Watch closely for changes in your health, and be sure to contact your doctor if you have any problems. Where can you learn more? Go to [email or web address] org and sign in to your [employer] account. Enter U965 in the [employer] box to learn more about \"Counting Your Baby's Kicks: Care Instructions. \"     If you do not have an account, please click on the \"Sign Up Now\" link. Current as of: September 5, 2018  Content Version: 12.0  © 6739-3150 [employer] How To. Care instructions adapted under license by [facility] ([facility]). If you have questions about a medical condition or this instruction, always ask your healthcare professional. [address] any warranty or liability for your use of this information. Preeclampsia: Care Instructions  Overview    Preeclampsia occurs when a woman's blood pressure rises during pregnancy. Often with preeclampsia, you also have swelling in your legs, hands, and face. A test may show too much protein in your urine. Preeclampsia is also called toxemia. If preeclampsia is severe and not treated, it can lead to seizures (eclampsia) and damage to your liver or kidneys. Preeclampsia can prevent your baby from getting enough food and oxygen. This can cause a low birth weight or other problems. Your doctor will watch you closely to prevent these problems. He or she also may recommend that you reduce your activity. If your preeclampsia is a danger to your health or the health of your baby, your doctor may need to deliver your baby early. While preeclampsia is a concern, most women with preeclampsia have healthy babies. After a woman gives birth, preeclampsia usually goes away on its own. But symptoms may last a few weeks or more and can get worse after delivery. Rarely, symptoms of preeclampsia don't show up until days or even weeks after childbirth. Follow-up care is a key part of your treatment and safety.  Be sure to make and go to all appointments, and call your doctor if you are having problems. It's also a good idea to know your test results and keep a list of the medicines you take. How can you care for yourself at home? · Take and record your blood pressure at home if your doctor tells you to. ? Learn the importance of the two measures of blood pressure (such as 120 over 80, or 120/80). The first number is the systolic pressure, which is the force of blood on the artery walls as the heart pumps. The second number is the diastolic pressure, which is the force of blood on the artery walls between heartbeats, when the heart is at rest. You have a choice of monitors to use. ? Manual monitor: You pump up the cuff and use a stethoscope to listen for your pulse. ? Electronic monitor: The cuff inflates, and a gauge shows your pulse rate. ? To take your blood pressure:  ? Ask your doctor to check your blood pressure monitor to be sure that it is accurate and that the cuff fits you. Also ask your doctor to watch you to make sure that you are using it right. ? You should not eat, use tobacco products, or use medicine known to raise blood pressure (such as some nasal decongestant sprays) before you take your blood pressure. ? Avoid taking your blood pressure if you have just exercised. Also avoid taking it if you are nervous or upset. Rest at least 15 minutes before you take your blood pressure. · If your doctor advises, check the protein levels in your urine. Your doctor or nurse will show you how to do this. · Take your medicines exactly as prescribed. Call your doctor if you think you are having a problem with your medicine. · Do not smoke. Quitting smoking will help improve your baby's growth and health. If you need help quitting, talk to your doctor about stop-smoking programs and medicines. These can increase your chances of quitting for good.   · Eat a balanced and healthy diet that has lots of fruits and vegetables. · If your doctor advised bed rest, be sure to stay off your feet and rest as much as possible. ? Keep a phone, phone book, notepad, and pen near the bed where you can easily reach them. ? Gently stretch your legs every hour to maintain good blood flow. ? Have another family member pack snacks and lunch food in a cooler close to your bed. ? Use this time for activities that you usually cannot find time for, such as reading, craft projects, or letter writing. · You can keep track of your baby's health by noting the length of time it takes to count 10 movements (such as kicks, flutters, or rolls). Feeling 10 movements in less than 1 hour is considered normal. Track your baby's movements once each day. Bring this record with you to each prenatal visit. When should you call for help? Call 911 anytime you think you may need emergency care. For example, call if:    · You passed out (lost consciousness). · You have a seizure. Call your doctor now or seek immediate medical care if:    · You have symptoms of preeclampsia, such as:  ? Sudden swelling of your face, hands, or feet. ? New vision problems (such as dimness or blurring). ? A severe headache. · Your blood pressure is higher than it should be, or it rises suddenly. · You have new nausea or vomiting. · You think that you are in labor. · You have pain in your belly or pelvis. Watch closely for changes in your health, and be sure to contact your doctor if:    · You gain weight rapidly. Where can you learn more? Go to [email or web address].[email or web address] [email or web address] org and sign in to your [employer] account. Enter V053 in the [employer] box to learn more about \"Preeclampsia: Care Instructions. \"     If you do not have an account, please click on the \"Sign Up Now\" link. Current as of: September 5, 2018  Content Version: 12.0  © 1827-5150 Healthwise, Inc[employer]. Care instructions adapted under license by [facility] ([facility]).  If allow others to smoke around you. If you need help quitting, talk to your doctor about stop-smoking programs and medicines. These can increase your chances of quitting for good. When should you call for help? Call 911 anytime you think you may need emergency care. For example, call if:    · You passed out (lost consciousness). · You have severe vaginal bleeding. · You have severe pain in your belly or pelvis. · You have had fluid gushing or leaking from your vagina and you know or think the umbilical cord is bulging into your vagina. If this happens, immediately get down on your knees so your rear end (buttocks) is higher than your head. This will decrease the pressure on the cord until help arrives. Call your doctor now or seek immediate medical care if:    · You have signs of preeclampsia, such as:  ? Sudden swelling of your face, hands, or feet. ? New vision problems (such as dimness or blurring). ? A severe headache. · You have any vaginal bleeding. · You have belly pain or cramping. · You have a fever. · You have had regular contractions (with or without pain) for an hour. This means that you have 6 or more within 1 hour after you change your position and drink fluids. · You have a sudden release of fluid from the vagina. · You have low back pain or pelvic pressure that does not go away. · You notice that your baby has stopped moving or is moving much less than normal.    Watch closely for changes in your health, and be sure to contact your doctor if you have any problems. Where can you learn more? Go to https://TR Fleet LimitedwanderEncarnate.BoardBookit. org and sign in to your [employer] account. Enter Q400 in the [employer] box to learn more about \" Labor: Care Instructions. \"     If you do not have an account, please click on the \"Sign Up Now\" link. Current as of: 2018  Content Version: 12.0  © 5138-0009 Healthwise, [facility].  Care

## 2021-06-24 NOTE — PROGRESS NOTES
Presents for OB visit  Gestation 30w1d  Estimated Date of Delivery: 21    NIPS Testing ordered- 21 Results NEGATIVE Gender- BOY! - Omega Sink  Fm Hx of Autism Sister   Initial prenatal labs in care everywhere:   nonimmune rubella  Blood type O negative  +THC on UDS   Rhogam given 21                         OB History    Para Term  AB Living   1             SAB TAB Ectopic Molar Multiple Live Births                    # Outcome Date GA Lbr Ric/2nd Weight Sex Delivery Anes PTL Lv   1 Current                     No Known Allergies  Current Outpatient Medications   Medication Sig Dispense Refill    famotidine (PEPCID) 40 MG tablet Take 1 tablet by mouth every evening 30 tablet 2    vitamin B-6 (PYRIDOXINE) 25 MG tablet Take 1 tablet by mouth nightly 30 tablet 2    doxyLAMINE succinate (UNISOM SLEEPTABS) 25 MG tablet Take 1 tablet by mouth nightly (Patient not taking: Reported on 2021) 30 tablet 0    Prenatal Vit-Fe Fumarate-FA (PRENATAL/FOLIC ACID PO) Take by mouth       No current facility-administered medications for this visit. No past medical history on file. No past surgical history on file. Headaches: yes - she has hx of migraines has had frequent headaches, sometimes sleep will helps she has hd more persistent lately and also c/o allergy symptoms and asthma exacerbations. Using inhaler more than 2 times daily. She was in hospital on  for dizziness at work BP normal. She had CBC, CMP, prot /creat ratio, US were unremarkable. Denies severe headaches, vision changes, cp,  midepigastric pain or swelling.      Swelling: no  Bleeding: no  Discharge: no   Dysuria: no  Nausea: no  Heartburn: no  Epigastric pain: no  Contractions: no  Leakage of fluid: no  + fetal movement     Asthma in pregnancy consider antihistamine having some allergy symptoms also and order peak flow meter    Return 2 WEEKS, 1 week BP check (persistent headaches, hx of migraines) monitor   severe or persistent  headaches, vision changes, cp, worsening sob, midepigastric pain or swelling. Labs as indicated n/a    PTL signs reviewed, if indicated  Kick Count Instructions given if indicated: The patient was instructed on fetal kick counts and was given a kick sheet to complete every 8 hours. This is to begin at 28 weeks gestation. She was instructed that the baby should move at a minimum of ten times within one hour after a meal. The patient was instructed to lay down on her left side twenty minutes after eating and count movements for up to one hour with a target value of ten movements. She was instructed to notify the office if she did not make that target after two attempts or if after any attempt there was less than four movements. After hour numbers reviewed , along with labor signs if indicated. Contractions/cramping between 5-7 minutes and persisting even with attempts of increased water and laying on side. Fluid leakage, bleeding  NST information given no    The patient was counseled on the mandatory call ahead policy. She has been instructed to call the office at anytime prior to going into the hospital so the on-call physician may direct her to the appropriate facility for care. Exceptions were reviewed including but not limited to: Decreased fetal movement, vaginal Bleeding or hemorrhage, trauma, readily expectant delivery, or any instance where she feels 911 should be utilized. Of the 20 minute duration appointment visit, [facility] spent at least 50% of the face-to-face time in counseling, explanation of diagnosis, planning of further management, and answering all questions.

## 2021-06-29 ENCOUNTER — PATIENT MESSAGE (OUTPATIENT)
Dept: OBGYN CLINIC | Age: 21
End: 2021-06-29

## 2021-06-29 ENCOUNTER — HOSPITAL ENCOUNTER (OUTPATIENT)
Age: 21
Discharge: HOME | End: 2021-06-29
Attending: OBSTETRICS & GYNECOLOGY | Admitting: OBSTETRICS & GYNECOLOGY
Payer: COMMERCIAL

## 2021-06-29 ENCOUNTER — APPOINTMENT (OUTPATIENT)
Dept: GENERAL RADIOLOGY | Age: 21
End: 2021-06-29
Payer: COMMERCIAL

## 2021-06-29 ENCOUNTER — HOSPITAL ENCOUNTER (EMERGENCY)
Age: 21
Discharge: HOME | End: 2021-06-29
Attending: EMERGENCY MEDICINE
Payer: COMMERCIAL

## 2021-06-29 VITALS
RESPIRATION RATE: 20 BRPM | HEART RATE: 92 BPM | OXYGEN SATURATION: 99 % | SYSTOLIC BLOOD PRESSURE: 122 MMHG | DIASTOLIC BLOOD PRESSURE: 75 MMHG | TEMPERATURE: 97.3 F

## 2021-06-29 DIAGNOSIS — J45.909 ASTHMA DURING PREGNANCY: ICD-10-CM

## 2021-06-29 DIAGNOSIS — Z3A.30 30 WEEKS GESTATION OF PREGNANCY: Primary | ICD-10-CM

## 2021-06-29 DIAGNOSIS — O99.519 ASTHMA DURING PREGNANCY: ICD-10-CM

## 2021-06-29 DIAGNOSIS — Z3A.31 31 WEEKS GESTATION OF PREGNANCY: ICD-10-CM

## 2021-06-29 DIAGNOSIS — R19.7 DIARRHEA, UNSPECIFIED TYPE: ICD-10-CM

## 2021-06-29 DIAGNOSIS — R11.2 NON-INTRACTABLE VOMITING WITH NAUSEA, UNSPECIFIED VOMITING TYPE: ICD-10-CM

## 2021-06-29 DIAGNOSIS — R10.11 ABDOMINAL PAIN, RIGHT UPPER QUADRANT: Primary | ICD-10-CM

## 2021-06-29 LAB
ABSOLUTE EOS #: 0.11 K/UL (ref 0–0.44)
ABSOLUTE IMMATURE GRANULOCYTE: 0.18 K/UL (ref 0–0.3)
ABSOLUTE LYMPH #: 2.09 K/UL (ref 1.2–5.2)
ABSOLUTE MONO #: 0.72 K/UL (ref 0.1–1.4)
ALBUMIN SERPL-MCNC: 3.6 G/DL (ref 3.5–5.2)
ALBUMIN/GLOBULIN RATIO: 1.3 (ref 1–2.5)
ALP BLD-CCNC: 97 U/L (ref 35–104)
ALT SERPL-CCNC: 13 U/L (ref 5–33)
AMYLASE: 68 U/L (ref 28–100)
ANION GAP SERPL CALCULATED.3IONS-SCNC: 10 MMOL/L (ref 9–17)
AST SERPL-CCNC: 24 U/L
BASOPHILS # BLD: 0 % (ref 0–2)
BASOPHILS ABSOLUTE: 0.04 K/UL (ref 0–0.2)
BILIRUB SERPL-MCNC: 0.2 MG/DL (ref 0.3–1.2)
BILIRUBIN URINE: NEGATIVE
BUN BLDV-MCNC: 5 MG/DL (ref 6–20)
BUN/CREAT BLD: ABNORMAL (ref 9–20)
CALCIUM SERPL-MCNC: 8.8 MG/DL (ref 8.6–10.4)
CHLORIDE BLD-SCNC: 102 MMOL/L (ref 98–107)
CO2: 22 MMOL/L (ref 20–31)
COLOR: YELLOW
COMMENT UA: ABNORMAL
CREAT SERPL-MCNC: 0.26 MG/DL (ref 0.5–0.9)
DIFFERENTIAL TYPE: ABNORMAL
EOSINOPHILS RELATIVE PERCENT: 1 % (ref 1–4)
GFR AFRICAN AMERICAN: >60 ML/MIN
GFR NON-AFRICAN AMERICAN: >60 ML/MIN
GFR SERPL CREATININE-BSD FRML MDRD: ABNORMAL ML/MIN/{1.73_M2}
GFR SERPL CREATININE-BSD FRML MDRD: ABNORMAL ML/MIN/{1.73_M2}
GLUCOSE BLD-MCNC: 86 MG/DL (ref 70–99)
GLUCOSE URINE: NEGATIVE
HCT VFR BLD CALC: 37.7 % (ref 36.3–47.1)
HEMOGLOBIN: 11.9 G/DL (ref 11.9–15.1)
IMMATURE GRANULOCYTES: 2 %
KETONES, URINE: NEGATIVE
LACTATE DEHYDROGENASE: 279 U/L (ref 135–214)
LEUKOCYTE ESTERASE, URINE: NEGATIVE
LIPASE: 19 U/L (ref 13–60)
LYMPHOCYTES # BLD: 19 % (ref 25–45)
MCH RBC QN AUTO: 28.5 PG (ref 25.2–33.5)
MCHC RBC AUTO-ENTMCNC: 31.6 G/DL (ref 28.4–34.8)
MCV RBC AUTO: 90.4 FL (ref 82.6–102.9)
MONOCYTES # BLD: 7 % (ref 2–8)
NITRITE, URINE: NEGATIVE
NRBC AUTOMATED: 0 PER 100 WBC
PDW BLD-RTO: 14.6 % (ref 11.8–14.4)
PH UA: 7.5 (ref 5–8)
PLATELET # BLD: 290 K/UL (ref 138–453)
PLATELET ESTIMATE: ABNORMAL
PMV BLD AUTO: 9.1 FL (ref 8.1–13.5)
POTASSIUM SERPL-SCNC: 4.2 MMOL/L (ref 3.7–5.3)
PROTEIN UA: NEGATIVE
RBC # BLD: 4.17 M/UL (ref 3.95–5.11)
RBC # BLD: ABNORMAL 10*6/UL
SEG NEUTROPHILS: 71 % (ref 34–64)
SEGMENTED NEUTROPHILS ABSOLUTE COUNT: 7.91 K/UL (ref 1.8–8)
SODIUM BLD-SCNC: 134 MMOL/L (ref 135–144)
SPECIFIC GRAVITY UA: 1 (ref 1–1.03)
TOTAL PROTEIN: 6.3 G/DL (ref 6.4–8.3)
TURBIDITY: CLEAR
URINE HGB: NEGATIVE
UROBILINOGEN, URINE: NORMAL
WBC # BLD: 11.1 K/UL (ref 4.5–13.5)
WBC # BLD: ABNORMAL 10*3/UL

## 2021-06-29 PROCEDURE — 83690 ASSAY OF LIPASE: CPT

## 2021-06-29 PROCEDURE — 81003 URINALYSIS AUTO W/O SCOPE: CPT

## 2021-06-29 PROCEDURE — 85025 COMPLETE CBC W/AUTO DIFF WBC: CPT

## 2021-06-29 PROCEDURE — 6360000002 HC RX W HCPCS

## 2021-06-29 PROCEDURE — 99284 EMERGENCY DEPT VISIT MOD MDM: CPT

## 2021-06-29 PROCEDURE — 71045 X-RAY EXAM CHEST 1 VIEW: CPT

## 2021-06-29 PROCEDURE — 99213 OFFICE O/P EST LOW 20 MIN: CPT

## 2021-06-29 PROCEDURE — 80053 COMPREHEN METABOLIC PANEL: CPT

## 2021-06-29 PROCEDURE — 96374 THER/PROPH/DIAG INJ IV PUSH: CPT

## 2021-06-29 PROCEDURE — 82150 ASSAY OF AMYLASE: CPT

## 2021-06-29 PROCEDURE — 87086 URINE CULTURE/COLONY COUNT: CPT

## 2021-06-29 PROCEDURE — 2580000003 HC RX 258: Performed by: GENERAL PRACTICE

## 2021-06-29 PROCEDURE — 83615 LACTATE (LD) (LDH) ENZYME: CPT

## 2021-06-29 PROCEDURE — 93005 ELECTROCARDIOGRAM TRACING: CPT | Performed by: GENERAL PRACTICE

## 2021-06-29 RX ORDER — ACETAMINOPHEN 500 MG
1000 TABLET ORAL EVERY 6 HOURS PRN
Status: DISCONTINUED | OUTPATIENT
Start: 2021-06-29 | End: 2021-06-29 | Stop reason: HOSPADM

## 2021-06-29 RX ORDER — 0.9 % SODIUM CHLORIDE 0.9 %
1000 INTRAVENOUS SOLUTION INTRAVENOUS ONCE
Status: COMPLETED | OUTPATIENT
Start: 2021-06-29 | End: 2021-06-29

## 2021-06-29 RX ORDER — PROMETHAZINE HYDROCHLORIDE 25 MG/1
25 TABLET ORAL 3 TIMES DAILY PRN
Qty: 12 TABLET | Refills: 0 | Status: SHIPPED | OUTPATIENT
Start: 2021-06-29 | End: 2021-07-06

## 2021-06-29 RX ORDER — ONDANSETRON 2 MG/ML
INJECTION INTRAMUSCULAR; INTRAVENOUS
Status: COMPLETED
Start: 2021-06-29 | End: 2021-06-29

## 2021-06-29 RX ORDER — ONDANSETRON 2 MG/ML
4 INJECTION INTRAMUSCULAR; INTRAVENOUS ONCE
Status: COMPLETED | OUTPATIENT
Start: 2021-06-29 | End: 2021-06-29

## 2021-06-29 RX ORDER — ONDANSETRON 4 MG/1
4 TABLET, ORALLY DISINTEGRATING ORAL 3 TIMES DAILY PRN
Qty: 21 TABLET | Refills: 0 | Status: ON HOLD | OUTPATIENT
Start: 2021-06-29 | End: 2021-08-19 | Stop reason: HOSPADM

## 2021-06-29 RX ADMIN — ONDANSETRON 4 MG: 2 INJECTION INTRAMUSCULAR; INTRAVENOUS at 14:01

## 2021-06-29 RX ADMIN — SODIUM CHLORIDE 1000 ML: 9 INJECTION, SOLUTION INTRAVENOUS at 14:02

## 2021-06-29 ASSESSMENT — ENCOUNTER SYMPTOMS
COUGH: 0
DIARRHEA: 1
ABDOMINAL PAIN: 1
EYES NEGATIVE: 1
VOMITING: 1
SHORTNESS OF BREATH: 1
NAUSEA: 1

## 2021-06-29 NOTE — ED PROVIDER NOTES
[facility]  ED  eMERGENCY dEPARTMENT eNCOUnter   Attending Attestation     Pt Name: Ilia Rivera  MRN: [id number]  [place] 2000  Date of evaluation: 6/29/21       Ilia Rivera is a 22 y.o. female who presents with Nausea and Emesis      History: Pt with nausea and vomiting. 31 weeks pregnant. Pt has chronic RUQ pain. Pt has no other complaints. Symptoms have been going on for some time. Exam: Abdomen soft and non tender except for mild RUQ pain. Gravid uterus. Plan for call to OB to send patient up to see them. Bedside U/S showed Fetal HR at ~180. I performed a history and physical examination of the patient and discussed management with the resident. I reviewed the residents note and agree with the documented findings and plan of care. Any areas of disagreement are noted on the chart. I was personally present for the key portions of any procedures. I have documented in the chart those procedures where I was not present during the key portions. I have personally reviewed all images and agree with the resident's interpretation. I have reviewed the emergency nurses triage note. I agree with the chief complaint, past medical history, past surgical history, allergies, medications, social and family history as documented unless otherwise noted below. Documentation of the HPI, Physical Exam and Medical Decision Making performed by medical students or scribes is based on my personal performance of the HPI, PE and MDM. For Phys Assistant/ Nurse Practitioner cases/documentation I have had a face to face evaluation of this patient and have completed at least one if not all key elements of the E/M (history, physical exam, and MDM). Additional findings are as noted. For APC cases I have personally evaluated and examined the patient in conjunction with the APC and agree with the treatment plan and disposition of the patient as recorded by the APC.     Juan Carlos Campa MD  Attending Emergency Physician       Jenna Weinstein MD  06/29/21 [id number]

## 2021-06-29 NOTE — H&P
OBSTETRICAL HISTORY Jeff Saeed    Date: 2021       Time: 6:12 PM   Patient Name: Belinda Miranda     Patient : 2000  Room/Bed: [place] [address]    Admission Date/Time: 2021  3:01 PM      CC: N/V     HPI: Belinda Miranda is a 22 y.o.  at 30w6d who presents from the ED c/o persistent N/V throughout her pregnancy that worsened today. She states some days she vomits 9 times other days are not so bad. She states she hasnt vomited since prior to being seen in the ED. She has Vitamin B6 and Doxalymine at home that do not relieve her symptoms. She denies any sick contacts, F/C, or myalgias. Her lab work in the ED were unremarkable. She received IV Zofran and an IVF bolus and reports her symptoms have resolved. The patient reports fetal movement is present, denies contractions, denies loss of fluid, denies vaginal bleeding. DATING:  LMP: Patient's last menstrual period was 2020.   Estimated Date of Delivery: 21   Based on: date of conception, at 15 3/7 weeks GA    PREGNANCY RISK FACTORS:  Patient Active Problem List   Diagnosis    Asthma    25 weeks gestation of pregnancy    High-risk pregnancy in second trimester    29 weeks gestation of pregnancy    30 weeks gestation of pregnancy        Steroids Given In This Pregnancy:  no     REVIEW OF SYSTEMS:   Constitutional: negative fever, negative chills, negative weight changes   HEENT: negative visual disturbances, negative headaches, negative dizziness, negative hearing loss  Breast: Negative breast abnormalities, negative breast lumps, negative nipple discharge  Respiratory: negative dyspnea, negative cough, negative SOB  Cardiovascular: negative chest pain,  negative palpitations, negative arrhythmia, negative syncope   Gastrointestinal: negative abdominal pain, negative RUQ pain, negative N/V, negative diarrhea, negative constipation, negative bowel changes, negative heartburn   Genitourinary: negative dysuria, negative hematuria, negative urinary incontinence, negative vaginal discharge, negative vaginal bleeding or spotting  Dermatological: negative rash, negative pruritis, negative mole or other skin changes  Hematologic: negative bruising  Immunologic/Lymphatic: negative recent illness, negative recent sick contact  Musculoskeletal: negative back pain, negative myalgias, negative arthralgias  Neurological:  negative dizziness, negative migraines, negative seizures, negative weakness  Behavior/Psych: negative depression, negative anxiety, negative SI, negative HI    OBSTETRICAL HISTORY:   OB History    Para Term  AB Living   1 0 0 0 0 0   SAB TAB Ectopic Molar Multiple Live Births   0 0 0 0 0 0      # Outcome Date GA Lbr Ric/2nd Weight Sex Delivery Anes PTL Lv   1 Current                PAST MEDICAL HISTORY:   has no past medical history on file. PAST SURGICAL HISTORY:   has no past surgical history on file. ALLERGIES:  has No Known Allergies. MEDICATIONS:  Prior to Admission medications    Medication Sig Start Date End Date Taking?  Authorizing Provider   promethazine (PHENERGAN) 25 MG tablet Take 1 tablet by mouth 3 times daily as needed for Nausea 21 Yes Brittany Fall DO   ondansetron (ZOFRAN-ODT) 4 MG disintegrating tablet Take 1 tablet by mouth 3 times daily as needed for Nausea or Vomiting 21  Yes Brittany Fall DO   Peak Flow Meter PATRICIA Use 1 to 2 times daily 21   BEBA Hsu - CNP   famotidine (PEPCID) 40 MG tablet Take 1 tablet by mouth every evening 21  Harsh Barlow,    vitamin B-6 (PYRIDOXINE) 25 MG tablet Take 1 tablet by mouth nightly 21   Judith Barlow,    doxyLAMINE succinate (UNISOM SLEEPTABS) 25 MG tablet Take 1 tablet by mouth nightly  Patient not taking: Reported on 2021   Charmayne Roys, PA-C   Prenatal Vit-Fe Fumarate-FA (PRENATAL/FOLIC ACID PO) Take by mouth Historical Provider, MD       FAMILY HISTORY:  family history is not on file. SOCIAL HISTORY:   reports that she has never smoked. She has never used smokeless tobacco. She reports previous alcohol use. She reports current drug use. Drug: Marijuana. VITALS:  BP: 122/75  Pulse 92  Temp 97.3F  Resp 20  Sp02  99%    PHYSICAL EXAM:  Fetal Heart Monitor:  Baseline Heart Rate 135, moderate variability, present accelerations, absent decelerations  Schubert: contractions, none    General appearance:  no apparent distress, alert and cooperative  HEENT: head atraumatic, normocephalic, moist mucous membranes, trachea midline  Neurologic:  alert, oriented, normal speech, no focal findings or movement disorder noted  Lungs:  No increased work of breathing, good air exchange, clear to auscultation bilaterally, no crackles or wheezing  Heart:  regular rate and rhythm and no murmur, rubs, gallops  Abdomen:  soft, gravid, non-tender, no rebound, guarding, or rigidity, no RUQ or epigastric tenderness, no signs or symptoms of abruption, no signs or symptoms of chorioamnionitis,  Extremities:  no calf tenderness, non edematous, no varicosities, full range of motion in all four extremities  Musculoskeletal: Gross strength equal and intact throughout, no gross abnormalities, range of motion normal in hips, knees, shoulders and spine  Psychiatric: Mood appropriate, normal affect     PRENATAL LAB RESULTS:   Blood Type/Rh: O neg  Antibody Screen: negative  Hemoglobin, Hematocrit, Platelets: 70.3 / 64.5 / 251  Rubella: non-immune  T.  Pallidum, IgG: non-reactive   Hepatitis B Surface Antigen: non-reactive   HIV: non-reactive   Sickle Cell Screen: not done  Gonorrhea: negative  Chlamydia: negative  Urine culture: negative, date: 5/19/21     1 hour Glucose Tolerance Test: 78     Group B Strep: not done  Cystic Fibrosis Screen: negative  First Trimester Screen: not available  MSAFP/Multiple Markers: normal  Non-Invasive Prenatal Testing: no aneuploidy detected  Anatomy US: Male anatomy, 3VC, normal insertion, Anterior placenta       ASSESSMENT & PLAN:  Jeremias Kaiser is a 22 y.o. female  at 27w9d    - GBS unknown / Rh positive / R non-immune   - No indication for GBS prophylaxis       N/V    - VSS, Afebrile              - cEFM/TOCO: Cat 1, no contractions               - CBC, CMP, Lipase, amylase were unremarkable  - UA with low suspicion for UTI, Ucx pending               - IVF bolus and IV Zofran per ED   - Rx for Phenergan and Zofran given   - PO hydration encouraged   - Tylenol for pain control    - She reports improvement in symptoms following Zofran and fluids   - Her next Ob apt is 21   - Will plan to DC at this time. Patient is aware that she should return to the hospital if she has worsening contractions, LOF, VB or decreased fetal movements. She voices understanding       Asthma   - Albuterol prn     THC Use   - Cessation encouraged    FHx of Chiari Malformation (FOB half Brother)    FHx of Autism (Pts Sister)      Patient Active Problem List    Diagnosis Date Noted    30 weeks gestation of pregnancy 2021    29 weeks gestation of pregnancy 2021    High-risk pregnancy in second trimester 06/15/2021    25 weeks gestation of pregnancy 2021    Asthma 2021       Plan discussed with Dr. Chao Godinez, who is agreeable. Steroids given this admission: No    Risks, benefits, alternatives and possible complications have been discussed in detail with the patient. Admission, and post admission procedures and expectations were discussed in detail. All questions were answered.     Attending's Name: Dr. Saravanan Soto DO  Ob/Gyn Resident  2021, 6:12 PM

## 2021-06-29 NOTE — ED PROVIDER NOTES
[facility]  ED  Emergency Department Encounter  EmergencyMedicine Resident     Pt Name:Tylor Dinh  MRN: [id number]  [place] 2000  Date of evaluation: 21  PCP:  BEBA Barton CNP    CHIEF COMPLAINT       Chief Complaint   Patient presents with    Nausea    Emesis       HISTORY OF PRESENT ILLNESS  (Location/Symptom, Timing/Onset, Context/Setting, Quality, Duration, Modifying Factors, Severity.)       Jaki Nava is a 22 y.o. female, , who reports to be at 31 weeks of pregnancy. She presents with nausea and vomiting associated with pregnancy, and diarrhea. She also reports RUQ pain, chest tightness, and daily migraines or head aches. She had n/v early in pregnancy that improved but has become almost daily over the last few weeks. She came to the ER today after having multiple episodes of emesis and diarrhea at work. She has previously been prescribed vitamin B6 for nausea and continues to take it. She denies any blood or dark color to either emesis or stool. Patient reports RUQ pain for 2 months, described as sharp and non-radiating.      She also reports chest tightness that has been present throughout pregnancy. Patient notes history of seasonal asthma and that it may be related. She takes albuterol daily and it provides only minor relief.        PAST MEDICAL / SURGICAL / SOCIAL / FAMILY HISTORY      has no past medical history on file. Denies further past medical hx     has no past surgical history on file.   Denies further past surgical hx    Social History     Socioeconomic History    Marital status: Single     Spouse name: Not on file    Number of children: Not on file    Years of education: Not on file    Highest education level: Not on file   Occupational History    Not on file   Tobacco Use    Smoking status: Never Smoker    Smokeless tobacco: Never Used   Vaping Use    Vaping Use: Never used   Substance and Sexual Activity    Alcohol use: Not Currently  Drug use: Yes     Types: Marijuana     Comment: \"couple times a week\"  4/29/2021    Sexual activity: Not on file   Other Topics Concern    Not on file   Social History Narrative    Not on file     Social Determinants of Health     Financial Resource Strain: Low Risk     Difficulty of Paying Living Expenses: Not hard at all   Food Insecurity: No Food Insecurity    Worried About Running Out of Food in the Last Year: Never true    Angelina of Food in the Last Year: Never true   Transportation Needs: No Transportation Needs    Lack of Transportation (Medical): No    Lack of Transportation (Non-Medical): No   Physical Activity:     Days of Exercise per Week:     Minutes of Exercise per Session:    Stress:     Feeling of Stress :    Social Connections:     Frequency of Communication with Friends and Family:     Frequency of Social Gatherings with Friends and Family:     Attends [employer] Services:     Active Member of Clubs or Organizations:     Attends Club or Organization Meetings:     Marital Status:    Intimate Partner Violence:     Fear of Current or Ex-Partner:     Emotionally Abused:     Physically Abused:     Sexually Abused:        No family history on file. Allergies:  Patient has no known allergies. Home Medications:  Prior to Admission medications    Medication Sig Start Date End Date Taking?  Authorizing Provider   promethazine (PHENERGAN) 25 MG tablet Take 1 tablet by mouth 3 times daily as needed for Nausea 6/29/21 7/6/21  [facility], DO   ondansetron (ZOFRAN-ODT) 4 MG disintegrating tablet Take 1 tablet by mouth 3 times daily as needed for Nausea or Vomiting 6/29/21   [facility]   Peak Flow Meter PATRICIA Use 1 to 2 times daily 6/24/21   BEBA Bridges - CNP   famotidine (PEPCID) 40 MG tablet Take 1 tablet by mouth every evening 4/23/21 5/23/21  Tan Barlow DO   vitamin B-6 (PYRIDOXINE) 25 MG tablet Take 1 tablet by mouth nightly 4/23/21   Tan March DO Yen   doxyLAMINE succinate (UNISOM SLEEPTABS) 25 MG tablet Take 1 tablet by mouth nightly  Patient not taking: Reported on 4/29/2021 2/21/21   Kt Owens PA-C   Prenatal Vit-Fe Fumarate-FA (PRENATAL/FOLIC ACID PO) Take by mouth    Historical Provider, MD       REVIEW OF SYSTEMS    (2-9 systems for level 4, 10 or more for level 5)      Review of Systems   Constitutional: Negative for fever. HENT: Negative. Eyes: Negative. Respiratory: Positive for shortness of breath. Negative for cough. Cardiovascular: Negative for chest pain. Gastrointestinal: Positive for abdominal pain, diarrhea, nausea and vomiting. Endocrine: Negative. Genitourinary: Negative. Musculoskeletal: Negative. Neurological: Negative. Hematological: Negative. Psychiatric/Behavioral: Negative. PHYSICAL EXAM   (up to 7 for level 4, 8 or more for level 5)      INITIAL VITALS:   /75   Pulse 92   Temp 97.3 °F (36.3 °C) (Oral)   Resp 20   LMP 11/25/2020   SpO2 99%     Physical Exam   Gen. Appearance: patient appears well, nondistressed. Head: head atraumatic, normocephalic. Eyes: Extraocular movements intact. No scleral icterus  Mouth: Oropharynx clear and moist.  No oral lesions  Neck: Supple. No lymphadenopathy. Pulmonary: Lungs clear to auscultation bilaterally. No wheezing, rales or rhonchi   Cardiovascular: Regular rate and rhythm, no murmurs   Abdomen: Appropriately enlarged, soft, nontender, no guarding or rebound, normal bowel sounds  Neurology: GCS 15. Oriented to person place and time.   moving all extremities         DIFFERENTIAL  DIAGNOSIS     PLAN (LABS / IMAGING / EKG):  Orders Placed This Encounter   Procedures    Culture, Urine    XR CHEST PORTABLE    CBC Auto Differential    Comprehensive Metabolic Panel w/ Reflex to MG    Urinalysis Reflex to Culture    Amylase    Lactate Dehydrogenase    Lipase    Inpatient consult to Obstetrics / Gynecology    EKG 12 Lead       MEDICATIONS ORDERED:  Orders Placed This Encounter   Medications    0.9 % sodium chloride bolus    ondansetron (ZOFRAN) injection 4 mg    ondansetron (ZOFRAN) 4 MG/2ML injection     Negrita Davis: cabinet override           DIAGNOSTIC RESULTS / [address] [place] [address] / [facility]     LABS:  Results for orders placed or performed during the hospital encounter of 06/29/21   CBC Auto Differential   Result Value Ref Range    WBC 11.1 4.5 - 13.5 k/uL    RBC 4.17 3.95 - 5.11 m/uL    Hemoglobin 11.9 11.9 - 15.1 g/dL    Hematocrit 37.7 36.3 - 47.1 %    MCV 90.4 82.6 - 102.9 fL    MCH 28.5 25.2 - 33.5 pg    MCHC 31.6 28.4 - 34.8 g/dL    RDW 14.6 (H) 11.8 - 14.4 %    Platelets 054 642 - 653 k/uL    MPV 9.1 8.1 - 13.5 fL    NRBC Automated 0.0 0.0 per 100 WBC    Differential Type NOT REPORTED     Seg Neutrophils 71 (H) 34 - 64 %    Lymphocytes 19 (L) 25 - 45 %    Monocytes 7 2 - 8 %    Eosinophils % 1 1 - 4 %    Basophils 0 0 - 2 %    Immature Granulocytes 2 (H) 0 %    Segs Absolute 7.91 1.80 - 8.00 k/uL    Absolute Lymph # 2.09 1.20 - 5.20 k/uL    Absolute Mono # 0.72 0.10 - 1.40 k/uL    Absolute Eos # 0.11 0.00 - 0.44 k/uL    Basophils Absolute 0.04 0.00 - 0.20 k/uL    Absolute Immature Granulocyte 0.18 0.00 - 0.30 k/uL    WBC Morphology NOT REPORTED     RBC Morphology ANISOCYTOSIS PRESENT     Platelet Estimate NOT REPORTED    Comprehensive Metabolic Panel w/ Reflex to MG   Result Value Ref Range    Glucose 86 70 - 99 mg/dL    BUN 5 (L) 6 - 20 mg/dL    CREATININE 0.26 (L) 0.50 - 0.90 mg/dL    Bun/Cre Ratio NOT REPORTED 9 - 20    Calcium 8.8 8.6 - 10.4 mg/dL    Sodium 134 (L) 135 - 144 mmol/L    Potassium 4.2 3.7 - 5.3 mmol/L    Chloride 102 98 - 107 mmol/L    CO2 22 20 - 31 mmol/L    Anion Gap 10 9 - 17 mmol/L    Alkaline Phosphatase 97 35 - 104 U/L    ALT 13 5 - 33 U/L    AST 24 <32 U/L    Total Bilirubin 0.20 (L) 0.3 - 1.2 mg/dL    Total Protein 6.3 (L) 6.4 - 8.3 g/dL    Albumin 3.6 3.5 - 5.2 g/dL Albumin/Globulin Ratio 1.3 1.0 - 2.5    GFR Non-African American >60 >60 mL/min    GFR African American >60 >60 mL/min    GFR Comment          GFR Staging NOT REPORTED    Urinalysis Reflex to Culture    Specimen: Urine, clean catch   Result Value Ref Range    Color, UA YELLOW YELLOW    Turbidity UA CLEAR CLEAR    Glucose, Ur NEGATIVE NEGATIVE    Bilirubin Urine NEGATIVE NEGATIVE    Ketones, Urine NEGATIVE NEGATIVE    Specific Gravity, UA 1.003 (L) 1.005 - 1.030    Urine Hgb NEGATIVE NEGATIVE    pH, UA 7.5 5.0 - 8.0    Protein, UA NEGATIVE NEGATIVE    Urobilinogen, Urine Normal Normal    Nitrite, Urine NEGATIVE NEGATIVE    Leukocyte Esterase, Urine NEGATIVE NEGATIVE    Urinalysis Comments       Microscopic exam not performed based on chemical results unless requested in original order. Amylase   Result Value Ref Range    Amylase 68 28 - 100 U/L   Lactate Dehydrogenase   Result Value Ref Range     (H) 135 - 214 U/L   Lipase   Result Value Ref Range    Lipase 19 13 - 60 U/L   EKG 12 Lead   Result Value Ref Range    Ventricular Rate 84 BPM    Atrial Rate 84 BPM    P-R Interval 212 ms    QRS Duration 80 ms    Q-T Interval 368 ms    QTc Calculation (Bazett) 434 ms    P Axis 68 degrees    R Axis 69 degrees    T Axis 53 degrees       RADIOLOGY:  None    EKG  None    All EKG's are interpreted by the Emergency Department Physician who either signs or Co-signs this chart in the absence of a cardiologist.    [address] [facility] MDM:  22 y.o. female who presents with nausea vomiting and diarrhea during pregnancy. Abdominal cramping. Work-up here in the ER is unremarkable, no leukocytosis. Normal kidney function. Feels improved after IV fluids and Zofran. Patient will report to labor and delivery. ED Course as of Jun 29 1710   Tue Jun 29, 2021   1425 Fetal movements on bedside ultrasound.   Fetal heart rate 185    [SAMIRA]      ED Course User Index  [SAMIRA] Pascual Vizcaino DO PROCEDURES:  None    CONSULTS:  IP CONSULT TO OB GYN    CRITICAL CARE:  None    FINAL IMPRESSION      1. Abdominal pain, right upper quadrant    2. 31 weeks gestation of pregnancy            DISPOSITION / PLAN     DISPOSITION Decision To Discharge 06/29/2021 02:51:12 PM      PATIENT REFERRED TO:  No follow-up provider specified.     DISCHARGE MEDICATIONS:  Discharge Medication List as of 6/29/2021  2:51 PM          Page Doe DO  Emergency Medicine Resident    (Please note that portions of thisnote were completed with a voice recognition program.  Efforts were made to edit the dictations but occasionally words are mis-transcribed.)        Page Doe DO  Resident  06/29/21 [id number]

## 2021-06-30 LAB
CULTURE: NORMAL
EKG ATRIAL RATE: 84 BPM
EKG P AXIS: 68 DEGREES
EKG P-R INTERVAL: 212 MS
EKG Q-T INTERVAL: 368 MS
EKG QRS DURATION: 80 MS
EKG QTC CALCULATION (BAZETT): 434 MS
EKG R AXIS: 69 DEGREES
EKG T AXIS: 53 DEGREES
EKG VENTRICULAR RATE: 84 BPM
Lab: NORMAL
SPECIMEN DESCRIPTION: NORMAL

## 2021-06-30 NOTE — TELEPHONE ENCOUNTER
From: Tyler Dowd  To: Jacqueline Best, APRN - CNP  Sent: 6/29/2021 6:54 PM EDT  Subject: Prescription Question    I was ordered the peak flow meter and went to  my prescriptions today and they said they never received the prescription request. In my my chart belen it shows that it got sent over but [facility] said that they never got it so I wasn't sure if you guys could resend that or not.

## 2021-07-06 ENCOUNTER — HOSPITAL ENCOUNTER (EMERGENCY)
Age: 21
Discharge: HOME | DRG: 833 | End: 2021-07-06
Attending: EMERGENCY MEDICINE
Payer: COMMERCIAL

## 2021-07-06 ENCOUNTER — HOSPITAL ENCOUNTER (INPATIENT)
Age: 21
LOS: 1 days | Discharge: HOME | DRG: 833 | End: 2021-07-07
Attending: OBSTETRICS & GYNECOLOGY | Admitting: OBSTETRICS & GYNECOLOGY
Payer: COMMERCIAL

## 2021-07-06 VITALS
SYSTOLIC BLOOD PRESSURE: 108 MMHG | OXYGEN SATURATION: 96 % | TEMPERATURE: 97.6 F | RESPIRATION RATE: 16 BRPM | HEART RATE: 89 BPM | DIASTOLIC BLOOD PRESSURE: 66 MMHG
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
## 2021-07-06 PROBLEM — O09.90 HRP (HIGH RISK PREGNANCY): Status: ACTIVE | Noted: 2021-07-06

## 2021-07-06 PROBLEM — Z3A.31 31 WEEKS GESTATION OF PREGNANCY: Status: ACTIVE | Noted: 2021-07-06

## 2021-07-06 PROBLEM — G93.5 CHIARI MALFORMATION TYPE I (HCC): Status: ACTIVE | Noted: 2021-07-06

## 2021-07-06 PROBLEM — Z3A.29 29 WEEKS GESTATION OF PREGNANCY: Status: RESOLVED | Noted: 2021-06-22 | Resolved: 2021-07-06

## 2021-07-06 PROBLEM — S39.91XA ABDOMINAL TRAUMA: Status: ACTIVE | Noted: 2021-07-06

## 2021-07-06 PROBLEM — F12.10 TETRAHYDROCANNABINOL (THC) USE DISORDER, MILD, ABUSE: Status: ACTIVE | Noted: 2021-07-06

## 2021-07-06 PROBLEM — Z3A.30 30 WEEKS GESTATION OF PREGNANCY: Status: RESOLVED | Noted: 2021-06-29 | Resolved: 2021-07-06

## 2021-07-06 PROBLEM — Z28.39 RUBELLA NONIMMUNE STATUS, DELIVERED, CURRENT HOSPITALIZATION: Status: ACTIVE | Noted: 2021-07-06

## 2021-07-06 PROBLEM — Z3A.25 25 WEEKS GESTATION OF PREGNANCY: Status: RESOLVED | Noted: 2021-05-19 | Resolved: 2021-07-06

## 2021-07-06 LAB
% FETAL BLEED: NORMAL %
ABO/RH: NORMAL
ABSOLUTE EOS #: 0.16 K/UL (ref 0–0.44)
ABSOLUTE IMMATURE GRANULOCYTE: 0.26 K/UL (ref 0–0.3)
ABSOLUTE LYMPH #: 2.14 K/UL (ref 1.2–5.2)
ABSOLUTE MONO #: 0.79 K/UL (ref 0.1–1.4)
ANTIBODY IDENTIFICATION: NORMAL
ANTIBODY SCREEN: POSITIVE
ARM BAND NUMBER: NORMAL
BASOPHILS # BLD: 1 % (ref 0–2)
BASOPHILS ABSOLUTE: 0.06 K/UL (ref 0–0.2)
BILIRUBIN URINE: NEGATIVE
COLOR: YELLOW
COMMENT UA: NORMAL
DIFFERENTIAL TYPE: ABNORMAL
DIRECT EXAM: ABNORMAL
EOSINOPHILS RELATIVE PERCENT: 1 % (ref 1–4)
EXPIRATION DATE: NORMAL
FETAL BLEED VOLUME: NORMAL ML
GLUCOSE URINE: NEGATIVE
HCT VFR BLD CALC: 35.9 % (ref 36.3–47.1)
HEMOGLOBIN: 11.2 G/DL (ref 11.9–15.1)
IMMATURE GRANULOCYTES: 2 %
KETONES, URINE: NEGATIVE
LEUKOCYTE ESTERASE, URINE: NEGATIVE
LYMPHOCYTES # BLD: 17 % (ref 25–45)
Lab: ABNORMAL
MCH RBC QN AUTO: 28.6 PG (ref 25.2–33.5)
MCHC RBC AUTO-ENTMCNC: 31.2 G/DL (ref 28.4–34.8)
MCV RBC AUTO: 91.8 FL (ref 82.6–102.9)
MONOCYTES # BLD: 6 % (ref 2–8)
NITRITE, URINE: NEGATIVE
NRBC AUTOMATED: 0 PER 100 WBC
PDW BLD-RTO: 14.7 % (ref 11.8–14.4)
PH UA: 6.5 (ref 5–8)
PLATELET # BLD: 261 K/UL (ref 138–453)
PLATELET ESTIMATE: ABNORMAL
PMV BLD AUTO: 9.3 FL (ref 8.1–13.5)
PROTEIN UA: NEGATIVE
RBC # BLD: 3.91 M/UL (ref 3.95–5.11)
RBC # BLD: ABNORMAL 10*6/UL
RHOGAM DOSES REQUIRED: 1
SEG NEUTROPHILS: 73 % (ref 34–64)
SEGMENTED NEUTROPHILS ABSOLUTE COUNT: 8.91 K/UL (ref 1.8–8)
SPECIFIC GRAVITY UA: 1.01 (ref 1–1.03)
SPECIMEN DESCRIPTION: ABNORMAL
TURBIDITY: CLEAR
URINE HGB: NEGATIVE
UROBILINOGEN, URINE: NORMAL
WBC # BLD: 12.3 K/UL (ref 4.5–13.5)
WBC # BLD: ABNORMAL 10*3/UL

## 2021-07-06 PROCEDURE — 87591 N.GONORRHOEAE DNA AMP PROB: CPT

## 2021-07-06 PROCEDURE — 99282 EMERGENCY DEPT VISIT SF MDM: CPT

## 2021-07-06 PROCEDURE — 87491 CHLMYD TRACH DNA AMP PROBE: CPT

## 2021-07-06 PROCEDURE — 6370000000 HC RX 637 (ALT 250 FOR IP): Performed by: STUDENT IN AN ORGANIZED HEALTH CARE EDUCATION/TRAINING PROGRAM

## 2021-07-06 PROCEDURE — 85460 HEMOGLOBIN FETAL: CPT

## 2021-07-06 PROCEDURE — 87510 GARDNER VAG DNA DIR PROBE: CPT

## 2021-07-06 PROCEDURE — 86850 RBC ANTIBODY SCREEN: CPT

## 2021-07-06 PROCEDURE — 87480 CANDIDA DNA DIR PROBE: CPT

## 2021-07-06 PROCEDURE — 36415 COLL VENOUS BLD VENIPUNCTURE: CPT

## 2021-07-06 PROCEDURE — 87660 TRICHOMONAS VAGIN DIR PROBE: CPT

## 2021-07-06 PROCEDURE — 86900 BLOOD TYPING SEROLOGIC ABO: CPT

## 2021-07-06 PROCEDURE — 85025 COMPLETE CBC W/AUTO DIFF WBC: CPT

## 2021-07-06 PROCEDURE — 86901 BLOOD TYPING SEROLOGIC RH(D): CPT

## 2021-07-06 PROCEDURE — 99234 HOSP IP/OBS SM DT SF/LOW 45: CPT | Performed by: OBSTETRICS & GYNECOLOGY

## 2021-07-06 PROCEDURE — 86870 RBC ANTIBODY IDENTIFICATION: CPT

## 2021-07-06 PROCEDURE — 81003 URINALYSIS AUTO W/O SCOPE: CPT

## 2021-07-06 PROCEDURE — 1220000000 HC SEMI PRIVATE OB R&B

## 2021-07-06 RX ORDER — ACETAMINOPHEN 500 MG
1000 TABLET ORAL EVERY 6 HOURS PRN
Status: DISCONTINUED | OUTPATIENT
Start: 2021-07-06 | End: 2021-07-07 | Stop reason: HOSPADM

## 2021-07-06 RX ORDER — METRONIDAZOLE 500 MG/1
500 TABLET ORAL EVERY 12 HOURS SCHEDULED
Status: DISCONTINUED | OUTPATIENT
Start: 2021-07-06 | End: 2021-07-07 | Stop reason: HOSPADM

## 2021-07-06 RX ORDER — ASPIRIN 81 MG/1
81 TABLET, CHEWABLE ORAL DAILY
Status: DISCONTINUED | OUTPATIENT
Start: 2021-07-06 | End: 2021-07-07 | Stop reason: HOSPADM

## 2021-07-06 RX ORDER — PROMETHAZINE HYDROCHLORIDE 12.5 MG/1
12.5 TABLET ORAL EVERY 6 HOURS PRN
Status: DISCONTINUED | OUTPATIENT
Start: 2021-07-06 | End: 2021-07-07 | Stop reason: HOSPADM

## 2021-07-06 RX ORDER — VITAMIN A, ASCORBIC ACID, CHOLECALCIFEROL, .ALPHA.-TOCOPHEROL ACETATE, DL-, THIAMINE MONONITRATE, RIBOFLAVIN, NIACINAMIDE, PYRIDOXINE HYDROCHLORIDE, FOLIC ACID, CYANOCOBALAMIN, CALCIUM CARBONATE, IRON, ZINC OXIDE, AND CUPRIC OXIDE 4000; 120; 400; 22; 1.84; 3; 20; 10; 1; 12; 200; 29; 25; 2 [IU]/1; MG/1; [IU]/1; [IU]/1; MG/1; MG/1; MG/1; MG/1; MG/1; UG/1; MG/1; MG/1; MG/1; MG/1
1 TABLET ORAL DAILY
Status: DISCONTINUED | OUTPATIENT
Start: 2021-07-06 | End: 2021-07-07 | Stop reason: HOSPADM

## 2021-07-06 RX ORDER — ONDANSETRON 2 MG/ML
4 INJECTION INTRAMUSCULAR; INTRAVENOUS EVERY 6 HOURS PRN
Status: DISCONTINUED | OUTPATIENT
Start: 2021-07-06 | End: 2021-07-07 | Stop reason: HOSPADM

## 2021-07-06 RX ORDER — BUTALBITAL, ACETAMINOPHEN AND CAFFEINE 50; 325; 40 MG/1; MG/1; MG/1
1 TABLET ORAL ONCE
Status: COMPLETED | OUTPATIENT
Start: 2021-07-06 | End: 2021-07-06

## 2021-07-06 RX ORDER — METRONIDAZOLE 500 MG/1
500 TABLET ORAL 2 TIMES DAILY
Qty: 12 TABLET | Refills: 0 | Status: SHIPPED | OUTPATIENT
Start: 2021-07-06 | End: 2021-07-12

## 2021-07-06 RX ADMIN — BUTALBITAL, ACETAMINOPHEN AND CAFFEINE 1 TABLET: 50; 325; 40 TABLET ORAL at 21:05

## 2021-07-06 RX ADMIN — ACETAMINOPHEN 1000 MG: 500 TABLET ORAL at 16:09

## 2021-07-06 RX ADMIN — METRONIDAZOLE 500 MG: 500 TABLET, FILM COATED ORAL at 19:40

## 2021-07-06 RX ADMIN — Medication 1 TABLET: at 19:39

## 2021-07-06 RX ADMIN — ASPIRIN 81 MG: 81 TABLET, CHEWABLE ORAL at 19:39

## 2021-07-06 RX ADMIN — ACETAMINOPHEN 1000 MG: 500 TABLET ORAL at 10:27

## 2021-07-06 RX ADMIN — PROMETHAZINE HYDROCHLORIDE 12.5 MG: 12.5 TABLET ORAL at 21:05

## 2021-07-06 ASSESSMENT — PAIN SCALES - GENERAL
PAINLEVEL_OUTOF10: 8
PAINLEVEL_OUTOF10: 7
PAINLEVEL_OUTOF10: 4
PAINLEVEL_OUTOF10: 7

## 2021-07-06 ASSESSMENT — PAIN DESCRIPTION - PAIN TYPE: TYPE: ACUTE PAIN

## 2021-07-06 ASSESSMENT — PAIN DESCRIPTION - LOCATION: LOCATION: BACK;ABDOMEN

## 2021-07-06 NOTE — FLOWSHEET NOTE
Pt received to L&D per w/c from ER with c/o falling down 6 stairs at 0700. Placed in triage 2. Up to BR et voided. EFM applied. Pt c/o \"shooting pains in back et bilat flank pain.

## 2021-07-06 NOTE — H&P
OBSTETRICAL HISTORY [facility]    Date: 2021       Time: 11:05 AM   Patient Name: Cristhian Avitia     Patient : 2000  Room/Bed: 0703/0703-01    Admission Date/Time: 2021  8:26 AM      CC: S/p fall     HPI: Cristhian Avitia is a 22 y.o.  at [address] who presents after falling down 5-6 stairs at 0700, she states she caught herself with her hands but slightly bumped her abdomen. She reports lower back pain that comes and goes. She also reports to intermittent lower abdominal cramping. Patient denies any fever, chills, N/V, headaches, vision changes, chest pain, shortness of breath, RUQ pain, and increased swelling/tenderness in bilateral lower extremities. Patient denies any vaginal discharge and any urinary complaints. The patient reports fetal movement is present, denies contractions, denies loss of fluid, denies vaginal bleeding. DATING:  LMP: Patient's last menstrual period was 2020.   Estimated Date of Delivery: 21   Based on: LMP c/w US, at 13 3/7 weeks GA    PREGNANCY RISK FACTORS:  Patient Active Problem List   Diagnosis    Asthma    High-risk pregnancy in second trimester    31 weeks gestation of pregnancy    FHx Chiari malformation type I and cleft lip    THC use    Rubella NI    HRP (high risk pregnancy)        Steroids Given In This Pregnancy:  no     REVIEW OF SYSTEMS:  Constitutional: negative fever, negative chills  HEENT: negative visual disturbances, negative headaches  Respiratory: negative dyspnea, negative cough  Cardiovascular: negative chest pain,  negative palpitations  Gastrointestinal: positive lower abdominal cramping, negative RUQ pain, negative N/V, negative diarrhea, negative constipation  Genitourinary: negative dysuria, negative vaginal discharge, negative vaginal bleeding  Dermatological: negative rash  Hematologic: negative bruising  Immunologic/Lymphatic: negative recent illness, negative recent sick Baseline Heart Rate 135, moderate variability, present accelerations, absent decelerations  [place]: none contractions    General appearance:  no apparent distress, alert, and cooperative  HEENT: head atraumatic, normocephalic, moist mucous membranes, trachea midline  Neurologic:  alert, oriented, normal speech, no focal findings or movement disorder noted  Lungs:  No increased work of breathing, good air exchange, clear to auscultation bilaterally, no crackles or wheezing  Heart:  regular rate and rhythm and no murmur    Abdomen:  soft, gravid, non-tender, no rebound, guarding, or rigidity, and no RUQ or epigastric tenderness  Extremities:  no calf tenderness, non edematous, DTR's: +2/4 bilateral lower extremities   Musculoskeletal: Gross strength equal and intact throughout, no gross abnormalities, range of motion normal in hips, knees, shoulders and spine, CVA tenderness: none  Psychiatric: Mood appropriate, normal affect   Rectal Exam: not indicated  Sterile Speculum Exam:   Urethral meatus: normal appearing   Vulva: Normal hair distribution, normal appearing vulva, no masses, tenderness or lesions, normal clitoris   Vagina: Normal appearing vaginal mucosa without lesions, minimal white vaginal discharge noted in the posterior vault, no lacerations, no blood noted in posterior vaginal vault   Cervix: Normal appearing cervix without lesions, external os visibly closed, no lacerations or abnormal lesions visualized, no bleeding noted from cervical os    DATA:  Valsalva/Pooling: absent  Vaginal Bleeding: absent      OMM EXAM:  Chief Complaint: Pregnancy  Reason for No Exam (if applicable): not applicable  Anterior/ Posterior Spinal Curves: Lumbar Lordosis -  Slightly increased  Assessment Tool: T= Tenderness, A= Asymmetry, R= Restricted Motion (A=Active, P=Passive), T=Tissue Texture Changes  Region Evaluated : Severity / Specific of Major Somatic Dysfunction: M99.03 Lumbar -  Minor TART  Major Correlations with: Gravid  Structural Diagnosis: Lumbar lordosis slightly increased 2/2 pregnancy  Treatment Plan: Outpatient       LIMITED BEDSIDE US: performed with Dr. Aquino Friendly bedside  Position: Cephalic  Placental Location: anterior  Fetal Heart Tones: Present  Fetal Movement: Present  Amniotic Fluid Index/Volume: >2x2 cm MVP    PRENATAL LAB RESULTS:   Blood Type/Rh: O negative  Antibody Screen: negative  Hemoglobin, Hematocrit, Platelets: 20.5 / 78.9 / 251  Rubella: non-immune  T. Pallidum, IgG: non-reactive   Hepatitis B Surface Antigen: negative   HIV: non-reactive   Sickle Cell Screen: negative  Gonorrhea: negative  Chlamydia: negative  Urine culture: negative, date: 21, 21    1 hour Glucose Tolerance Test: 78    Group B Strep: unknown  Cystic Fibrosis Screen: negative  First Trimester Screen: not available  MSAFP/Multiple Markers: normal  Non-Invasive Prenatal Testing: no aneuploidy detected  Anatomy US: normal male anatomy, 3vc, anterior placenta, normal cord insertion    ASSESSMENT & PLAN:  Radha Ashraf is a 22 y.o. female  at [place] [address]   - GBS unknown / Rh negative / R non immune   - No indication for GBS prophylaxis    S/p Fall w/ abdominal trauma   - Patient fell down stairs @ 0700 and hit her abdomen.   - Denies contractions or vaginal bleeding.   - BSUS showing good fetal movement with no evidence of placental abruption.   - SSE: no blood noted in posterior vaginal vault or coming from cervical os   - CBC, T&S and KB ordered   - Rhogam given 21   - Will admit for 24 hours of cEFM to monitor. Patient agreeable.    - Admit to labor and delivery under the service of Dr. Hayley Grimes   - VSS   - cEFM and TOCO   - Cat 1 FHT and TOCO showing no contractions   - PNV, ASA, and SCDs ordered   - Continue to monitor closely    Asthma   - Controlled with albuterol prn   - Clinically asymptomatic    Rubella NI   - Will need MMR PP    FHx Chiari malformation and cleft lip   - Anatomy scan wnl   - NIPT normal    THC use   - Encouraged cessation    BMI 34.13    Patient Active Problem List    Diagnosis Date Noted    31 weeks gestation of pregnancy 07/06/2021    FHx Chiari malformation type I and cleft lip 07/06/2021     FOB half brother  Anatomy scan wnl  NIPT normal      THC use 07/06/2021    Rubella NI 07/06/2021     Needs MMR PP      HRP (high risk pregnancy) 07/06/2021    High-risk pregnancy in second trimester 06/15/2021    Asthma 02/11/2021       Plan discussed with Dr. Myra Arteaga, who is agreeable. Steroids given this admission: No    Risks, benefits, alternatives and possible complications have been discussed in detail with the patient. Admission, and post admission procedures and expectations were discussed in detail. All questions were answered.     Attending's Name: Dr. Ozella Essex, DO  Ob/Gyn Resident  7/6/2021, 11:05 AM

## 2021-07-06 NOTE — CARE COORDINATION
Antepartum Note      Patient, 22 y.o.  at [address], presents for monitoring s/p abdominal trauma. Case management will continue to follow for needs. No needs identified at present, await M scan.

## 2021-07-06 NOTE — PROGRESS NOTES
Obstetric/Gynecology Resident Interval Note    Labs reviewed and KB noted to have fetal bleed volume up to 15 mL however, no fetal cells seen. I spoke with the lab who reported that they did not see any fetal cells on the screen however, stated bleed up to 15 mL \"just to be safe. \" Patient is Rh negative and received rhogam on 6/4/21. She has no complaints of vaginal bleeding or abdominal pain and no blood noted in vaginal vault or coming from cervical on SSE performed on arrival by Dr. Robison Mt. Dr. Melanie Ch is requesting MFM ultrasound today or tomorrow given KB results. Will notify MFM. Patient also noted to have bacterial vaginosis. Will treat with Flagyl.     John Carpenter DO  OB/GYN Resident, PGY3  [facility]  7/6/2021, 1:59 PM

## 2021-07-06 NOTE — ED PROVIDER NOTES
[facility]  ED  Emergency Department Encounter  Emergency Medicine Resident     Pt Name: Jose Drummond  MRN: [id number]  [place] 2000  Date of evaluation: 7/6/21  PCP:  BEBA Thomas CNP    CHIEF COMPLAINT       Chief Complaint   Patient presents with    Fall       HISTORY Josephzahida  (Location/Symptom, Timing/Onset, Context/Setting, Quality, Duration, Modifying Factors,Severity.)      Jose Drummond is a 22 y. o.yo female who presents with complaint of falling down 6 stairs. Patient is 32 weeks gestation. Patient was on her way to work this morning when she got a cramp in her right calf and subsequently slipped on 6 stairs. Patient denies any loss of consciousness. Does admit to mild wrist pain although is able to move it through full range of motion. Denies any chest pain, shortness of breath. Does state that she was able to catch herself with her hands although she did skid mildly with her abdomen across the stairs. She is noting that she has had some cramping in her lower back and left side since the incident. Has had some nausea this morning took a Zofran prior to the incident. Since incident she noted that her nausea has increased. Is unsure whether she has had vaginal bleeding due to the fact that she has not use the restroom since the incident. She came straight to the ER to be evaluated. No radiation of pain. PAST MEDICAL / SURGICAL / SOCIAL / FAMILY HISTORY      has no past medical history on file. has no past surgical history on file. Social:  reports that she has never smoked. She has never used smokeless tobacco. She reports previous alcohol use. She reports current drug use. Drug: Marijuana. Family Hx: History reviewed. No pertinent family history. Allergies:  Patient has no known allergies. Home Medications:  Prior to Admission medications    Medication Sig Start Date End Date Taking?  Authorizing Provider   metroNIDAZOLE (FLAGYL) 500 MG tablet Take 1 tablet by mouth 2 times daily for 6 days 7/6/21 7/12/21  Gabby Mason DO Akira   Peak Flow Meter PATRICIA Use 1 to 2 times daily 6/30/21   BEBA Arias CNP   ondansetron (ZOFRAN-ODT) 4 MG disintegrating tablet Take 1 tablet by mouth 3 times daily as needed for Nausea or Vomiting 6/29/21   Donneta Comfort, DO   famotidine (PEPCID) 40 MG tablet Take 1 tablet by mouth every evening 4/23/21 5/23/21  Jordan Barlow, DO   vitamin B-6 (PYRIDOXINE) 25 MG tablet Take 1 tablet by mouth nightly 4/23/21   Jordan Barlow, DO   doxyLAMINE succinate (UNISOM SLEEPTABS) 25 MG tablet Take 1 tablet by mouth nightly  Patient not taking: Reported on 4/29/2021 2/21/21   Jeanne Blackwood PA-C   Prenatal Vit-Fe Fumarate-FA (PRENATAL/FOLIC ACID PO) Take by mouth    Historical Provider, MD       REVIEW OFSYSTEMS    (2-9 systems for level 4, 10 or more for level 5)      Review of Systems   Constitutional: Negative for activity change, chills and fever. HENT: Negative for congestion, rhinorrhea and sore throat. Eyes: Negative for pain and visual disturbance. Respiratory: Negative for cough and shortness of breath. Cardiovascular: Negative for chest pain and palpitations. Gastrointestinal: Negative for abdominal pain, constipation and diarrhea. Genitourinary: Negative for difficulty urinating and frequency. Musculoskeletal:        Right wrist pain   Back pain/cramping, flank pain/ cramping   Skin: Negative for rash and wound. Neurological: Negative for dizziness and headaches. PHYSICAL EXAM   (up to 7 for level 4, 8 or more forlevel 5)      INITIAL VITALS:   Vitals:    07/06/21 0744   BP: 108/66   Pulse: 89   Resp: 16   Temp: 97.6 °F (36.4 °C)   SpO2: 96%        Physical Exam  Vitals and nursing note reviewed. Constitutional:       General: She is not in acute distress. Appearance: Normal appearance. She is not ill-appearing.    HENT:      Head: Normocephalic and atraumatic. Nose: Nose normal.      Mouth/Throat:      Mouth: Mucous membranes are moist.      Pharynx: Oropharynx is clear. Eyes:      General:         Right eye: No discharge. Left eye: No discharge. Extraocular Movements: Extraocular movements intact. Pupils: Pupils are equal, round, and reactive to light. Neck:      Comments: Moving neck freely upon my examination   Cardiovascular:      Rate and Rhythm: Normal rate and regular rhythm. Heart sounds: No murmur heard. No friction rub. No gallop. Pulmonary:      Effort: Pulmonary effort is normal. No respiratory distress. Breath sounds: Normal breath sounds. Abdominal:      General: There is no distension. Palpations: Abdomen is soft. Tenderness: There is no abdominal tenderness. There is no guarding or rebound. Comments: Gravid abdomen, nondistended, nonperitoneal, no obvious signs of trauma over the abdomen, no guarding, no rebound tenderness. Musculoskeletal:         General: No tenderness or deformity. Cervical back: Normal range of motion. Right lower leg: No edema. Left lower leg: No edema. Skin:     General: Skin is warm and dry. Neurological:      General: No focal deficit present. Mental Status: She is alert and oriented to person, place, and time. Comments:  strength intact and equal bilateral upper extremities. Dorsiflexion plantarflexion intact equal bilateral lower extremities. Patient able to ambulate with steady gait. Psychiatric:         Mood and Affect: Mood normal.         Thought Content: Thought content normal.         DIFFERENTIAL  DIAGNOSIS       Initial MDM/Plan: 22 y.o. female who presents with acute complaint of falling down the stairs this morning around 7 AM, 6 stairs. No loss of consciousness. Upon my initial examination patient is resting comfortably in the cot in no acute distress, conversational, speaking in full sentences.   Vital signs are stable upon arrival.  Mother denies any loss of consciousness, no trauma to the head, neck, chest or back. No obvious signs of trauma over the abdomen on physical exam.  Abdomen is soft, nontender, nondistended. Patient did use the bathroom in the interim and denied any vaginal bleeding. Does continue to have back pain/cramping that radiates into the left side. Bedside ultrasound performed showing fetal heart rate of 737, baby is cephalic, good fetal movement. Of note patient does note that child has had decreased fetal movement over the last 3 days, this is not new this morning. She states that she feels to move intermittently although has been less over the 3 days than previously, he has not been doing fetal kick counts. No acute traumatic injuries. Will page OB/GYN for evaluation and disposition. OB/GYN team recommending monitoring in L&D. Patient discharged to labor and delivery. DIAGNOSTIC RESULTS / EMERGENCYDEPARTMENT COURSE / MDM     LABS:  Labs Reviewed - No data to display      RADIOLOGY:  No results found. EKG      All EKG's are interpreted by the Emergency Department Physicianwho either signs or Co-signs this chart in the absence of a cardiologist.    EMERGENCY DEPARTMENT COURSE:          PROCEDURES:  None    CONSULTS:  IP CONSULT TO OB GYN      FINAL IMPRESSION      1. Fall, initial encounter          DISPOSITION / PLAN     DISPOSITION Decision To Discharge 07/06/2021 08:03:10 AM      PATIENT REFERRED TO:  [facility]  [address] [place] [address]  [phone]    As needed, If symptoms worsen    BEBA Tang - CNP  [address]  Dr. Xu Carlos [address] [place] [id number]  [phone]      As needed, If symptoms worsen      DISCHARGE MEDICATIONS:  Discharge Medication List as of 7/6/2021  8:15 AM          Alban Larson DO  Emergency Medicine Resident    (Please note that portions of this note were completed with a voice recognition

## 2021-07-06 NOTE — LETTER
Ned Brizuela was seen and treated in our labor and delivery department on 7/6/2021 after a fall at home. She is discharged today (7/7/2021) She may return to work on 7/8/2021     If you have any questions or concerns, please don't hesitate to call.       Zakiya Chau RN  Labor and delivery

## 2021-07-06 NOTE — ED PROVIDER NOTES
[facility]     Emergency Department     Faculty Note/ Attestation      Pt Name: Raffi Thomas                                       MRN: [id number]  [place] 2000  Date of evaluation: 7/6/2021  Patients PCP:    BEBA García CNP    Attestation  I performed a history and physical examination of the patient/ or directly observed  and discussed management with the resident. I reviewed the residents note and agree with the documented findings and plan of care. Any areas of disagreement are noted on the chart. I was personally present for the key portions of any procedures. I have documented in the chart those procedures where I was not present during the key portions. I have reviewed the emergency nurses triage note. I agree with the chief complaint, past medical history, past surgical history, allergies, medications, social and family history as documented unless otherwise noted below. For Physician Assistant/ Nurse Practitioner cases/documentation I have personally evaluated this patient and have completed at least one if not all key elements of the E/M (history, physical exam, and MDM). Additional findings are as noted. This patient was evaluated in the Emergency Department for symptoms described in the history of present illness. The patient was evaluated in the context of the global COVID-19 pandemic, which necessitated consideration that the patient might be at risk for infection with the SARS-CoV-2 virus that causes COVID-19. Institutional protocols and algorithms that pertain to the evaluation of patients at risk for COVID-19 are in a state of rapid change based on information released by regulatory bodies including the CDC and federal and state organizations. These policies and algorithms were followed during the patient's care in the ED. Initial Screens:             Vitals: There were no vitals filed for this visit.     Chief Complaint      Chief Complaint Patient presents with    Fall          vitals were not taken for this visit. DIAGNOSTIC RESULTS       RADIOLOGY:   No orders to display         LABS:  Labs Reviewed - No data to display      EMERGENCY DEPARTMENT COURSE:     -------------------------       ,  ,  ,      System Problem List     Patient Active Problem List   Diagnosis    Asthma    25 weeks gestation of pregnancy    High-risk pregnancy in second trimester    29 weeks gestation of pregnancy    30 weeks gestation of pregnancy         Comments  Chronic Prob List noted  Patient history as noted  Total number of stairs is about 5-6  No specific abdominal pain vaginal bleeding or contractions apparent but she does have some back discomfort  Quick screening exam here, ultrasound, and expedited upstairs for OB/GYN noninvasive monitoring  mohan Gaitan MD,, MD, F.A.C.E.P.   Attending Emergency Physician         Kenn Gaitan MD  07/06/21 1626

## 2021-07-07 VITALS
HEART RATE: 85 BPM | RESPIRATION RATE: 16 BRPM | TEMPERATURE: 97.7 F | SYSTOLIC BLOOD PRESSURE: 90 MMHG | DIASTOLIC BLOOD PRESSURE: 38 MMHG

## 2021-07-07 DIAGNOSIS — Z3A.32 32 WEEKS GESTATION OF PREGNANCY: ICD-10-CM

## 2021-07-07 DIAGNOSIS — O35.BXX1: Primary | ICD-10-CM

## 2021-07-07 PROCEDURE — 6370000000 HC RX 637 (ALT 250 FOR IP): Performed by: STUDENT IN AN ORGANIZED HEALTH CARE EDUCATION/TRAINING PROGRAM

## 2021-07-07 PROCEDURE — 76805 OB US >/= 14 WKS SNGL FETUS: CPT | Performed by: OBSTETRICS & GYNECOLOGY

## 2021-07-07 PROCEDURE — 76819 FETAL BIOPHYS PROFIL W/O NST: CPT | Performed by: OBSTETRICS & GYNECOLOGY

## 2021-07-07 RX ADMIN — METRONIDAZOLE 500 MG: 500 TABLET, FILM COATED ORAL at 09:56

## 2021-07-07 ASSESSMENT — ENCOUNTER SYMPTOMS
ABDOMINAL PAIN: 0
DIARRHEA: 0
SHORTNESS OF BREATH: 0
RHINORRHEA: 0
EYE PAIN: 0
SORE THROAT: 0
CONSTIPATION: 0
COUGH: 0

## 2021-07-07 NOTE — DISCHARGE SUMMARY
known as: Unisom SleepTabs  Take 1 tablet by mouth nightly     famotidine 40 MG tablet  Commonly known as: PEPCID  Take 1 tablet by mouth every evening     ondansetron 4 MG disintegrating tablet  Commonly known as: ZOFRAN-ODT  Take 1 tablet by mouth 3 times daily as needed for Nausea or Vomiting     Peak Flow Meter Estelita  Use 1 to 2 times daily     PRENATAL/FOLIC ACID PO     vitamin B-6 25 MG tablet  Commonly known as: PYRIDOXINE  Take 1 tablet by mouth nightly        ASK your doctor about these medications    promethazine 25 MG tablet  Commonly known as: PHENERGAN  Take 1 tablet by mouth 3 times daily as needed for Nausea  Ask about: Should I take this medication?            Where to Get Your Medications      You can get these medications from any pharmacy    Bring a paper prescription for each of these medications  · metroNIDAZOLE 500 MG tablet         Follow up: with scheduled appointment in primary Ob/Gyn's office 7/8/21    Condition on discharge: stable    Discharge date: 07/07/21      Atiya Fraknlin DO  Ob/Gyn Resident

## 2021-07-07 NOTE — PROGRESS NOTES
Maternal Fetal Medicine   Ultrasound Interval Note    Carmela Rothman is a 22 y.o. female  at 32w0d     Case discussed with Dr. Natacha Maya Report (Verbal): BPP . Cephalic. Anterior placenta w/ normal cord insertion. EFW 4#1. Fetal ASD noted. Plan: Patient stable for discharged. Follow up outpatient regarding fetal ASD. Appointment scheduled with M 21. Please refer to report for further details. Dr. Ravindra Roa updated, OB Residents updated, RN updated.      Leopold Jack, DO  Ob/Gyn Resident  2021, 8:51 AM

## 2021-07-07 NOTE — PROGRESS NOTES
OB/GYN PROGRESS NOTE    Ilia Rivera is a 22 y.o. female  at [address] [facility] Day: 2    Subjective:   Patient has been seen and examined. Patient has no complaints this morning and rested comfortably overnight. Patient denies any vaginal discharge and any urinary complaints. The patient reports fetal movement is present, denies contractions, denies loss of fluid, denies vaginal bleeding. Patient denies headache, vision changes, nausea, vomiting, fever, chills, shortness of breath, chest pain, RUQ pain, abdominal pain, diarrhea, change in color/amount/odor of vaginal discharge, dysuria or, hematuria.      Objective:   Vitals:  Vitals:    21 1937 21 1939 21 0036 21 0037   BP:  117/62  (!) 90/38   Pulse:  85  85   Resp: 16  16    Temp: 98 °F (36.7 °C)  97.7 °F (36.5 °C)        FHT: 135, moderate variability, accelerations present, decelerations absent  Contractions: none    Physical Exam:  General appearance:  no apparent distress, alert and cooperative  HEENT: head atraumatic, normocephalic, moist mucous membranes, trachea midline  Neurologic:  alert, oriented, normal speech, no focal findings or movement disorder noted  Lungs:  No increased work of breathing, good air exchange, clear to auscultation bilaterally, no crackles or wheezing  Heart:  regular rate and rhythm and no murmur    Abdomen:  soft, gravid, non-tender and no rebound, guarding, or rigidity  Extremities:  no calf tenderness, non edematous  Musculoskeletal: Gross strength equal and intact throughout, no gross abnormalities, range of motion normal in hips, knees, shoulders and spine, CVA tenderness: none  Psychiatric: Mood appropriate, normal affect   Rectal Exam: not indicated  Pelvic Exam: not indicated at this time      Assessment/Plan:  Iila Rivera is a 22 y.o. female  at 32w0d IUP   - Rh negative/ Rubella non-immune/ GBS unknown   - Pen G for GBS prophylaxis if delivery imminent   - Rhogam: given 21   - VSS   - Cat 1 FHT, TOCO quiet    S/p Fall w/ Abdominal Trauma 7/5 @ 0700   - KB noted to have fetal blood volume up to 15 mL, but after discussion with lab it appears that no fetal cells were actually seen and this result was reported \"just to be safe.  Patient already received rhogam on 6/4/21   - SSE 7/6: no blood noted in vaginal vault or coming from cervical os   - Denies LOF, VB, Cx, decreased fetal movement    - Plan for MFM US 7/7/21 and possible DC home following pending normal US     Bacterial Vaginosis   - Noted on admission    - Flagyl Rx upon DC home    Asthma   - Controlled with albuterol PRN    - Asymptomatic    FHx Chiari malformation & cleft lip (FOB brother)   - Anatomy scan wnl   - NIPT nml    THC use   - Encouraged cessation and advised close follow up     BMI 34    Patient Active Problem List    Diagnosis Date Noted    31 weeks gestation of pregnancy 07/06/2021    FHx Chiari malformation type I and cleft lip 07/06/2021     Overview Note:     FOB half brother  Anatomy scan wnl  NIPT normal      THC use 07/06/2021    Rubella NI 07/06/2021     Overview Note:     Needs MMR PP      HRP (high risk pregnancy) 07/06/2021    Abdominal trauma 07/06/2021     Overview Note:     S/p fall 7/6/21      High-risk pregnancy in second trimester 06/15/2021    Asthma 02/11/2021       Will update Dr. Ethel Odonnell, [address] [facility]  Ob/Gyn Resident  7/7/2021, 7:06 AM

## 2021-07-08 ENCOUNTER — ROUTINE PRENATAL (OUTPATIENT)
Dept: OBGYN CLINIC | Age: 21
End: 2021-07-08

## 2021-07-08 VITALS — WEIGHT: 172 LBS | BODY MASS INDEX: 34.74 KG/M2 | DIASTOLIC BLOOD PRESSURE: 62 MMHG | SYSTOLIC BLOOD PRESSURE: 112 MMHG

## 2021-07-08 DIAGNOSIS — O35.BXX1: ICD-10-CM

## 2021-07-08 DIAGNOSIS — Z3A.32 32 WEEKS GESTATION OF PREGNANCY: ICD-10-CM

## 2021-07-08 DIAGNOSIS — Z34.93 NORMAL PREGNANCY IN THIRD TRIMESTER: Primary | ICD-10-CM

## 2021-07-08 PROBLEM — O35.BXX0 ATRIAL SEPTAL DEFECT OF FETUS AFFECTING ANTEPARTUM CARE OF MOTHER: Status: ACTIVE | Noted: 2021-07-08

## 2021-07-08 PROCEDURE — 0502F SUBSEQUENT PRENATAL CARE: CPT | Performed by: NURSE PRACTITIONER

## 2021-07-08 NOTE — PATIENT INSTRUCTIONS

## 2021-07-08 NOTE — PROGRESS NOTES
Presents for OB visit  Gestation 32w1d  Estimated Date of Delivery: 21    NIPS Testing ordered- 21 Results NEGATIVE Gender- BOY! - Bepadmini Lyons  Fm Hx of Autism Sister   Initial prenatal labs in care everywhere:   nonimmune rubella  Blood type O negative  +THC on UDS  TDAP given   Rhogam given     ASD on 21                      OB History    Para Term  AB Living   1             SAB TAB Ectopic Molar Multiple Live Births                    # Outcome Date GA Lbr Ric/2nd Weight Sex Delivery Anes PTL Lv   1 Current                     No Known Allergies  Current Outpatient Medications   Medication Sig Dispense Refill    metroNIDAZOLE (FLAGYL) 500 MG tablet Take 1 tablet by mouth 2 times daily for 6 days 12 tablet 0    Peak Flow Meter PATRICIA Use 1 to 2 times daily 1 Device 0    ondansetron (ZOFRAN-ODT) 4 MG disintegrating tablet Take 1 tablet by mouth 3 times daily as needed for Nausea or Vomiting 21 tablet 0    famotidine (PEPCID) 40 MG tablet Take 1 tablet by mouth every evening 30 tablet 2    vitamin B-6 (PYRIDOXINE) 25 MG tablet Take 1 tablet by mouth nightly 30 tablet 2    doxyLAMINE succinate (UNISOM SLEEPTABS) 25 MG tablet Take 1 tablet by mouth nightly (Patient not taking: Reported on 2021) 30 tablet 0    Prenatal Vit-Fe Fumarate-FA (PRENATAL/FOLIC ACID PO) Take by mouth       No current facility-administered medications for this visit. No past medical history on file. No past surgical history on file. Headaches: no  Swelling: no  Bleeding: no  Discharge: no   Dysuria: no  Nausea: no  Heartburn: no  Epigastric pain: no  Contractions: no  Leakage of fluid: no  + fetal movement       Return 2 WEEKS    Labs as indicated n/a    PTL signs reviewed, if indicated  Kick Count Instructions given if indicated: The patient was instructed on fetal kick counts and was given a kick sheet to complete every 8 hours. This is to begin at 28 weeks gestation.  She was instructed that the baby should move at a minimum of ten times within one hour after a meal. The patient was instructed to lay down on her left side twenty minutes after eating and count movements for up to one hour with a target value of ten movements. She was instructed to notify the office if she did not make that target after two attempts or if after any attempt there was less than four movements. After hour numbers reviewed , along with labor signs if indicated. Contractions/cramping between 5-7 minutes and persisting even with attempts of increased water and laying on side. Fluid leakage, bleeding  NST information given no    The patient was counseled on the mandatory call ahead policy. She has been instructed to call the office at anytime prior to going into the hospital so the on-call physician may direct her to the appropriate facility for care. Exceptions were reviewed including but not limited to: Decreased fetal movement, vaginal Bleeding or hemorrhage, trauma, readily expectant delivery, or any instance where she feels 911 should be utilized. Of the 20 minute duration appointment visit, Raisa Cortez CNP spent at least 50% of the face-to-face time in counseling, explanation of diagnosis, planning of further management, and answering all questions.

## 2021-07-12 ENCOUNTER — HOSPITAL ENCOUNTER (OUTPATIENT)
Age: 21
LOS: 1 days | Discharge: HOME | End: 2021-07-12
Attending: OBSTETRICS & GYNECOLOGY | Admitting: OBSTETRICS & GYNECOLOGY
Payer: COMMERCIAL

## 2021-07-12 VITALS
DIASTOLIC BLOOD PRESSURE: 74 MMHG | TEMPERATURE: 97.5 F | OXYGEN SATURATION: 98 % | RESPIRATION RATE: 16 BRPM | SYSTOLIC BLOOD PRESSURE: 131 MMHG | HEART RATE: 107 BPM

## 2021-07-12 PROBLEM — Z3A.31 31 WEEKS GESTATION OF PREGNANCY: Status: RESOLVED | Noted: 2021-07-06 | Resolved: 2021-07-12

## 2021-07-12 PROBLEM — Z3A.32 32 WEEKS GESTATION OF PREGNANCY: Status: ACTIVE | Noted: 2021-07-12

## 2021-07-12 PROBLEM — O09.92 HIGH-RISK PREGNANCY IN SECOND TRIMESTER: Status: RESOLVED | Noted: 2021-06-15 | Resolved: 2021-07-12

## 2021-07-12 PROBLEM — Z81.8 FAMILY HISTORY OF AUTISM: Status: ACTIVE | Noted: 2021-07-12

## 2021-07-12 LAB
BILIRUBIN URINE: NEGATIVE
COLOR: YELLOW
COMMENT UA: NORMAL
DIRECT EXAM: NORMAL
GLUCOSE URINE: NEGATIVE
KETONES, URINE: NEGATIVE
LEUKOCYTE ESTERASE, URINE: NEGATIVE
Lab: NORMAL
NITRITE, URINE: NEGATIVE
PH UA: 6.5 (ref 5–8)
PROTEIN UA: NEGATIVE
SPECIFIC GRAVITY UA: 1.01 (ref 1–1.03)
SPECIMEN DESCRIPTION: NORMAL
TURBIDITY: CLEAR
URINE HGB: NEGATIVE
UROBILINOGEN, URINE: NORMAL

## 2021-07-12 PROCEDURE — 87510 GARDNER VAG DNA DIR PROBE: CPT

## 2021-07-12 PROCEDURE — 87591 N.GONORRHOEAE DNA AMP PROB: CPT

## 2021-07-12 PROCEDURE — 81003 URINALYSIS AUTO W/O SCOPE: CPT

## 2021-07-12 PROCEDURE — 87660 TRICHOMONAS VAGIN DIR PROBE: CPT

## 2021-07-12 PROCEDURE — 87480 CANDIDA DNA DIR PROBE: CPT

## 2021-07-12 PROCEDURE — 87491 CHLMYD TRACH DNA AMP PROBE: CPT

## 2021-07-12 RX ORDER — ONDANSETRON 2 MG/ML
4 INJECTION INTRAMUSCULAR; INTRAVENOUS EVERY 6 HOURS PRN
Status: DISCONTINUED | OUTPATIENT
Start: 2021-07-12 | End: 2021-07-12 | Stop reason: HOSPADM

## 2021-07-12 RX ORDER — ACETAMINOPHEN 500 MG
1000 TABLET ORAL EVERY 6 HOURS PRN
Status: DISCONTINUED | OUTPATIENT
Start: 2021-07-12 | End: 2021-07-12 | Stop reason: HOSPADM

## 2021-07-12 RX ORDER — PROMETHAZINE HYDROCHLORIDE 12.5 MG/1
12.5 TABLET ORAL EVERY 6 HOURS PRN
Status: DISCONTINUED | OUTPATIENT
Start: 2021-07-12 | End: 2021-07-12 | Stop reason: HOSPADM

## 2021-07-12 NOTE — H&P
OBSTETRICAL HISTORY Jeff Saeed    Date: 2021       Time: 12:20 PM {  Patient Name: Stephanie Salvador     Patient : 2000  Room/Bed: [place] [address]    Admission Date/Time: 2021 10:12 AM      CC:  pelvic pressure     HPI: Stephanie Salvador is a 22 y.o.  at 32w5d who presents with the complaint of suprapubic/pelvic pressure. She says the pressure is constant and feels like an ache. She says it began this morning. States the discomfort is worse when she urinates. She denies urinary burning or frequency. Patient denies any fever, chills, N/V, headaches, vision changes, chest pain, shortness of breath, RUQ pain, and increased swelling/tenderness in bilateral lower extremities. Patient denies any vaginal discharge. The patient reports fetal movement is present, denies contractions, denies loss of fluid, denies vaginal bleeding. DATING:  LMP: Patient's last menstrual period was 2020.   Estimated Date of Delivery: 21   Based on: LMP c/w early US at 15 3/7 weeks GA    PREGNANCY RISK FACTORS:  Patient Active Problem List   Diagnosis    Asthma    FHx Chiari malformation type I and cleft lip    THC use    Rubella NI    HRP (high risk pregnancy)    Abdominal trauma    Traumatic injury during pregnancy in third trimester    Encounter for routine screening for malformation using ultrasonics     screening for fetal growth retardation using ultrasonics    Atrial septal defect of fetus affecting antepartum care of mother    Family history of autism    32 weeks gestation of pregnancy        Steroids Given In This Pregnancy:  no     REVIEW OF SYSTEMS:   Constitutional: negative fever, negative chills  HEENT: negative visual disturbances, negative headaches  Respiratory: negative dyspnea, negative cough  Cardiovascular: negative chest pain,  negative palpitations  Gastrointestinal: positive abdominal pain-- suprapubic, negative RUQ pain, negative N/V, negative diarrhea, negative constipation  Genitourinary: positive dysuria, negative vaginal discharge, negative vaginal bleeding  Dermatological: negative rash  Hematologic: negative bruising  Immunologic/Lymphatic: negative recent illness, negative recent sick contact  Musculoskeletal: negative back pain, negative myalgias, negative arthralgias  Neurological:  negative dizziness, negative weakness  Behavior/Psych: negative depression, negative anxiety    OBSTETRICAL HISTORY:   OB History    Para Term  AB Living   1 0 0 0 0 0   SAB TAB Ectopic Molar Multiple Live Births   0 0 0 0 0 0      # Outcome Date GA Lbr Ric/2nd Weight Sex Delivery Anes PTL Lv   1 Current                PAST MEDICAL HISTORY:   has no past medical history on file. PAST SURGICAL HISTORY:   has no past surgical history on file. ALLERGIES:  has No Known Allergies. MEDICATIONS:  Prior to Admission medications    Medication Sig Start Date End Date Taking? Authorizing Provider   metroNIDAZOLE (FLAGYL) 500 MG tablet Take 1 tablet by mouth 2 times daily for 6 days 21 Yes Charis Champion DO   Peak Flow Meter PATRICIA Use 1 to 2 times daily 21  Yes BEBA Oconnor CNP   ondansetron (ZOFRAN-ODT) 4 MG disintegrating tablet Take 1 tablet by mouth 3 times daily as needed for Nausea or Vomiting 21  Yes Nadya Paul DO   vitamin B-6 (PYRIDOXINE) 25 MG tablet Take 1 tablet by mouth nightly 21  Yes Harsh Barlow DO   doxyLAMINE succinate (UNISOM SLEEPTABS) 25 MG tablet Take 1 tablet by mouth nightly 21  Yes Cherie Haines PA-C   Prenatal Vit-Fe Fumarate-FA (PRENATAL/FOLIC ACID PO) Take by mouth   Yes Historical Provider, MD   famotidine (PEPCID) 40 MG tablet Take 1 tablet by mouth every evening 21  Guillermo Barlow DO       FAMILY HISTORY:  family history is not on file. SOCIAL HISTORY:   reports that she has never smoked.  She has never used smokeless tobacco. She reports previous alcohol use. She reports current drug use. Drug: Marijuana.     VITALS:  Vitals:    07/12/21 1145 07/12/21 1150 07/12/21 1155 07/12/21 1200   BP:       Pulse:       Resp:       Temp:       SpO2: 97% 98% 98% 98%         PHYSICAL EXAM:  Fetal Heart Monitor:  Baseline Heart Rate 140 bpm, moderate variability, present accelerations, absent decelerations  Revloc: no contractions    General appearance:  no apparent distress, alert and cooperative  HEENT: head atraumatic, normocephalic, moist mucous membranes, trachea midline  Neurologic:  alert, oriented, normal speech, no focal findings or movement disorder noted  Lungs:  No increased work of breathing, good air exchange  Heart:  regular rate and rhythm  Abdomen:  soft, gravid, non-tender and no rebound, guarding, or rigidity, pain reproducible with lateral translation of the uterus  Extremities:  no calf tenderness, non-edematous  Musculoskeletal: Gross strength equal and intact throughout, no gross abnormalities, range of motion normal in hips, knees, shoulders and spine, CVA tenderness: none  Psychiatric: Mood appropriate, normal affect   Rectal Exam: not indicated  Pelvic Exam:  Chaperone for Intimate Exam: Chaperone was present for entire exam, Chaperone Name: Annabel Joseph RN  Sterile Speculum Exam:   Urethral meatus: normal appearing   Vulva: Normal hair distribution, normal appearing vulva, no masses, tenderness or lesions, normal clitoris   Vagina: Normal appearing vaginal mucosa without lesions, scant physiologic vaginal discharge noted in the posterior vault, no lacerations   Cervix: Normal appearing cervix without lesions, external os visibly closed, no lacerations or abnormal lesions visualized  Sterile Vaginal Exam:  Cervix: No cervical motion tenderness  Cervix: closed/0%/high  per senior resident      PRENATAL LAB RESULTS:  Blood Type/Rh: O neg  Antibody Screen: negative  Hemoglobin, Hematocrit, Platelets: Hgb 52.2/KTQ 37.4/Plt 251  Rubella: non-immune  T. Pallidum, IgG: non-reactive  Hepatitis B Surface Antigen: non-reactive   Hepatitis C Antibody: non-reactive   HIV: non-reactive   Sickle Cell Screen: negative  Gonorrhea: negative  Chlamydia: negative  Urine culture: negative, date: 21, 21    1 hour Glucose Tolerance Test: 78    Group B Strep: not done yet  Cystic Fibrosis Screen: negative  First Trimester Screen: not available  MSAFP/Multiple Markers: low risk of NTD  Non-Invasive Prenatal Testing: no aneuploidy  Anatomy US: anterior placenta, 3 VC, male gender, normal anatomy    ASSESSMENT & PLAN:  Mila Crawford is a 22 y.o. female  at 30w10d with the CC of pelvic pressure   - GBS unknown / Rh negative / R non-immune   - No indication for GBS prophylaxis   - VSS, afebrile   - cEFM/TOCO: Cat I, no contractions   - SSE: scant physiologic discharge, external os visibly closed, no lacerations   - SVE: closed/0   - Vaginitis negative   - GC/C pending   - UA not suspicious for UTI, UCx pending   - Patient's pressure is most likely 2/2 round ligament pain. Low suspicion for infection or PTL. Patient counseled on return precautions including PTL, decreased fetal movement, vaginal bleeding, vaginal discharge, or loss of fluid. Patient verbalized understanding. All concerns were addressed and questions answered. Patient encouraged to keep her routine OB follow up appointment on 21.    - Asthma   - Albuterol PRN   - Patient reports little relief at times with her inhaler.  Stable at this time   - Encouraged follow up with her PCP   - SpO2 98%    - Rubella NI   - MMR PP    - Suspected fetal ASD    - Noted on MFM US 21   - Fetal echo scheduled 21    - FHx Chiari malformation type I and cleft lip   - FOB's half brother   - Anatomy scan wnl   - NIPT wnl    - FHx autism   - Maternal sister    - THC use   - Encouraged cessation    - BMI 34.7    Patient Active Problem List    Diagnosis Date Noted    Family history of autism 2021    28 weeks gestation of pregnancy 2021    Atrial septal defect of fetus affecting antepartum care of mother 2021    Traumatic injury during pregnancy in third trimester     Encounter for routine screening for malformation using ultrasonics      screening for fetal growth retardation using ultrasonics     FHx Chiari malformation type I and cleft lip 2021     FOB half brother  Anatomy scan wnl  NIPT normal      THC use 2021    Rubella NI 2021     Needs MMR PP      HRP (high risk pregnancy) 2021    Abdominal trauma 2021     S/p fall 21      Asthma 2021       Plan discussed with Dr. Stewart Pugh, who is agreeable. Steroids given this admission: No    Risks, benefits, alternatives and possible complications have been discussed in detail with the patient. Admission, and post admission procedures and expectations were discussed in detail. All questions were answered. Attending's Name: Dr. Saeid Porter,   Ob/Gyn Resident  2021, 12:20 PM      Resident Physician Statement  I have discussed the case, including pertinent history and exam findings with the above resident. I have personally seen the patient. I agree with the assessment, plan and orders as documented. I have made changes to the above note as needed. I have discussed the case with above named attending. All discharge instructions reviewed with the patient. Patient is in agreement with plan. All questions answered.     Lizandro Singh DO  OB/GYN Resident  2021  12:47 PM

## 2021-07-15 ENCOUNTER — TELEMEDICINE (OUTPATIENT)
Dept: OBGYN CLINIC | Age: 21
End: 2021-07-15
Payer: COMMERCIAL

## 2021-07-15 DIAGNOSIS — Z3A.33 33 WEEKS GESTATION OF PREGNANCY: ICD-10-CM

## 2021-07-15 DIAGNOSIS — F32.A ANXIETY AND DEPRESSION: Primary | ICD-10-CM

## 2021-07-15 DIAGNOSIS — F41.9 ANXIETY AND DEPRESSION: Primary | ICD-10-CM

## 2021-07-15 PROCEDURE — G8427 DOCREV CUR MEDS BY ELIG CLIN: HCPCS | Performed by: NURSE PRACTITIONER

## 2021-07-15 PROCEDURE — 99213 OFFICE O/P EST LOW 20 MIN: CPT | Performed by: NURSE PRACTITIONER

## 2021-07-15 PROCEDURE — 1111F DSCHRG MED/CURRENT MED MERGE: CPT | Performed by: NURSE PRACTITIONER

## 2021-07-15 ASSESSMENT — ENCOUNTER SYMPTOMS
NAUSEA: 0
WHEEZING: 0
SHORTNESS OF BREATH: 0
VOMITING: 0

## 2021-07-15 NOTE — PATIENT INSTRUCTIONS
Patient Education        sertraline  Pronunciation:  SER katerin terrell  Brand:  Zoloft  What is the most important information I should know about sertraline? You should not use sertraline if you also take pimozide, or if you are being treated with methylene blue injection. Do not use this medicine if you have used an MAO inhibitor in the past 14 days, such as isocarboxazid, linezolid, methylene blue injection, phenelzine, rasagiline, selegiline, or tranylcypromine. Some young people have thoughts about suicide when first taking an antidepressant. Stay alert to changes in your mood or symptoms. Report any new or worsening symptoms to your doctor. Seek medical attention right away if you have symptoms of serotonin syndrome, such as: agitation, hallucinations, fever, sweating, shivering, fast heart rate, muscle stiffness, twitching, loss of coordination, nausea, vomiting, or diarrhea. What is sertraline? Sertraline is an antidepressant in a group of drugs called selective serotonin reuptake inhibitors (SSRIs). Sertraline affects chemicals in the brain that may be unbalanced in people with depression, panic, anxiety, or obsessive-compulsive symptoms. Sertraline is used to treat depression, obsessive-compulsive disorder, panic disorder, anxiety disorders, post-traumatic stress disorder (PTSD), and premenstrual dysphoric disorder (PMDD). Sertraline may also be used for purposes not listed in this medication guide. What should I discuss with my healthcare provider before taking sertraline? You should not use sertraline if you are allergic to it, or if you also take pimozide. Do not use the liquid form of sertraline  if you are taking disulfiram (Antabuse) or you could have a severe reaction to the disulfiram.  Do not take sertraline within 14 days before or 14 days after you take an MAO inhibitor. A dangerous drug interaction could occur.  MAO inhibitors include isocarboxazid, linezolid, phenelzine, rasagiline, selegiline, and tranylcypromine. To make sure sertraline is safe for you, tell your doctor if you have ever had:  · heart disease, high blood pressure, or a stroke;  · liver or kidney disease;  · a seizure;  · bleeding problems, or if you take warfarin (Coumadin, Jantoven);  · bipolar disorder (manic depression); or  · low levels of sodium in your blood. Some medicines can interact with sertraline and cause a serious condition called serotonin syndrome. Be sure your doctor knows if you also take stimulant medicine, opioid medicine, herbal products, other antidepressants, or medicine for mental illness, Parkinson's disease, migraine headaches, serious infections, or prevention of nausea and vomiting. Ask your doctor before making any changes in how or when you take your medications. Some young people have thoughts about suicide when first taking an antidepressant. Your doctor should check your progress at regular visits. Your family or other caregivers should also be alert to changes in your mood or symptoms. Taking an SSRI antidepressant during pregnancy may cause serious lung problems or other complications in the baby. However, you may have a relapse of depression if you stop taking your antidepressant. Tell your doctor right away if you become pregnant. Do not start or stop taking this medicine during pregnancy without your doctor's advice. It is not known whether sertraline passes into breast milk or if it could harm a nursing baby. Tell your doctor if you are breast-feeding a baby. Do not give sertraline to anyone younger than 25years old without the advice of a doctor. Sertraline is FDA-approved for children with obsessive-compulsive disorder (OCD). It is not approved for treating depression in children. How should I take sertraline? Follow all directions on your prescription label. Your doctor may occasionally change your dose.  Do not take this medicine in larger or smaller amounts or for longer than recommended. Sertraline may be taken with or without food. Try to take the medicine at the same time each day. The liquid (oral concentrate) form of sertraline must be diluted before you take it. To be sure you get the correct dose, measure the liquid with the medicine dropper provided. Mix the dose with 4 ounces (one-half cup) of water, ginger ale, lemon/lime soda, lemonade, or orange juice. Do not use any other liquids to dilute the medicine. Stir this mixture and drink all of it right away. To make sure you get the entire dose, add a little more water to the same glass, swirl gently and drink right away. This medicine can cause you to have a false positive drug screening test. If you provide a urine sample for drug screening, tell the laboratory staff that you are taking sertraline. It may take up to 4 weeks before your symptoms improve. Keep using the medication as directed and tell your doctor if your symptoms do not improve. Do not stop using sertraline suddenly, or you could have unpleasant withdrawal symptoms. Ask your doctor how to safely stop using sertraline. Store at room temperature away from moisture and heat. What happens if I miss a dose? Take the missed dose as soon as you remember. Skip the missed dose if it is almost time for your next scheduled dose. Do not take extra medicine to make up the missed dose. What happens if I overdose? Seek emergency medical attention or call the Poison Help line at 1-728.104.7118. What should I avoid while taking sertraline? Do not drink alcohol. Ask your doctor before taking a nonsteroidal anti-inflammatory drug (NSAID) for pain, arthritis, fever, or swelling. This includes aspirin, ibuprofen (Advil, Motrin), naproxen (Aleve), celecoxib (Celebrex), diclofenac, indomethacin, meloxicam, and others. Using an NSAID with sertraline may cause you to bruise or bleed easily. This medication may impair your thinking or reactions.  Be careful if you drive or do anything that requires you to be alert. What are the possible side effects of sertraline? Get emergency medical help if you have signs of an allergic reaction: skin rash or hives (with or without fever or joint pain); difficulty breathing; swelling of your face, lips, tongue, or throat. Report any new or worsening symptoms to your doctor, such as: mood or behavior changes, anxiety, panic attacks, trouble sleeping, or if you feel impulsive, irritable, agitated, hostile, aggressive, restless, hyperactive (mentally or physically), more depressed, or have thoughts about suicide or hurting yourself. Call your doctor at once if you have:  · a seizure (convulsions);  · blurred vision, tunnel vision, eye pain or swelling;  · low levels of sodium in the body --headache, confusion, memory problems, severe weakness, feeling unsteady; or  · manic episodes --racing thoughts, increased energy, unusual risk-taking behavior, extreme happiness, being irritable or talkative. Seek medical attention right away if you have symptoms of serotonin syndrome, such as: agitation, hallucinations, fever, sweating, shivering, fast heart rate, muscle stiffness, twitching, loss of coordination, nausea, vomiting, or diarrhea. Common side effects may include:  · drowsiness, tiredness, feeling anxious or agitated;  · indigestion, nausea, diarrhea, loss of appetite;  · sweating;  · tremors or shaking;  · sleep problems (insomnia); or  · decreased sex drive, impotence, or difficulty having an orgasm. This is not a complete list of side effects and others may occur. Call your doctor for medical advice about side effects. You may report side effects to FDA at 0-816-FDA-3224. What other drugs will affect sertraline? Taking sertraline with other drugs that make you sleepy can worsen this effect. Ask your doctor before taking a sleeping pill, narcotic medication, muscle relaxer, or medicine for anxiety, depression, or seizures.   Other drugs may interact with sertraline, including prescription and over-the-counter medicines, vitamins, and herbal products. Tell your doctor about all your current medicines and any medicine you start or stop using. Where can I get more information? Your pharmacist can provide more information about sertraline. Remember, keep this and all other medicines out of the reach of children, never share your medicines with others, and use this medication only for the indication prescribed. Every effort has been made to ensure that the information provided by [address] is accurate, up-to-date, and complete, but no guarantee is made to that effect. Drug information contained herein may be time sensitive. [facility] information has been compiled for use by healthcare practitioners and consumers in the [place] and therefore [facility] does not warrant that uses outside of the [place] are appropriate, unless specifically indicated otherwise. [facility]'s drug information does not endorse drugs, diagnose patients or recommend therapy. [facility] drug information is an informational resource designed to assist licensed healthcare practitioners in caring for their patients and/or to serve consumers viewing this service as a supplement to, and not a substitute for, the expertise, skill, knowledge and judgment of healthcare practitioners. The absence of a warning for a given drug or drug combination in no way should be construed to indicate that the drug or drug combination is safe, effective or appropriate for any given patient. [facility] does not assume any responsibility for any aspect of healthcare administered with the aid of information [facility] provides. The information contained herein is not intended to cover all possible uses, directions, precautions, warnings, drug interactions, allergic reactions, or adverse effects.  If you have questions about the drugs you are taking, check with your doctor, nurse or pharmacist.  Copyright 4693-1939 [employer]. Version: 21.01. Revision date: 8/9/2017. Care instructions adapted under license by [facility] ([facility]). If you have questions about a medical condition or this instruction, always ask your healthcare professional. [address] any warranty or liability for your use of this information.

## 2021-07-20 ENCOUNTER — OFFICE VISIT (OUTPATIENT)
Dept: PRIMARY CARE CLINIC | Age: 21
End: 2021-07-20
Payer: COMMERCIAL

## 2021-07-20 VITALS
SYSTOLIC BLOOD PRESSURE: 120 MMHG | WEIGHT: 180 LBS | BODY MASS INDEX: 36.36 KG/M2 | OXYGEN SATURATION: 99 % | DIASTOLIC BLOOD PRESSURE: 68 MMHG | HEART RATE: 83 BPM | RESPIRATION RATE: 14 BRPM

## 2021-07-20 DIAGNOSIS — T14.8XXA MUSCLE STRAIN: Primary | ICD-10-CM

## 2021-07-20 PROBLEM — F12.90 MARIJUANA USE: Status: ACTIVE | Noted: 2021-02-01

## 2021-07-20 PROCEDURE — G8427 DOCREV CUR MEDS BY ELIG CLIN: HCPCS | Performed by: NURSE PRACTITIONER

## 2021-07-20 PROCEDURE — 1111F DSCHRG MED/CURRENT MED MERGE: CPT | Performed by: NURSE PRACTITIONER

## 2021-07-20 PROCEDURE — 99213 OFFICE O/P EST LOW 20 MIN: CPT | Performed by: NURSE PRACTITIONER

## 2021-07-20 PROCEDURE — 1036F TOBACCO NON-USER: CPT | Performed by: NURSE PRACTITIONER

## 2021-07-20 PROCEDURE — G8417 CALC BMI ABV UP PARAM F/U: HCPCS | Performed by: NURSE PRACTITIONER

## 2021-07-20 ASSESSMENT — ENCOUNTER SYMPTOMS
SINUS PRESSURE: 0
SHORTNESS OF BREATH: 0
DIARRHEA: 0
BACK PAIN: 1
EYE ITCHING: 0
CHEST TIGHTNESS: 0
EYE DISCHARGE: 0
SORE THROAT: 0
ABDOMINAL PAIN: 0
COUGH: 0
EYE REDNESS: 0
SINUS PAIN: 0
WHEEZING: 0
VOMITING: 0
TROUBLE SWALLOWING: 0
NAUSEA: 0

## 2021-07-20 NOTE — PROGRESS NOTES
[facility] PRIMARY CARE  [facility] [address] [facility] [address]  [address] [address]  Dept: [phone]  Dept Fax: [phone]    Jhoanne Raygoza is a 22 y.o. female who presents today for her medical conditions/complaintsas noted below. Johanne Raygoza is c/o of Back Pain (midback pain, hurts to breathe)        HPI:     Pt presents for an office visit  bp stable  Weight stable    Pt presents for an office visit. She has a sharp pain to the left lower back. When she coughs or breaths its worse. Started yesterday/last night. She wasn't doing anything when the pain started. Constant pain. Nothing makes it better. She has been taking tylenol for the pain. She does feel constipated. She is pooping but not as often. She is having pelvic pain   34 weeks pregnant. History reviewed. No pertinent past medical history. History reviewed. No pertinent surgical history. History reviewed. No pertinent family history. Social History     Tobacco Use    Smoking status: Never Smoker    Smokeless tobacco: Never Used   Substance Use Topics    Alcohol use: Not Currently      Current Outpatient Medications   Medication Sig Dispense Refill    sertraline (ZOLOFT) 50 MG tablet TAKE 1/2 TABLET BY MOUTH ONE TIME A DAY for 1 week then increase to 1 tablet daily thereafter as tolerated      Peak Flow Meter PATRICIA Use 1 to 2 times daily 1 Device 0    ondansetron (ZOFRAN-ODT) 4 MG disintegrating tablet Take 1 tablet by mouth 3 times daily as needed for Nausea or Vomiting 21 tablet 0    vitamin B-6 (PYRIDOXINE) 25 MG tablet Take 1 tablet by mouth nightly 30 tablet 2    doxyLAMINE succinate (UNISOM SLEEPTABS) 25 MG tablet Take 1 tablet by mouth nightly 30 tablet 0    Prenatal Vit-Fe Fumarate-FA (PRENATAL/FOLIC ACID PO) Take by mouth       No current facility-administered medications for this visit.      No Known Allergies    Health Maintenance   Topic Date Due    Hepatitis C screen  Never done    Pneumococcal 0-64 years Vaccine (1 of 2 - PPSV23) 04/16/2007    COVID-19 Vaccine (1) Never done    HIV screen  Never done    Flu vaccine (1) 09/01/2021    Chlamydia screen  07/12/2022    DTaP/Tdap/Td vaccine (8 - Td or Tdap) 06/24/2031    Hepatitis A vaccine  Completed    Hepatitis B vaccine  Completed    Hib vaccine  Completed    HPV vaccine  Completed    Varicella vaccine  Completed    Meningococcal (ACWY) vaccine  Completed       :     Review of Systems   Constitutional: Negative for chills, fatigue and fever. HENT: Negative for ear discharge, ear pain, sinus pressure, sinus pain, sore throat and trouble swallowing. Eyes: Negative for discharge, redness and itching. Respiratory: Negative for cough, chest tightness, shortness of breath and wheezing. Cardiovascular: Negative for chest pain. Gastrointestinal: Negative for abdominal pain, diarrhea, nausea and vomiting. Genitourinary: Negative for difficulty urinating and dysuria. Musculoskeletal: Positive for back pain. Negative for arthralgias and neck pain. Skin: Negative for rash. Neurological: Negative for dizziness, weakness, light-headedness and headaches. All other systems reviewed and are negative. Objective:     Physical Exam  Constitutional:       Appearance: Normal appearance. She is normal weight. HENT:      Head: Normocephalic and atraumatic. Nose: Nose normal.   Eyes:      Extraocular Movements: Extraocular movements intact. Conjunctiva/sclera: Conjunctivae normal.      Pupils: Pupils are equal, round, and reactive to light. Cardiovascular:      Rate and Rhythm: Normal rate and regular rhythm. Pulses: Normal pulses. Heart sounds: Normal heart sounds. Pulmonary:      Effort: Pulmonary effort is normal.      Breath sounds: Normal breath sounds. Abdominal:      General: Abdomen is flat. Palpations: Abdomen is soft. Musculoskeletal:         General: Normal range of motion. Cervical back: Neck supple. Back:    Skin:     General: Skin is warm and dry. Capillary Refill: Capillary refill takes less than 2 seconds. Neurological:      General: No focal deficit present. Mental Status: She is alert and oriented to person, place, and time. Psychiatric:         Mood and Affect: Mood normal.       /68   Pulse 83   Resp 14   Wt 180 lb (81.6 kg)   LMP 11/25/2020   SpO2 99%   BMI 36.36 kg/m²     Assessment:       Diagnosis Orders   1. Muscle strain         :      Return if symptoms worsen or fail to improve. 1.  Muscle strain  -Continue with exercising Tylenol  Heat or ice for pain  May use over-the-counter topical agents  Pain may be either muscle related or from her pregnancy  Follow-up with GYN as needed       Patient given educational materials - seepatient instructions. Discussed use, benefit, and side effects of prescribed medications. All patient questions answered. Pt voiced understanding. Reviewed health maintenance. Instructed to continue current medications, diet and exercise. Patient agreedwith treatment plan. Follow up as directed.       Electronically signed by BEBA Fenton CNP on 7/20/2021at 1:28 PM

## 2021-07-22 ENCOUNTER — ROUTINE PRENATAL (OUTPATIENT)
Dept: PERINATAL CARE | Age: 21
End: 2021-07-22
Payer: COMMERCIAL

## 2021-07-22 VITALS
WEIGHT: 176 LBS | TEMPERATURE: 97.2 F | HEART RATE: 110 BPM | HEIGHT: 59 IN | BODY MASS INDEX: 35.48 KG/M2 | DIASTOLIC BLOOD PRESSURE: 64 MMHG | RESPIRATION RATE: 16 BRPM | SYSTOLIC BLOOD PRESSURE: 104 MMHG

## 2021-07-22 DIAGNOSIS — O35.BXX0 ATRIAL SEPTAL DEFECT OF FETUS AFFECTING ANTEPARTUM CARE OF MOTHER, SINGLE OR UNSPECIFIED FETUS: Primary | ICD-10-CM

## 2021-07-22 DIAGNOSIS — J45.909 ASTHMA AFFECTING PREGNANCY IN THIRD TRIMESTER: ICD-10-CM

## 2021-07-22 DIAGNOSIS — Z3A.34 34 WEEKS GESTATION OF PREGNANCY: ICD-10-CM

## 2021-07-22 DIAGNOSIS — O99.513 ASTHMA AFFECTING PREGNANCY IN THIRD TRIMESTER: ICD-10-CM

## 2021-07-22 DIAGNOSIS — O99.213 OBESITY AFFECTING PREGNANCY IN THIRD TRIMESTER: ICD-10-CM

## 2021-07-22 PROCEDURE — 76825 ECHO EXAM OF FETAL HEART: CPT | Performed by: OBSTETRICS & GYNECOLOGY

## 2021-07-22 PROCEDURE — 99243 OFF/OP CNSLTJ NEW/EST LOW 30: CPT | Performed by: OBSTETRICS & GYNECOLOGY

## 2021-07-22 PROCEDURE — 76815 OB US LIMITED FETUS(S): CPT | Performed by: OBSTETRICS & GYNECOLOGY

## 2021-07-22 PROCEDURE — 76819 FETAL BIOPHYS PROFIL W/O NST: CPT | Performed by: OBSTETRICS & GYNECOLOGY

## 2021-07-22 PROCEDURE — 76827 ECHO EXAM OF FETAL HEART: CPT | Performed by: OBSTETRICS & GYNECOLOGY

## 2021-07-22 PROCEDURE — G8427 DOCREV CUR MEDS BY ELIG CLIN: HCPCS | Performed by: OBSTETRICS & GYNECOLOGY

## 2021-07-22 PROCEDURE — 93325 DOPPLER ECHO COLOR FLOW MAPG: CPT | Performed by: OBSTETRICS & GYNECOLOGY

## 2021-07-22 PROCEDURE — G8417 CALC BMI ABV UP PARAM F/U: HCPCS | Performed by: OBSTETRICS & GYNECOLOGY

## 2021-07-27 ENCOUNTER — HOSPITAL ENCOUNTER (OUTPATIENT)
Age: 21
Discharge: HOME | End: 2021-07-27
Attending: OBSTETRICS & GYNECOLOGY | Admitting: OBSTETRICS & GYNECOLOGY
Payer: COMMERCIAL

## 2021-07-27 VITALS
DIASTOLIC BLOOD PRESSURE: 63 MMHG | TEMPERATURE: 98.2 F | RESPIRATION RATE: 20 BRPM | SYSTOLIC BLOOD PRESSURE: 108 MMHG | HEART RATE: 97 BPM

## 2021-07-27 PROBLEM — Z3A.34 34 WEEKS GESTATION OF PREGNANCY: Status: ACTIVE | Noted: 2021-07-27

## 2021-07-27 LAB
BILIRUBIN URINE: NEGATIVE
COLOR: YELLOW
COMMENT UA: NORMAL
DIRECT EXAM: NORMAL
GLUCOSE URINE: NEGATIVE
KETONES, URINE: NEGATIVE
LEUKOCYTE ESTERASE, URINE: NEGATIVE
Lab: NORMAL
NITRITE, URINE: NEGATIVE
PH UA: 7 (ref 5–8)
PROTEIN UA: NEGATIVE
SPECIFIC GRAVITY UA: 1.01 (ref 1–1.03)
SPECIMEN DESCRIPTION: NORMAL
TURBIDITY: CLEAR
URINE HGB: NEGATIVE
UROBILINOGEN, URINE: NORMAL

## 2021-07-27 PROCEDURE — 87491 CHLMYD TRACH DNA AMP PROBE: CPT

## 2021-07-27 PROCEDURE — 87660 TRICHOMONAS VAGIN DIR PROBE: CPT

## 2021-07-27 PROCEDURE — 87510 GARDNER VAG DNA DIR PROBE: CPT

## 2021-07-27 PROCEDURE — 87591 N.GONORRHOEAE DNA AMP PROB: CPT

## 2021-07-27 PROCEDURE — 87086 URINE CULTURE/COLONY COUNT: CPT

## 2021-07-27 PROCEDURE — 81003 URINALYSIS AUTO W/O SCOPE: CPT

## 2021-07-27 PROCEDURE — 99213 OFFICE O/P EST LOW 20 MIN: CPT

## 2021-07-27 PROCEDURE — 87480 CANDIDA DNA DIR PROBE: CPT

## 2021-07-27 RX ORDER — ACETAMINOPHEN 500 MG
1000 TABLET ORAL EVERY 6 HOURS PRN
Status: DISCONTINUED | OUTPATIENT
Start: 2021-07-27 | End: 2021-07-27 | Stop reason: HOSPADM

## 2021-07-27 NOTE — H&P
[facility]    Date: 2021       Time: 1:06 PM   Patient Name: Ned Brizuela     Patient : 2000  Room/Bed: [place] [address]    Admission Date/Time: 2021 12:42 PM      CC: Abdominal Pain     HPI: Ned Brizuela is a 22 y.o.  at 34w6d who presents to L&D for abdominal cramping that began this morning. She tried Tylenol and rest without improvement. She denies an recent intercourse. Patient denies vaginal bleeding, irritation, itching, hematuria, dysuria, chest pain, SOB, F/C, N/V/D, HA, RUQ pain, visual changes. The patient reports fetal movement is present, denies contractions, denies loss of fluid, denies vaginal bleeding. DATING:  LMP: Patient's last menstrual period was 2020.   Estimated Date of Delivery: 21   Based on: LMP, CW US at 13 3/7 weeks GA    PREGNANCY RISK FACTORS:  Patient Active Problem List   Diagnosis    Asthma    FHx Chiari malformation type I and cleft lip    THC use    Rubella NI    HRP (high risk pregnancy)    Abdominal trauma    Traumatic injury during pregnancy in third trimester    Encounter for routine screening for malformation using ultrasonics     screening for fetal growth retardation using ultrasonics    Atrial septal defect of fetus affecting antepartum care of mother    Family history of autism    32 weeks gestation of pregnancy    Marijuana use    34 weeks gestation of pregnancy        Steroids Given In This Pregnancy:  no     REVIEW OF SYSTEMS:   Constitutional: negative fever, negative chills, negative weight changes   HEENT: negative visual disturbances, negative headaches, negative dizziness, negative hearing loss  Breast: Negative breast abnormalities, negative breast lumps, negative nipple discharge  Respiratory: negative dyspnea, negative cough, negative SOB  Cardiovascular: negative chest pain,  negative palpitations, negative arrhythmia, negative syncope Sebastian Joya PA-C   Prenatal Vit-Fe Fumarate-FA (PRENATAL/FOLIC ACID PO) Take by mouth    Historical Provider, MD       FAMILY HISTORY:  family history is not on file. SOCIAL HISTORY:   reports that she has never smoked. She has never used smokeless tobacco. She reports previous alcohol use. She reports current drug use. Drug: Marijuana.     VITALS:  Vitals:    07/27/21 1254   BP: 108/63   Pulse: 97   Resp: 20   Temp: 98.2 °F (36.8 °C)       PHYSICAL EXAM:  Fetal Heart Monitor:  Baseline Heart Rate 135, moderate variability, present accelerations, absent decelerations  [place]: contractions, none    General appearance:  no apparent distress, alert and cooperative  HEENT: head atraumatic, normocephalic, moist mucous membranes, trachea midline  Neurologic:  alert, oriented, normal speech, no focal findings or movement disorder noted  Lungs:  No increased work of breathing, good air exchange, clear to auscultation bilaterally, no crackles or wheezing  Heart:  regular rate and rhythm and no murmur, rubs, gallops  Abdomen:  soft, gravid, non-tender, no rebound, guarding, or rigidity, no RUQ or epigastric tenderness, no signs or symptoms of abruption, no signs or symptoms of chorioamnionitis  Extremities:  no calf tenderness, non edematous, no varicosities, full range of motion in all four extremities  Musculoskeletal: Gross strength equal and intact throughout, no gross abnormalities, range of motion normal in hips, knees, shoulders and spine  Psychiatric: Mood appropriate, normal affect   Rectal Exam: not indicated  Pelvic Exam:   Chaperone for Intimate Exam: Chaperone was present for entire exam, Chaperone Name: Clhoe Toro RN  Sterile Speculum Exam:   Urethral meatus: normal appearing   Vulva: Normal hair distribution, normal appearing vulva, no masses, tenderness or lesions, normal clitoris   Vagina: Normal appearing vaginal mucosa without lesions, no vaginal discharge noted in the posterior vault, no lacerations   Cervix: Normal appearing cervix without lesions, external os visibly closed, no lacerations or abnormal lesions visualized    PRENATAL LAB RESULTS:   Blood Type/Rh: O neg  Antibody Screen: negative  Hemoglobin, Hematocrit, Platelets: 54.4/69.2/966  Rubella: non-immune  T. Pallidum, IgG: non-reactive  Hepatitis B Surface Antigen: non-reactive   Hepatitis C Antibody: non-reactive   HIV: non-reactive   Sickle Cell Screen: not done  Gonorrhea: negative  Chlamydia: negative  Urine culture: negative, date: 21    1 hour Glucose Tolerance Test: 78    Group B Strep: not done  Cystic Fibrosis Screen: not done  First Trimester Screen: not done  MSAFP/Multiple Markers: Normal   Non-Invasive Prenatal Testing: No aneuploidy detected   Anatomy US: Anterior placenta, 3VC, Male gender, Normal anatomy    ASSESSMENT & PLAN:  Jamison Daly is a 22 y.o. female  at 34w7d    - GBS unknown / Rh positive / R non-immune   - No indication for GBS prophylaxis     Abdominal cramping    - VSS, Afebrile              - cEFM/TOCO: Cat 1, no contractions               - SSE:  Cervix visually closed, minimal discharge, no bleeding, leaking, or lesions   - UA not suspicious for UTI, Ucx pending  - Vaginitis Probe negative  - Gc/C ordered   - PO hydration encouraged   - Tylenol for pain control    - She reports continued cramping, but states it is mild and tolerable   - Her next Ob apt is 21   - Will plan to DC at this time. Patient is aware that she should return to the hospital if she has worsening contractions, LOF, VB or decreased fetal movements.  She voices understanding       Asthma   - Controlled with albuterol prn    ASD   - Seen on Fetal ECHO 21   - The patient has an appointment with Peds cardio 21    FHx of Chiari Malformation    THC Use   - Positive UDS 21   - Cessation encouraged    BMI 35.55      Patient Active Problem List    Diagnosis Date Noted    34 weeks gestation of pregnancy 2021    Family history of autism 2021    28 weeks gestation of pregnancy 2021    Atrial septal defect of fetus affecting antepartum care of mother 2021    Traumatic injury during pregnancy in third trimester     Encounter for routine screening for malformation using ultrasonics      screening for fetal growth retardation using ultrasonics     FHx Chiari malformation type I and cleft lip 2021     FOB half brother  Anatomy scan wnl  NIPT normal      THC use 2021    Rubella NI 2021     Needs MMR PP      HRP (high risk pregnancy) 2021    Abdominal trauma 2021     S/p fall 21      Asthma 2021    Marijuana use 2021     Formatting of this note might be different from the original.  Positive screen in first trimester         Plan discussed with Dr. Gabi Cordova, who is agreeable. Steroids given this admission: No    Risks, benefits, alternatives and possible complications have been discussed in detail with the patient. Admission, and post admission procedures and expectations were discussed in detail. All questions were answered.     Attending's Name: Dr. Mila Atkins DO  Ob/Gyn Resident  2021, 1:06 PM

## 2021-07-27 NOTE — FLOWSHEET NOTE
Pt given discharge instructions,  labor precautions and educated on fetal kick counts. Pt aware of her appt on . Pt ambulates to leave L&D.

## 2021-07-27 NOTE — FLOWSHEET NOTE
Pt received to L&D per w/c from ER with c/o lower abdominal pain. Placed in triage 1. Up to BR et gowned.

## 2021-07-27 NOTE — FLOWSHEET NOTE
Dr. Álvaro Lane in to see pt.; sterile spec done et cultures obtained. Cervix visually closed. Plan of care discussed with pt.; verbalizes understanding.

## 2021-07-29 ENCOUNTER — OFFICE VISIT (OUTPATIENT)
Dept: PEDIATRIC CARDIOLOGY | Age: 21
End: 2021-07-29
Payer: COMMERCIAL

## 2021-07-29 ENCOUNTER — ROUTINE PRENATAL (OUTPATIENT)
Dept: OBGYN CLINIC | Age: 21
End: 2021-07-29

## 2021-07-29 ENCOUNTER — HOSPITAL ENCOUNTER (OUTPATIENT)
Dept: NON INVASIVE DIAGNOSTICS | Age: 21
Discharge: HOME | End: 2021-07-29
Payer: COMMERCIAL

## 2021-07-29 VITALS — BODY MASS INDEX: 35.55 KG/M2 | DIASTOLIC BLOOD PRESSURE: 64 MMHG | WEIGHT: 176 LBS | SYSTOLIC BLOOD PRESSURE: 112 MMHG

## 2021-07-29 DIAGNOSIS — Z3A.35 35 WEEKS GESTATION OF PREGNANCY: ICD-10-CM

## 2021-07-29 DIAGNOSIS — Z34.93 NORMAL PREGNANCY IN THIRD TRIMESTER: Primary | ICD-10-CM

## 2021-07-29 DIAGNOSIS — O35.BXX0 PREGNANCY COMPLICATED BY FETAL CONGENITAL HEART DISEASE, SINGLE OR UNSPECIFIED FETUS: Primary | ICD-10-CM

## 2021-07-29 PROCEDURE — 0502F SUBSEQUENT PRENATAL CARE: CPT | Performed by: NURSE PRACTITIONER

## 2021-07-29 PROCEDURE — 99245 OFF/OP CONSLTJ NEW/EST HI 55: CPT | Performed by: PEDIATRICS

## 2021-07-29 PROCEDURE — 76825 ECHO EXAM OF FETAL HEART: CPT | Performed by: PEDIATRICS

## 2021-07-29 PROCEDURE — G8417 CALC BMI ABV UP PARAM F/U: HCPCS | Performed by: PEDIATRICS

## 2021-07-29 PROCEDURE — G8427 DOCREV CUR MEDS BY ELIG CLIN: HCPCS | Performed by: PEDIATRICS

## 2021-07-29 NOTE — PATIENT INSTRUCTIONS

## 2021-07-29 NOTE — LETTER
[facility] Congenital Heart Specialist  [address]  [address] [address]  Phone: [phone]  Fax: [phone]           Salud Dickens MD      July 30, 2021     Patient: Radha Ashraf   MR Number: [id number]   YOB: 2000   Date of Visit: 7/29/2021       Dear Dr. Florian Velasquez:    Thank you for referring Radha Ashraf to me for evaluation/treatment. Below are the relevant portions of my assessment and plan of care. I had the opportunity to evaluate Radha Ashraf for an initial consultation per your request in the fetal cardiology clinic on 7/29/2021. As you know, Radha Ashraf is a 22 y.o. mother who presents at 34-36 weeks gestation for evaluation of fetal atrial septal defect (ASD) that was noted by MFM. According to the patient, she has been doing well without symptoms. Her current pregnancy, however, has remained uncomplicated. There has been no history of gestational diabetes, episodes of vaginal bleeding, infection, or placental compromise. MATERNAL MEDICAL HISTORY:  Elaina Martinez  has no past medical history on file. .  She  has no past surgical history on file. .    Current Outpatient Medications:     sertraline (ZOLOFT) 50 MG tablet, TAKE 1/2 TABLET BY MOUTH ONE TIME A DAY for 1 week then increase to 1 tablet daily thereafter as tolerated, Disp: , Rfl:     Peak Flow Meter PATRICIA, Use 1 to 2 times daily, Disp: 1 Device, Rfl: 0    ondansetron (ZOFRAN-ODT) 4 MG disintegrating tablet, Take 1 tablet by mouth 3 times daily as needed for Nausea or Vomiting, Disp: 21 tablet, Rfl: 0    vitamin B-6 (PYRIDOXINE) 25 MG tablet, Take 1 tablet by mouth nightly, Disp: 30 tablet, Rfl: 2    doxyLAMINE succinate (UNISOM SLEEPTABS) 25 MG tablet, Take 1 tablet by mouth nightly, Disp: 30 tablet, Rfl: 0    Prenatal Vit-Fe Fumarate-FA (PRENATAL/FOLIC ACID PO), Take by mouth, Disp: , Rfl: . No Known Allergies  She denies any tobacco or cigarette smoking, alcohol, or illicit or illegal drug use. FAMILY HISTORY:  Family history is unremarkable. PHYSICAL EXAMINATION:  Examination revealed a younger woman, in no apparent distress, lying in the supine position, who is comfortable. No JVD is seen. There is no evidence for dependent edema. Her skin appears normal, without rashes lesion, or jaundice. She is breathing comfortably and does not have audible breath sounds or stridor noted. Cardiac exam reveals normal S1 and S2, normal rhythm and rate, no murmur, gallop, rub and click. FETAL EXAMINATION:  Fetal examination utilizing the [facility] with the curvilinear transducer. Findings  Situs/Connections: There is atrial situs solitus with atrioventriuclar concordance and ventriculoarterial concordance. Pulmonary Veins: At least two pulmonary veins drain into the left atrium. Systemic Veins: The SVC and IVC drain into the right atrium. Atrial Septum: There is a normal flap of septum primum seen in the left atrium, and right to left shunting. Atria: Both atria appear normal.  AV Valves: Normal mitral and tricuspid valves without stenosis or regurgitation. Ventricular Septum: There are no ventricular level shunts. Ventricles: The biventricular size and ventricular systolic function is normal.  Aortic Valve: Normal aortic valve structure and function without stenosis or regurgitation. Pulmonic Valve: Pulmonic valve is structurally normal. No evidence of pulmonic valve stenosis. No evidence of any pulmonic regurgitation. Aorta: The ascending, transverse and descending aorta appear left-sided with no evidence of dilation, coarctation or aneurysm. Pulmonary Arteries: Ductal arch is patent without obstruction. The main and branch pulmonary arteries appear(s) normal.    DIAGNOSES:  1. Normal fetal cardiac anatomy       a) There is an interventricular communication that is most likely consistent with ASD or PFO  2.  Normal fetal biventricular size and systolic function      a) Heart to chest ration = 31%. 3. Normal cardiac rhythm      a) Fetal heart rate 140 bpm  4. No evidence of congenital heart defects. This is a fetal echocardiogram performed at 35 weeks gestation, the study reveals a single fetus in the cephalic position. RECOMMENDATIONS   1. She should be able to deliver at [facility] or [address] [place] [address] per her Elverna Alvine discretion  2. Further fetal Cardiology follow up with fetal echocardiography is not recommended unless new concerns arise. 3. Pediatric Cardiology follow up is needed when the baby is 34 weeks old    June Valdez 1934:    It is my impression that Sawyer Ferrara a 22 y.o. female who presents at 34-36 weeks gestation for evaluation of fetal Atrial septal defect (ASD) that was noted by MFM. I performed fetal echo during today's visit, the fetal ECHO demonstrated normal fetal cardiac structure, function and rhythm. The ECHO also showed there is an interatrial shunt that is likely consistent with ASD or PFO. Therefore, I don't think that the fetus has any significant cardiac defects that can cause any hemodynamic issue and affect fetal development.  Findings and the limitations of fetal echocardiography (ie, inability to conclusively rule out small ventricular septal defects, mild coarctation of the aorta, or mild valve anomalies) were also reviewed with the patient. My impression is listed above.        Utilizing over 50% of the appointment time in a face-to-face discussion which was approximately one hour in length, I reviewed today's results with the patient. Her questions were answered. She understands and agrees. Thank you for allowing us to share in the care of this wonderful patient. If you have anything further, please do not hesitate to call.         Sincerely,        Karyna Robison MD. PhD     Pediatric [facility] Professor  Division of Pediatric Cardiology

## 2021-07-29 NOTE — PROGRESS NOTES
Presents for OB visit  Gestation 35w1d  Estimated Date of Delivery: 21    NIPS Testing ordered- 21 Results NEGATIVE Gender- BOY! - Giovani Kumar  Fm Hx of Autism Sister   Initial prenatal labs in care everywhere:   nonimmune rubella  Blood type O negative  +THC on UDS  TDAP given   Rhogam given   ASD on 21 fetal echo scheduled 21                       OB History    Para Term  AB Living   1             SAB TAB Ectopic Molar Multiple Live Births                    # Outcome Date GA Lbr Ric/2nd Weight Sex Delivery Anes PTL Lv   1 Current                     No Known Allergies  Current Outpatient Medications   Medication Sig Dispense Refill    sertraline (ZOLOFT) 50 MG tablet TAKE 1/2 TABLET BY MOUTH ONE TIME A DAY for 1 week then increase to 1 tablet daily thereafter as tolerated (Patient not taking: Reported on 2021)      Peak Flow Meter PATRICIA Use 1 to 2 times daily 1 Device 0    ondansetron (ZOFRAN-ODT) 4 MG disintegrating tablet Take 1 tablet by mouth 3 times daily as needed for Nausea or Vomiting 21 tablet 0    vitamin B-6 (PYRIDOXINE) 25 MG tablet Take 1 tablet by mouth nightly 30 tablet 2    doxyLAMINE succinate (UNISOM SLEEPTABS) 25 MG tablet Take 1 tablet by mouth nightly 30 tablet 0    Prenatal Vit-Fe Fumarate-FA (PRENATAL/FOLIC ACID PO) Take by mouth       No current facility-administered medications for this visit. No past medical history on file. No past surgical history on file. Headaches: hx migraines denies significant severe headaches that are persistent or with vision chanegs  Swelling: no  Bleeding: no  Discharge: no   Dysuria: no  Nausea: yes still intermittent. Heartburn: no  Epigastric pain: no  Contractions: no  Leakage of fluid:no  + fetal movement       Return 1 WEEK    Labs as indicated n/a    PTL signs reviewed, if indicated  Kick Count Instructions given if indicated:   The patient was instructed on fetal kick counts and was given a

## 2021-07-29 NOTE — LETTER
[id number] [facility]enital [facility] Specialist  [facility] [address]  [place] [address]  Phone: [phone]  Fax: [phone]    Brandie Warren MD    July 30, 2021     Brinda Sarah, APRN - CNP  [address]  [facility] [address]    Patient: Carmela Rothman   MR Number: [id number]   YOB: 2000   Date of Visit: 7/29/2021     Dear Brinda Sarah: Thank you for referring Carmela Rothman to me for evaluation/treatment. Below are the relevant portions of my assessment and plan of care. I had the opportunity to evaluate Carmela Rothman for an initial consultation per your request in the fetal cardiology clinic on 7/29/2021. As you know, Carmela Rothman is a 22 y.o. mother who presents at 34-36 weeks gestation for evaluation of fetal atrial septal defect (ASD) that was noted by MFM. According to the patient, she has been doing well without symptoms. Her current pregnancy, however, has remained uncomplicated. There has been no history of gestational diabetes, episodes of vaginal bleeding, infection, or placental compromise. MATERNAL MEDICAL HISTORY:  Zak Julian  has no past medical history on file. .  She  has no past surgical history on file. .    Current Outpatient Medications:     sertraline (ZOLOFT) 50 MG tablet, TAKE 1/2 TABLET BY MOUTH ONE TIME A DAY for 1 week then increase to 1 tablet daily thereafter as tolerated, Disp: , Rfl:     Peak Flow Meter PATRICIA, Use 1 to 2 times daily, Disp: 1 Device, Rfl: 0    ondansetron (ZOFRAN-ODT) 4 MG disintegrating tablet, Take 1 tablet by mouth 3 times daily as needed for Nausea or Vomiting, Disp: 21 tablet, Rfl: 0    vitamin B-6 (PYRIDOXINE) 25 MG tablet, Take 1 tablet by mouth nightly, Disp: 30 tablet, Rfl: 2    doxyLAMINE succinate (UNISOM SLEEPTABS) 25 MG tablet, Take 1 tablet by mouth nightly, Disp: 30 tablet, Rfl: 0    Prenatal Vit-Fe Fumarate-FA (PRENATAL/FOLIC ACID PO), Take by mouth, Disp: , Rfl: .  No Known Allergies  She denies any tobacco or cigarette smoking, alcohol, or illicit or illegal drug use. FAMILY HISTORY:  Family history is unremarkable. PHYSICAL EXAMINATION:  Examination revealed a younger woman, in no apparent distress, lying in the supine position, who is comfortable. No JVD is seen. There is no evidence for dependent edema. Her skin appears normal, without rashes lesion, or jaundice. She is breathing comfortably and does not have audible breath sounds or stridor noted. Cardiac exam reveals normal S1 and S2, normal rhythm and rate, no murmur, gallop, rub and click. FETAL EXAMINATION:  Fetal examination utilizing the [facility] with the curvilinear transducer. Findings  Situs/Connections: There is atrial situs solitus with atrioventriuclar concordance and ventriculoarterial concordance. Pulmonary Veins: At least two pulmonary veins drain into the left atrium. Systemic Veins: The SVC and IVC drain into the right atrium. Atrial Septum: There is a normal flap of septum primum seen in the left atrium, and right to left shunting. Atria: Both atria appear normal.  AV Valves: Normal mitral and tricuspid valves without stenosis or regurgitation. Ventricular Septum: There are no ventricular level shunts. Ventricles: The biventricular size and ventricular systolic function is normal.  Aortic Valve: Normal aortic valve structure and function without stenosis or regurgitation. Pulmonic Valve: Pulmonic valve is structurally normal. No evidence of pulmonic valve stenosis. No evidence of any pulmonic regurgitation. Aorta: The ascending, transverse and descending aorta appear left-sided with no evidence of dilation, coarctation or aneurysm. Pulmonary Arteries: Ductal arch is patent without obstruction. The main and branch pulmonary arteries appear(s) normal.    DIAGNOSES:  1. Normal fetal cardiac anatomy       a) There is an interventricular communication that is most likely consistent with ASD or PFO  2.  Normal fetal biventricular size and systolic function      a) Heart to chest ration = 31%. 3. Normal cardiac rhythm      a) Fetal heart rate 140 bpm  4. No evidence of congenital heart defects. This is a fetal echocardiogram performed at 35 weeks gestation, the study reveals a single fetus in the cephalic position. RECOMMENDATIONS   1. She should be able to deliver at [facility] or [address] [place] [address] per her [facility] discretion  2. Further fetal Cardiology follow up with fetal echocardiography is not recommended unless new concerns arise. 3. Pediatric Cardiology follow up is needed when the baby is 34 weeks old    June Ureñakassie Valdez 1934:    It is my impression that Foster Bear a 22 y.o. female who presents at 34-36 weeks gestation for evaluation of fetal Atrial septal defect (ASD) that was noted by MFM. I performed fetal echo during today's visit, the fetal ECHO demonstrated normal fetal cardiac structure, function and rhythm. The ECHO also showed there is an interatrial shunt that is likely consistent with ASD or PFO. Therefore, I don't think that the fetus has any significant cardiac defects that can cause any hemodynamic issue and affect fetal development.  Findings and the limitations of fetal echocardiography (ie, inability to conclusively rule out small ventricular septal defects, mild coarctation of the aorta, or mild valve anomalies) were also reviewed with the patient. My impression is listed above.        Utilizing over 50% of the appointment time in a face-to-face discussion which was approximately one hour in length, I reviewed today's results with the patient. Her questions were answered. She understands and agrees. Thank you for allowing us to share in the care of this wonderful patient. If you have anything further, please do not hesitate to call.         Sincerely,    Samanta Dyer MD. PhD     [facility]  Division of Pediatric Cardiology

## 2021-07-30 NOTE — PROGRESS NOTES
I had the opportunity to evaluate Anel Perry for an initial consultation per your request in the fetal cardiology clinic on 7/29/2021. As you know, Anel Perry is a 22 y.o. mother who presents at 34-36 weeks gestation for evaluation of fetal atrial septal defect (ASD) that was noted by MFM. According to the patient, she has been doing well without symptoms. Her current pregnancy, however, has remained uncomplicated. There has been no history of gestational diabetes, episodes of vaginal bleeding, infection, or placental compromise. MATERNAL MEDICAL HISTORY:  Estrella Camacho  has no past medical history on file. .  She  has no past surgical history on file. .    Current Outpatient Medications:     sertraline (ZOLOFT) 50 MG tablet, TAKE 1/2 TABLET BY MOUTH ONE TIME A DAY for 1 week then increase to 1 tablet daily thereafter as tolerated, Disp: , Rfl:     Peak Flow Meter PATRICIA, Use 1 to 2 times daily, Disp: 1 Device, Rfl: 0    ondansetron (ZOFRAN-ODT) 4 MG disintegrating tablet, Take 1 tablet by mouth 3 times daily as needed for Nausea or Vomiting, Disp: 21 tablet, Rfl: 0    vitamin B-6 (PYRIDOXINE) 25 MG tablet, Take 1 tablet by mouth nightly, Disp: 30 tablet, Rfl: 2    doxyLAMINE succinate (UNISOM SLEEPTABS) 25 MG tablet, Take 1 tablet by mouth nightly, Disp: 30 tablet, Rfl: 0    Prenatal Vit-Fe Fumarate-FA (PRENATAL/FOLIC ACID PO), Take by mouth, Disp: , Rfl: . No Known Allergies  She denies any tobacco or cigarette smoking, alcohol, or illicit or illegal drug use. FAMILY HISTORY:  Family history is unremarkable. PHYSICAL EXAMINATION:  Examination revealed a younger woman, in no apparent distress, lying in the supine position, who is comfortable. No JVD is seen. There is no evidence for dependent edema. Her skin appears normal, without rashes lesion, or jaundice. She is breathing comfortably and does not have audible breath sounds or stridor noted.   Cardiac exam reveals normal S1 and S2, normal rhythm new concerns arise. 3. Pediatric Cardiology follow up is needed when the baby is 34 weeks old    Junejuani Elizabeth Jose Juan Jimenez5:    It is my impression that Mario Khoury a 22 y.o. female who presents at 34-36 weeks gestation for evaluation of fetal Atrial septal defect (ASD) that was noted by MFM. I performed fetal echo during today's visit, the fetal ECHO demonstrated normal fetal cardiac structure, function and rhythm. The ECHO also showed there is an interatrial shunt that is likely consistent with ASD or PFO. Therefore, I don't think that the fetus has any significant cardiac defects that can cause any hemodynamic issue and affect fetal development.  Findings and the limitations of fetal echocardiography (ie, inability to conclusively rule out small ventricular septal defects, mild coarctation of the aorta, or mild valve anomalies) were also reviewed with the patient. My impression is listed above.        Utilizing over 50% of the appointment time in a face-to-face discussion which was approximately one hour in length, I reviewed today's results with the patient. Her questions were answered. She understands and agrees. Thank you for allowing us to share in the care of this wonderful patient. If you have anything further, please do not hesitate to call.

## 2021-08-05 ENCOUNTER — HOSPITAL ENCOUNTER (OUTPATIENT)
Age: 21
Setting detail: SPECIMEN
Discharge: HOME | End: 2021-08-05
Payer: COMMERCIAL

## 2021-08-05 ENCOUNTER — ROUTINE PRENATAL (OUTPATIENT)
Dept: OBGYN CLINIC | Age: 21
End: 2021-08-05

## 2021-08-05 VITALS — SYSTOLIC BLOOD PRESSURE: 110 MMHG | DIASTOLIC BLOOD PRESSURE: 62 MMHG | BODY MASS INDEX: 36.15 KG/M2 | WEIGHT: 179 LBS

## 2021-08-05 DIAGNOSIS — Z34.93 NORMAL PREGNANCY IN THIRD TRIMESTER: Primary | ICD-10-CM

## 2021-08-05 DIAGNOSIS — O35.BXX1: ICD-10-CM

## 2021-08-05 DIAGNOSIS — Z34.93 NORMAL PREGNANCY IN THIRD TRIMESTER: ICD-10-CM

## 2021-08-05 DIAGNOSIS — Z3A.36 36 WEEKS GESTATION OF PREGNANCY: ICD-10-CM

## 2021-08-05 PROCEDURE — 0502F SUBSEQUENT PRENATAL CARE: CPT | Performed by: NURSE PRACTITIONER

## 2021-08-05 NOTE — PATIENT INSTRUCTIONS

## 2021-08-05 NOTE — PROGRESS NOTES
Presents for OB visit  Gestation 36w1d  Estimated Date of Delivery: 21    NIPS Testing ordered- 21 Results NEGATIVE Gender- BOY! - Leora Risk  Fm Hx of Autism Sister   Initial prenatal labs in care everywhere:   nonimmune rubella  Blood type O negative  +THC on UDS  TDAP given   Rhogam given   ASD on 21 fetal echo scheduled 21- Pediatric cardiology consult scheduled 2021   Started zoloft in 3rd trimester- prescribed by psychiatrist                  OB History    Para Term  AB Living   1             SAB TAB Ectopic Molar Multiple Live Births                    # Outcome Date GA Lbr Ric/2nd Weight Sex Delivery Anes PTL Lv   1 Current                     No Known Allergies  Current Outpatient Medications   Medication Sig Dispense Refill    sertraline (ZOLOFT) 50 MG tablet TAKE 1/2 TABLET BY MOUTH ONE TIME A DAY for 1 week then increase to 1 tablet daily thereafter as tolerated      Peak Flow Meter PATRICIA Use 1 to 2 times daily 1 Device 0    ondansetron (ZOFRAN-ODT) 4 MG disintegrating tablet Take 1 tablet by mouth 3 times daily as needed for Nausea or Vomiting 21 tablet 0    vitamin B-6 (PYRIDOXINE) 25 MG tablet Take 1 tablet by mouth nightly 30 tablet 2    doxyLAMINE succinate (UNISOM SLEEPTABS) 25 MG tablet Take 1 tablet by mouth nightly 30 tablet 0    Prenatal Vit-Fe Fumarate-FA (PRENATAL/FOLIC ACID PO) Take by mouth       No current facility-administered medications for this visit. No past medical history on file. No past surgical history on file. Headaches: no  Swelling: yes - intermittent ankles/feet improves with elevation and rest.   Bleeding: no  Discharge: no   Dysuria: no  Nausea: no  Heartburn: no  Epigastric pain: no  Contractions: no  Leakage of fluid: no  + fetal movement       Return 1 WEEK    Labs as indicated GBS    PTL signs reviewed, if indicated  Kick Count Instructions given if indicated:   The patient was instructed on fetal kick counts and was given a kick sheet to complete every 8 hours. This is to begin at 28 weeks gestation. She was instructed that the baby should move at a minimum of ten times within one hour after a meal. The patient was instructed to lay down on her left side twenty minutes after eating and count movements for up to one hour with a target value of ten movements. She was instructed to notify the office if she did not make that target after two attempts or if after any attempt there was less than four movements. After hour numbers reviewed , along with labor signs if indicated. Contractions/cramping between 5-7 minutes and persisting even with attempts of increased water and laying on side. Fluid leakage, bleeding  NST information given no    The patient was counseled on the mandatory call ahead policy. She has been instructed to call the office at anytime prior to going into the hospital so the on-call physician may direct her to the appropriate facility for care. Exceptions were reviewed including but not limited to: Decreased fetal movement, vaginal Bleeding or hemorrhage, trauma, readily expectant delivery, or any instance where she feels 911 should be utilized. Of the 20 minute duration appointment visit, Susannah Goodson CNP spent at least 50% of the face-to-face time in counseling, explanation of diagnosis, planning of further management, and answering all questions.

## 2021-08-08 LAB
CULTURE: NORMAL
Lab: NORMAL
SPECIMEN DESCRIPTION: NORMAL

## 2021-08-11 ENCOUNTER — ROUTINE PRENATAL (OUTPATIENT)
Dept: OBGYN CLINIC | Age: 21
End: 2021-08-11

## 2021-08-11 VITALS — SYSTOLIC BLOOD PRESSURE: 112 MMHG | BODY MASS INDEX: 36.36 KG/M2 | DIASTOLIC BLOOD PRESSURE: 62 MMHG | WEIGHT: 180 LBS

## 2021-08-11 DIAGNOSIS — Z3A.37 37 WEEKS GESTATION OF PREGNANCY: Primary | ICD-10-CM

## 2021-08-11 PROCEDURE — 0502F SUBSEQUENT PRENATAL CARE: CPT | Performed by: OBSTETRICS & GYNECOLOGY

## 2021-08-12 ENCOUNTER — ROUTINE PRENATAL (OUTPATIENT)
Dept: PERINATAL CARE | Age: 21
End: 2021-08-12
Payer: COMMERCIAL

## 2021-08-12 VITALS
WEIGHT: 181 LBS | HEIGHT: 59 IN | RESPIRATION RATE: 16 BRPM | DIASTOLIC BLOOD PRESSURE: 70 MMHG | HEART RATE: 83 BPM | BODY MASS INDEX: 36.49 KG/M2 | SYSTOLIC BLOOD PRESSURE: 99 MMHG | TEMPERATURE: 97.1 F

## 2021-08-12 DIAGNOSIS — O35.BXX0 ATRIAL SEPTAL DEFECT OF FETUS AFFECTING ANTEPARTUM CARE OF MOTHER, SINGLE OR UNSPECIFIED FETUS: Primary | ICD-10-CM

## 2021-08-12 DIAGNOSIS — Z13.89 ENCOUNTER FOR ROUTINE SCREENING FOR MALFORMATION USING ULTRASONICS: ICD-10-CM

## 2021-08-12 DIAGNOSIS — Z3A.37 37 WEEKS GESTATION OF PREGNANCY: ICD-10-CM

## 2021-08-12 DIAGNOSIS — O99.513 ASTHMA AFFECTING PREGNANCY IN THIRD TRIMESTER: ICD-10-CM

## 2021-08-12 DIAGNOSIS — J45.909 ASTHMA AFFECTING PREGNANCY IN THIRD TRIMESTER: ICD-10-CM

## 2021-08-12 DIAGNOSIS — Z36.4 ANTENATAL SCREENING FOR FETAL GROWTH RETARDATION USING ULTRASONICS: ICD-10-CM

## 2021-08-12 DIAGNOSIS — O99.213 OBESITY AFFECTING PREGNANCY IN THIRD TRIMESTER: ICD-10-CM

## 2021-08-12 LAB
ABDOMINAL CIRCUMFERENCE: NORMAL
BIPARIETAL DIAMETER: NORMAL
ESTIMATED FETAL WEIGHT: NORMAL
FEMORAL DIAMETER: NORMAL
HC/AC: NORMAL
HEAD CIRCUMFERENCE: NORMAL

## 2021-08-12 PROCEDURE — 76819 FETAL BIOPHYS PROFIL W/O NST: CPT | Performed by: OBSTETRICS & GYNECOLOGY

## 2021-08-12 PROCEDURE — 76805 OB US >/= 14 WKS SNGL FETUS: CPT | Performed by: OBSTETRICS & GYNECOLOGY

## 2021-08-13 NOTE — PROGRESS NOTES
Carmela Rothman is a 22 y.o. female 42w2d        OB History    Para Term  AB Living   1             SAB TAB Ectopic Molar Multiple Live Births                    # Outcome Date GA Lbr Ric/2nd Weight Sex Delivery Anes PTL Lv   1 Current                Vitals  BP: 112/62  Weight: 180 lb (81.6 kg)  Patient Position: Sitting  Albumin: Negative  Glucose: Negative      The patient was seen and evaluated. There was positive fetal movements. No contractions or leakage of fluid. Signs and symptoms of labor were reviewed. The S/S of Pre-Eclampsia were reviewed with the patient in detail. She is to report any of these if they occur. She currently denies any of these. The patient was instructed on fetal kick counts and was given a kick sheet to complete every 8 hours. She was instructed that the baby should move at a minimum of ten times within one hour after a meal. The patient was instructed to lay down on her left side twenty minutes after eating and count movements for up to one hour with a target value of ten movements. She was instructed to notify the office if she did not make that target after two attempts or if after any attempt there was less than four movements. The patient reports that the targets have been made Yes. NIPS Testing ordered- 21 Results NEGATIVE Gender- BOY! - Leroy Ben  Fm Hx of Autism Sister   Initial prenatal labs in care everywhere:   nonimmune rubella  Blood type O negative  +THC on UDS  TDAP given   Rhogam given   ASD on 21 fetal echo scheduled 21- Pediatric cardiology consult scheduled 2021   Started zoloft in 3rd trimester- prescribed by psychiatrist       T-Dap Vaccine Completed (27-36 weeks): Yes    Allergies: Allergies as of 2021    (No Known Allergies)         Group Beta Strep collection was completed.  Yes  GBS Results:   Hospital Outpatient Visit on 2021   Component Date Value Ref Range Status    Specimen Description 08/05/2021 . VAGINA   Final    Special Requests 08/05/2021 NOT REPORTED   Final    Culture 08/05/2021 NEGATIVE FOR GROUP B STREPTOCOCCI   Final   Admission on 07/27/2021, Discharged on 07/27/2021   Component Date Value Ref Range Status    Color, UA 07/27/2021 YELLOW  YELLOW Final    Turbidity UA 07/27/2021 CLEAR  CLEAR Final    Glucose, Ur 07/27/2021 NEGATIVE  NEGATIVE Final    Bilirubin Urine 07/27/2021 NEGATIVE  NEGATIVE Final    Ketones, Urine 07/27/2021 NEGATIVE  NEGATIVE Final    Specific Gravity, UA 07/27/2021 1.006  1.005 - 1.030 Final    Urine Hgb 07/27/2021 NEGATIVE  NEGATIVE Final    pH, UA 07/27/2021 7.0  5.0 - 8.0 Final    Protein, UA 07/27/2021 NEGATIVE  NEGATIVE Final    Urobilinogen, Urine 07/27/2021 Normal  Normal Final    Nitrite, Urine 07/27/2021 NEGATIVE  NEGATIVE Final    Leukocyte Esterase, Urine 07/27/2021 NEGATIVE  NEGATIVE Final    Urinalysis Comments 07/27/2021 Microscopic exam not performed based on chemical results unless requested in original order. Final    Specimen Description 07/27/2021 . CLEAN CATCH URINE   Final    Special Requests 07/27/2021 NOT REPORTED   Final    Culture 07/27/2021 NO SIGNIFICANT GROWTH   Final    Specimen Description 07/27/2021 . VAGINA   Final    Special Requests 07/27/2021 NOT REPORTED   Final    Direct Exam 07/27/2021 NEGATIVE for Candida sp. Final    Direct Exam 07/27/2021 NEGATIVE for Gardnerella vaginalis   Final    Direct Exam 07/27/2021 NEGATIVE for Trichomonas vaginalis   Final    Direct Exam 07/27/2021 Method of testing is a DNA probe intended for detection and identification of Candida species, Gardnerella vaginalis, and Trichomonas vaginalis nucleic acid in vaginal fluid specimens from patients with symptoms of vaginitis/vaginosis. Final    Specimen Description 07/27/2021 . VAGINAL SPECIMEN   Final    C. trachomatis DNA 07/27/2021 NEGATIVE  NEGATIVE Final    Comment: CHLAMYDIA TRACHOMATIS DNA not detected by nucleic acid amplification. This test is intended for medical purposes only and is not valid for the evaluation of   suspected sexual abuse or for other forensic purposes. In certain contexts, culture may be required to meet applicable laws and regulations for   diagnosis of C. trachomatis and N. gonorrhoeae infections. Per 2014  CDC recommendations, this test does not include confirmation of positive results   by an alternative nucleic acid target.  N. gonorrhoeae DNA 2021 NEGATIVE  NEGATIVE Final    Comment: NEISSERIA GONORRHOEAE DNA not detected by nucleic acid amplification. This test is intended for medical purposes only and is not valid for the evaluation of   suspected sexual abuse or for other forensic purposes. In certain contexts, culture may be required to meet applicable laws and regulations for   diagnosis of C. trachomatis and N. gonorrhoeae infections. Per 2014  CDC recommendations, this test does not include confirmation of positive results   by an alternative nucleic acid target.     ]        Cervical Exam was:   Pt declined      The patient was counseled on the mandatory call ahead policy. She has been instructed to call the office at anytime prior to going into the hospital so the on-call physician may direct her to the appropriate facility for care. Exceptions were reviewed including but not limited to: Decreased fetal movement, vaginal Bleeding or hemorrhage, trauma, readily expectant delivery, or any instance where she feels 911 should be utilized. The patient was counseled on Labor & Delivery. Route of delivery and counseling on vaginal, operative vaginal, and  sections were completed with the risks of each to both the patient as well as her baby. The possibility of a blood transfusion was discussed as well. The patient was not opposed to receiving a transfusion if needed.  The patient was counseled on types of analgesia during labor and is considering either Regional or IV medication the risks, benefits and alternatives were discussed.  Testing:  none        Assessment:  1Amparo Perry is a 22 y.o. female  2.   3. 37w2d    Patient Active Problem List    Diagnosis Date Noted    34 weeks gestation of pregnancy 2021    Family history of autism 2021    Atrial septal defect of fetus affecting antepartum care of mother 2021    Traumatic injury during pregnancy in third trimester     Encounter for routine screening for malformation using ultrasonics      screening for fetal growth retardation using ultrasonics     FHx Chiari malformation type I and cleft lip 2021     Overview Note:     FOB half brother  Anatomy scan wnl  NIPT normal      THC use 2021    Rubella NI 2021     Overview Note:     Needs MMR PP      Abdominal trauma 2021     Overview Note:     S/p fall 21      Asthma 2021        Diagnosis Orders   1. 37 weeks gestation of pregnancy             Plan:  The patient will return to the office for her next visit in 1 weeks. See antepartum flow sheet. Counseled on s/s of preeclampsia. Counseled on s/s of labor. Fetal movement discussed in detail                    Patient was seen with total face to face time of 20 minutes. More than 50% of this visit was on counseling and education regarding her    Diagnosis Orders   1. 37 weeks gestation of pregnancy      and her options. She was also counseled on her preventative health maintenance recommendations and follow-up.

## 2021-08-18 ENCOUNTER — HOSPITAL ENCOUNTER (INPATIENT)
Age: 21
LOS: 3 days | Discharge: HOME | End: 2021-08-21
Attending: OBSTETRICS & GYNECOLOGY | Admitting: OBSTETRICS & GYNECOLOGY
Payer: COMMERCIAL

## 2021-08-18 ENCOUNTER — ANESTHESIA (OUTPATIENT)
Dept: LABOR AND DELIVERY | Age: 21
End: 2021-08-18
Payer: COMMERCIAL

## 2021-08-18 ENCOUNTER — ANESTHESIA EVENT (OUTPATIENT)
Dept: LABOR AND DELIVERY | Age: 21
End: 2021-08-18
Payer: COMMERCIAL

## 2021-08-18 PROBLEM — O09.90 HIGH RISK PREGNANCY, ANTEPARTUM: Status: ACTIVE | Noted: 2021-08-18

## 2021-08-18 LAB
ABO/RH: NORMAL
AMPHETAMINE SCREEN URINE: NEGATIVE
ANTIBODY IDENTIFICATION: NORMAL
ANTIBODY SCREEN: POSITIVE
ARM BAND NUMBER: NORMAL
BARBITURATE SCREEN URINE: NEGATIVE
BENZODIAZEPINE SCREEN, URINE: NEGATIVE
BLOOD BANK COMMENT: NORMAL
BUPRENORPHINE URINE: ABNORMAL
CANNABINOID SCREEN URINE: POSITIVE
COCAINE METABOLITE, URINE: NEGATIVE
EXPIRATION DATE: NORMAL
HCT VFR BLD CALC: 36.4 % (ref 36–46)
HEMOGLOBIN: 12.1 G/DL (ref 12–16)
MCH RBC QN AUTO: 28.6 PG (ref 26–34)
MCHC RBC AUTO-ENTMCNC: 33.3 G/DL (ref 31–37)
MCV RBC AUTO: 85.8 FL (ref 80–100)
MDMA URINE: ABNORMAL
METHADONE SCREEN, URINE: NEGATIVE
METHAMPHETAMINE, URINE: ABNORMAL
NRBC AUTOMATED: ABNORMAL PER 100 WBC
OPIATES, URINE: NEGATIVE
OXYCODONE SCREEN URINE: NEGATIVE
PDW BLD-RTO: 15.2 % (ref 11.5–14.9)
PHENCYCLIDINE, URINE: NEGATIVE
PLATELET # BLD: 241 K/UL (ref 150–450)
PMV BLD AUTO: 7.4 FL (ref 6–12)
PROPOXYPHENE, URINE: ABNORMAL
RBC # BLD: 4.25 M/UL (ref 4–5.2)
SARS-COV-2, RAPID: NOT DETECTED
SPECIMEN DESCRIPTION: NORMAL
T. PALLIDUM, IGG: NONREACTIVE
TEST INFORMATION: ABNORMAL
TRICYCLIC ANTIDEPRESSANTS, UR: ABNORMAL
WBC # BLD: 10.2 K/UL (ref 4.5–13.5)

## 2021-08-18 PROCEDURE — 86870 RBC ANTIBODY IDENTIFICATION: CPT

## 2021-08-18 PROCEDURE — 83986 ASSAY PH BODY FLUID NOS: CPT

## 2021-08-18 PROCEDURE — 86850 RBC ANTIBODY SCREEN: CPT

## 2021-08-18 PROCEDURE — 1220000000 HC SEMI PRIVATE OB R&B

## 2021-08-18 PROCEDURE — 2500000003 HC RX 250 WO HCPCS: Performed by: ANESTHESIOLOGY

## 2021-08-18 PROCEDURE — 6370000000 HC RX 637 (ALT 250 FOR IP): Performed by: ADVANCED PRACTICE MIDWIFE

## 2021-08-18 PROCEDURE — 6370000000 HC RX 637 (ALT 250 FOR IP): Performed by: OBSTETRICS & GYNECOLOGY

## 2021-08-18 PROCEDURE — 2580000003 HC RX 258: Performed by: ADVANCED PRACTICE MIDWIFE

## 2021-08-18 PROCEDURE — 36415 COLL VENOUS BLD VENIPUNCTURE: CPT

## 2021-08-18 PROCEDURE — 86901 BLOOD TYPING SEROLOGIC RH(D): CPT

## 2021-08-18 PROCEDURE — 6360000002 HC RX W HCPCS: Performed by: ADVANCED PRACTICE MIDWIFE

## 2021-08-18 PROCEDURE — 76815 OB US LIMITED FETUS(S): CPT

## 2021-08-18 PROCEDURE — 80307 DRUG TEST PRSMV CHEM ANLYZR: CPT

## 2021-08-18 PROCEDURE — 85027 COMPLETE CBC AUTOMATED: CPT

## 2021-08-18 PROCEDURE — 87635 SARS-COV-2 COVID-19 AMP PRB: CPT

## 2021-08-18 PROCEDURE — 86900 BLOOD TYPING SEROLOGIC ABO: CPT

## 2021-08-18 PROCEDURE — 3700000025 EPIDURAL BLOCK: Performed by: ANESTHESIOLOGY

## 2021-08-18 PROCEDURE — 86780 TREPONEMA PALLIDUM: CPT

## 2021-08-18 RX ORDER — BUPIVACAINE HYDROCHLORIDE 2.5 MG/ML
INJECTION, SOLUTION EPIDURAL; INFILTRATION; INTRACAUDAL PRN
Status: DISCONTINUED | OUTPATIENT
Start: 2021-08-18 | End: 2021-08-19 | Stop reason: SDUPTHER

## 2021-08-18 RX ORDER — PROMETHAZINE HYDROCHLORIDE 25 MG/ML
25 INJECTION, SOLUTION INTRAMUSCULAR; INTRAVENOUS ONCE
Status: COMPLETED | OUTPATIENT
Start: 2021-08-18 | End: 2021-08-18

## 2021-08-18 RX ORDER — SODIUM CHLORIDE 0.9 % (FLUSH) 0.9 %
5-40 SYRINGE (ML) INJECTION EVERY 12 HOURS SCHEDULED
Status: DISCONTINUED | OUTPATIENT
Start: 2021-08-18 | End: 2021-08-19

## 2021-08-18 RX ORDER — SODIUM CHLORIDE 0.9 % (FLUSH) 0.9 %
5-40 SYRINGE (ML) INJECTION PRN
Status: DISCONTINUED | OUTPATIENT
Start: 2021-08-18 | End: 2021-08-19

## 2021-08-18 RX ORDER — ONDANSETRON 2 MG/ML
4 INJECTION INTRAMUSCULAR; INTRAVENOUS EVERY 6 HOURS PRN
Status: DISCONTINUED | OUTPATIENT
Start: 2021-08-18 | End: 2021-08-19

## 2021-08-18 RX ORDER — NALBUPHINE HCL 10 MG/ML
10 AMPUL (ML) INJECTION ONCE
Status: COMPLETED | OUTPATIENT
Start: 2021-08-18 | End: 2021-08-18

## 2021-08-18 RX ORDER — SODIUM CHLORIDE, SODIUM LACTATE, POTASSIUM CHLORIDE, AND CALCIUM CHLORIDE .6; .31; .03; .02 G/100ML; G/100ML; G/100ML; G/100ML
500 INJECTION, SOLUTION INTRAVENOUS PRN
Status: DISCONTINUED | OUTPATIENT
Start: 2021-08-18 | End: 2021-08-19

## 2021-08-18 RX ORDER — SODIUM CHLORIDE, SODIUM LACTATE, POTASSIUM CHLORIDE, CALCIUM CHLORIDE 600; 310; 30; 20 MG/100ML; MG/100ML; MG/100ML; MG/100ML
INJECTION, SOLUTION INTRAVENOUS CONTINUOUS
Status: DISCONTINUED | OUTPATIENT
Start: 2021-08-18 | End: 2021-08-19

## 2021-08-18 RX ORDER — EPHEDRINE SULFATE 50 MG/ML
10 INJECTION INTRAVENOUS EVERY 5 MIN PRN
Status: DISCONTINUED | OUTPATIENT
Start: 2021-08-18 | End: 2021-08-19

## 2021-08-18 RX ORDER — SODIUM CHLORIDE 9 MG/ML
25 INJECTION, SOLUTION INTRAVENOUS PRN
Status: DISCONTINUED | OUTPATIENT
Start: 2021-08-18 | End: 2021-08-19

## 2021-08-18 RX ORDER — MORPHINE SULFATE 10 MG/ML
10 INJECTION, SOLUTION INTRAMUSCULAR; INTRAVENOUS ONCE
Status: COMPLETED | OUTPATIENT
Start: 2021-08-18 | End: 2021-08-18

## 2021-08-18 RX ORDER — ONDANSETRON 2 MG/ML
4 INJECTION INTRAMUSCULAR; INTRAVENOUS EVERY 6 HOURS PRN
Status: DISCONTINUED | OUTPATIENT
Start: 2021-08-18 | End: 2021-08-21 | Stop reason: HOSPADM

## 2021-08-18 RX ORDER — NALBUPHINE HCL 10 MG/ML
5 AMPUL (ML) INJECTION EVERY 4 HOURS PRN
Status: DISCONTINUED | OUTPATIENT
Start: 2021-08-18 | End: 2021-08-19

## 2021-08-18 RX ORDER — NALOXONE HYDROCHLORIDE 0.4 MG/ML
0.4 INJECTION, SOLUTION INTRAMUSCULAR; INTRAVENOUS; SUBCUTANEOUS PRN
Status: DISCONTINUED | OUTPATIENT
Start: 2021-08-18 | End: 2021-08-19

## 2021-08-18 RX ORDER — SODIUM CHLORIDE, SODIUM LACTATE, POTASSIUM CHLORIDE, AND CALCIUM CHLORIDE .6; .31; .03; .02 G/100ML; G/100ML; G/100ML; G/100ML
1000 INJECTION, SOLUTION INTRAVENOUS PRN
Status: DISCONTINUED | OUTPATIENT
Start: 2021-08-18 | End: 2021-08-19

## 2021-08-18 RX ADMIN — SODIUM CHLORIDE, POTASSIUM CHLORIDE, SODIUM LACTATE AND CALCIUM CHLORIDE: 600; 310; 30; 20 INJECTION, SOLUTION INTRAVENOUS at 21:02

## 2021-08-18 RX ADMIN — SERTRALINE HYDROCHLORIDE 50 MG: 50 TABLET ORAL at 22:59

## 2021-08-18 RX ADMIN — MORPHINE SULFATE 10 MG: 10 INJECTION, SOLUTION INTRAMUSCULAR; INTRAVENOUS at 16:37

## 2021-08-18 RX ADMIN — SODIUM CHLORIDE, POTASSIUM CHLORIDE, SODIUM LACTATE AND CALCIUM CHLORIDE: 600; 310; 30; 20 INJECTION, SOLUTION INTRAVENOUS at 19:58

## 2021-08-18 RX ADMIN — Medication 10 ML/HR: at 20:40

## 2021-08-18 RX ADMIN — SODIUM CHLORIDE, POTASSIUM CHLORIDE, SODIUM LACTATE AND CALCIUM CHLORIDE: 600; 310; 30; 20 INJECTION, SOLUTION INTRAVENOUS at 08:10

## 2021-08-18 RX ADMIN — BUPIVACAINE HYDROCHLORIDE 8 ML: 2.5 INJECTION, SOLUTION EPIDURAL; INFILTRATION; INTRACAUDAL; PERINEURAL at 20:51

## 2021-08-18 RX ADMIN — PROMETHAZINE HYDROCHLORIDE 25 MG: 25 INJECTION INTRAMUSCULAR; INTRAVENOUS at 16:37

## 2021-08-18 RX ADMIN — Medication 50 MCG: at 08:11

## 2021-08-18 RX ADMIN — Medication 25 MCG: at 19:58

## 2021-08-18 RX ADMIN — ONDANSETRON 4 MG: 2 INJECTION INTRAMUSCULAR; INTRAVENOUS at 14:48

## 2021-08-18 RX ADMIN — NALBUPHINE HYDROCHLORIDE 10 MG: 10 INJECTION, SOLUTION INTRAMUSCULAR; INTRAVENOUS; SUBCUTANEOUS at 12:26

## 2021-08-18 RX ADMIN — Medication 25 MCG: at 14:48

## 2021-08-18 ASSESSMENT — PAIN SCALES - GENERAL
PAINLEVEL_OUTOF10: 9
PAINLEVEL_OUTOF10: 8

## 2021-08-18 ASSESSMENT — ENCOUNTER SYMPTOMS: STRIDOR: 0

## 2021-08-18 NOTE — PROGRESS NOTES
LABOR PROGRESS NOTE      Patient Name: Jeremias Kaiser  Patient : 2000  Room/Bed: 7653/5209-87  Admission Date/Time: 2021  5:11 AM  MRN #: [id number]  [facility] #: [id number]    Date: 2021  Time: 4:06 PM    The patient was seen and examined. Her pain is not well controlled. She reports fetal movement is present, complains of contractions, complains of loss of fluid, denies vaginal bleeding. Denies s/s of preeclampsia including headache vision changes, difficulty breathing, chest pain, RUQ pain or worsening swelling of extremities. Melissa Ramesh is tearful reporting pain untolerable        Vital Signs:  VS:  temperature is 97.6 °F (36.4 °C). Her blood pressure is 132/71 and her pulse is 71. Her respiration is 16 and oxygen saturation is 95%. FHT:    Baseline: 120   Variability: moderate              Accels: present   Decels: Absent      Contraction pattern:                                  Frequency: 2-4 minutes                                 Duration: 60 seconds                                 Intensity: mild                                 Pitocin: 0                                 IUPC:  No  Chaperone: Funmi TRISTAN  Cervix:             Dilation: tight 3            Effacement: 90            Station: -1            Position: anterior            Consistency: soft    Status of membranes: SROM 21 @ 0230    Pain control: inadequate.  Discussed morphine phenergan vs epidural    Maternal status (hydration, fatigue, voids): adequate    Interventions: SVE    Assessment/Plan:  Jeremias Kaiser is a 22 y.o. female  at 38w0d admitted for term prelabor rupture of membranes   - cEFM/ TOCO  - GBS negative,    - VSS, Afebrile    - IVF LR @ 125 ml/hr    - Induction method: 50mcg cytotec buccal x1, 25mcg cyctotec buccal x1   - Diet: reg   - Category 1 strip   - Patient optd for morphine/phenergan      Attending: Orquidea Gonzalez  Midwife  2021, 4:06 PM

## 2021-08-18 NOTE — PROGRESS NOTES
LABOR PROGRESS NOTE      Patient Name: Jaki Nava  Patient : 2000  Room/Bed: 0534/2747-35  Admission Date/Time: 2021  5:11 AM  MRN #: [id number]  [facility] #: [id number]    Date: 2021  Time: 12:16 PM    The patient was seen and examined. Her pain is not well controlled. She reports fetal movement is present, complains of contractions, denies loss of fluid, denies vaginal bleeding. Denies s/s of preeclampsia including headache vision changes, difficulty breathing, chest pain, RUQ pain or worsening swelling of extremities. Vital Signs:  VS:  temperature is 97 °F (36.1 °C). Her blood pressure is 118/61 and her pulse is 85. Her respiration is 16.      FHT:    Baseline: 130   Variability: moderate              Accels: present   Decels: Absent      Contraction pattern:                                  Frequency: 2-4 minutes                                 Duration: 60-80seconds                                 Intensity: mild/mod                                 Pitocin: 0                                 IUPC:  No  Chaperone: Funmi TRISTAN  Cervix:             Dilation: 1 loose            Effacement: 80            Station: -1            Position: ant            Consistency: soft    Status of membranes: SROM @ 21 @ 0230    Pain control: inadequate    Maternal status (hydration, fatigue, voids): adequate    Interventions: none    Assessment/Plan:  Jaki Nava is a 22 y.o. female  at 38w0d admitted for term prelabor rupture of membranes   - cEFM/ TOCO  - GBS negative,    - VSS, Afebrile    - IVF LR @ 125 ml/hr    - Induction method: Buccal 50mcg of cytotec x1   - Diet: reg   - Category 1 strip  - Nubain 10mg IV once for pain      Attending: . Reviewed plan of care with  to monitor contraction patter and given second dose of cytotec when able    BEBA Newberry CNM  Midwife  2021, 12:16 PM

## 2021-08-18 NOTE — H&P
OBSTETRICAL HISTORY [address]    Date: 2021       Time: 11:41 AM   Patient Name: Stephy Bruno     Patient : 2000  Room/Bed: [facility]/7953-60    Admission Date/Time: 2021  5:11 AM      CC: ANSELMO     HPI: Stephy Bruno is a 22 y.o.  at 38w0d who presents gush of fluid and spontaneous leaking since approximately 0230. The patient reports fetal movement is present, denies contractions, complains of loss of fluid, denies vaginal bleeding. DATING:  LMP: Patient's last menstrual period was 2020.   Estimated Date of Delivery: 21   Based on: LMP, confirmed with early ultrasound    PREGNANCY RISK FACTORS:  Patient Active Problem List   Diagnosis    Asthma    FHx Chiari malformation type I and cleft lip    THC use    Rubella NI    Abdominal trauma    Traumatic injury during pregnancy in third trimester    Encounter for routine screening for malformation using ultrasonics     screening for fetal growth retardation using ultrasonics    Atrial septal defect of fetus affecting antepartum care of mother    Family history of autism    34 weeks gestation of pregnancy    High risk pregnancy, antepartum      Steroids Given In This Pregnancy:  no     REVIEW OF SYSTEMS:  Constitutional: negative fever, negative chills  HEENT: negative visual disturbances, negative headaches  Respiratory: negative dyspnea, negative cough  Cardiovascular: negative chest pain,  negative palpitations  Gastrointestinal: negative abdominal pain, negative RUQ pain, negative N/V, negative diarrhea, negative constipation  Genitourinary: negative dysuria, negative vaginal discharge  Dermatological: negative rash  Hematologic: negative bruising  Immunologic/Lymphatic: negative recent illness, negative recent sick contact  Musculoskeletal: negative back pain, negative myalgias, negative arthralgias  Neurological:  negative dizziness, negative weakness  Behavior/Psych: negative depression, negative anxiety    OBSTETRICAL HISTORY:   OB History    Para Term  AB Living   1 0 0 0 0 0   SAB TAB Ectopic Molar Multiple Live Births   0 0 0 0 0 0      # Outcome Date GA Lbr Ric/2nd Weight Sex Delivery Anes PTL Lv   1 Current                PAST MEDICAL HISTORY:   has a past medical history of Mental disorder. PAST SURGICAL HISTORY:   has no past surgical history on file. ALLERGIES:  has No Known Allergies. MEDICATIONS:  Prior to Admission medications    Medication Sig Start Date End Date Taking? Authorizing Provider   sertraline (ZOLOFT) 50 MG tablet TAKE 1/2 TABLET BY MOUTH ONE TIME A DAY for 1 week then increase to 1 tablet daily thereafter as tolerated 21  Yes Historical Provider, MD   ondansetron (ZOFRAN-ODT) 4 MG disintegrating tablet Take 1 tablet by mouth 3 times daily as needed for Nausea or Vomiting 21  Yes Carlos Alberto Coleman,    vitamin B-6 (PYRIDOXINE) 25 MG tablet Take 1 tablet by mouth nightly 21  Yes Ebenezer Barlow DO   doxyLAMINE succinate (UNISOM SLEEPTABS) 25 MG tablet Take 1 tablet by mouth nightly 21  Yes Kt Owens PA-C   Prenatal Vit-Fe Fumarate-FA (PRENATAL/FOLIC ACID PO) Take by mouth   Yes Historical Provider, MD   Peak Flow Meter PATRICIA Use 1 to 2 times daily 21   Dennice Lanes, APRN - CNP       FAMILY HISTORY:  family history is not on file. SOCIAL HISTORY:   reports that she has never smoked. She has never used smokeless tobacco. She reports previous alcohol use. She reports current drug use. Drug: Marijuana.     VITALS:  Vitals:    21 0521 21 1222 21 1223   BP: 118/61  132/71   Pulse: 85  71   Resp: 16  16   Temp: 97 °F (36.1 °C)  97.6 °F (36.4 °C)   SpO2:  98%          PHYSICAL EXAM:  Fetal Heart Monitor: Cat 1  [place]: contractions, regular, every 4-5 minutes    General appearance:  no apparent distress, alert and cooperative  Neurologic:  CN intact  Lungs:  CTA  Heart: regular rate and rhythm    Abdomen:  soft, gravid, non-tender, no right upper quadrant tenderness, no CVA tenderness, uterus non-tender, no signs of abruption and no signs of chorioamnionitis  Extremities:  no calf tenderness, DTR's: normal    Pelvic Exam:   Speculum: closed, no lesions, +pooling  Cervix Check: Closed dilated, thick % effaced, -3 station, mid position, medium consistency, Cephalic    DATA:  Membranes Ruptured: Yes: 230am  Fern: positive  Nitrazine: Positive  Valsalva/Pooling: present  Vaginal Bleeding: absent    OMM EXAM:  Chief Complaint:  Pregnancy  Reason for No Exam (if applicable): Declined    LIMITED BEDSIDE US:  Position: Cephalic  Fetal Movement: Present  Amniotic Fluid Index/Volume: MVP 1.05    PRENATAL LAB RESULTS:   Blood Type/Rh:    ABO/Rh   Date Value Ref Range Status   2021 O NEGATIVE  Final     Antibody Screen:    Antibody Screen   Date Value Ref Range Status   2021 POSITIVE  Final     Hemoglobin:   Hemoglobin   Date Value Ref Range Status   2021 12.1 12.0 - 16.0 g/dL Final     Hematocrit:   Hematocrit   Date Value Ref Range Status   2021 36.4 36 - 46 % Final     Platelet Count:   Platelets   Date Value Ref Range Status   2021 241 150 - 450 k/uL Final       ASSESSMENT & PLAN:  Anel Perry is a 22 y.o. female  at 42w0d    - GBS negative / Rh negative / R non-immune   - No indication for GBS prophylaxis   - 2021 Rhogam given   - Needs MMR PP  SROM   - Admit for induction/augmentation   - analgesia per patient request   - admission labs  ASD   - consult done  Dr. Denisse Camacho  Asthma, controlled    Patient Active Problem List    Diagnosis Date Noted    High risk pregnancy, antepartum 2021    34 weeks gestation of pregnancy 2021    Family history of autism 2021    Atrial septal defect of fetus affecting antepartum care of mother 2021    Traumatic injury during pregnancy in third trimester     Encounter for routine screening for malformation using ultrasonics      screening for fetal growth retardation using ultrasonics     FHx Chiari malformation type I and cleft lip 2021     FOB half brother  Anatomy scan wnl  NIPT normal      THC use 2021    Rubella NI 2021     Needs MMR PP      Abdominal trauma 2021     S/p fall 21      Asthma 2021       Risks, benefits, alternatives and possible complications have been discussed in detail with the patient. Admission, and post admission procedures and expectations were discussed in detail. All questions were answered.     Arvind Luna DO  Ob/Gyn   2021, 11:41 AM

## 2021-08-18 NOTE — PROGRESS NOTES
LABOR PROGRESS NOTE      Patient Name: Sonny Landa  Patient : 2000  Room/Bed: 5797/4098-20  Admission Date/Time: 2021  5:11 AM  MRN #: [id number]  [facility] #: [id number]    Date: 2021  Time: 7:45 AM    The patient was seen and examined. Her pain is well controlled. She reports fetal movement is present, denies contractions, complains of loss of fluid, denies vaginal bleeding. Denies s/s of preeclampsia including headache vision changes, difficulty breathing, chest pain, RUQ pain or worsening swelling of extremities. Noticing some cramping less than period cramps. Vital Signs:  VS:  temperature is 97 °F (36.1 °C). Her blood pressure is 118/61 and her pulse is 85. Her respiration is 16. FHT:    Baseline: 130   Variability: moderate              Accels: present   Decels: Absent      Contraction pattern:                                  Frequency: irregular                                  Chaperone: n/a  Cervix: deferred           Status of membranes: SROM @ 21 @ 0200    Pain control: adequate    Maternal status (hydration, fatigue, voids): adequate    Interventions: none    Assessment/Plan:  Sonny Landa is a 22 y.o. female  at 42w0d admitted for Term Prelabor Rupture of Membranes   - cEFM/ TOCO  - GBS negative,    - VSS, Afebrile    - IVF LR @ 125 ml/hr    - Induction method: cytotec 50 mcg buccal. Reviewed with patient.  Questions answered.   - BOY, desires circ and breastfeeding   - Diet: regular   - Category 1 strip    SROM x 5 hours      Attending: BEBA Newton CNM  Midwife  2021, 7:45 AM

## 2021-08-18 NOTE — FLOWSHEET NOTE
Patient admitted to room 167 from ED registration per wheelchair. Here with c/o \"gush\" of fluid. Denies vaginal bleeding. Denies N/V/D. Denies fever/chills. Relates of + fetal movement. Assisted into bed, Siderails up x2. Call light in reach. Bed in low position. Oriented to room, surroundings, call system and plan of care. Patient verbalizes understanding. EFM applied and monitor test completed/passed.

## 2021-08-19 PROBLEM — Z3A.34 34 WEEKS GESTATION OF PREGNANCY: Status: RESOLVED | Noted: 2021-07-27 | Resolved: 2021-08-19

## 2021-08-19 LAB — FETAL ROSETTE: NEGATIVE

## 2021-08-19 PROCEDURE — 7200000001 HC VAGINAL DELIVERY

## 2021-08-19 PROCEDURE — 3E0P7VZ INTRODUCTION OF HORMONE INTO FEMALE REPRODUCTIVE, VIA NATURAL OR ARTIFICIAL OPENING: ICD-10-PCS | Performed by: OBSTETRICS & GYNECOLOGY

## 2021-08-19 PROCEDURE — 6360000002 HC RX W HCPCS: Performed by: ADVANCED PRACTICE MIDWIFE

## 2021-08-19 PROCEDURE — 0HQ9XZZ REPAIR PERINEUM SKIN, EXTERNAL APPROACH: ICD-10-PCS | Performed by: OBSTETRICS & GYNECOLOGY

## 2021-08-19 PROCEDURE — 36415 COLL VENOUS BLD VENIPUNCTURE: CPT

## 2021-08-19 PROCEDURE — 90471 IMMUNIZATION ADMIN: CPT | Performed by: ADVANCED PRACTICE MIDWIFE

## 2021-08-19 PROCEDURE — 90707 MMR VACCINE SC: CPT | Performed by: ADVANCED PRACTICE MIDWIFE

## 2021-08-19 PROCEDURE — 6370000000 HC RX 637 (ALT 250 FOR IP): Performed by: ADVANCED PRACTICE MIDWIFE

## 2021-08-19 PROCEDURE — 88307 TISSUE EXAM BY PATHOLOGIST: CPT

## 2021-08-19 PROCEDURE — 1220000000 HC SEMI PRIVATE OB R&B

## 2021-08-19 PROCEDURE — 6370000000 HC RX 637 (ALT 250 FOR IP): Performed by: OBSTETRICS & GYNECOLOGY

## 2021-08-19 PROCEDURE — 85461 HEMOGLOBIN FETAL: CPT

## 2021-08-19 RX ORDER — IBUPROFEN 600 MG/1
600 TABLET ORAL EVERY 6 HOURS PRN
Qty: 120 TABLET | Refills: 0 | Status: SHIPPED | OUTPATIENT
Start: 2021-08-19 | End: 2021-10-14 | Stop reason: ALTCHOICE

## 2021-08-19 RX ORDER — ACETAMINOPHEN 325 MG/1
650 TABLET ORAL EVERY 4 HOURS PRN
Status: DISCONTINUED | OUTPATIENT
Start: 2021-08-19 | End: 2021-08-21 | Stop reason: HOSPADM

## 2021-08-19 RX ORDER — IBUPROFEN 600 MG/1
600 TABLET ORAL EVERY 6 HOURS PRN
Status: DISCONTINUED | OUTPATIENT
Start: 2021-08-19 | End: 2021-08-21 | Stop reason: HOSPADM

## 2021-08-19 RX ORDER — DOCUSATE SODIUM 100 MG/1
100 CAPSULE, LIQUID FILLED ORAL DAILY PRN
Qty: 30 CAPSULE | Refills: 0 | Status: SHIPPED | OUTPATIENT
Start: 2021-08-19 | End: 2021-10-14 | Stop reason: ALTCHOICE

## 2021-08-19 RX ADMIN — IBUPROFEN 600 MG: 600 TABLET, FILM COATED ORAL at 04:00

## 2021-08-19 RX ADMIN — HUMAN RHO(D) IMMUNE GLOBULIN 300 MCG: 1500 SOLUTION INTRAMUSCULAR; INTRAVENOUS at 14:43

## 2021-08-19 RX ADMIN — IBUPROFEN 600 MG: 600 TABLET, FILM COATED ORAL at 16:00

## 2021-08-19 RX ADMIN — MEASLES, MUMPS, AND RUBELLA VIRUS VACCINE LIVE 0.5 ML: 1000; 12500; 1000 INJECTION, POWDER, LYOPHILIZED, FOR SUSPENSION SUBCUTANEOUS at 20:38

## 2021-08-19 RX ADMIN — Medication 166.7 ML: at 01:34

## 2021-08-19 RX ADMIN — SERTRALINE HYDROCHLORIDE 50 MG: 50 TABLET ORAL at 21:55

## 2021-08-19 ASSESSMENT — PAIN SCALES - GENERAL
PAINLEVEL_OUTOF10: 0
PAINLEVEL_OUTOF10: 2
PAINLEVEL_OUTOF10: 5

## 2021-08-19 NOTE — FLOWSHEET NOTE
2015 Anesthesia notified of request for epidural.  2026 -  Dr. Kyra Shankar at bedside and consent form signed. Everyone in room is wearing a mask. Risks and benefits discussed and patient verbalizes understanding. Patient verbalizes to proceed with procedure. Time out performed. 2024 - to dangle position. 2029  - procedure started. 2035  - test dose given. Patient tolerated procedure well. 2029 -to low fowlers position with hip wedge in place. 2040  -first dose given. 2040 - epidural pump started. See anesthesia note.

## 2021-08-19 NOTE — LACTATION NOTE
This note was copied from a baby's chart. Lactation round made. Mother wishes to breastfeed. Mother states baby is nursing well but sleepy. Mother denies painful latch. Mother has a Spectra 2 pump at home. Mother goes to [facility]. Mother educated on sleepy phase. Encouraged to put baby to breast every 2-3 hours unless baby is showing hunger signs. Encouraged to do skin to skin prior to assist waking baby. Writer educates mother on cluster feeding and encourages mother to put baby to breast rather than pacifier or artifical nipple. Family educated on nipple confusion. Family educated on paced bottle feedings. Writer encourages mother to call out for breastfeeding questions and assistance. Handouts given and explained to mother:  Breastfeeding resource Guide; Breastfeeding Log for the first week; When to Call a Lactation Consultant, Colostrum First, Norms in the First 3 days; feeding cues; Baby's second Night; ILCA's inside track, a resource for breastfeeding mothers: Milk Expression and Pumping; How to Achieve a Deep Latch; Tips for breastfeeding Moms/Daily meal plan; Perks Inside Track, a resource for breastfeeding mother: Managing Your Milk Supply:Going with the Flow;  IL[employer]'s Inside Track, a resource for breastfeeding mothers: Using Your Hands to Express Your Milk; Signs of a Good Feeding, Signs of a Poor Feeding. Discussed handouts with mother verbalizing understanding and encouraged mother  to view video clips.

## 2021-08-19 NOTE — ANESTHESIA PROCEDURE NOTES
Epidural Block    Patient location during procedure: OB  Start time: 8/18/2021 8:30 PM  End time: 8/18/2021 8:49 PM  Reason for block: labor epidural  Staffing  Anesthesiologist: Alessandro Kulkarni MD  Preanesthetic Checklist  Completed: patient identified, IV checked, site marked, risks and benefits discussed, surgical consent, monitors and equipment checked, pre-op evaluation, timeout performed, anesthesia consent given, oxygen available and patient being monitored  Epidural  Patient position: sitting  Prep: Betadine  Patient monitoring: cardiac monitor, continuous pulse ox and frequent blood pressure checks  Approach: midline  Location: lumbar (1-5)  Injection technique: GINETTE air and GINETTE saline  Provider prep: mask and sterile gloves  Needle  Needle type: Tuohy   Needle gauge: 20 G  Needle length: 3.5 in  Needle insertion depth: 7 cm  Catheter type: end hole  Catheter size: 19 G  Catheter at skin depth: 11 cm  Test dose: negative  Assessment  Sensory level: T8  Hemodynamics: stable  Attempts: 1

## 2021-08-19 NOTE — ANESTHESIA PRE PROCEDURE
Department of Anesthesiology  Preprocedure Note       Name:  Stephy Bruno   Age:  22 y.o.  :  2000                                          MRN:  [id number]         Date:  2021      Surgeon: * No surgeons listed *    Procedure: * No procedures listed *    Medications prior to admission:   Prior to Admission medications    Medication Sig Start Date End Date Taking?  Authorizing Provider   sertraline (ZOLOFT) 50 MG tablet TAKE 1/2 TABLET BY MOUTH ONE TIME A DAY for 1 week then increase to 1 tablet daily thereafter as tolerated 21  Yes Historical Provider, MD   ondansetron (ZOFRAN-ODT) 4 MG disintegrating tablet Take 1 tablet by mouth 3 times daily as needed for Nausea or Vomiting 21  Yes Leah Augustine, DO   vitamin B-6 (PYRIDOXINE) 25 MG tablet Take 1 tablet by mouth nightly 21  Yes Harsh Barlow, DO   doxyLAMINE succinate (UNISOM SLEEPTABS) 25 MG tablet Take 1 tablet by mouth nightly 21  Yes Kinjal Sotelo PA-C   Prenatal Vit-Fe Fumarate-FA (PRENATAL/FOLIC ACID PO) Take by mouth   Yes Historical Provider, MD   Peak Flow Meter PATRICIA Use 1 to 2 times daily 21   BEBA Jhaveri CNP       Current medications:    Current Facility-Administered Medications   Medication Dose Route Frequency Provider Last Rate Last Admin    lactated ringers infusion   Intravenous Continuous Blas Coral, APRN -  mL/hr at 21 Rate Change at 21    lactated ringers bolus  500 mL Intravenous PRN Blas Coral, APRN - CNM        Or    lactated ringers bolus  1,000 mL Intravenous PRN Blas Coral, APRN - CNM        sodium chloride flush 0.9 % injection 5-40 mL  5-40 mL Intravenous 2 times per day Blas Coral, APRN - CNM        sodium chloride flush 0.9 % injection 5-40 mL  5-40 mL Intravenous PRN Blas Coral, APRN - CNM        0.9 % sodium chloride infusion  25 mL Intravenous PRN Blas Coral, APRN - CNM        oxytocin (PITOCIN) 10 unit bolus from the (82.1 kg)   08/11/21 180 lb (81.6 kg)   08/05/21 179 lb (81.2 kg)     There is no height or weight on file to calculate BMI.    CBC:   Lab Results   Component Value Date    WBC 10.2 08/18/2021    RBC 4.25 08/18/2021    HGB 12.1 08/18/2021    HCT 36.4 08/18/2021    MCV 85.8 08/18/2021    RDW 15.2 08/18/2021     08/18/2021       CMP:   Lab Results   Component Value Date     06/29/2021    K 4.2 06/29/2021     06/29/2021    CO2 22 06/29/2021    BUN 5 06/29/2021    CREATININE 0.26 06/29/2021    GFRAA >60 06/29/2021    LABGLOM >60 06/29/2021    GLUCOSE 86 06/29/2021    PROT 6.3 06/29/2021    CALCIUM 8.8 06/29/2021    BILITOT 0.20 06/29/2021    ALKPHOS 97 06/29/2021    AST 24 06/29/2021    ALT 13 06/29/2021       POC Tests: No results for input(s): POCGLU, POCNA, POCK, POCCL, POCBUN, POCHEMO, POCHCT in the last 72 hours.     Coags: No results found for: PROTIME, INR, APTT    HCG (If Applicable):   Lab Results   Component Value Date    HCG POSITIVE (A) 02/21/2021    HCGQUANT 69,455 (H) 02/21/2021        ABGs: No results found for: PHART, PO2ART, QCC8TLQ, WJU0SNP, BEART, R5OCGRDU     Type & Screen (If Applicable):  No results found for: LABABO, LABRH    Drug/Infectious Status (If Applicable):  No results found for: HIV, HEPCAB    COVID-19 Screening (If Applicable):   Lab Results   Component Value Date    COVID19 Not Detected 08/18/2021           Anesthesia Evaluation  Patient summary reviewed and Nursing notes reviewed no history of anesthetic complications:   Airway: Mallampati: II  TM distance: >3 FB   Neck ROM: full  Mouth opening: > = 3 FB Dental: normal exam         Pulmonary: breath sounds clear to auscultation  (+) asthma:     (-) rhonchi, wheezes, rales, stridor and no decreased breath sounds                           Cardiovascular:Negative CV ROS            Rhythm: regular  Rate: normal                    Neuro/Psych:   (+) psychiatric history:            GI/Hepatic/Renal: Neg GI/Hepatic/Renal ROS            Endo/Other: Negative Endo/Other ROS                    Abdominal:             Vascular: negative vascular ROS. Other Findings:             Anesthesia Plan      epidural     ASA 2             Anesthetic plan and risks discussed with patient.                       Onel Jones MD   8/18/2021

## 2021-08-19 NOTE — PLAN OF CARE
Problem: Mood - Altered:  Goal: Mood stable  Description: Mood stable  Outcome: Met This Shift     Problem: Pain - Acute:  Goal: Pain level will decrease  Description: Pain level will decrease  Outcome: Met This Shift     Problem: Discharge Planning:  Goal: Discharged to appropriate level of care  Description: Discharged to appropriate level of care  Outcome: Ongoing     Problem: Constipation:  Goal: Bowel elimination is within specified parameters  Description: Bowel elimination is within specified parameters  Outcome: Met This Shift

## 2021-08-19 NOTE — ANESTHESIA POSTPROCEDURE EVALUATION
POST- ANESTHESIA EVALUATION       Pt Name: Blayne Bruno  MRN: [id number]  YOB: 2000  Date of evaluation: 8/19/2021  Time:  7:05 AM      BP (!) 111/50   Pulse 84   Temp 97 °F (36.1 °C) (Infrared)   Resp 14   Ht 4' 11\" (1.499 m)   Wt 181 lb (82.1 kg)   LMP 11/25/2020   SpO2 96%   Breastfeeding Unknown   BMI 36.56 kg/m²      Consciousness Level  Awake  Cardiopulmonary Status  Stable  Pain Adequately Treated YES  Nausea / Vomiting  NO  Adequate Hydration  YES  Anesthesia Related Complications NONE      Electronically signed by Magaly Jimenez MD on 8/19/2021 at 7:05 AM       Department of Anesthesiology  Postprocedure Note    Patient: Blayne Bruno  MRN: [id number]  YOB: 2000  Date of evaluation: 8/19/2021  Time:  7:05 AM     Procedure Summary     Date: 08/18/21 Room / Location:     Anesthesia Start: 2048 Anesthesia Stop: 08/19/21 0129    Procedure: Labor Analgesia Diagnosis:     Scheduled Providers:  Responsible Provider: Magaly Jimenez MD    Anesthesia Type: epidural ASA Status: 2          Anesthesia Type: epidural    Hiren Phase I: Hiren Score: 10    Hiren Phase II: Hiren Score: 10    Last vitals: Reviewed and per EMR flowsheets.      Epidural catheter removed with tip intact per RN  Anesthesia Post Evaluation

## 2021-08-19 NOTE — PROGRESS NOTES
Pt seen and examined. Mod uncomfortable, would like epidural redosed. ROS: as above    SVE: 6/90/-1    FHR: 135/+danilo/early decels  TOCO: q3min. A/P:  22yo IUP at term with SROM, clear fluid. Cytotec earlier PO. Ok to re-dose epidural.   Will hold stimulation at this time as she is making progress on her own.

## 2021-08-19 NOTE — L&D DELIVERY NOTE
Mother's Information    Labor Events     labor?: No  Rupture type: Spontaneous=SROM  Fluid color: Clear     Mother Delivery Information    Episiotomy: None  Lacerations: None  # of Repair Packets: 1  Surgical or Additional Est. Blood Loss (mL): 0 (View Only): Edit in Flowsheets   Combined Est. Blood Loss (mL): 0        Anibal Contreras [[id number]]    Labor Events     labor?: No   steroids?: None  Cervical ripening date/time: 21 08:11:00   Cervical ripening type: Misoprostol  Antibiotics received during labor?: No  Rupture Identifier: Sac 1   Rupture date/time: 21 02:30:00   Rupture type: Spontaneous=SROM  Fluid color: Clear  Fluid odor: None  Induction: Misoprostol  Indications for induction: Premature ROM  Augmentation: None  Labor complications: None          Labor Event Times    Labor onset date/time: 21   Dilation complete date/time:  21   Start pushin2021 0116   Decision time (emergent ):        Anesthesia    Method: Epidural     Assisted Delivery Details    Forceps attempted?: No  Vacuum extractor attempted?: No     Document Additional Attempt       Document Additional Attempt             Shoulder Dystocia    Shoulder dystocia present?: No  Add Second Maneuver  Add Third Maneuver  Add Fourth Maneuver  Add Fifth Maneuver  Add Sixth Maneuver  Add Seventh Maneuver  Add Eighth Maneuver  Add Ninth Maneuver     [place] Presentation    Presentation: Vertex  Position: Middle  _: Occiput  _: Anterior     [place] Information    Head delivery date/time: 2021 01:29:00   Changing the 's delivery date/time could affect patient care.:    Delivery date/time:  21   Delivery type: Vaginal, Spontaneous    Details:        Delivery Providers    Delivering clinician: Rae Rice MD   Provider Role    Marianne Stewart, RN Delivery Nurse    Chantal Pisano, [place]ALEA Registered Nurse    Sharon Sandoval Surgical Tech      Cord Complications: Nuchal  Nuchal intervention: reduced  Nuchal cord description: loose nuchal cord  Number of loops: 1  Delayed cord clamping?: Yes  Cord clamped date/time: 2021 0131  Cord blood disposition: Lab  Gases sent?: Yes  Stem cell collection (by provider): No     Placenta    Date/time: 2021 01:34:00  Removal: Spontaneous  Appearance: Intact  Disposition: Lab     Delivery Resuscitation    Method: Stimulation, Bulb Suction     Apgars    Living status: Living  Apgars   1 Minute:  5 Minute:  10 Minute 15 Minute 20 Minute   Skin Color: 0  1       Heart Rate: 2  2       Reflex Irritability: 1  2       Muscle Tone: 1  2       Respiratory Effort: 2  2       Total: 6  9               Apgars Assigned By: Beatris Spencer RN     Skin to Skin    Skin to skin initiation date/time: 21 01:29:00 EDT   Skin to skin with: Mother  Skin to skin end date/time:         Measurements       Delivery Information    Episiotomy: None  Perineal lacerations: None    Vaginal laceration: No    Cervical laceration: No    Surgical or additional est. blood loss (mL): 0 (View Only): Edit in Flowsheets   Combined est. blood loss (mL): 0     Vaginal Delivery Counts    Initial count personnel: DR. Lam Staley  Initial count verified by: YOSVANY GRIFFIN   4x4:  Needles:  Instruments:  Lap Pads:  Sponges:    Initial counts:         Final counts:         Final count personnel: DR. Lam Staley  Final count verified by: YOSVANY GRIFFIN  Accurate final count?: Yes  Final vaginal sweep completed: Yes     Other Procedures    Procedures: None          NASVD of viable baby boy, wt      pending    Gm, from OA, APGAR6/9 . Nuchal cord x 1 reduced on the perineum. Baby del and cord was clamped and cut. 3vessels. Baby bulb-suctioned and handed off to awaiting NICU team.   Placenta followed spontaneously, intact. All membranes and cotyledons present. Lacerations: 1st degree noted; Repaired: Yes . Hemostatic. Pitocin given prior to del of placenta.   EBL ~ less than 100 ml  Mom and baby are well.

## 2021-08-19 NOTE — CARE COORDINATION
Referral received for Zoloft use. Review of medical records indicated positive cannabinoid lab results on 1/28/2021 and 8/18/2021. Met with pt and significant other Jean-Paul Hanna; baby sleeping in mom's bed. Both welcoming of visit. Pt lives with SO and identified multiple support systems. Both her and SO work and have all necessary provisions for baby and has [address] Pt is linked with [facility] in [facility] and reached out to them when she found out she was pregnant. This is pt's 1st child; Help me grow packet provided. Pt acknowledged marijuana use during pregnancy; smoke and edibles. She stated it was due to morning sickness. Pt aware of report will be [facility] to [facility]. Contacted [facility] and report provided to [place].

## 2021-08-19 NOTE — FLOWSHEET NOTE
Patient rating pain of 9. Dr. Shaun Monroe at bedside to evaluate epidural. 5ml of marcaine . 25% given per Dr. Shaun Monroe.

## 2021-08-19 NOTE — FLOWSHEET NOTE
Dr. Leyda Dougherty at bedside to re-attempt Epidural. 2316 procedure started. 2313 patient dangling at bedside. 2322 test dose given. 2324 to semi fowlers. 2328 to pump.

## 2021-08-20 PROBLEM — S39.91XA ABDOMINAL TRAUMA: Status: RESOLVED | Noted: 2021-07-06 | Resolved: 2021-08-20

## 2021-08-20 LAB
ABSOLUTE EOS #: 0.3 K/UL (ref 0–0.4)
ABSOLUTE IMMATURE GRANULOCYTE: ABNORMAL K/UL (ref 0–0.3)
ABSOLUTE LYMPH #: 2.2 K/UL (ref 1.2–5.2)
ABSOLUTE MONO #: 0.8 K/UL (ref 0.1–1.3)
ALBUMIN SERPL-MCNC: 3.1 G/DL (ref 3.5–5.2)
ALBUMIN/GLOBULIN RATIO: ABNORMAL (ref 1–2.5)
ALP BLD-CCNC: 111 U/L (ref 35–104)
ALT SERPL-CCNC: 12 U/L (ref 5–33)
ANION GAP SERPL CALCULATED.3IONS-SCNC: 7 MMOL/L (ref 9–17)
AST SERPL-CCNC: 20 U/L
BASOPHILS # BLD: 1 % (ref 0–2)
BASOPHILS ABSOLUTE: 0.1 K/UL (ref 0–0.2)
BILIRUB SERPL-MCNC: <0.15 MG/DL (ref 0.3–1.2)
BLD PROD TYP BPU: NORMAL
BUN BLDV-MCNC: 8 MG/DL (ref 6–20)
BUN/CREAT BLD: ABNORMAL (ref 9–20)
CALCIUM SERPL-MCNC: 8.4 MG/DL (ref 8.6–10.4)
CHLORIDE BLD-SCNC: 106 MMOL/L (ref 98–107)
CO2: 24 MMOL/L (ref 20–31)
CREAT SERPL-MCNC: 0.6 MG/DL (ref 0.5–0.9)
CREATININE URINE: 56.2 MG/DL (ref 28–217)
DIFFERENTIAL TYPE: ABNORMAL
DISPENSE STATUS BLOOD BANK: NORMAL
EOSINOPHILS RELATIVE PERCENT: 3 % (ref 0–4)
GFR AFRICAN AMERICAN: >60 ML/MIN
GFR NON-AFRICAN AMERICAN: >60 ML/MIN
GFR SERPL CREATININE-BSD FRML MDRD: ABNORMAL ML/MIN/{1.73_M2}
GFR SERPL CREATININE-BSD FRML MDRD: ABNORMAL ML/MIN/{1.73_M2}
GLUCOSE BLD-MCNC: 74 MG/DL (ref 70–99)
HCT VFR BLD CALC: 34.1 % (ref 36–46)
HEMOGLOBIN: 11.2 G/DL (ref 12–16)
IMMATURE GRANULOCYTES: ABNORMAL %
LYMPHOCYTES # BLD: 19 % (ref 25–45)
MCH RBC QN AUTO: 28.7 PG (ref 26–34)
MCHC RBC AUTO-ENTMCNC: 32.9 G/DL (ref 31–37)
MCV RBC AUTO: 87.1 FL (ref 80–100)
MONOCYTES # BLD: 7 % (ref 2–8)
NRBC AUTOMATED: ABNORMAL PER 100 WBC
PDW BLD-RTO: 15.4 % (ref 11.5–14.9)
PLATELET # BLD: 230 K/UL (ref 150–450)
PLATELET ESTIMATE: ABNORMAL
PMV BLD AUTO: 6.9 FL (ref 6–12)
POTASSIUM SERPL-SCNC: 4.4 MMOL/L (ref 3.7–5.3)
RBC # BLD: 3.92 M/UL (ref 4–5.2)
RBC # BLD: ABNORMAL 10*6/UL
SEG NEUTROPHILS: 70 % (ref 34–64)
SEGMENTED NEUTROPHILS ABSOLUTE COUNT: 8.1 K/UL (ref 1.3–9.1)
SODIUM BLD-SCNC: 137 MMOL/L (ref 135–144)
SURGICAL PATHOLOGY REPORT: NORMAL
TOTAL PROTEIN, URINE: 27 MG/DL
TOTAL PROTEIN: 5.5 G/DL (ref 6.4–8.3)
TRANSFUSION STATUS: NORMAL
UNIT DIVISION: 0
UNIT NUMBER: NORMAL
URINE TOTAL PROTEIN CREATININE RATIO: 0.48 (ref 0–0.2)
WBC # BLD: 11.4 K/UL (ref 4.5–13.5)
WBC # BLD: ABNORMAL 10*3/UL

## 2021-08-20 PROCEDURE — 36415 COLL VENOUS BLD VENIPUNCTURE: CPT

## 2021-08-20 PROCEDURE — 1220000000 HC SEMI PRIVATE OB R&B

## 2021-08-20 PROCEDURE — 82570 ASSAY OF URINE CREATININE: CPT

## 2021-08-20 PROCEDURE — 84156 ASSAY OF PROTEIN URINE: CPT

## 2021-08-20 PROCEDURE — 80053 COMPREHEN METABOLIC PANEL: CPT

## 2021-08-20 PROCEDURE — 85025 COMPLETE CBC W/AUTO DIFF WBC: CPT

## 2021-08-20 PROCEDURE — 99024 POSTOP FOLLOW-UP VISIT: CPT | Performed by: OBSTETRICS & GYNECOLOGY

## 2021-08-20 ASSESSMENT — PAIN SCALES - GENERAL: PAINLEVEL_OUTOF10: 0

## 2021-08-20 NOTE — FLOWSHEET NOTE
RN verbally updated Ramond Galeazzi, CNM on patients most recent blood pressure. No new orders received at this time.

## 2021-08-20 NOTE — PROGRESS NOTES
POST PARTUM DAY # 1    Patient Name: French Rodríguez  Patient : 2000  Room/Bed: 4304/5654-59  Admission Date/Time: 2021  5:11 AM  MRN #: [id number]  [facility] #: [id number]    [facility] Provider: Loc Vale    Date: 2021  Time: 9:03 AM        French Rodríguez is a 22 y.o. female  This patient was seen today. She Delivered on 21. Her pregnancy was complicated by:     Patient Active Problem List   Diagnosis    Asthma    FHx Chiari malformation type I and cleft lip    THC use    Rubella NI    Abdominal trauma    Traumatic injury during pregnancy in third trimester    Encounter for routine screening for malformation using ultrasonics     screening for fetal growth retardation using ultrasonics    Atrial septal defect of fetus affecting antepartum care of mother    Family history of autism    High risk pregnancy, antepartum    Postpartum state     (spontaneous vaginal delivery)       Today she is doing well without any chief complaint. Her lochia is light. She denies chest pain, shortness of breath, headache, lightheadedness and blurred vision. She is  breast feeding and she denies any signs or symptoms of mastitis. These were reviewed with her in detail. She is ambulating well. Her bowels are regular. Her voiding pattern is Normal. I reviewed signs and symptoms of post partum depression with the patient, she currently denies any of these symptoms. I have counseled this patient on secondary smoke risks and the increased incidence of sudden infant death syndrome. Would like discharge home today    Vitals:    21 2045 21 0452 21 0807 21 0822   BP: 119/72 132/70 (!) 153/86 (!) 155/80   Pulse: 81 74 71 75   Resp: 14 14     Temp: 97.4 °F (36.3 °C) 97.6 °F (36.4 °C)     TempSrc: Infrared      SpO2: 98%      Weight:       Height:           Constitutional:  Awake, alert, cooperative, no apparent distress.                           Appears to be bonding well with baby, does not appear overly stressed with infant handling    Pain:  controlled    Breasts:  Soft without tenderness, nipples are intact    Abdominal Exam: Soft, non-tender, lax muscle tone                                Fundus is mid-line, firm @ U/-U. Non-tender with palpation. Bladder non-distended    Perineum: Intact and healing. Lochia: Small and Rubra    Extremities: Negative Marsha sign. Minimal edema     Voiding QS,     Labs:   Lab Results   Component Value Date    HGB 12.1 2021    HCT 36.4 2021       Assessment/Plan:  1. Jaki Nava is a [id number] PPD # 1 s/p         - Doing well, VSS               - male infant in [place] [address] Nursery   -  Encourage ambulation   - Motrin/Tylenol PRN as needed  - Discharge planned for today or tomorrow pending maternal/infant status  - RTO in 1 weeks  -  Counseling Completed: Home care, Follow up Care, and birth control review completed. Secondary Smoke risks and Sudden Infant Death Syndrome were reviewed with recommendations. Signs and Symptoms of Post Partum Depression were reviewed. The patient is to call if any occur. Signs and symptoms of Mastitis were reviewed. The patient is to call if any occur for follow up. 2. Breastfeeding without difficulty  - Breast care reviewed  - Infant has lost 10% feels is doing well    3.  Rh negative/Rubella non immune  - RhoGam received  -MMR to be offered prior to discharge    Attending: Jeff Vazquez    Electronically signed by Orquidea Newberry on 2021 at 9:03 AM

## 2021-08-20 NOTE — LACTATION NOTE
This note was copied from a baby's chart. Weight loss greater than 10%. Bilirubin 6.87, low intermediate risk zone. Voiding and stooling as expected. Baby was circumcised this morning. Mother educated on how jaundice and a circumcision can contribute to sleepy phase. Writer assist mother with trying to wake baby to nurse. Colostrum easily expressed and given to baby. Baby sleepy, no latch achieved. Pumping initiated. Mother pumped 18 ml of expressed colostrum. RN demonstrates to family how to give with syringe. Baby tolerated colostrum well. Baby alert after receiving colostrum. Writer educates mother on breastfeeding or pumping every 2 hours. Mother verbalizes understanding.

## 2021-08-20 NOTE — PROGRESS NOTES
Based on blood pressures 0822 155/80 with repeat 4 hours later 142/89 along with pc ratio of 0.48 patient meets criteria for preeclampsia without severe features (ALT 12/ AST 20/ Plt 230). Continue to monitor closely for signs and symptoms of preeclampsia including headache, blurry vision, vision changes, chest pain, and/or shortness of breath.   updated

## 2021-08-20 NOTE — FLOWSHEET NOTE
RN verbally updates Dr. Brad Lundberg regarding elevated blood pressure. Per Dr. Brad Lundberg, order CBC, CMP and [facility]

## 2021-08-20 NOTE — PROGRESS NOTES
POST PARTUM DAY # 1    Patient Name: Johanne Raygoza  Patient : 2000  Room/Bed: 4998/8930-64  Admission Date/Time: 2021  5:11 AM  MRN #: [id number]  [facility] #: [id number]        Date: 2021  Time: 7:53 AM        Johanne Raygoza is a 22 y.o. female  This patient was seen today. She Delivered on 2021. Her pregnancy was complicated by:     Patient Active Problem List   Diagnosis    Asthma    FHx Chiari malformation type I and cleft lip    THC use    Rubella NI    Abdominal trauma    Traumatic injury during pregnancy in third trimester    Encounter for routine screening for malformation using ultrasonics     screening for fetal growth retardation using ultrasonics    Atrial septal defect of fetus affecting antepartum care of mother    Family history of autism    High risk pregnancy, antepartum           Today she is doing well without any chief complaint. Her lochia is light. She denies chest pain, shortness of breath, headache, lightheadedness, blurred vision, peripheral edema, palpitations, dry cough and fatigue. She is  breast feeding and she denies any signs or symptoms of mastitis. These were reviewed with her in detail. She is ambulating well. Her bowels are regular. Her voiding pattern is Normal. I reviewed signs and symptoms of post partum depression with the patient, she currently denies any of these symptoms. I have counseled this patient on secondary smoke risks and the increased incidence of sudden infant death syndrome. Vitals:    21 1111 21 1558 21 2045 21 0452   BP: 132/88 133/85 119/72 132/70   Pulse: 93 84 81 74   Resp: 16 16 14 14   Temp: 97.2 °F (36.2 °C) 97.7 °F (36.5 °C) 97.4 °F (36.3 °C) 97.6 °F (36.4 °C)   TempSrc:   Infrared    SpO2: 98%  98%    Weight:       Height:           General appearance:   awake, alert, cooperative, no apparent distress, and appears stated age    Lungs:  Normal expansion. Clear to auscultation.   No rales, rhonchi, or wheezing. Heart:  normal rate, normal S1 and S2, no gallops, intact distal pulses and no carotid bruits    Abdomen:  Abdomen soft, non-tender. BS normal. No masses,  No organomegaly    Uterus:  normal size, well involuted, firm, non-tender    Extremities:  no cyanosis, clubbing or edema present      Perineum:  not inspected        Assessment:  1. Stephanie Salvador is PPD # 1    2. Patient Active Problem List    Diagnosis Date Noted    High risk pregnancy, antepartum 2021    Family history of autism 2021    Atrial septal defect of fetus affecting antepartum care of mother 2021    Traumatic injury during pregnancy in third trimester     Encounter for routine screening for malformation using ultrasonics      screening for fetal growth retardation using ultrasonics     FHx Chiari malformation type I and cleft lip 2021     Overview Note:     FOB half brother  Anatomy scan wnl  NIPT normal      THC use 2021    Rubella NI 2021     Overview Note:     Needs MMR PP      Abdominal trauma 2021     Overview Note:     S/p fall 21      Asthma 2021     3. Normal postpartum course      Condition: good          Plan:  1. Continue with current post partum care  2. Discharge home today if baby cleared for discharge    Home care, Follow up Care, and birth control review completed    RTO 2 weeks    Secondary Smoke risks and Sudden Infant Death Syndrome were reviewed with recommendations. Signs and Symptoms of Post Partum Depression were reviewed. The patient is to call if any occur. Signs and symptoms of Mastitis were reviewed. The patient is to call if any occur for follow up. [unfilled]            Discharge Instructions for Labor and Delivery, Vaginal Birth     A vaginal birth refers to the baby being delivered through the vagina. The amount of time that labor can take varies greatly. Labor for the average first-born baby is about 12 hours. Usually your hospital stay after a routine delivery is no more than two nights. Some new mothers go home the same day. Recovery from childbirth varies depending upon whether you had an episiotomy (an incision in the perineum, the area between your vaginal opening and your anus), the duration of labor and delivery, and the amount of rest you get. In general, it takes about 6-8 weeks for a woman's body to recover from childbirth. What You Will Need   Along with your medications, you will need the following:   · Sanitary pads    · Nursing pads    · Witch hazel pads    · Sitz bath    Steps to [address] will want to arrange for transportation home for you and your baby. The baby will need a car seat. You will receive instructions in the hospital for breastfeeding and taking care of the perineum area. You may use ointment for cracked nipples or warm water rinses to your perineum. · You will need to wear sanitary pads for about six weeks after delivery. · If you had an episiotomy or vaginal tear, you will be sent home with a plastic squirt bottle. Fill it with warm water and squirt over the vaginal and anal area every time you urinate and defecate. · Warm baths can be soothing to healing tissues. · Apply warm or cold cloths to sore breasts. · Apply ointment to cracked nipples. · Use nursing pads for leaky breast.    · Apply witch hazel pads to sore perineum (area between vagina and anus). · Ask your doctor about when it is safe for you to shower or bathe. · Sit in a sitz bath to soothe sore perineum and/or hemorrhoids. A sitz bath is soaking the hip and buttocks area in warm water. You can buy a plastic sitz bath at most [employer]. It will fit on your toilet. You can also use your bathtub. Diet    Eat a well balanced, healthy diet to help your recover from childbirth. If you are breastfeeding, you will need additional calories each day.  You may also be advised to avoid certain control options. · If you are breastfeeding, consider a breast pump. · Call your obstetrician and/or pediatrician for any questions that arise. · Understand the changes your body is going through as it recovers from childbirth:   ¨ Hot and cold flashes as your body adjusts to new hormone and blood flow levels   ¨ Urinary or fecal incontinence due to stretched muscles   ¨ After pains from uterine contractions as the uterus shrinks   ¨ Vaginal bleeding (called lochia), which is heavier than your period (generally stops within two months)   ¨ Baby blues, feelings of irritability, sadness, crying, or anxiety. Postpartum depression is more severe, occurring in 10%-20% of new moms. If you experience such symptoms, contact your doctor. · Consider joining a support group for new mothers. You can get encouragement and learn parenting strategies. Follow-up   Schedule a follow-up appointment as directed by your doctor. Call Your Doctor If Any of the Following Occurs   It is important for you to monitor your recovery once you leave the hospital. That way, you can alert your doctor to any problems immediately. If any of the following occurs, call your doctor:   · Signs of infection, including fever and chills    · Increased bleeding: soaking more than one sanitary pad an hour    · Wounds that become red, swollen or drain pus    · Vaginal discharge that smells foul    · New pain, swelling, or tenderness in your legs    · Pain that you can't control with the medications you've been given    · Pain, burning, urgency or frequency of urination, or persistent bleeding in the urine    · Cough, shortness of breath, or chest pain    · Depression, suicidal thoughts, or feelings of harming your baby    · Breasts that are hot, red and accompanied by fever    · Any cracking or bleeding from the nipple or areola (the dark-colored area of the breast)      In case of an emergency, call 911 immediately.                Providers Name: Robles Ashton DO      Electronically signed by Robles Ashton DO on 8/20/2021 at 7:53 AM

## 2021-08-20 NOTE — FLOWSHEET NOTE
RN updates Mel Mullins, [address] regarding elevated blood pressures. RN informs that she has spoken to Dr. Yolanda Eckert already and lab work has been ordered. CNM suggest PC ratio to be sent as well. RN will continue to update.

## 2021-08-20 NOTE — PROGRESS NOTES
Notified by RN of elevated blood pressure of 153/86 with repeat of 155/80. Patient denies any symptoms of preeclampsia. Preeclampsia labs ordered. RN notified Oscar Nielson who is agreeable with plan of care for preeclampsia labs. To continue to monitor closely.

## 2021-08-21 VITALS
SYSTOLIC BLOOD PRESSURE: 124 MMHG | TEMPERATURE: 98 F | DIASTOLIC BLOOD PRESSURE: 93 MMHG | HEART RATE: 94 BPM | OXYGEN SATURATION: 97 % | BODY MASS INDEX: 36.49 KG/M2 | WEIGHT: 181 LBS | RESPIRATION RATE: 16 BRPM | HEIGHT: 59 IN

## 2021-08-21 PROCEDURE — 6370000000 HC RX 637 (ALT 250 FOR IP): Performed by: ADVANCED PRACTICE MIDWIFE

## 2021-08-21 RX ADMIN — SERTRALINE HYDROCHLORIDE 50 MG: 50 TABLET ORAL at 00:12

## 2021-08-21 NOTE — PROGRESS NOTES
POST PARTUM DAY # 2    Patient Name: Sonny Landa  Patient : 2000  Room/Bed: 7881/2425-54  Admission Date/Time: 2021  5:11 AM  MRN #: [id number]  [facility] #: [id number]        Date: 2021  Time: 6:12 AM        Sonny Landa is a 22 y.o. female  This patient was seen today. She Delivered on 2021. Her pregnancy was complicated by:     Patient Active Problem List   Diagnosis    Asthma    FHx Chiari malformation type I and cleft lip    THC use    Rubella NI    Atrial septal defect of fetus affecting antepartum care of mother    Family history of autism    High risk pregnancy, antepartum    Postpartum state     22 M 6#8 Apg     Preeclampsia in postpartum period           Today she is doing well without any chief complaint. Her lochia is light. She denies chest pain, shortness of breath, headache, lightheadedness, blurred vision, peripheral edema, palpitations, dry cough and fatigue. She is  breast feeding and she denies any signs or symptoms of mastitis. These were reviewed with her in detail. She is ambulating well. Her bowels are regular. Her voiding pattern is Normal. I reviewed signs and symptoms of post partum depression with the patient, she currently denies any of these symptoms. I have counseled this patient on secondary smoke risks and the increased incidence of sudden infant death syndrome. Pt seen & examined. Pt without c/c. Pt requesting discharge home. Pts BPs improved  Vitals:    21 1104 21 1243 21 1615 21   BP: 129/78 (!) 142/89 130/67 139/75   Pulse: 89 68 74 80   Resp:    Temp: 98.1 °F (36.7 °C)  98.1 °F (36.7 °C) 97.6 °F (36.4 °C)   TempSrc:    Infrared   SpO2:    97%   Weight:       Height:           General appearance:   awake, alert, cooperative, no apparent distress, and appears stated age    Lungs:  Normal expansion. Clear to auscultation. No rales, rhonchi, or wheezing.     Heart:  normal rate, normal S1 and S2, no and vegetables   ¨ Legumes (eg, beans, lentils)   Physical Activity    Labor and delivery is tiring. Rest when you can to regain your energy. Your doctor will encourage you to exercise by walking. Ask your doctor when you will be able to return to more strenuous exercise. Your doctor may suggest you do Kegel exercises to strengthen your pelvic muscles. To do these tighten your muscles as if you were stopping your urine flow. Hold for a few seconds and then relax. Do these throughout the day. Medications    Breastfeeding can cause your uterus to contract. If painful, your doctor may recommend a pain reliever. If you are breastfeeding, it's important to ask your doctor about taking medications. You may receive from the hospital a list of medications to avoid. Once your doctor has approved your medications, it's important to:   · Take your medication as directed. Do not change the amount or the schedule. · Do not stop taking them without talking to your doctor. · Do not share them. · Know what the results and side effects may be. Report bothersome side effects to your doctor. · Some drugs can be dangerous when mixed. Talk to a doctor or pharmacist if you are taking more than one drug. This includes over-the-counter medication and herb or dietary supplements. · Plan ahead for refills so you don't run out. Lifestyle Changes    Having a baby requires significant lifestyle changes. You and your doctor will plan lifestyle changes that will help you recover. Some things to keep in mind include:   · It is important to get enough rest so you can recover. Try sleeping when the baby sleeps. · Ask your doctor when you can resume sexual relations. If you have not done so already, talk to your doctor about birth control options. · If you are breastfeeding, consider a breast pump. · Call your obstetrician and/or pediatrician for any questions that arise.    · Understand the changes your body is going through as it recovers from childbirth:   ¨ Hot and cold flashes as your body adjusts to new hormone and blood flow levels   ¨ Urinary or fecal incontinence due to stretched muscles   ¨ After pains from uterine contractions as the uterus shrinks   ¨ Vaginal bleeding (called lochia), which is heavier than your period (generally stops within two months)   ¨ Baby blues, feelings of irritability, sadness, crying, or anxiety. Postpartum depression is more severe, occurring in 10%-20% of new moms. If you experience such symptoms, contact your doctor. · Consider joining a support group for new mothers. You can get encouragement and learn parenting strategies. Follow-up   Schedule a follow-up appointment as directed by your doctor. Call Your Doctor If Any of the Following Occurs   It is important for you to monitor your recovery once you leave the hospital. That way, you can alert your doctor to any problems immediately. If any of the following occurs, call your doctor:   · Signs of infection, including fever and chills    · Increased bleeding: soaking more than one sanitary pad an hour    · Wounds that become red, swollen or drain pus    · Vaginal discharge that smells foul    · New pain, swelling, or tenderness in your legs    · Pain that you can't control with the medications you've been given    · Pain, burning, urgency or frequency of urination, or persistent bleeding in the urine    · Cough, shortness of breath, or chest pain    · Depression, suicidal thoughts, or feelings of harming your baby    · Breasts that are hot, red and accompanied by fever    · Any cracking or bleeding from the nipple or areola (the dark-colored area of the breast)      In case of an emergency, call 911 immediately.                Providers Name: Mick Sun DO      Electronically signed by Mick Sun DO on 8/21/2021 at 6:12 AM

## 2021-08-21 NOTE — FLOWSHEET NOTE
Signed             Show:Clear all  [x]Manual[]Template[]Copied    Added by:  Ollie Reno RN    []Hover for details  Dr Jasson De Dios at bedside. Educated mother on supplementing feeds with formula if baby does not nurse well or if pumped breast milk of 25 ml is not obtained and given every 2 hours. Reeducated to watch for s/s of poor intake and jaundice and instructed to call him until seen by peds. Instructed to have the baby seen on Monday. Mother verbalizes understanding. Discharge order received.

## 2021-08-21 NOTE — FLOWSHEET NOTE
Discharge teaching and instructions completed. AVS reviewed. MARYHOSHRUT[facility] your life: Post-Birth Warning Signs handout reviewed and given to mother. Understanding verbalized. Printed prescriptions given to mother. Patient voiced understanding regarding prescriptions, follow up appointments, and care of self at home. Discharged home ambulatory to car.

## 2021-08-21 NOTE — DISCHARGE SUMMARY
Obstetric Discharge Summary    Patient Name: Mila Crawford  Patient : 2000  Room/Bed: [address] [place], [place] [address]-  Primary Care Physician: BEBA Nguyen CNP  Admit Date: 2021  5:11 AM  MRN #: [id number]  [facility] #: [id number]    Admitting Diagnosis  Mila Crawford is a 22 y.o. female  at 38w1d  Admitting DX: SROM  OB History        1    Para   1    Term   1            AB        Living   1       SAB        TAB        Ectopic        Molar        Multiple   0    Live Births   1              Patient Active Problem List   Diagnosis    Asthma    FHx Chiari malformation type I and cleft lip    THC use    Rubella NI    Atrial septal defect of fetus affecting antepartum care of mother    Family history of autism    High risk pregnancy, antepartum    Postpartum state     21 M 6#8 Apg     Preeclampsia in postpartum period         Reasons for Admission on 2021  5:11 AM  High risk pregnancy, antepartum [O09.90]  No comment available  Onset of Labor  SROM     Prenatal Procedures  None    Intrapartum Procedures:  Spontaneous Vaginal Delivery: See Labor and Delivery Summary        Multiple birth?: No             Postpartum Procedures  None  Signs and symptoms of depression reviewed and evaluated: absent    Postpartum/Operative Complications:  none       Data  Information for the patient's :  Elaina Tenorio [[id number]]   male   Birth Weight: 6 lb 8.1 oz (2.95 kg)     Discharge With Mother  Complications: No    Discharge Diagnosis:  1. Mila Crawford is a 22 y.o. female  2.   3. 38w1d  4. Delivered a Live Born male by Vaginal delivery.     5. BPs improved follow up with OB for recheck 1 week    Discharge Information/Patient Instructions:   Latoya Cuenca Medication Instructions PAPITO:[id number]    Printed on:21 0614   Medication Information                      docusate sodium (COLACE) 100 MG capsule  Take 1 capsule by mouth daily as needed for Constipation             ibuprofen (ADVIL;MOTRIN) 600 MG tablet  Take 1 tablet by mouth every 6 hours as needed for Pain             Prenatal Vit-Fe Fumarate-FA (PRENATAL/FOLIC ACID PO)  Take by mouth             sertraline (ZOLOFT) 50 MG tablet  TAKE 1/2 TABLET BY MOUTH ONE TIME A DAY for 1 week then increase to 1 tablet daily thereafter as tolerated                 No discharge procedures on file. Activity: no driving for 2 weeks and no sex for 6 weeks  Diet: regular diet  Wound Care: none needed      Discharge to: Home  Follow up in 1-2 weeks       Discharge Date: 8/21/2021      Vinny Lira DO      Comments:   Home care, Follow-up care and birth control were reviewed. Signs and symptoms of mastitis and Post Partum Depression were reviewed. The patient is to notify her physician if any of these occur. The patient was counseled on secondary smoke risks and the increased risk of sudden infant death syndrome and respiratory problems to her baby with exposure. She was counseled on various alternate recommendations to decrease the exposure to secondary smoke to her children.

## 2021-08-21 NOTE — PLAN OF CARE
Problem: Mood - Altered:  Goal: Mood stable  Description: Mood stable  8/21/2021 1010 by Zay Song RN  Outcome: Completed    Problem: Pain - Acute:  Goal: Pain level will decrease  Description: Pain level will decrease  8/21/2021 1010 by Zay Song RN  Outcome: Completed     Problem: Discharge Planning:  Goal: Discharged to appropriate level of care  Description: Discharged to appropriate level of care  8/21/2021 1010 by Zay Song RN  Outcome: Completed     Problem: Constipation:  Goal: Bowel elimination is within specified parameters  Description: Bowel elimination is within specified parameters  8/21/2021 1010 by Zay Song RN  Outcome: Completed

## 2021-08-21 NOTE — PLAN OF CARE
Problem: Mood - Altered:  Goal: Mood stable  Description: Mood stable  Outcome: Met This Shift     Problem: Pain - Acute:  Goal: Pain level will decrease  Description: Pain level will decrease  Outcome: Met This Shift     Problem: Discharge Planning:  Goal: Discharged to appropriate level of care  Description: Discharged to appropriate level of care  Outcome: Met This Shift     Problem: Constipation:  Goal: Bowel elimination is within specified parameters  Description: Bowel elimination is within specified parameters  Outcome: Met This Shift

## 2021-08-31 ENCOUNTER — POSTPARTUM VISIT (OUTPATIENT)
Dept: OBGYN CLINIC | Age: 21
End: 2021-08-31

## 2021-08-31 VITALS — DIASTOLIC BLOOD PRESSURE: 62 MMHG | SYSTOLIC BLOOD PRESSURE: 110 MMHG | WEIGHT: 162 LBS | BODY MASS INDEX: 32.72 KG/M2

## 2021-08-31 PROCEDURE — 99024 POSTOP FOLLOW-UP VISIT: CPT | Performed by: OBSTETRICS & GYNECOLOGY

## 2021-08-31 NOTE — PROGRESS NOTES
Preeclampsia in postpartum period       Blood pressure 110/62, weight 162 lb (73.5 kg), last menstrual period 2020, currently breastfeeding. Abdomen: Soft and non-tender; good bowel sounds; no guarding, rebound or rigidity; no CVA tenderness bilaterally. Extremities: No calf tenderness bilaterally. DTR 2/4 bilaterally. No edema. Perineum: pt declined    Assessment:   Diagnosis Orders   1.  22 M 6#8 Apg 6       Chief Complaint   Patient presents with    Postpartum Care     Patient Active Problem List    Diagnosis Date Noted    Preeclampsia in postpartum period 2021     Diagnosed PPD1 elevated BP/elevated pc ratio      Postpartum state      22 M 6#8 Apg 69     High risk pregnancy, antepartum 2021    Family history of autism 2021    Atrial septal defect of fetus affecting antepartum care of mother 2021    FHx Chiari malformation type I and cleft lip 2021     FOB half brother  Anatomy scan wnl  NIPT normal      THC use 2021    Rubella NI 2021     Needs MMR PP      Asthma 2021         EPDS Score of 0        Plan:  1. Return to the office in  3-4 weeks  2. Signs & Symptoms of mastitis reviewed; notify if occurs  3. Secondary smoke risks reviewed. Increased risks of respiratory problems, Sudden     infant death syndrome, and potential malignancies. 4. Abstinence  5. Family planning counseling and STD counseling completed - unsure  6. Return in about 4 weeks (around 2021) for postpartum. Patient was seen with total face to face time of 20 minutes. More than 50% of this visit was on counseling and education regarding her    Diagnosis Orders   1.  22 M 6#8 Apg       and her options. She was also counseled on her preventative health maintenance recommendations and follow-up.

## 2021-09-27 ENCOUNTER — POSTPARTUM VISIT (OUTPATIENT)
Dept: OBGYN CLINIC | Age: 21
End: 2021-09-27

## 2021-09-27 ENCOUNTER — HOSPITAL ENCOUNTER (OUTPATIENT)
Age: 21
Setting detail: SPECIMEN
Discharge: HOME | End: 2021-09-27
Payer: COMMERCIAL

## 2021-09-27 VITALS
HEIGHT: 59 IN | WEIGHT: 156 LBS | SYSTOLIC BLOOD PRESSURE: 120 MMHG | BODY MASS INDEX: 31.45 KG/M2 | DIASTOLIC BLOOD PRESSURE: 70 MMHG

## 2021-09-27 DIAGNOSIS — F12.91 HISTORY OF MARIJUANA USE: ICD-10-CM

## 2021-09-27 LAB
AMPHETAMINE SCREEN URINE: NEGATIVE
BARBITURATE SCREEN URINE: NEGATIVE
BENZODIAZEPINE SCREEN, URINE: NEGATIVE
BUPRENORPHINE URINE: NORMAL
CANNABINOID SCREEN URINE: NEGATIVE
COCAINE METABOLITE, URINE: NEGATIVE
MDMA URINE: NORMAL
METHADONE SCREEN, URINE: NEGATIVE
METHAMPHETAMINE, URINE: NORMAL
OPIATES, URINE: NEGATIVE
OXYCODONE SCREEN URINE: NEGATIVE
PHENCYCLIDINE, URINE: NEGATIVE
PROPOXYPHENE, URINE: NORMAL
TEST INFORMATION: NORMAL
TRICYCLIC ANTIDEPRESSANTS, UR: NORMAL

## 2021-09-27 PROCEDURE — 0503F POSTPARTUM CARE VISIT: CPT | Performed by: NURSE PRACTITIONER

## 2021-09-27 RX ORDER — FLUOXETINE HYDROCHLORIDE 20 MG/1
20 CAPSULE ORAL DAILY
COMMUNITY
End: 2021-10-14 | Stop reason: ALTCHOICE

## 2021-09-27 ASSESSMENT — ENCOUNTER SYMPTOMS
WHEEZING: 0
CONSTIPATION: 0
VOMITING: 0
NAUSEA: 0
DIARRHEA: 0
SHORTNESS OF BREATH: 0
COLOR CHANGE: 0
ABDOMINAL PAIN: 0

## 2021-09-27 NOTE — PATIENT INSTRUCTIONS
about death, such as writing suicide notes or talking about guns, knives, or pills. Keep the numbers for these national suicide hotlines: 9-350-688-TALK (7-429.805.1885) and 3-975-NUZBGQN (0-957.651.6717). If you or someone you know talks about suicide or feeling hopeless, get help right away. How can you care for yourself at home? · Be safe with medicines. Take your medicines exactly as prescribed. Call your doctor if you think you are having a problem with your medicine. · Eat a healthy diet so that you can keep up your energy. · Get regular daily exercise, such as walks, to help improve your mood. · Get as much sunlight as possible. Keep your shades and curtains open. Get outside as much as you can. · Avoid using alcohol or other substances to feel better. · Get as much rest and sleep as possible. Avoid doing too much. Being too tired can increase depression. · Play stimulating music throughout your day and soothing music at night. · Schedule outings and visits with friends and family. Ask them to call you regularly, so that you do not feel alone. · Ask for help with preparing food and other daily tasks. Family and friends are often happy to help a mother with a . · Be honest with yourself and those who care about you. Tell them about your struggle. · Join a support group of new mothers. No one can better understand the challenges of caring for a  than other new mothers. · If you feel like life is not worth living or are feeling hopeless, get help right away. Keep the numbers for these national suicide hotlines: -TALK (9-404.483.9952) and 9-762-ULLYDQW (5-131.192.3930). When should you call for help? Call 911 anytime you think you may need emergency care. For example, call if:    · You feel you cannot stop from hurting yourself, your baby, or someone else. Call your doctor now or seek immediate medical care if:    · You are having trouble caring for yourself or your baby. · You hear voices. Watch closely for changes in your health, and be sure to contact your doctor if:    · You have problems with your depression medicine. · You do not get better as expected. Where can you learn more? Go to https://rina.4Blox. org and sign in to your [email or web address] account. Enter V976 in the Health Plotter box to learn more about \"Depression After Childbirth: Care Instructions. \"     If you do not have an account, please click on the \"Sign Up Now\" link. Current as of: September 11, 2018  Content Version: 12.0  © 4761-7694 [employer]. Care instructions adapted under license by [facility] ([facility]). If you have questions about a medical condition or this instruction, always ask your healthcare professional. [address] any warranty or liability for your use of this information. Preeclampsia: Care Instructions  Overview    Preeclampsia occurs when a woman's blood pressure rises during pregnancy. Often with preeclampsia, you also have swelling in your legs, hands, and face. A test may show too much protein in your urine. Preeclampsia is also called toxemia. If preeclampsia is severe and not treated, it can lead to seizures (eclampsia) and damage to your liver or kidneys. Preeclampsia can prevent your baby from getting enough food and oxygen. This can cause a low birth weight or other problems. Your doctor will watch you closely to prevent these problems. He or she also may recommend that you reduce your activity. If your preeclampsia is a danger to your health or the health of your baby, your doctor may need to deliver your baby early. While preeclampsia is a concern, most women with preeclampsia have healthy babies. After a woman gives birth, preeclampsia usually goes away on its own. But symptoms may last a few weeks or more and can get worse after delivery.  Rarely, symptoms of preeclampsia don't show up until days or even weeks quitting for good. · Eat a balanced and healthy diet that has lots of fruits and vegetables. · If your doctor advised bed rest, be sure to stay off your feet and rest as much as possible. ? Keep a phone, phone book, notepad, and pen near the bed where you can easily reach them. ? Gently stretch your legs every hour to maintain good blood flow. ? Have another family member pack snacks and lunch food in a cooler close to your bed. ? Use this time for activities that you usually cannot find time for, such as reading, craft projects, or letter writing. · You can keep track of your baby's health by noting the length of time it takes to count 10 movements (such as kicks, flutters, or rolls). Feeling 10 movements in less than 1 hour is considered normal. Track your baby's movements once each day. Bring this record with you to each prenatal visit. When should you call for help? Call 911 anytime you think you may need emergency care. For example, call if:    · You passed out (lost consciousness). · You have a seizure. Call your doctor now or seek immediate medical care if:    · You have symptoms of preeclampsia, such as:  ? Sudden swelling of your face, hands, or feet. ? New vision problems (such as dimness or blurring). ? A severe headache. · Your blood pressure is higher than it should be, or it rises suddenly. · You have new nausea or vomiting. · You think that you are in labor. · You have pain in your belly or pelvis. Watch closely for changes in your health, and be sure to contact your doctor if:    · You gain weight rapidly. Where can you learn more? Go to https://Alawar EntertainmentwanderLogoGarden.GetO2. org and sign in to your LegCyte account. Enter N242 in the VersionEye box to learn more about \"Preeclampsia: Care Instructions. \"     If you do not have an account, please click on the \"Sign Up Now\" link.   Current as of: September 5, 2018  Content Version: 12.0  © 0146-3427 Healthwise, Incorporated. Care instructions adapted under license by [facility] ([facility]). If you have questions about a medical condition or this instruction, always ask your healthcare professional. [address] any warranty or liability for your use of this information. Mastitis: Care Instructions  Your Care Instructions  Mastitis is an inflammation of the breast. It occurs most often in women who are breastfeeding, but it can affect any woman. Mastitis can be caused by poor milk flow from the breast. When milk builds up in a breast, it leaks into the nearby breast tissue. Infection can also develop when the nipples become cracked or irritated. The tissue can then become infected with bacteria. Antibiotics can usually cure mastitis. For women who are nursing, continued breastfeeding (or pumping) can help. If mastitis is not treated, a pocket of pus may form in the breast and need to be drained. Follow-up care is a key part of your treatment and safety. Be sure to make and go to all appointments, and call your doctor if you are having problems. It's also a good idea to know your test results and keep a list of the medicines you take. How can you care for yourself at home? · If your doctor prescribed antibiotics, take them as directed. Do not stop taking them just because you feel better. You need to take the full course of antibiotics. · If you are breastfeeding, continue breastfeeding or pumping breast milk. It is important to empty your breasts regularly, every 2 to 3 hours while you are awake. These tips may help:  ? Before breastfeeding, place a warm, wet washcloth over your breast for about 15 minutes. Try this at least 3 times a day. This increases milk flow in the breast. Massaging the affected breast may also increase milk flow. ? Breastfeed on both sides.  Try to start with your healthy breast first. Then, after your milk is flowing, breastfeed from the affected breast until better as expected. Where can you learn more? Go to https://chpepiceweb.Winshuttle. org and sign in to your PocketSuite account. Enter Y207 in the PLx Pharmahire box to learn more about \"Mastitis: Care Instructions. \"     If you do not have an account, please click on the \"Sign Up Now\" link. Current as of: September 5, 2018  Content Version: 12.0  © 2213-7629 [employer]. Care instructions adapted under license by [facility] ([facility]). If you have questions about a medical condition or this instruction, always ask your healthcare professional. [address] any warranty or liability for your use of this information. Learning About Safe Sleep for Babies  Why is safe sleep important? Enjoy your time with your baby, and know that you can do a few things to keep your baby safe. Following safe sleep guidelines can help prevent sudden infant death syndrome (SIDS) and reduce other sleep-related risks. SIDS is the death of a baby younger than 1 year with no known cause. Talk about these safety steps with your  providers, family, friends, and anyone else who spends time with your baby. Explain in detail what you expect them to do. Do not assume that people who care for your baby know these guidelines. What are the tips for safe sleep? Putting your baby to sleep  · Put your baby to sleep on his or her back, not on the side or tummy. This reduces the risk of SIDS. · Once your baby learns to roll from the back to the belly, you do not need to keep shifting your baby onto his or her back. But keep putting your baby down to sleep on his or her back. · Keep the room at a comfortable temperature so that your baby can sleep in lightweight clothes without a blanket. Usually, the temperature is about right if an adult can wear a long-sleeved T-shirt and pants without feeling cold. Make sure that your baby doesn't get too warm.  Your baby is likely too warm if he or she sweats or tosses and turns a lot. · Think about giving your baby a pacifier at nap time and bedtime if your doctor agrees. If your baby is , experts recommend waiting 3 or 4 weeks until breastfeeding is going well before offering a pacifier. · The American Academy of Pediatrics recommends that you do not sleep with your baby in the bed with you. · When your baby is awake and someone is watching, allow your baby to spend some time on his or her belly. This helps your baby get strong and may help prevent flat spots on the back of the head. Cribs, cradles, bassinets, and bedding  · For the first 6 months, have your baby sleep in a crib, cradle, or bassinet in the same room where you sleep. · Keep soft items and loose bedding out of the crib. Items such as blankets, stuffed animals, toys, and pillows could block your baby's mouth or trap your baby. Dress your baby in sleepers instead of using blankets. · Make sure that your baby's crib has a firm mattress (with a fitted sheet). Don't use sleep positioners, bumper pads, or other products that attach to crib slats or sides. They could block your baby's mouth or trap your baby. · Do not place your baby in a car seat, sling, swing, bouncer, or stroller to sleep. The safest place for a baby is in a crib, cradle, or bassinet that meets safety standards. What else is important to know? More about sudden infant death syndrome (SIDS)  SIDS is very rare. In most cases, a parent or other caregiver puts the baby--who seems healthy--down to sleep and returns later to find that the baby has . No one is at fault when a baby dies of SIDS. A SIDS death cannot be predicted, and in many cases it cannot be prevented. Doctors do not know what causes SIDS. It seems to happen more often in premature and low-birth-weight babies. It also is seen more often in babies whose mothers did not get medical care during the pregnancy and in babies whose mothers smoke.   Do not smoke or let anyone else smoke in the house or around your baby. Exposure to smoke increases the risk of SIDS. If you need help quitting, talk to your doctor about stop-smoking programs and medicines. These can increase your chances of quitting for good. Breastfeeding your child may help prevent SIDS. Be wary of products that are billed as helping prevent SIDS. Talk to your doctor before buying any product that claims to reduce SIDS risk. What to do while still pregnant  · See your doctor regularly. Women who see a doctor early in and throughout their pregnancies are less likely to have babies who die of SIDS. · Eat a healthy, balanced diet, which can help prevent a premature baby or a baby with a low birth weight. · Do not smoke or let anyone else smoke in the house or around you. Smoking or exposure to smoke during pregnancy increases the risk of SIDS. If you need help quitting, talk to your doctor about stop-smoking programs and medicines. These can increase your chances of quitting for good. · Do not drink alcohol or take illegal drugs. Alcohol or drug use may cause your baby to be born early. Follow-up care is a key part of your child's treatment and safety. Be sure to make and go to all appointments, and call your doctor if your child is having problems. It's also a good idea to know your child's test results and keep a list of the medicines your child takes. Where can you learn more? Go to https://chpejaydeneb.health41st Parameter. org and sign in to your [email or web address] account. Enter Q463 in the [facility] box to learn more about \"Learning About Safe Sleep for Babies. \"     If you do not have an account, please click on the \"Sign Up Now\" link. Current as of: December 12, 2018  Content Version: 12.0  © 1868-8203 Healthwise, Incorporated. Care instructions adapted under license by [facility] ([facility]).  If you have questions about a medical condition or this instruction, always ask your healthcare professional. [address] any warranty or liability for your use of this information.

## 2021-09-27 NOTE — PROGRESS NOTES
[facility] PHYSICIANS  [facility] [place] [address]  [address] [address]  Dept: [phone]  Dept Fax: [phone]    Nrom Holbrook is a 22 y.o. female who presents today for her medical conditions/complaintsas noted below. Norm Holbrook is c/o of Postpartum Care        HPI:     Samantha Poe presents for 6 week postpartum check up. Delivered Vaginally on 21 No complaints, of chest pain, S.O.B. Headaches, no abdominal pain, GI or  issues. Breastfeeding Yes Signs mastitis No  Bleeding- states stopped but then had spotting, no heavy bleeding or pain. Birth control options has abstained since delivery would like an IUD placed. She has hx of irregular periods has had non hormonal IUD in past.   The patient completed the E.P.D.S. Evaluation form and scored 0. Denies suicidal/homicidal ideations or plans. Last PAP: N/A    Hx marijuana use, states quit after delivery is requesting a UDS. OB History    Para Term  AB Living   1 1 1     1   SAB TAB Ectopic Molar Multiple Live Births           0 1      # Outcome Date GA Lbr Ric/2nd Weight Sex Delivery Anes PTL Lv   1 Term 21 38w1d 02:04 / 00:22 6 lb 8.1 oz (2.95 kg) M Vag-Spont EPI N GIANNI       Past Medical History:   Diagnosis Date    Mental disorder     depression      History reviewed. No pertinent surgical history. History reviewed. No pertinent family history.     Social History     Tobacco Use    Smoking status: Never Smoker    Smokeless tobacco: Never Used   Substance Use Topics    Alcohol use: Not Currently      Current Outpatient Medications   Medication Sig Dispense Refill    FLUoxetine (PROZAC) 20 MG capsule Take 20 mg by mouth daily      ibuprofen (ADVIL;MOTRIN) 600 MG tablet Take 1 tablet by mouth every 6 hours as needed for Pain 120 tablet 0    docusate sodium (COLACE) 100 MG capsule Take 1 capsule by mouth daily as needed for Constipation 30 capsule 0    Prenatal Vit-Fe Fumarate-FA (PRENATAL/FOLIC ACID PO) Take by mouth       No current facility-administered medications for this visit. No Known Allergies    Health Maintenance   Topic Date Due    Hepatitis C screen  Never done    Pneumococcal 0-64 years Vaccine (1 of 2 - PPSV23) 04/16/2007    COVID-19 Vaccine (1) Never done    HIV screen  Never done    Flu vaccine (1) 09/01/2021    Chlamydia screen  07/27/2022    DTaP/Tdap/Td vaccine (8 - Td or Tdap) 06/24/2031    Hepatitis A vaccine  Completed    Hepatitis B vaccine  Completed    Hib vaccine  Completed    HPV vaccine  Completed    Varicella vaccine  Completed    Meningococcal (ACWY) vaccine  Completed       Subjective:     Review of Systems   Constitutional: Negative for chills and fever. Respiratory: Negative for shortness of breath and wheezing. Cardiovascular: Negative for chest pain, palpitations and leg swelling. Gastrointestinal: Negative for abdominal pain, constipation, diarrhea, nausea and vomiting. Genitourinary: Negative for dysuria, flank pain, frequency, pelvic pain, vaginal bleeding, vaginal discharge and vaginal pain. Skin: Negative for color change and pallor. Neurological: Negative for dizziness, light-headedness and headaches. Hematological: Negative for adenopathy. Does not bruise/bleed easily. Psychiatric/Behavioral: Negative for self-injury and suicidal ideas. Objective:     Physical Exam  Vitals and nursing note reviewed. Constitutional:       General: She is not in acute distress. Appearance: She is well-developed. She is not diaphoretic. HENT:      Head: Normocephalic and atraumatic. Right Ear: External ear normal.      Left Ear: External ear normal.      Nose: Nose normal.   Eyes:      Pupils: Pupils are equal, round, and reactive to light. Neck:      Thyroid: No thyromegaly. Cardiovascular:      Rate and Rhythm: Normal rate and regular rhythm. Heart sounds: Normal heart sounds. No murmur heard. No friction rub.  No gallop. Pulmonary:      Effort: Pulmonary effort is normal.      Breath sounds: Normal breath sounds. No wheezing. Abdominal:      General: Bowel sounds are normal.      Palpations: Abdomen is soft. Tenderness: There is no abdominal tenderness. Musculoskeletal:         General: Normal range of motion. Cervical back: Normal range of motion and neck supple. Right lower leg: No edema. Left lower leg: No edema. Lymphadenopathy:      Cervical: No cervical adenopathy. Skin:     General: Skin is warm and dry. Findings: No rash. Neurological:      Mental Status: She is alert and oriented to person, place, and time. Cranial Nerves: No cranial nerve deficit. Psychiatric:         Behavior: Behavior normal.         Thought Content: Thought content normal.         Judgment: Judgment normal.       /70 (Site: Right Upper Arm, Position: Sitting, Cuff Size: Small Adult)   Ht 4' 11\" (1.499 m)   Wt 156 lb (70.8 kg)   LMP 2020   Breastfeeding Yes   BMI 31.51 kg/m²     Assessment:     Normal Postpartum        Diagnosis Orders   1.  / M 6#8 Apg      2. History of marijuana use  Urine Drug Screen       Plan:   Cont restrictions through 6 weeks- no lifting over 15 lb, pelvic rest  DVT prevention reviewed  Endometritis signs reviewed  Mastitis signs reviewed  PPD symptoms reviewed     SIDs prevention reviewed   Contraception choices reviewed-BC mini pill& condoms, IUD,  Vasectomy_  Discussed and counseled on all forms hormonal contraceptives and she would like to have IUD placed. Discussed R/B/A with IUD.    Cultures delcined  Abstain sexual intercourse until IUD placement, can have urine hcg   encouraged to return to office on menses for IUD placement     RV earliest convenience for IUD placement    Orders Placed This Encounter   Procedures    Urine Drug Screen     Standing Status:   Future     Standing Expiration Date:   2022     No orders of the defined types were placed in this encounter. Patient given educational materials - seepatient instructions. Discussed use, benefit, and side effects of prescribed medications. All patient questions answered. Pt voiced understanding. Reviewed health maintenance. Instructed to continue current medications, diet and exercise. Patient agreedwith treatment plan. Follow up as directed. Electronically signed by BEBA Lee CNP on 9/27/2021at 12:13 PM        Of the 20 minute duration appointment visit, Nina Galvez CNP spent at least 50% of the face-to-face time in counseling, explanation of diagnosis, planning of further management, and answering all questions.

## 2021-10-04 ENCOUNTER — PROCEDURE VISIT (OUTPATIENT)
Dept: OBGYN CLINIC | Age: 21
End: 2021-10-04
Payer: COMMERCIAL

## 2021-10-04 VITALS — DIASTOLIC BLOOD PRESSURE: 62 MMHG | BODY MASS INDEX: 31.51 KG/M2 | WEIGHT: 156 LBS | SYSTOLIC BLOOD PRESSURE: 118 MMHG

## 2021-10-04 DIAGNOSIS — Z30.430 ENCOUNTER FOR IUD INSERTION: Primary | ICD-10-CM

## 2021-10-04 DIAGNOSIS — Z01.812 PRE-PROCEDURE LAB EXAM: ICD-10-CM

## 2021-10-04 DIAGNOSIS — N93.9 ABNORMAL UTERINE BLEEDING (AUB): ICD-10-CM

## 2021-10-04 LAB
CONTROL: PRESENT
PREGNANCY TEST URINE, POC: NEGATIVE

## 2021-10-04 PROCEDURE — 58300 INSERT INTRAUTERINE DEVICE: CPT | Performed by: NURSE PRACTITIONER

## 2021-10-04 PROCEDURE — 81025 URINE PREGNANCY TEST: CPT | Performed by: NURSE PRACTITIONER

## 2021-10-04 NOTE — PROGRESS NOTES
SUBJECTIVE:    CC:    Chief Complaint   Patient presents with    Procedure       Malissa Soto presents today for insertion of Mirena IUD for her complaint of irregular menses. Malissa Soto understands the risks and benefits of the intrauterine device insertion and desires to proceed with its insertion. OBJECTIVE:    /62 (Site: Right Upper Arm, Position: Sitting, Cuff Size: Medium Adult)   Wt 156 lb (70.8 kg)   Breastfeeding No   BMI 31.51 kg/m²      pregnancy test: negative urine hcg  Gyn history: Cultures: collected today  Medical history: No known contraindication to progestin use. She is not a smoker. The patient denies any family member or herself as having a blood clot in their leg, lung, brain or that any member of her family has had a sudden cardiac death under the age of 35 yo. Procedure: The patient was positioned comfortably on the exam table. After a bi-manual exam; the uterus was found to be  anteverted. There was no cervical motion tenderness. A sterile speculum was inserted. The cervix was visualized and prepped with Betadine. An  allis clamp was applied to the anterior lip of the cervix. A sound was placed through the cervical os in sterile fashion and the uterus was sounded to 7 cm. The Mirena IUD was loaded in the applicator and the indicator placed according to the sound. The applicator was then inserted into the cervix and the Intrauterine Device was placed in the endometrial cavity. The applicator was removed and the strings were trimmed to 3 cm. The patient tolerated the procedure without difficulty. She was instructed to abstain for two weeks and use leno-pads for the first 8 weeks. She is to notify the office or go to the nearest Emergency Department if she experiences Abdominal Pain, Temperatures more than 101 F, Odiferous Vaginal Discharge, Dizziness or Shortness of breath. ASSESSMENT:  IUD insertion    PLAN:  1.  IUD counseling was done with Malissa Soto.   The  Mirena booklet was given to the patient. The consent was signed and scanned into the chart. The Lot # is charted on the CC. Carl Cowden was given the IUD identification card. 2.  IUD warning signs and symptoms were reviewed with Carl Cowden. 3.  Post-insertion instructions were also given   4. She will return in 1 month for follow up or as needed              Post procedure restrictions were reviewed and given to the patient.

## 2021-10-04 NOTE — PATIENT INSTRUCTIONS
Patient Education      We encourage you abstain for two weeks and use leno-pads for the first 8 weeks, no tampons. If you are sexually active prior to 7 days after IUD placed use barrier contraceptive. Notify the office or go to the nearest Emergency Department if she experiences Abdominal Pain, Temperatures more than 101 F, Odiferous Vaginal Discharge, Dizziness or Shortness of breath. Intrauterine Device (IUD) Insertion: Care Instructions  Your Care Instructions     An intrauterine device (IUD) is a very effective method of birth control. It is a small, plastic, T-shaped device that contains copper or hormones. The doctor inserts the IUD into your uterus. You can have an IUD inserted at any time, as long as you aren't pregnant and you don't have a pelvic infection. If you and your doctor discuss it before you give birth, this can be done right after you have your baby. A plastic string tied to the end of the IUD hangs down through the cervix into the vagina. Your doctor may have you feel for the IUD string right after insertion, to be sure you know what it feels like. There are two types of IUDs. The copper IUD works for up to 10 years. The hormonal IUD works for either 3 years or 6 years, depending on which IUD is used. But your doctor may talk to you about leaving it in for longer. The hormonal IUD also reduces menstrual bleeding and cramping. Follow-up care is a key part of your treatment and safety. Be sure to make and go to all appointments, and call your doctor if you are having problems. It's also a good idea to know your test results and keep a list of the medicines you take. How can you care for yourself at home? · It's safe to use while breastfeeding. · You may experience some mild cramping and light bleeding (spotting) for 1 or 2 days. Use a hot water bottle or a heating pad set on low on your belly for pain.   · Take an over-the-counter pain medicine, such as acetaminophen (Tylenol), ibuprofen (Advil, Motrin), and naproxen (Aleve) if needed. Read and follow all instructions on the label. · Do not take two or more pain medicines at the same time unless the doctor told you to. Many pain medicines have acetaminophen, which is Tylenol. Too much acetaminophen (Tylenol) can be harmful. · If you want to check the string of your IUD, insert a finger into your vagina and feel for the cervix, which is at the top of the vagina and feels harder than the rest of your vagina. You should be able to feel the thin, plastic string coming out of the opening of your cervix. If you cannot feel the string, use another form of birth control and make an appointment with your doctor to have the string checked. · If the IUD comes out, save it and call your doctor. Be sure to use another form of birth control while the IUD is out. · Use latex condoms to protect against sexually transmitted infections (STIs), such as gonorrhea and chlamydia. An IUD does not protect you from STIs. Having one sex partner (who does not have STIs and does not have sex with anyone else) is a good way to avoid STIs. When should you call for help? Call 911 anytime you think you may need emergency care. For example, call if:    · You passed out (lost consciousness).     · You have sudden, severe pain in your belly or pelvis. Call your doctor now or seek immediate medical care if:    · You have new belly or pelvic pain.     · You have severe vaginal bleeding. This means that you are soaking through your usual pads or tampons each hour for 2 or more hours.     · You are dizzy or lightheaded, or you feel like you may faint.     · You have a fever and pelvic pain or vaginal discharge.     · You have pelvic pain that is getting worse.    Watch closely for changes in your health, and be sure to contact your doctor if:    · You cannot feel the string, or the IUD comes out.     · You feel sick to your stomach, or you vomit.     · You think you may be pregnant. Where can you learn more? Go to https://chpepiceweb.[facility]. org and sign in to your [employer] account. Enter F431 in the [facility] box to learn more about \"Intrauterine Device (IUD) Insertion: Care Instructions. \"     If you do not have an account, please click on the \"Sign Up Now\" link. Current as of: June 16, 2021               Content Version: 13.0  © 2006-2021 Healthwise, Incorporated. Care instructions adapted under license by [facility] ([facility]). If you have questions about a medical condition or this instruction, always ask your healthcare professional. [address] any warranty or liability for your use of this information.

## 2021-10-05 ENCOUNTER — HOSPITAL ENCOUNTER (OUTPATIENT)
Age: 21
Setting detail: SPECIMEN
Discharge: HOME | End: 2021-10-05
Payer: COMMERCIAL

## 2021-10-05 DIAGNOSIS — Z01.812 PRE-PROCEDURE LAB EXAM: ICD-10-CM

## 2021-10-06 LAB
C TRACH DNA GENITAL QL NAA+PROBE: NEGATIVE
DIRECT EXAM: NORMAL
Lab: NORMAL
N. GONORRHOEAE DNA: NEGATIVE
SPECIMEN DESCRIPTION: NORMAL
SPECIMEN DESCRIPTION: NORMAL

## 2021-10-14 ENCOUNTER — TELEMEDICINE (OUTPATIENT)
Dept: PRIMARY CARE CLINIC | Age: 21
End: 2021-10-14
Payer: COMMERCIAL

## 2021-10-14 DIAGNOSIS — F32.A DEPRESSION, UNSPECIFIED DEPRESSION TYPE: Primary | ICD-10-CM

## 2021-10-14 DIAGNOSIS — J35.8 TONSIL STONE: ICD-10-CM

## 2021-10-14 DIAGNOSIS — J03.91 RECURRENT TONSILLITIS: ICD-10-CM

## 2021-10-14 PROCEDURE — 99214 OFFICE O/P EST MOD 30 MIN: CPT | Performed by: NURSE PRACTITIONER

## 2021-10-14 PROCEDURE — G8427 DOCREV CUR MEDS BY ELIG CLIN: HCPCS | Performed by: NURSE PRACTITIONER

## 2021-10-14 ASSESSMENT — PATIENT HEALTH QUESTIONNAIRE - PHQ9
SUM OF ALL RESPONSES TO PHQ QUESTIONS 1-9: 0
1. LITTLE INTEREST OR PLEASURE IN DOING THINGS: 0
SUM OF ALL RESPONSES TO PHQ9 QUESTIONS 1 & 2: 0
2. FEELING DOWN, DEPRESSED OR HOPELESS: 0

## 2021-10-14 ASSESSMENT — ENCOUNTER SYMPTOMS
SINUS PRESSURE: 0
EYE REDNESS: 0
NAUSEA: 0
EYE DISCHARGE: 0
CHEST TIGHTNESS: 0
DIARRHEA: 0
SHORTNESS OF BREATH: 0
COUGH: 0
VOMITING: 0
SINUS PAIN: 0
ABDOMINAL PAIN: 0
WHEEZING: 0
EYE ITCHING: 0
TROUBLE SWALLOWING: 0
SORE THROAT: 0

## 2021-10-14 NOTE — PROGRESS NOTES
10/14/2021    TELEHEALTH EVALUATION -- Audio/Visual (During BCNSL-08 public health emergency)    HPI:    Rinku Garza (:  2000) has requested an audio/video evaluation for the following concern(s):    Pt presents for a VV  She had a healthy baby boy. He water broke first. Vaginal delivery. No complications. She had him . Breastfeeding which is d  She is going back on 10/31. She is following psych. They started her on zoloft prior to her delivery to avoid postpartum depression d/t her history. She denies any feelings of depression. Doing well. She is happy. Adjusting well to being a mother. She got a IUD. Needs a referral to ENT d/t recurrent tonsil stones and tonsillitis. Review of Systems   Constitutional: Negative for chills, fatigue and fever. HENT: Negative for ear discharge, ear pain, sinus pressure, sinus pain, sore throat and trouble swallowing. Tonsil stones    Eyes: Negative for discharge, redness and itching. Respiratory: Negative for cough, chest tightness, shortness of breath and wheezing. Cardiovascular: Negative for chest pain. Gastrointestinal: Negative for abdominal pain, diarrhea, nausea and vomiting. Genitourinary: Negative for difficulty urinating. Musculoskeletal: Negative for arthralgias and neck pain. Skin: Negative for rash. Neurological: Negative for dizziness, weakness, light-headedness and headaches. All other systems reviewed and are negative. Prior to Visit Medications    Medication Sig Taking?  Authorizing Provider   sertraline (ZOLOFT) 50 MG tablet Take 50 mg by mouth daily Yes Historical Provider, MD   Prenatal Vit-Fe Fumarate-FA (PRENATAL/FOLIC ACID PO) Take by mouth Yes Historical Provider, MD       Social History     Tobacco Use    Smoking status: Never Smoker    Smokeless tobacco: Never Used   Vaping Use    Vaping Use: Never used   Substance Use Topics    Alcohol use: Not Currently    Drug use: Yes     Types: Marijuana     Comment: \"EVERY ONCE IN A WHILE- 2X'S/WEEK\" 7/22/2021        No Known Allergies,   Past Medical History:   Diagnosis Date    Mental disorder     depression   , History reviewed. No pertinent surgical history. ,   Social History     Tobacco Use    Smoking status: Never Smoker    Smokeless tobacco: Never Used   Vaping Use    Vaping Use: Never used   Substance Use Topics    Alcohol use: Not Currently    Drug use: Yes     Types: Marijuana     Comment: \"EVERY ONCE IN A WHILE- 2X'S/WEEK\" 7/22/2021   , History reviewed. No pertinent family history. ,   Immunization History   Administered Date(s) Administered    DTaP vaccine 2001, 2001, 2001, 09/24/2002, 10/10/2005    HPV Quadrivalent (Gardasil) 08/26/2013, 10/23/2013, 09/23/2014    Hep B/Hib (Comvax) 2001    Hepatitis A Ped/Adol (Havrix, Vaqta) 09/02/2002, 06/11/2009    Hepatitis B Ped/Adol (Engerix-B, Recombivax HB) 2001, 2001    Hib vaccine 2001, 2001, 05/14/2002    Influenza Whole 11/25/2003, 12/23/2003    Influenza, Live, Intranasal, Quadv, (Flumist 2-49 yrs) 09/23/2014    Influenza, Christine Roughen, IM, PF (6 mo and older Fluzone, Flulaval, Fluarix, and 3 yrs and older Afluria) 01/25/2020, 02/11/2021    MMR 05/14/2002, 10/10/2005, 08/19/2021    Meningococcal MCV4P (Menactra) 08/26/2013, 06/18/2018    PPD Test 02/26/2021    Pneumococcal Conjugate 7-valent (Regina Brink) 2001, 2001, 2001    Polio IPV (IPOL) 2001, 2001, 05/14/2002, 10/10/2005    Tdap (Boostrix, Adacel) 08/26/2013, 06/24/2021    Varicella (Varivax) 05/14/2002, 10/02/2007   ,   Health Maintenance   Topic Date Due    Hepatitis C screen  Never done    Pneumococcal 0-64 years Vaccine (1 of 2 - PPSV23) 04/16/2007    COVID-19 Vaccine (1) Never done    HIV screen  Never done    Flu vaccine (1) 09/01/2021    Potassium monitoring  08/20/2022    Creatinine monitoring  08/20/2022    Chlamydia screen  10/05/2022  DTaP/Tdap/Td vaccine (8 - Td or Tdap) 06/24/2031    Hepatitis A vaccine  Completed    Hepatitis B vaccine  Completed    Hib vaccine  Completed    HPV vaccine  Completed    Varicella vaccine  Completed    Meningococcal (ACWY) vaccine  Completed       PHYSICAL EXAMINATION:  [ INSTRUCTIONS:  \"[x]\" Indicates a positive item  \"[]\" Indicates a negative item  -- DELETE ALL ITEMS NOT EXAMINED]  Vital Signs: (As obtained by patient/caregiver or practitioner observation)    Constitutional: [x] Appears well-developed and well-nourished [x] No apparent distress      [] Abnormal-   Mental status  [x] Alert and awake  [x] Oriented to person/place/time [x]Able to follow commands      Eyes:  EOM    [x]  Normal  [] Abnormal-  Sclera  []  Normal  [] Abnormal -         Discharge []  None visible  [] Abnormal -    HENT:   [x] Normocephalic, atraumatic. [] Abnormal   [x] Mouth/Throat: Mucous membranes are moist.     External Ears [x] Normal  [] Abnormal-     Neck: [x] No visualized mass     Pulmonary/Chest: [x] Respiratory effort normal.  [x] No visualized signs of difficulty breathing or respiratory distress        [] Abnormal-      Musculoskeletal:   [] Normal gait with no signs of ataxia         [x] Normal range of motion of neck        [] Abnormal-       Neurological:        [] No Facial Asymmetry (Cranial nerve 7 motor function) (limited exam to video visit)          [x] No gaze palsy        [] Abnormal-         Skin:        [x] No significant exanthematous lesions or discoloration noted on facial skin         [] Abnormal-            Psychiatric:       [x] Normal Affect [] No Hallucinations        [] Abnormal-     Other pertinent observable physical exam findings-     ASSESSMENT/PLAN:  1. Depression, unspecified depression type  -Following psychiatry. Continue with Zoloft as already prescribed  2.  Tonsil stone  -Referral to ENT for further evaluations per the patient's request  - Michael Wisdom MD, Otolaryngology, [place]    3. Recurrent tonsillitis  - [facility] - Kady Rivera MD, Otolaryngology, [facility]    Patient to follow-up in 4 months for physical  Return in about 4 months (around 2/14/2022) for physical.    Caroline Vaughn, was evaluated through a synchronous (real-time) audio-video encounter. The patient (or guardian if applicable) is aware that this is a billable service. Verbal consent to proceed has been obtained within the past 12 months. The visit was conducted pursuant to the emergency declaration under the [address] [place] [address], [address] [place] [address] authority and the [employer] General Act. Patient identification was verified, and a caregiver was present when appropriate. The patient was located in a state where the provider was credentialed to provide care. Total time spent on this encounter: Not billed by time    --BEBA Ruiz CNP on 10/14/2021 at 1:00 PM    An electronic signature was used to authenticate this note.

## 2021-10-27 ENCOUNTER — NURSE TRIAGE (OUTPATIENT)
Dept: OTHER | Facility: CLINIC | Age: 21
End: 2021-10-27

## 2021-10-27 NOTE — TELEPHONE ENCOUNTER
was your last menstrual period? \"        No- 2 mos postpartum and breastfeeding    Protocols used: HEADACHE-ADULT-AH    Received call from Erasmo at [facility] with [employer]. Brief description of triage: headache, on going, blurred vision with spots at times, neck pain at times    Triage indicates for patient to be seen within 24 hours. Walk in clinic if no appointments    Care advice provided, patient verbalizes understanding; denies any other questions or concerns; instructed to call back for any new or worsening symptoms. Writer provided warm transfer to Soumya Cardenas at [facility] for appointment scheduling. Attention Provider: Thank you for allowing me to participate in the care of your patient. The patient was connected to triage in response to information provided to the ECC/PSC. Please do not respond through this encounter as the response is not directed to a shared pool.

## 2021-10-28 ENCOUNTER — OFFICE VISIT (OUTPATIENT)
Dept: PRIMARY CARE CLINIC | Age: 21
End: 2021-10-28
Payer: COMMERCIAL

## 2021-10-28 ENCOUNTER — TELEPHONE (OUTPATIENT)
Dept: PRIMARY CARE CLINIC | Age: 21
End: 2021-10-28

## 2021-10-28 VITALS
OXYGEN SATURATION: 97 % | RESPIRATION RATE: 14 BRPM | SYSTOLIC BLOOD PRESSURE: 116 MMHG | DIASTOLIC BLOOD PRESSURE: 64 MMHG | HEART RATE: 70 BPM | WEIGHT: 161.2 LBS | BODY MASS INDEX: 32.56 KG/M2

## 2021-10-28 DIAGNOSIS — G43.819 OTHER MIGRAINE WITHOUT STATUS MIGRAINOSUS, INTRACTABLE: Primary | ICD-10-CM

## 2021-10-28 DIAGNOSIS — Z23 NEED FOR INFLUENZA VACCINATION: ICD-10-CM

## 2021-10-28 PROCEDURE — G8482 FLU IMMUNIZE ORDER/ADMIN: HCPCS | Performed by: NURSE PRACTITIONER

## 2021-10-28 PROCEDURE — 90674 CCIIV4 VAC NO PRSV 0.5 ML IM: CPT | Performed by: NURSE PRACTITIONER

## 2021-10-28 PROCEDURE — 90471 IMMUNIZATION ADMIN: CPT | Performed by: NURSE PRACTITIONER

## 2021-10-28 PROCEDURE — G8427 DOCREV CUR MEDS BY ELIG CLIN: HCPCS | Performed by: NURSE PRACTITIONER

## 2021-10-28 PROCEDURE — 1036F TOBACCO NON-USER: CPT | Performed by: NURSE PRACTITIONER

## 2021-10-28 PROCEDURE — G8417 CALC BMI ABV UP PARAM F/U: HCPCS | Performed by: NURSE PRACTITIONER

## 2021-10-28 PROCEDURE — 99214 OFFICE O/P EST MOD 30 MIN: CPT | Performed by: NURSE PRACTITIONER

## 2021-10-28 RX ORDER — BUTALBITAL, ACETAMINOPHEN AND CAFFEINE 50; 325; 40 MG/1; MG/1; MG/1
1 TABLET ORAL EVERY 4 HOURS PRN
Qty: 30 TABLET | Refills: 3 | Status: SHIPPED | OUTPATIENT
Start: 2021-10-28

## 2021-10-28 RX ORDER — RIZATRIPTAN BENZOATE 10 MG/1
10 TABLET ORAL
Qty: 30 TABLET | Refills: 3 | Status: SHIPPED | OUTPATIENT
Start: 2021-10-28 | End: 2022-05-07 | Stop reason: SDUPTHER

## 2021-10-28 RX ORDER — PROPRANOLOL HCL 60 MG
60 CAPSULE, EXTENDED RELEASE 24HR ORAL DAILY
Qty: 30 CAPSULE | Refills: 0 | Status: SHIPPED | OUTPATIENT
Start: 2021-10-28 | End: 2022-05-07 | Stop reason: SDUPTHER

## 2021-10-28 ASSESSMENT — ENCOUNTER SYMPTOMS
RHINORRHEA: 0
WHEEZING: 0
SINUS PRESSURE: 0
EYE PAIN: 0
CHEST TIGHTNESS: 0
VISUAL CHANGE: 1
EYE REDNESS: 0
SORE THROAT: 0
SHORTNESS OF BREATH: 0
ABDOMINAL PAIN: 0
DIARRHEA: 0
EYE DISCHARGE: 0
BLURRED VISION: 1
VOMITING: 0
PHOTOPHOBIA: 1
COUGH: 0
EYE ITCHING: 0
TROUBLE SWALLOWING: 0
NAUSEA: 1
SINUS PAIN: 0

## 2021-10-28 ASSESSMENT — PATIENT HEALTH QUESTIONNAIRE - PHQ9
SUM OF ALL RESPONSES TO PHQ9 QUESTIONS 1 & 2: 0
SUM OF ALL RESPONSES TO PHQ QUESTIONS 1-9: 0
2. FEELING DOWN, DEPRESSED OR HOPELESS: 0
SUM OF ALL RESPONSES TO PHQ QUESTIONS 1-9: 0
SUM OF ALL RESPONSES TO PHQ QUESTIONS 1-9: 0
1. LITTLE INTEREST OR PLEASURE IN DOING THINGS: 0

## 2021-10-28 NOTE — TELEPHONE ENCOUNTER
----- Message from Jolanta Parham sent at 10/27/2021  4:41 PM EDT -----  Subject: Referral Request    QUESTIONS   Reason for referral request? Pt would like to check the status of her   referral or request a referral to an ENT please   Has the physician seen you for this condition before? Yes  Select a date? 2021-10-14  Select the Provider the patient wants to be referred to, if known (PCP or   Specialist)? Prince [place]   Preferred Specialist (if applicable)? Do you already have an appointment scheduled? No  Additional Information for Provider?   ---------------------------------------------------------------------------  --------------  CALL BACK INFO  What is the best way for the office to contact you? OK to leave message on   voicemail  Preferred Call Back Phone Number?  [phone]
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
## 2021-10-28 NOTE — PROGRESS NOTES
Vaccine Information Sheet, \"Influenza - Inactivated\"  given to Dalila Chaouise, or parent/legal guardian of  Dalila Garcia and verbalized understanding. Patient responses:    Have you ever had a reaction to a flu vaccine? No  Are you able to eat eggs without adverse effects? Yes  Do you have any current illness? No  Have you ever had Guillian Denver Syndrome? No    Flu vaccine given per order. Please see immunization tab.

## 2021-11-18 ENCOUNTER — PROCEDURE VISIT (OUTPATIENT)
Dept: OBGYN CLINIC | Age: 21
End: 2021-11-18
Payer: COMMERCIAL

## 2021-11-18 VITALS
SYSTOLIC BLOOD PRESSURE: 116 MMHG | WEIGHT: 161 LBS | BODY MASS INDEX: 32.52 KG/M2 | DIASTOLIC BLOOD PRESSURE: 74 MMHG | RESPIRATION RATE: 16 BRPM

## 2021-11-18 DIAGNOSIS — T83.32XA MALPOSITIONED INTRAUTERINE DEVICE (IUD), INITIAL ENCOUNTER: Primary | ICD-10-CM

## 2021-11-18 DIAGNOSIS — Z30.433 ENCOUNTER FOR REMOVAL AND REINSERTION OF INTRAUTERINE CONTRACEPTIVE DEVICE (IUD): ICD-10-CM

## 2021-11-18 PROCEDURE — 58301 REMOVE INTRAUTERINE DEVICE: CPT | Performed by: OBSTETRICS & GYNECOLOGY

## 2021-11-18 RX ORDER — MEDROXYPROGESTERONE ACETATE 150 MG/ML
150 INJECTION, SUSPENSION INTRAMUSCULAR ONCE
Qty: 1 ML | Refills: 3 | Status: SHIPPED | OUTPATIENT
Start: 2021-11-18 | End: 2022-11-04

## 2021-11-18 NOTE — PROGRESS NOTES
Shefali Santo  2021    YOB: 2000        HPI:  Shefali Santo is a 22 y.o. female      Patient agreeable to having replacement IUD today if possible, if not, patient would like depoProvera IM q 12 weeks. IUD noted on US to be in lower uterus/ cervix. OB History    Para Term  AB Living   1 1 1 0 0 1   SAB IAB Ectopic Molar Multiple Live Births   0 0 0 0 0 1      # Outcome Date GA Lbr Ric/2nd Weight Sex Delivery Anes PTL Lv   1 Term 22 38w1d 02:04 / 00:22 6 lb 8.1 oz (2.95 kg) M Vag-Spont EPI N GIANNI      Name: Pascual Hernandez: 6  Apgar5: 9       Past Medical History:   Diagnosis Date    Mental disorder     depression       Past Surgical History:   Procedure Laterality Date    INTRAUTERINE DEVICE REMOVAL  2021    dislodged IUD       No family history on file. Social History     Socioeconomic History    Marital status: Single     Spouse name: Not on file    Number of children: Not on file    Years of education: Not on file    Highest education level: Not on file   Occupational History    Not on file   Tobacco Use    Smoking status: Never Smoker    Smokeless tobacco: Never Used   Vaping Use    Vaping Use: Never used   Substance and Sexual Activity    Alcohol use: Not Currently    Drug use: Yes     Types: Marijuana Ady Blow)     Comment: \"EVERY ONCE IN A WHILE- 2X'S/WEEK\" 2021    Sexual activity: Not on file   Other Topics Concern    Not on file   Social History Narrative    Not on file     Social Determinants of Health     Financial Resource Strain: Low Risk     Difficulty of Paying Living Expenses: Not hard at all   Food Insecurity: No Food Insecurity    Worried About Running Out of Food in the Last Year: Never true    Angelina of Food in the Last Year: Never true   Transportation Needs: No Transportation Needs    Lack of Transportation (Medical): No    Lack of Transportation (Non-Medical):  No   Physical Activity:  Days of Exercise per Week: Not on file    Minutes of Exercise per Session: Not on file   Stress:     Feeling of Stress : Not on file   Social Connections:     Frequency of Communication with Friends and Family: Not on file    Frequency of Social Gatherings with Friends and Family: Not on file    Attends [employer] Services: Not on file    Active Member of [address] [place] [address] or Organizations: Not on file    Attends Club or Organization Meetings: Not on file    Marital Status: Not on file   Intimate Partner Violence:     Fear of Current or Ex-Partner: Not on file    Emotionally Abused: Not on file    Physically Abused: Not on file    Sexually Abused: Not on file   Housing Stability:     Unable to Pay for Housing in the Last Year: Not on file    Number of [place] in the Last Year: Not on file    Unstable Housing in the Last Year: Not on file         MEDICATIONS:  Current Outpatient Medications   Medication Sig Dispense Refill    medroxyPROGESTERone (DEPO-PROVERA) 150 MG/ML injection Inject 1 mL into the muscle once for 1 dose 1 mL 3    butalbital-acetaminophen-caffeine (FIORICET, ESGIC) -40 MG per tablet Take 1 tablet by mouth every 4 hours as needed for Headaches 30 tablet 3    rizatriptan (MAXALT) 10 MG tablet Take 1 tablet by mouth once as needed for Migraine May repeat in 2 hours if needed 30 tablet 3    propranolol (INDERAL LA) 60 MG extended release capsule Take 1 capsule by mouth daily 30 capsule 0    sertraline (ZOLOFT) 50 MG tablet Take 50 mg by mouth daily      Prenatal Vit-Fe Fumarate-FA (PRENATAL/FOLIC ACID PO) Take by mouth       No current facility-administered medications for this visit. ALLERGIES:  Allergies as of 11/18/2021    (No Known Allergies)       Review Of Systems (11 point):  Constitutional: No fever, chills or malaise;  No weight change or fatigue  Head and Eyes: No vision, Headache, Dizziness or trauma in last 12 months  ENT ROS: No hearing, Tinnitis, sinus or taste problems  Hematological and Lymphatic ROS:No Lymphoma, Von Willebrand's, Hemophillia or Bleeding History  Psych ROS: No Depression, Homicidal thoughts,suicidal thoughts, or anxiety  Breast ROS: No prior breast abnormalities or lumps  Respiratory ROS: No SOB, Pneumoniae,Cough, or Pulmonary Embolism History  Cardiovascular ROS: No Chest Pain with Exertion, Palpitations, Syncope, Edema, Arrhythmia  Gastrointestinal ROS: No Indigestion, Heartburn, Nausea, vomiting, Diarrhea, Constipation,or Bowel Changes; No Bloody Stools or melena  Genito-Urinary ROS: No Dysuria, Hematuria or Nocturia. No Urinary Incontinence or Vaginal Discharge  Musculoskeletal ROS: No Arthralgia, Arthritis,Gout,Osteoporosis or Rheumatism  Neurological ROS: No CVA, Migraines, Epilepsy, Seizure Hx, or Limb Weakness  Dermatological ROS: No Rash, Itching, Hives, Mole Changes or Cancer          Blood pressure 116/74, resp. rate 16, weight 161 lb (73 kg), currently breastfeeding. Abdomen: Soft non-tender; good bowel sounds. No guarding, rebound or rigidity. No CVA tenderness bilaterally. Extremities: No calf tenderness, DTR 2/4, and No edema bilaterally    Pelvic: + IUD noted to be partially ejected into vagina with the entire shaft in the upper vagina and the T bar arms still inside the cervix. IUD easiy removed. Attempt to sound uterus was unsuccessful and patient declined continuing to proceed with attempt of replacement of IUD secondary to increased risk of uterine perforation or IUD being displaced into abdomen. Diagnostics:  US NON OB TRANSVAGINAL    Result Date: 11/3/2021  Dx: IUD check Uterus: 7.2x4. 3x2.6cm, retroverted Endometrial stripe 4.7mm IUD visualized low in JL/cervix Bilateral ovaries visualized and appear normal in size and shape No free fluid in pelvis    US PELVIS COMPLETE    Result Date: 11/3/2021  Dx: IUD check Uterus: 7.2x4. 3x2.6cm, retroverted Endometrial stripe 4.7mm IUD visualized low in JL/cervix Bilateral ovaries visualized and appear normal in size and shape No free fluid in pelvis      Lab Results:  Results for orders placed or performed during the hospital encounter of 10/05/21   Chlamydia Trachomatis & Neisseria gonorrhoeae (GC) by amplified detection    Specimen: Cervix   Result Value Ref Range    Specimen Description . CERVIX     C. trachomatis DNA NEGATIVE NEGATIVE    N. gonorrhoeae DNA NEGATIVE NEGATIVE   VAGINITIS DNA PROBE    Specimen: Vaginal   Result Value Ref Range    Specimen Description . VAGINAL SWAB     Special Requests NOT REPORTED     Direct Exam NEGATIVE for Candida sp. Direct Exam NEGATIVE for Gardnerella vaginalis     Direct Exam NEGATIVE for Trichomonas vaginalis     Direct Exam       Method of testing is a DNA probe intended for detection and identification of Candida species, Gardnerella vaginalis, and Trichomonas vaginalis nucleic acid in vaginal fluid specimens from patients with symptoms of vaginitis/vaginosis. Assessment:   Diagnosis Orders   1. Malpositioned intrauterine device (IUD), initial encounter  SC REMOVE INTRAUTERINE DEVICE   2.  Encounter for removal and reinsertion of intrauterine contraceptive device (IUD)  SC REMOVE INTRAUTERINE DEVICE     Patient Active Problem List    Diagnosis Date Noted    Preeclampsia in postpartum period 2021     Diagnosed PPD1 elevated BP/elevated pc ratio      Postpartum state      22 M 6#8 Apg 6/9     High risk pregnancy, antepartum 2021    Family history of autism 2021    Atrial septal defect of fetus affecting antepartum care of mother 2021    FHx Chiari malformation type I and cleft lip 2021     FOB half brother  Anatomy scan wnl  NIPT normal      THC use 2021    Rubella NI 2021     Needs MMR PP      Asthma 2021       PLAN:  IUD easily removed today intact  DepoProvera IM q 12 weeks   Orders Placed This Encounter   Procedures    SC REMOVE INTRAUTERINE DEVICE

## 2021-11-24 ENCOUNTER — NURSE ONLY (OUTPATIENT)
Dept: OBGYN CLINIC | Age: 21
End: 2021-11-24
Payer: COMMERCIAL

## 2021-11-24 DIAGNOSIS — Z30.42 DEPO-PROVERA CONTRACEPTIVE STATUS: ICD-10-CM

## 2021-11-24 DIAGNOSIS — Z01.812 PRE-PROCEDURE LAB EXAM: ICD-10-CM

## 2021-11-24 DIAGNOSIS — N93.9 ABNORMAL UTERINE BLEEDING (AUB): Primary | ICD-10-CM

## 2021-11-24 LAB
CONTROL: NORMAL
PREGNANCY TEST URINE, POC: NEGATIVE

## 2021-11-24 PROCEDURE — 81025 URINE PREGNANCY TEST: CPT | Performed by: OBSTETRICS & GYNECOLOGY

## 2021-11-24 PROCEDURE — 96372 THER/PROPH/DIAG INJ SC/IM: CPT | Performed by: OBSTETRICS & GYNECOLOGY

## 2021-11-24 RX ORDER — MEDROXYPROGESTERONE ACETATE 150 MG/ML
150 INJECTION, SUSPENSION INTRAMUSCULAR ONCE
Status: COMPLETED | OUTPATIENT
Start: 2021-11-24 | End: 2021-11-24

## 2021-11-24 RX ADMIN — MEDROXYPROGESTERONE ACETATE 150 MG: 150 INJECTION, SUSPENSION INTRAMUSCULAR at 10:34

## 2021-11-24 NOTE — PROGRESS NOTES
First depo given; IUD was not in the correct place and was not able to be reinserted. Urine preg was negative. She prefers injections in her arm- given in left deltoid. Tolerated well. No issues. Will come back 2/9/-2/23 for next injection.

## 2021-11-29 RX ORDER — CEPHALEXIN 500 MG/1
500 CAPSULE ORAL 4 TIMES DAILY
Qty: 28 CAPSULE | Refills: 0 | Status: SHIPPED | OUTPATIENT
Start: 2021-11-29 | End: 2022-02-17 | Stop reason: ALTCHOICE

## 2021-11-29 NOTE — TELEPHONE ENCOUNTER
Patient called in with complaints of mastitis, right breast has lump- red, swollen very painful- she hasnt taken her temp but feels warm/feverish. Instructed to continue to feed/pump warm compresses, and to really work that are towards to nipple. rx pending.

## 2021-11-30 ENCOUNTER — OFFICE VISIT (OUTPATIENT)
Dept: PRIMARY CARE CLINIC | Age: 21
End: 2021-11-30
Payer: COMMERCIAL

## 2021-11-30 VITALS
DIASTOLIC BLOOD PRESSURE: 74 MMHG | OXYGEN SATURATION: 98 % | BODY MASS INDEX: 31.1 KG/M2 | RESPIRATION RATE: 14 BRPM | WEIGHT: 154 LBS | HEART RATE: 95 BPM | SYSTOLIC BLOOD PRESSURE: 92 MMHG

## 2021-11-30 DIAGNOSIS — G43.819 OTHER MIGRAINE WITHOUT STATUS MIGRAINOSUS, INTRACTABLE: Primary | ICD-10-CM

## 2021-11-30 DIAGNOSIS — H66.002 NON-RECURRENT ACUTE SUPPURATIVE OTITIS MEDIA OF LEFT EAR WITHOUT SPONTANEOUS RUPTURE OF TYMPANIC MEMBRANE: ICD-10-CM

## 2021-11-30 DIAGNOSIS — N61.0 MASTITIS, ACUTE: ICD-10-CM

## 2021-11-30 PROCEDURE — G8482 FLU IMMUNIZE ORDER/ADMIN: HCPCS | Performed by: NURSE PRACTITIONER

## 2021-11-30 PROCEDURE — G8427 DOCREV CUR MEDS BY ELIG CLIN: HCPCS | Performed by: NURSE PRACTITIONER

## 2021-11-30 PROCEDURE — G8417 CALC BMI ABV UP PARAM F/U: HCPCS | Performed by: NURSE PRACTITIONER

## 2021-11-30 PROCEDURE — 1036F TOBACCO NON-USER: CPT | Performed by: NURSE PRACTITIONER

## 2021-11-30 PROCEDURE — 99214 OFFICE O/P EST MOD 30 MIN: CPT | Performed by: NURSE PRACTITIONER

## 2021-11-30 ASSESSMENT — ENCOUNTER SYMPTOMS
NAUSEA: 0
SINUS PRESSURE: 0
COUGH: 0
WHEEZING: 0
ABDOMINAL PAIN: 0
EYE ITCHING: 0
VOMITING: 0
EYE REDNESS: 0
DIARRHEA: 0
CHEST TIGHTNESS: 0
SINUS PAIN: 0
ROS SKIN COMMENTS: RIGHT BREAST PAIN
TROUBLE SWALLOWING: 0
SORE THROAT: 0
EYE DISCHARGE: 0
SHORTNESS OF BREATH: 0

## 2021-11-30 ASSESSMENT — PATIENT HEALTH QUESTIONNAIRE - PHQ9
SUM OF ALL RESPONSES TO PHQ QUESTIONS 1-9: 0
2. FEELING DOWN, DEPRESSED OR HOPELESS: 0
SUM OF ALL RESPONSES TO PHQ9 QUESTIONS 1 & 2: 0
1. LITTLE INTEREST OR PLEASURE IN DOING THINGS: 0

## 2021-11-30 NOTE — PROGRESS NOTES
(FIORICET, ESGIC) -40 MG per tablet Take 1 tablet by mouth every 4 hours as needed for Headaches 30 tablet 3    rizatriptan (MAXALT) 10 MG tablet Take 1 tablet by mouth once as needed for Migraine May repeat in 2 hours if needed 30 tablet 3    propranolol (INDERAL LA) 60 MG extended release capsule Take 1 capsule by mouth daily 30 capsule 0    sertraline (ZOLOFT) 50 MG tablet Take 50 mg by mouth daily      Prenatal Vit-Fe Fumarate-FA (PRENATAL/FOLIC ACID PO) Take by mouth       No current facility-administered medications for this visit. No Known Allergies    Health Maintenance   Topic Date Due    Hepatitis C screen  Never done    COVID-19 Vaccine (1) Never done    Pneumococcal 0-64 years Vaccine (1 of 2 - PPSV23) 04/16/2007    HIV screen  Never done    Potassium monitoring  08/20/2022    Creatinine monitoring  08/20/2022    Chlamydia screen  10/05/2022    DTaP/Tdap/Td vaccine (8 - Td or Tdap) 06/24/2031    Hepatitis A vaccine  Completed    Hepatitis B vaccine  Completed    Hib vaccine  Completed    HPV vaccine  Completed    Varicella vaccine  Completed    Meningococcal (ACWY) vaccine  Completed    Flu vaccine  Completed       :     Review of Systems   Constitutional: Negative for chills, fatigue and fever. HENT: Positive for congestion and ear pain. Negative for ear discharge, sinus pressure, sinus pain, sore throat and trouble swallowing. Eyes: Negative for discharge, redness and itching. Respiratory: Negative for cough, chest tightness, shortness of breath and wheezing. Cardiovascular: Negative for chest pain. Gastrointestinal: Negative for abdominal pain, diarrhea, nausea and vomiting. Genitourinary: Negative for difficulty urinating. Musculoskeletal: Negative for arthralgias and neck pain. Skin: Negative for rash. Right breast pain    Neurological: Negative for dizziness, weakness, light-headedness and headaches.    All other systems reviewed and are negative. Objective:     Physical Exam  Constitutional:       Appearance: Normal appearance. She is normal weight. HENT:      Head: Normocephalic and atraumatic. Right Ear: Tympanic membrane normal.      Left Ear: Tympanic membrane is erythematous and bulging. Nose: Nose normal.   Eyes:      Extraocular Movements: Extraocular movements intact. Conjunctiva/sclera: Conjunctivae normal.      Pupils: Pupils are equal, round, and reactive to light. Cardiovascular:      Rate and Rhythm: Normal rate and regular rhythm. Pulses: Normal pulses. Heart sounds: Normal heart sounds. Pulmonary:      Effort: Pulmonary effort is normal.      Breath sounds: Normal breath sounds. Abdominal:      General: Abdomen is flat. Palpations: Abdomen is soft. Musculoskeletal:         General: Normal range of motion. Cervical back: Neck supple. Skin:     General: Skin is warm and dry. Capillary Refill: Capillary refill takes less than 2 seconds. Neurological:      General: No focal deficit present. Mental Status: She is alert and oriented to person, place, and time. Psychiatric:         Mood and Affect: Mood normal.       BP 92/74   Pulse 95   Resp 14   Wt 154 lb (69.9 kg)   SpO2 98%   Breastfeeding No   BMI 31.10 kg/m²     Assessment:       Diagnosis Orders   1. Other migraine without status migrainosus, intractable     2. Non-recurrent acute suppurative otitis media of left ear without spontaneous rupture of tympanic membrane     3. Mastitis, acute         :      Return in about 1 week (around 12/7/2021) for ear recheck . 1. Migraine  -improved  -c/w propranolol  -c/w maxalt and fiorcet as needed  2. Otitis media  -pt to start keflex today for mastitis. Will see if this helps ear. Discussed flonase and nasal saline rinses   -tylenol or motrin for pain   3.  Mastitis  -pt to  antibiotic from dr. Bakari Pires office, keflex     Pt to f/u in one week for an ear check Patient given educational materials - seepatient instructions. Discussed use, benefit, and side effects of prescribed medications. All patient questions answered. Pt voiced understanding. Reviewed health maintenance. Instructed to continue current medications, diet and exercise. Patient agreedwith treatment plan. Follow up as directed.       Electronically signed by BEBA Vega CNP on 11/30/2021at 11:22 AM

## 2021-12-01 ENCOUNTER — TELEPHONE (OUTPATIENT)
Dept: PRIMARY CARE CLINIC | Age: 21
End: 2021-12-01

## 2021-12-01 NOTE — TELEPHONE ENCOUNTER
No I can not order anything like that.  I can always send you to an allergist to see what they can do     [address]

## 2021-12-01 NOTE — TELEPHONE ENCOUNTER
----- Message from Blu sent at 11/30/2021  2:02 PM EST -----  Subject: Message to Provider    QUESTIONS  Information for Provider? Pt stated she has mold in her home and she is   wondering if she could get blood work or x-ray done to see if she has   spores in her lungs or if she is being affected by the mold.   ---------------------------------------------------------------------------  --------------  [email or web address]  What is the best way for the office to contact you? OK to leave message on   voicemail  Preferred Call Back Phone Number? [phone]  ---------------------------------------------------------------------------  --------------  SCRIPT ANSWERS  Relationship to Patient?  Self

## 2021-12-03 RX ORDER — FLUTICASONE PROPIONATE 110 UG/1
2 AEROSOL, METERED RESPIRATORY (INHALATION) 2 TIMES DAILY
Qty: 12 G | Refills: 3 | Status: SHIPPED | OUTPATIENT
Start: 2021-12-03 | End: 2022-12-03

## 2021-12-03 NOTE — TELEPHONE ENCOUNTER
PT. Returned our call and stated that she has spoken with the allergists around and they told her that they could not aid in evaluating her for this situation. Pt. Stated she feels the long term mold exposure is effecting and flaring up her asthma. Pt. Stated she feels routinely that there is someone sitting on her chest and she is chronically coughing. Pt. Stated she is wondering if she should then change her asthma management regimen? Pt. Is in the process of moving and hopes this will over time aid in the breathing problems.

## 2021-12-07 ENCOUNTER — OFFICE VISIT (OUTPATIENT)
Dept: PRIMARY CARE CLINIC | Age: 21
End: 2021-12-07
Payer: COMMERCIAL

## 2021-12-07 VITALS
HEART RATE: 78 BPM | DIASTOLIC BLOOD PRESSURE: 66 MMHG | SYSTOLIC BLOOD PRESSURE: 98 MMHG | BODY MASS INDEX: 31.91 KG/M2 | RESPIRATION RATE: 16 BRPM | OXYGEN SATURATION: 99 % | WEIGHT: 158 LBS

## 2021-12-07 DIAGNOSIS — H66.002 NON-RECURRENT ACUTE SUPPURATIVE OTITIS MEDIA OF LEFT EAR WITHOUT SPONTANEOUS RUPTURE OF TYMPANIC MEMBRANE: Primary | ICD-10-CM

## 2021-12-07 PROCEDURE — 99213 OFFICE O/P EST LOW 20 MIN: CPT | Performed by: NURSE PRACTITIONER

## 2021-12-07 PROCEDURE — G8482 FLU IMMUNIZE ORDER/ADMIN: HCPCS | Performed by: NURSE PRACTITIONER

## 2021-12-07 PROCEDURE — 1036F TOBACCO NON-USER: CPT | Performed by: NURSE PRACTITIONER

## 2021-12-07 PROCEDURE — G8427 DOCREV CUR MEDS BY ELIG CLIN: HCPCS | Performed by: NURSE PRACTITIONER

## 2021-12-07 PROCEDURE — G8417 CALC BMI ABV UP PARAM F/U: HCPCS | Performed by: NURSE PRACTITIONER

## 2021-12-07 RX ORDER — PREDNISONE 10 MG/1
TABLET ORAL
Qty: 20 TABLET | Refills: 0 | Status: SHIPPED | OUTPATIENT
Start: 2021-12-07 | End: 2021-12-17

## 2021-12-07 ASSESSMENT — ENCOUNTER SYMPTOMS
SORE THROAT: 0
ABDOMINAL PAIN: 0
VOMITING: 0
EYE ITCHING: 0
CHEST TIGHTNESS: 0
NAUSEA: 0
EYE REDNESS: 0
SINUS PAIN: 0
WHEEZING: 0
TROUBLE SWALLOWING: 0
COUGH: 0
SHORTNESS OF BREATH: 0
DIARRHEA: 0
EYE DISCHARGE: 0
SINUS PRESSURE: 0

## 2021-12-07 NOTE — PROGRESS NOTES
[facility]  [facility] [address] [facility] [address]  [address] [address]  Dept: [phone]  Dept Fax: [phone]    Edgard Pierre is a 22 y.o. female who presents today for her medical conditions/complaintsas noted below. Edgard Pierre is c/o of Other (Ear Check)        HPI:     Pt presents for a follow up  bp stable  Weight stable    Pt presents for an ear check. Mastitis resolved. Her milk production has increased. She is still on keflex. Left ear does not hurt but it feels full and pops when she yawns. No headache or dizziness. No other cold symptoms  She is cleaning her new trailer. She plans on picking up her inhaler today     No other complaints today       Past Medical History:   Diagnosis Date    Mental disorder     depression      Past Surgical History:   Procedure Laterality Date    INTRAUTERINE DEVICE REMOVAL  2021    dislodged IUD       History reviewed. No pertinent family history.     Social History     Tobacco Use    Smoking status: Never Smoker    Smokeless tobacco: Never Used   Substance Use Topics    Alcohol use: Not Currently      Current Outpatient Medications   Medication Sig Dispense Refill    predniSONE (DELTASONE) 10 MG tablet Take 4 tablets by mouth once daily for 5 days 20 tablet 0    fluticasone (FLOVENT HFA) 110 MCG/ACT inhaler Inhale 2 puffs into the lungs 2 times daily 12 g 3    cephALEXin (KEFLEX) 500 MG capsule Take 1 capsule by mouth 4 times daily 28 capsule 0    medroxyPROGESTERone (DEPO-PROVERA) 150 MG/ML injection Inject 1 mL into the muscle once for 1 dose 1 mL 3    butalbital-acetaminophen-caffeine (FIORICET, ESGIC) -40 MG per tablet Take 1 tablet by mouth every 4 hours as needed for Headaches 30 tablet 3    rizatriptan (MAXALT) 10 MG tablet Take 1 tablet by mouth once as needed for Migraine May repeat in 2 hours if needed 30 tablet 3    propranolol (INDERAL LA) 60 MG extended release capsule Take 1 capsule by mouth daily 30 capsule 0    sertraline (ZOLOFT) 50 MG tablet Take 50 mg by mouth daily      Prenatal Vit-Fe Fumarate-FA (PRENATAL/FOLIC ACID PO) Take by mouth       No current facility-administered medications for this visit. No Known Allergies    Health Maintenance   Topic Date Due    Hepatitis C screen  Never done    Pneumococcal 0-64 years Vaccine (1 of 2 - PPSV23) 04/16/2007    COVID-19 Vaccine (1) Never done    HIV screen  Never done    Potassium monitoring  08/20/2022    Creatinine monitoring  08/20/2022    Chlamydia screen  10/05/2022    DTaP/Tdap/Td vaccine (8 - Td or Tdap) 06/24/2031    Hepatitis A vaccine  Completed    Hepatitis B vaccine  Completed    Hib vaccine  Completed    HPV vaccine  Completed    Varicella vaccine  Completed    Meningococcal (ACWY) vaccine  Completed    Flu vaccine  Completed       :     Review of Systems   Constitutional: Negative for chills, fatigue and fever. HENT: Positive for ear pain. Negative for ear discharge, sinus pressure, sinus pain, sore throat and trouble swallowing. Eyes: Negative for discharge, redness and itching. Respiratory: Negative for cough, chest tightness, shortness of breath and wheezing. Cardiovascular: Negative for chest pain. Gastrointestinal: Negative for abdominal pain, diarrhea, nausea and vomiting. Genitourinary: Negative for difficulty urinating. Musculoskeletal: Negative for arthralgias and neck pain. Skin: Negative for rash. Neurological: Negative for dizziness, weakness, light-headedness and headaches. All other systems reviewed and are negative. Objective:     Physical Exam  Constitutional:       Appearance: Normal appearance. She is normal weight. HENT:      Head: Normocephalic and atraumatic. Right Ear: Tympanic membrane normal.      Left Ear: A middle ear effusion is present. Tympanic membrane is erythematous (slight redness to the 12 oclock. no bulging. ).       Nose: Nose normal. Eyes:      Extraocular Movements: Extraocular movements intact. Conjunctiva/sclera: Conjunctivae normal.      Pupils: Pupils are equal, round, and reactive to light. Cardiovascular:      Rate and Rhythm: Normal rate and regular rhythm. Pulses: Normal pulses. Heart sounds: Normal heart sounds. Pulmonary:      Effort: Pulmonary effort is normal.      Breath sounds: Normal breath sounds. Abdominal:      General: Abdomen is flat. Palpations: Abdomen is soft. Musculoskeletal:         General: Normal range of motion. Cervical back: Neck supple. Skin:     General: Skin is warm and dry. Capillary Refill: Capillary refill takes less than 2 seconds. Neurological:      General: No focal deficit present. Mental Status: She is alert and oriented to person, place, and time. Psychiatric:         Mood and Affect: Mood normal.       BP 98/66   Pulse 78   Resp 16   Wt 158 lb (71.7 kg)   SpO2 99%   BMI 31.91 kg/m²     Assessment:       Diagnosis Orders   1. Non-recurrent acute suppurative otitis media of left ear without spontaneous rupture of tympanic membrane         :      Return in about 2 months (around 2/14/2022) for physical .  1. Otitis media  -c/w keflex until its gone  -rx for prednisone. Discussed breastfeeding while taking. She is going to take after first feed of the morning.   -she is to return if symptoms do not improve    F/u in 2 months for physical or sooner as needed. Orders Placed This Encounter   Medications    predniSONE (DELTASONE) 10 MG tablet     Sig: Take 4 tablets by mouth once daily for 5 days     Dispense:  20 tablet     Refill:  0       Patient given educational materials - seepatient instructions. Discussed use, benefit, and side effects of prescribed medications. All patient questions answered. Pt voiced understanding. Reviewed health maintenance. Instructed to continue current medications, diet and exercise. Patient agreedwith treatment plan. Follow up as directed.       Electronically signed by BEBA Dimas CNP on 12/7/2021at 10:29 AM

## 2022-02-17 ENCOUNTER — NURSE ONLY (OUTPATIENT)
Dept: OBGYN CLINIC | Age: 22
End: 2022-02-17
Payer: COMMERCIAL

## 2022-02-17 ENCOUNTER — OFFICE VISIT (OUTPATIENT)
Dept: PRIMARY CARE CLINIC | Age: 22
End: 2022-02-17
Payer: COMMERCIAL

## 2022-02-17 VITALS
HEART RATE: 72 BPM | DIASTOLIC BLOOD PRESSURE: 66 MMHG | WEIGHT: 162.3 LBS | BODY MASS INDEX: 32.72 KG/M2 | HEIGHT: 59 IN | OXYGEN SATURATION: 98 % | RESPIRATION RATE: 16 BRPM | SYSTOLIC BLOOD PRESSURE: 102 MMHG

## 2022-02-17 DIAGNOSIS — Z13.0 SCREENING FOR DEFICIENCY ANEMIA: ICD-10-CM

## 2022-02-17 DIAGNOSIS — H92.02 LEFT EAR PAIN: ICD-10-CM

## 2022-02-17 DIAGNOSIS — Z13.1 SCREENING FOR DIABETES MELLITUS: ICD-10-CM

## 2022-02-17 DIAGNOSIS — Z13.29 SCREENING FOR THYROID DISORDER: ICD-10-CM

## 2022-02-17 DIAGNOSIS — Z13.6 ENCOUNTER FOR LIPID SCREENING FOR CARDIOVASCULAR DISEASE: ICD-10-CM

## 2022-02-17 DIAGNOSIS — Z00.00 ENCOUNTER FOR WELL ADULT EXAM WITHOUT ABNORMAL FINDINGS: Primary | ICD-10-CM

## 2022-02-17 DIAGNOSIS — N93.9 ABNORMAL UTERINE BLEEDING (AUB): Primary | ICD-10-CM

## 2022-02-17 DIAGNOSIS — E53.8 VITAMIN B12 DEFICIENCY: ICD-10-CM

## 2022-02-17 DIAGNOSIS — Z30.42 DEPO-PROVERA CONTRACEPTIVE STATUS: ICD-10-CM

## 2022-02-17 DIAGNOSIS — E55.9 VITAMIN D DEFICIENCY: ICD-10-CM

## 2022-02-17 DIAGNOSIS — Z13.220 ENCOUNTER FOR LIPID SCREENING FOR CARDIOVASCULAR DISEASE: ICD-10-CM

## 2022-02-17 PROCEDURE — G8482 FLU IMMUNIZE ORDER/ADMIN: HCPCS | Performed by: NURSE PRACTITIONER

## 2022-02-17 PROCEDURE — 96372 THER/PROPH/DIAG INJ SC/IM: CPT | Performed by: NURSE PRACTITIONER

## 2022-02-17 PROCEDURE — 99395 PREV VISIT EST AGE 18-39: CPT | Performed by: NURSE PRACTITIONER

## 2022-02-17 RX ORDER — MEDROXYPROGESTERONE ACETATE 150 MG/ML
150 INJECTION, SUSPENSION INTRAMUSCULAR ONCE
Status: COMPLETED | OUTPATIENT
Start: 2022-02-17 | End: 2022-02-17

## 2022-02-17 RX ADMIN — MEDROXYPROGESTERONE ACETATE 150 MG: 150 INJECTION, SUSPENSION INTRAMUSCULAR at 12:06

## 2022-02-17 SDOH — ECONOMIC STABILITY: FOOD INSECURITY: WITHIN THE PAST 12 MONTHS, THE FOOD YOU BOUGHT JUST DIDN'T LAST AND YOU DIDN'T HAVE MONEY TO GET MORE.: NEVER TRUE

## 2022-02-17 SDOH — ECONOMIC STABILITY: FOOD INSECURITY: WITHIN THE PAST 12 MONTHS, YOU WORRIED THAT YOUR FOOD WOULD RUN OUT BEFORE YOU GOT MONEY TO BUY MORE.: NEVER TRUE

## 2022-02-17 ASSESSMENT — ENCOUNTER SYMPTOMS
WHEEZING: 0
DIARRHEA: 0
CHEST TIGHTNESS: 0
EYE DISCHARGE: 0
NAUSEA: 0
SINUS PAIN: 0
EYE REDNESS: 0
SHORTNESS OF BREATH: 0
COUGH: 0
SINUS PRESSURE: 0
SORE THROAT: 0
EYE ITCHING: 0
VOMITING: 0
ABDOMINAL PAIN: 0
TROUBLE SWALLOWING: 0

## 2022-02-17 ASSESSMENT — SOCIAL DETERMINANTS OF HEALTH (SDOH): HOW HARD IS IT FOR YOU TO PAY FOR THE VERY BASICS LIKE FOOD, HOUSING, MEDICAL CARE, AND HEATING?: NOT HARD AT ALL

## 2022-04-12 ENCOUNTER — HOSPITAL ENCOUNTER (OUTPATIENT)
Age: 22
Setting detail: SPECIMEN
Discharge: HOME | End: 2022-04-12

## 2022-04-12 DIAGNOSIS — Z13.1 SCREENING FOR DIABETES MELLITUS: ICD-10-CM

## 2022-04-12 DIAGNOSIS — Z13.0 SCREENING FOR DEFICIENCY ANEMIA: ICD-10-CM

## 2022-04-12 DIAGNOSIS — Z13.29 SCREENING FOR THYROID DISORDER: ICD-10-CM

## 2022-04-12 DIAGNOSIS — Z13.6 ENCOUNTER FOR LIPID SCREENING FOR CARDIOVASCULAR DISEASE: ICD-10-CM

## 2022-04-12 DIAGNOSIS — E53.8 VITAMIN B12 DEFICIENCY: ICD-10-CM

## 2022-04-12 DIAGNOSIS — Z13.220 ENCOUNTER FOR LIPID SCREENING FOR CARDIOVASCULAR DISEASE: ICD-10-CM

## 2022-04-12 DIAGNOSIS — E55.9 VITAMIN D DEFICIENCY: ICD-10-CM

## 2022-04-12 LAB
ABSOLUTE EOS #: 0.29 K/UL (ref 0–0.44)
ABSOLUTE IMMATURE GRANULOCYTE: 0.03 K/UL (ref 0–0.3)
ABSOLUTE LYMPH #: 3.02 K/UL (ref 1.2–5.2)
ABSOLUTE MONO #: 0.58 K/UL (ref 0.1–1.4)
ALBUMIN SERPL-MCNC: 5.2 G/DL (ref 3.5–5.2)
ALBUMIN/GLOBULIN RATIO: 2.2 (ref 1–2.5)
ALP BLD-CCNC: 102 U/L (ref 35–104)
ALT SERPL-CCNC: 14 U/L (ref 5–33)
ANION GAP SERPL CALCULATED.3IONS-SCNC: 14 MMOL/L (ref 9–17)
AST SERPL-CCNC: 15 U/L
BASOPHILS # BLD: 1 % (ref 0–2)
BASOPHILS ABSOLUTE: 0.07 K/UL (ref 0–0.2)
BILIRUB SERPL-MCNC: 0.51 MG/DL (ref 0.3–1.2)
BUN BLDV-MCNC: 11 MG/DL (ref 6–20)
CALCIUM SERPL-MCNC: 9.8 MG/DL (ref 8.6–10.4)
CHLORIDE BLD-SCNC: 102 MMOL/L (ref 98–107)
CHOLESTEROL/HDL RATIO: 2.5
CHOLESTEROL: 140 MG/DL
CO2: 23 MMOL/L (ref 20–31)
CREAT SERPL-MCNC: 0.77 MG/DL (ref 0.5–0.9)
EOSINOPHILS RELATIVE PERCENT: 4 % (ref 1–4)
ESTIMATED AVERAGE GLUCOSE: 103 MG/DL
FOLATE: >20 NG/ML
GFR AFRICAN AMERICAN: >60 ML/MIN
GFR NON-AFRICAN AMERICAN: >60 ML/MIN
GFR SERPL CREATININE-BSD FRML MDRD: NORMAL ML/MIN/{1.73_M2}
GLUCOSE BLD-MCNC: 83 MG/DL (ref 70–99)
HBA1C MFR BLD: 5.2 % (ref 4–6)
HCT VFR BLD CALC: 46 % (ref 36.3–47.1)
HDLC SERPL-MCNC: 55 MG/DL
HEMOGLOBIN: 14.2 G/DL (ref 11.9–15.1)
IMMATURE GRANULOCYTES: 0 %
LDL CHOLESTEROL: 76 MG/DL (ref 0–130)
LYMPHOCYTES # BLD: 39 % (ref 25–45)
MCH RBC QN AUTO: 28.1 PG (ref 25.2–33.5)
MCHC RBC AUTO-ENTMCNC: 30.9 G/DL (ref 28.4–34.8)
MCV RBC AUTO: 90.9 FL (ref 82.6–102.9)
MONOCYTES # BLD: 8 % (ref 2–8)
NRBC AUTOMATED: 0 PER 100 WBC
PDW BLD-RTO: 12.8 % (ref 11.8–14.4)
PLATELET # BLD: 357 K/UL (ref 138–453)
PMV BLD AUTO: 9.3 FL (ref 8.1–13.5)
POTASSIUM SERPL-SCNC: 3.9 MMOL/L (ref 3.7–5.3)
RBC # BLD: 5.06 M/UL (ref 3.95–5.11)
SEG NEUTROPHILS: 48 % (ref 34–64)
SEGMENTED NEUTROPHILS ABSOLUTE COUNT: 3.78 K/UL (ref 1.8–8)
SODIUM BLD-SCNC: 139 MMOL/L (ref 135–144)
TOTAL PROTEIN: 7.6 G/DL (ref 6.4–8.3)
TRIGL SERPL-MCNC: 46 MG/DL
TSH SERPL DL<=0.05 MIU/L-ACNC: 0.93 UIU/ML (ref 0.3–5)
VITAMIN B-12: 863 PG/ML (ref 232–1245)
VITAMIN D 25-HYDROXY: 26.9 NG/ML
WBC # BLD: 7.8 K/UL (ref 4.5–13.5)

## 2022-05-05 ENCOUNTER — NURSE ONLY (OUTPATIENT)
Dept: OBGYN CLINIC | Age: 22
End: 2022-05-05
Payer: COMMERCIAL

## 2022-05-05 ENCOUNTER — NURSE ONLY (OUTPATIENT)
Dept: PRIMARY CARE CLINIC | Age: 22
End: 2022-05-05
Payer: COMMERCIAL

## 2022-05-05 ENCOUNTER — TELEPHONE (OUTPATIENT)
Dept: PRIMARY CARE CLINIC | Age: 22
End: 2022-05-05

## 2022-05-05 DIAGNOSIS — N93.9 ABNORMAL UTERINE BLEEDING (AUB): ICD-10-CM

## 2022-05-05 DIAGNOSIS — Z11.1 ENCOUNTER FOR PPD TEST: Primary | ICD-10-CM

## 2022-05-05 DIAGNOSIS — Z30.42 DEPO-PROVERA CONTRACEPTIVE STATUS: Primary | ICD-10-CM

## 2022-05-05 PROCEDURE — 96372 THER/PROPH/DIAG INJ SC/IM: CPT | Performed by: OBSTETRICS & GYNECOLOGY

## 2022-05-05 PROCEDURE — 86580 TB INTRADERMAL TEST: CPT | Performed by: NURSE PRACTITIONER

## 2022-05-05 RX ORDER — MEDROXYPROGESTERONE ACETATE 150 MG/ML
150 INJECTION, SUSPENSION INTRAMUSCULAR ONCE
Status: COMPLETED | OUTPATIENT
Start: 2022-05-05 | End: 2022-05-05

## 2022-05-05 RX ADMIN — MEDROXYPROGESTERONE ACETATE 150 MG: 150 INJECTION, SUSPENSION INTRAMUSCULAR at 09:08

## 2022-05-05 NOTE — PROGRESS NOTES
After obtaining consent, and per orders of JIMENEZ Brambila, injection of PPD given in Right arm by Liss Kitchen. [place], [place]. Patient instructed to remain in clinic for 20 minutes afterwards, and to report any adverse reaction to me immediately. Patient needs PPD for ST[facility]. Advised to return Saturday to walk in for PPD read.

## 2022-05-05 NOTE — PROGRESS NOTES
Rito Banks is here for Depo Provera 150 mg. Given Intramuscular, Left deltoid. Lot Number: [id number]  EXP: 07/2024  Patient tolerated well and is to return to office in 12 weeks.

## 2022-05-07 ENCOUNTER — NURSE ONLY (OUTPATIENT)
Dept: FAMILY MEDICINE CLINIC | Age: 22
End: 2022-05-07

## 2022-05-07 DIAGNOSIS — Z11.1 ENCOUNTER FOR PPD SKIN TEST READING: Primary | ICD-10-CM

## 2022-05-07 RX ORDER — RIZATRIPTAN BENZOATE 10 MG/1
10 TABLET ORAL
Qty: 30 TABLET | Refills: 3 | Status: SHIPPED | OUTPATIENT
Start: 2022-05-07 | End: 2022-10-25

## 2022-05-07 RX ORDER — PROPRANOLOL HCL 60 MG
60 CAPSULE, EXTENDED RELEASE 24HR ORAL DAILY
Qty: 30 CAPSULE | Refills: 0 | Status: SHIPPED | OUTPATIENT
Start: 2022-05-07

## 2022-05-07 NOTE — PROGRESS NOTES
PPD Reading Note  PPD read and results entered in [facility]. Result: 0 mm induration. Interpretation: negative  If test not read within 48-72 hours of initial placement, patient advised to repeat in other arm 1-3 weeks after this test.  Allergic reaction: no      Pt provided w/ immunization report upon discharge.

## 2022-06-07 ENCOUNTER — HOSPITAL ENCOUNTER (OUTPATIENT)
Age: 22
Discharge: HOME | End: 2022-06-09
Payer: COMMERCIAL

## 2022-06-07 ENCOUNTER — HOSPITAL ENCOUNTER (OUTPATIENT)
Dept: GENERAL RADIOLOGY | Age: 22
Discharge: HOME | End: 2022-06-09
Payer: COMMERCIAL

## 2022-06-07 ENCOUNTER — APPOINTMENT (OUTPATIENT)
Dept: GENERAL RADIOLOGY | Facility: CLINIC | Age: 22
End: 2022-06-07
Payer: COMMERCIAL

## 2022-06-07 ENCOUNTER — HOSPITAL ENCOUNTER (EMERGENCY)
Facility: CLINIC | Age: 22
Discharge: HOME | End: 2022-06-07
Attending: EMERGENCY MEDICINE
Payer: COMMERCIAL

## 2022-06-07 ENCOUNTER — OFFICE VISIT (OUTPATIENT)
Dept: PRIMARY CARE CLINIC | Age: 22
End: 2022-06-07
Payer: COMMERCIAL

## 2022-06-07 VITALS
HEIGHT: 59 IN | RESPIRATION RATE: 16 BRPM | DIASTOLIC BLOOD PRESSURE: 73 MMHG | OXYGEN SATURATION: 99 % | TEMPERATURE: 98.8 F | BODY MASS INDEX: 31.25 KG/M2 | WEIGHT: 155 LBS | HEART RATE: 98 BPM | SYSTOLIC BLOOD PRESSURE: 129 MMHG

## 2022-06-07 VITALS
OXYGEN SATURATION: 98 % | HEART RATE: 89 BPM | SYSTOLIC BLOOD PRESSURE: 128 MMHG | BODY MASS INDEX: 32.36 KG/M2 | RESPIRATION RATE: 16 BRPM | DIASTOLIC BLOOD PRESSURE: 76 MMHG | WEIGHT: 160.2 LBS

## 2022-06-07 DIAGNOSIS — R10.9 NONSPECIFIC ABDOMINAL PAIN: ICD-10-CM

## 2022-06-07 DIAGNOSIS — R10.9 NONSPECIFIC ABDOMINAL PAIN: Primary | ICD-10-CM

## 2022-06-07 DIAGNOSIS — R10.84 GENERALIZED ABDOMINAL PAIN: Primary | ICD-10-CM

## 2022-06-07 LAB
ABSOLUTE EOS #: 0.3 K/UL (ref 0–0.4)
ABSOLUTE LYMPH #: 2.5 K/UL (ref 1–4.8)
ABSOLUTE MONO #: 0.6 K/UL (ref 0.1–1.4)
ALBUMIN SERPL-MCNC: 4.9 G/DL (ref 3.5–5.2)
ALBUMIN/GLOBULIN RATIO: 2 (ref 1–2.5)
ALP BLD-CCNC: 109 U/L (ref 35–104)
ALT SERPL-CCNC: 11 U/L (ref 5–33)
ANION GAP SERPL CALCULATED.3IONS-SCNC: 12 MMOL/L (ref 9–17)
AST SERPL-CCNC: 13 U/L
BASOPHILS # BLD: 1 % (ref 0–2)
BASOPHILS ABSOLUTE: 0.1 K/UL (ref 0–0.2)
BILIRUB SERPL-MCNC: 0.2 MG/DL (ref 0.3–1.2)
BILIRUBIN URINE: NEGATIVE
BUN BLDV-MCNC: 9 MG/DL (ref 6–20)
CALCIUM SERPL-MCNC: 10 MG/DL (ref 8.6–10.4)
CHLORIDE BLD-SCNC: 106 MMOL/L (ref 98–107)
CO2: 22 MMOL/L (ref 20–31)
COLOR: YELLOW
COMMENT UA: NORMAL
CREAT SERPL-MCNC: 0.6 MG/DL (ref 0.5–0.9)
EOSINOPHILS RELATIVE PERCENT: 3 % (ref 1–4)
GFR AFRICAN AMERICAN: >60 ML/MIN
GFR NON-AFRICAN AMERICAN: >60 ML/MIN
GFR SERPL CREATININE-BSD FRML MDRD: ABNORMAL ML/MIN/{1.73_M2}
GLUCOSE BLD-MCNC: 101 MG/DL (ref 70–99)
GLUCOSE URINE: NEGATIVE
HCG(URINE) PREGNANCY TEST: NEGATIVE
HCT VFR BLD CALC: 44.1 % (ref 36–46)
HEMOGLOBIN: 14.2 G/DL (ref 12–16)
KETONES, URINE: NEGATIVE
LEUKOCYTE ESTERASE, URINE: NEGATIVE
LIPASE: 22 U/L (ref 13–60)
LYMPHOCYTES # BLD: 25 % (ref 25–45)
MCH RBC QN AUTO: 27.9 PG (ref 26–34)
MCHC RBC AUTO-ENTMCNC: 32.1 G/DL (ref 31–37)
MCV RBC AUTO: 86.8 FL (ref 80–100)
MONOCYTES # BLD: 6 % (ref 2–8)
NITRITE, URINE: NEGATIVE
PDW BLD-RTO: 14 % (ref 12.5–15.4)
PH UA: 6.5 (ref 5–8)
PLATELET # BLD: 329 K/UL (ref 140–450)
PMV BLD AUTO: 7 FL (ref 6–12)
POTASSIUM SERPL-SCNC: 3.4 MMOL/L (ref 3.7–5.3)
PROTEIN UA: NEGATIVE
RBC # BLD: 5.08 M/UL (ref 4–5.2)
SEG NEUTROPHILS: 65 % (ref 34–64)
SEGMENTED NEUTROPHILS ABSOLUTE COUNT: 6.5 K/UL (ref 1.8–7.7)
SODIUM BLD-SCNC: 140 MMOL/L (ref 135–144)
SPECIFIC GRAVITY UA: 1.01 (ref 1–1.03)
TOTAL PROTEIN: 7.3 G/DL (ref 6.4–8.3)
TURBIDITY: CLEAR
URINE HGB: NEGATIVE
UROBILINOGEN, URINE: NORMAL
WBC # BLD: 9.9 K/UL (ref 4.5–13.5)

## 2022-06-07 PROCEDURE — 81003 URINALYSIS AUTO W/O SCOPE: CPT

## 2022-06-07 PROCEDURE — 99284 EMERGENCY DEPT VISIT MOD MDM: CPT

## 2022-06-07 PROCEDURE — 85025 COMPLETE CBC W/AUTO DIFF WBC: CPT

## 2022-06-07 PROCEDURE — G8417 CALC BMI ABV UP PARAM F/U: HCPCS | Performed by: NURSE PRACTITIONER

## 2022-06-07 PROCEDURE — G8427 DOCREV CUR MEDS BY ELIG CLIN: HCPCS | Performed by: NURSE PRACTITIONER

## 2022-06-07 PROCEDURE — 74022 RADEX COMPL AQT ABD SERIES: CPT

## 2022-06-07 PROCEDURE — 74018 RADEX ABDOMEN 1 VIEW: CPT

## 2022-06-07 PROCEDURE — 81025 URINE PREGNANCY TEST: CPT

## 2022-06-07 PROCEDURE — 1036F TOBACCO NON-USER: CPT | Performed by: NURSE PRACTITIONER

## 2022-06-07 PROCEDURE — 99213 OFFICE O/P EST LOW 20 MIN: CPT | Performed by: NURSE PRACTITIONER

## 2022-06-07 PROCEDURE — 83690 ASSAY OF LIPASE: CPT

## 2022-06-07 PROCEDURE — 36415 COLL VENOUS BLD VENIPUNCTURE: CPT

## 2022-06-07 PROCEDURE — 80053 COMPREHEN METABOLIC PANEL: CPT

## 2022-06-07 ASSESSMENT — PATIENT HEALTH QUESTIONNAIRE - PHQ9
5. POOR APPETITE OR OVEREATING: 0
3. TROUBLE FALLING OR STAYING ASLEEP: 0
SUM OF ALL RESPONSES TO PHQ QUESTIONS 1-9: 0
SUM OF ALL RESPONSES TO PHQ9 QUESTIONS 1 & 2: 0
7. TROUBLE CONCENTRATING ON THINGS, SUCH AS READING THE NEWSPAPER OR WATCHING TELEVISION: 0
9. THOUGHTS THAT YOU WOULD BE BETTER OFF DEAD, OR OF HURTING YOURSELF: 0
SUM OF ALL RESPONSES TO PHQ QUESTIONS 1-9: 0
4. FEELING TIRED OR HAVING LITTLE ENERGY: 0
SUM OF ALL RESPONSES TO PHQ QUESTIONS 1-9: 0
10. IF YOU CHECKED OFF ANY PROBLEMS, HOW DIFFICULT HAVE THESE PROBLEMS MADE IT FOR YOU TO DO YOUR WORK, TAKE CARE OF THINGS AT HOME, OR GET ALONG WITH OTHER PEOPLE: 0
6. FEELING BAD ABOUT YOURSELF - OR THAT YOU ARE A FAILURE OR HAVE LET YOURSELF OR YOUR FAMILY DOWN: 0
SUM OF ALL RESPONSES TO PHQ QUESTIONS 1-9: 0
2. FEELING DOWN, DEPRESSED OR HOPELESS: 0
8. MOVING OR SPEAKING SO SLOWLY THAT OTHER PEOPLE COULD HAVE NOTICED. OR THE OPPOSITE, BEING SO FIGETY OR RESTLESS THAT YOU HAVE BEEN MOVING AROUND A LOT MORE THAN USUAL: 0
1. LITTLE INTEREST OR PLEASURE IN DOING THINGS: 0

## 2022-06-07 ASSESSMENT — PAIN DESCRIPTION - LOCATION: LOCATION: ABDOMEN

## 2022-06-07 ASSESSMENT — ENCOUNTER SYMPTOMS
ABDOMINAL PAIN: 1
VOMITING: 0
NAUSEA: 0
BACK PAIN: 0
DIARRHEA: 0
SORE THROAT: 0
COUGH: 0
SHORTNESS OF BREATH: 0
RHINORRHEA: 0

## 2022-06-07 ASSESSMENT — PAIN SCALES - GENERAL: PAINLEVEL_OUTOF10: 7

## 2022-06-07 ASSESSMENT — PAIN - FUNCTIONAL ASSESSMENT: PAIN_FUNCTIONAL_ASSESSMENT: 0-10

## 2022-06-07 ASSESSMENT — PAIN DESCRIPTION - DESCRIPTORS: DESCRIPTORS: ACHING;DULL

## 2022-06-07 ASSESSMENT — PAIN DESCRIPTION - ORIENTATION: ORIENTATION: RIGHT;MID

## 2022-06-07 NOTE — PROGRESS NOTES
[address] [facility] PRIMARY CARE  [facility] [address] [facility] [address]  [address] [address]  Dept: [phone]  Dept Fax: [phone]    Kristina Bryant is a 25 y.o. female who presents today for her medical conditions/complaintsas noted below. Kristina Bryant is c/o of Abdominal Pain (x1 wk, shooting pains in lower right abdominal, increased bowel movements, softer stool)        HPI:     Pt presents for same day appointment  bp stable  Weight is stable    Pt presents for a same day appointment with concerns of ongoing abdominal pain. She is having abdominal pain. Worse with sitting. Better with standing. She took a pregnancy test which was negative. C/o shooting pains up her back at times   Constant pain for a week that she noticed. Decreased appetite. Stomach feels hard. Last bowel movement was today. She is pooping like 8 times a day. Very soft. Usually she has a hx of constipation. No medication for her symptoms. She is nursing/pumping. She has not restarted her cycle. She is holding her baby and is able to move around with no difficulty       Past Medical History:   Diagnosis Date    Mental disorder     depression      Past Surgical History:   Procedure Laterality Date    INTRAUTERINE DEVICE REMOVAL  2021    dislodged IUD       History reviewed. No pertinent family history.     Social History     Tobacco Use    Smoking status: Never Smoker    Smokeless tobacco: Never Used   Substance Use Topics    Alcohol use: Not Currently      Current Outpatient Medications   Medication Sig Dispense Refill    rizatriptan (MAXALT) 10 MG tablet Take 1 tablet by mouth once as needed for Migraine May repeat in 2 hours if needed 30 tablet 3    propranolol (INDERAL LA) 60 MG extended release capsule Take 1 capsule by mouth daily 30 capsule 0    fluticasone (FLOVENT HFA) 110 MCG/ACT inhaler Inhale 2 puffs into the lungs 2 times daily 12 g 3    medroxyPROGESTERone (DEPO-PROVERA) 150 MG/ML injection Inject 1 mL into the muscle once for 1 dose 1 mL 3    butalbital-acetaminophen-caffeine (FIORICET, ESGIC) -40 MG per tablet Take 1 tablet by mouth every 4 hours as needed for Headaches 30 tablet 3    sertraline (ZOLOFT) 50 MG tablet Take 50 mg by mouth daily      Prenatal Vit-Fe Fumarate-FA (PRENATAL/FOLIC ACID PO) Take by mouth       No current facility-administered medications for this visit. No Known Allergies    Health Maintenance   Topic Date Due    COVID-19 Vaccine (1) Never done    Pneumococcal 0-64 years Vaccine (1 - PCV) 04/16/2007    HIV screen  Never done    Pap smear  Never done    Chlamydia screen  10/05/2022    Depression Monitoring  06/07/2023    DTaP/Tdap/Td vaccine (8 - Td or Tdap) 06/24/2031    Hepatitis A vaccine  Completed    Hepatitis B vaccine  Completed    Hib vaccine  Completed    HPV vaccine  Completed    Varicella vaccine  Completed    Meningococcal (ACWY) vaccine  Completed    Flu vaccine  Completed    Hepatitis C screen  Completed       :     Review of Systems   Constitutional: Negative for chills, fatigue and fever. HENT: Negative for ear discharge, ear pain, sinus pressure, sinus pain, sore throat and trouble swallowing. Eyes: Negative for discharge, redness and itching. Respiratory: Negative for cough, chest tightness, shortness of breath and wheezing. Cardiovascular: Negative for chest pain. Gastrointestinal: Positive for abdominal pain and diarrhea. Negative for nausea and vomiting. Genitourinary: Negative for difficulty urinating. Musculoskeletal: Negative for arthralgias and neck pain. Skin: Negative for rash. Neurological: Negative for dizziness, weakness, light-headedness and headaches. All other systems reviewed and are negative. Objective:     Physical Exam  Constitutional:       Appearance: Normal appearance. She is normal weight. HENT:      Head: Normocephalic and atraumatic.       Nose: Nose normal. Eyes:      Extraocular Movements: Extraocular movements intact. Conjunctiva/sclera: Conjunctivae normal.      Pupils: Pupils are equal, round, and reactive to light. Cardiovascular:      Rate and Rhythm: Normal rate and regular rhythm. Pulses: Normal pulses. Heart sounds: Normal heart sounds. Pulmonary:      Effort: Pulmonary effort is normal.      Breath sounds: Normal breath sounds. Abdominal:      General: Abdomen is flat. Palpations: Abdomen is soft. Tenderness: There is abdominal tenderness in the left upper quadrant and left lower quadrant. There is no guarding or rebound. Comments: Soft. ND. Musculoskeletal:         General: Normal range of motion. Cervical back: Neck supple. Skin:     General: Skin is warm and dry. Capillary Refill: Capillary refill takes less than 2 seconds. Neurological:      General: No focal deficit present. Mental Status: She is alert and oriented to person, place, and time. Psychiatric:         Mood and Affect: Mood normal.       /76   Pulse 89   Resp 16   Wt 160 lb 3.2 oz (72.7 kg)   SpO2 98%   Breastfeeding No   BMI 32.36 kg/m²     Assessment:       Diagnosis Orders   1. Nonspecific abdominal pain  XR ABDOMEN (KUB) (SINGLE AP VIEW)       :      Return if symptoms worsen or fail to improve. 1. Nonspecific abdominal pain  -she has been eating healthy for some time now. She does not think her sx are diet related. She is breastfeeding/pumping. Will reserve medication if we need to as some medications can affect milk supply and there is a formula shortage. She is agreeable to get an xray done. We will base plan off of findings from xray. She is ok with this plan    F/u PRN.    Orders Placed This Encounter   Procedures    XR ABDOMEN (KUB) (SINGLE AP VIEW)     Standing Status:   Future     Number of Occurrences:   1     Standing Expiration Date:   6/7/2023     Order Specific Question:   Reason for exam:     Answer: constant abdominal pain     No orders of the defined types were placed in this encounter. Patient given educational materials - seepatient instructions. Discussed use, benefit, and side effects of prescribed medications. All patient questions answered. Pt voiced understanding. Reviewed health maintenance. Instructed to continue current medications, diet and exercise. Patient agreedwith treatment plan. Follow up as directed.       Electronically signed by BEBA Hamilton CNP on 6/8/2022at 8:19 AM

## 2022-06-08 ENCOUNTER — TELEPHONE (OUTPATIENT)
Dept: PRIMARY CARE CLINIC | Age: 22
End: 2022-06-08

## 2022-06-08 DIAGNOSIS — R10.9 NONSPECIFIC ABDOMINAL PAIN: Primary | ICD-10-CM

## 2022-06-08 ASSESSMENT — ENCOUNTER SYMPTOMS
SHORTNESS OF BREATH: 0
DIARRHEA: 1
EYE ITCHING: 0
COUGH: 0
SINUS PRESSURE: 0
ABDOMINAL PAIN: 1
EYE REDNESS: 0
WHEEZING: 0
TROUBLE SWALLOWING: 0
SORE THROAT: 0
CHEST TIGHTNESS: 0
VOMITING: 0
EYE DISCHARGE: 0
NAUSEA: 0
SINUS PAIN: 0

## 2022-06-08 NOTE — ED PROVIDER NOTES
Pt Name: Da Campa  MRN: [id number]  [place] 2000  Date of evaluation: 6/7/22       Da Campa is a 25 y.o. female who presents with Abdominal Pain        Based on the medical record, the care appears appropriate. I was personally available for consultation in the Emergency Department.     Philis Phalen, MD  Attending Emergency  Physician        Philis Phalen, MD  06/07/22 0576

## 2022-06-08 NOTE — ED PROVIDER NOTES
[facility]  [address] [facility]  Phone: [phone]        Pt Name: Cristhian Avitia  MRN: [id number]  [place] 2000  Date of evaluation: 6/7/22    [address] [place] [address]       Chief Complaint   Patient presents with    Abdominal Pain       HISTORY OF PRESENT ILLNESS (Location/Symptom, Timing/Onset, Context/Setting, Quality, Duration, Modifying Factors, Severity)      Cristhian Avitia is a 25 y.o. female with no pertinent PMH who presents to the ED via private auto with intermittent diffuse abdominal pain ongoing for the last week. Patient states that when she bends At the waist she does have intermittent abdominal pain. She denies any fevers or chills, chest pain, shortness of breath, nausea vomiting or diarrhea, hematuria or dysuria. She states that she is able to eat and drink normally. She denies any past surgical history on her abdomen. Patient states that she is breast-feeding and has not been taking medications for symptoms and states that she had a vaginal delivery over a year ago. Patient does state that her symptoms do improve when she is at rest and not moving. PAST MEDICAL / SURGICAL / SOCIAL / FAMILY HISTORY     PMH:  has a past medical history of Mental disorder. Surgical History:  has a past surgical history that includes Intrauterine device removal (11/18/2021). Social History:  reports that she has never smoked. She has never used smokeless tobacco. She reports previous alcohol use. She reports previous drug use. Drug: Marijuana Fronie Maxi). Family History: has no family status information on file. family history is not on file. Psychiatric History: None    Allergies: Patient has no known allergies. Home Medications:   Prior to Admission medications    Medication Sig Start Date End Date Taking?  Authorizing Provider   rizatriptan (MAXALT) 10 MG tablet Take 1 tablet by mouth once as needed for Migraine May repeat in 2 hours if needed 5/7/22 6/7/22  Shazia Villarreal not ill-appearing or toxic-appearing. HENT:      Head: Normocephalic and atraumatic. Cardiovascular:      Rate and Rhythm: Normal rate and regular rhythm. Heart sounds: Normal heart sounds. Pulmonary:      Effort: Pulmonary effort is normal.      Breath sounds: Normal breath sounds. Abdominal:      General: Abdomen is flat. Bowel sounds are normal. There is no distension. Palpations: Abdomen is soft. There is no shifting dullness, fluid wave, hepatomegaly, splenomegaly or mass. Tenderness: There is no abdominal tenderness. There is no guarding or rebound. Negative signs include Ivan's sign, Rovsing's sign and McBurney's sign. Hernia: No hernia is present. Skin:     General: Skin is warm and dry. Capillary Refill: Capillary refill takes less than 2 seconds. Neurological:      Mental Status: She is alert. Psychiatric:         Mood and Affect: Mood normal.         Behavior: Behavior normal.           DIFFERENTIAL DIAGNOSIS / MDM     Plan obtain baseline abdominal labs, UA, urine pregnancy and abdominal series x-ray. Lipase is normal.  CBC and CMP are unremarkable. Pregnancy is negative. UA is unremarkable. Patient is resting on the cot comfortably with even unlabored breaths is nontoxic-appearing. Attending to complete all remaining care, diagnosis and disposition for this patient. We discussed this patient prior to my departure. My note will be refreshed to reflect these details, though I was not actively involved in this patient's care following the time stamp. PLAN (LABS / IMAGING / EKG):  Orders Placed This Encounter   Procedures    XR ACUTE ABD SERIES CHEST 1 VW    CBC with Auto Differential    Comprehensive Metabolic Panel    Lipase    Urinalysis with Reflex to Culture    Pregnancy, Urine       MEDICATIONS ORDERED:  No orders of the defined types were placed in this encounter. Controlled Substances Monitoring:     DIAGNOSTIC RESULTS     EKG:  All EKG's are interpreted by the Emergency Department Physician who either signs or Co-signs this chart in the [address] a cardiologist.        RADIOLOGY: All images are read by the radiologist and their interpretations are reviewed. XR ACUTE ABD SERIES CHEST 1 VW    (Results Pending)       No results found.     LABS:  Results for orders placed or performed during the hospital encounter of 06/07/22   CBC with Auto Differential   Result Value Ref Range    WBC 9.9 4.5 - 13.5 k/uL    RBC 5.08 4.0 - 5.2 m/uL    Hemoglobin 14.2 12.0 - 16.0 g/dL    Hematocrit 44.1 36 - 46 %    MCV 86.8 80 - 100 fL    MCH 27.9 26 - 34 pg    MCHC 32.1 31 - 37 g/dL    RDW 14.0 12.5 - 15.4 %    Platelets 577 597 - 577 k/uL    MPV 7.0 6.0 - 12.0 fL    Seg Neutrophils 65 (H) 34 - 64 %    Lymphocytes 25 25 - 45 %    Monocytes 6 2 - 8 %    Eosinophils % 3 1 - 4 %    Basophils 1 0 - 2 %    Segs Absolute 6.50 1.8 - 7.7 k/uL    Absolute Lymph # 2.50 1.0 - 4.8 k/uL    Absolute Mono # 0.60 0.1 - 1.4 k/uL    Absolute Eos # 0.30 0.0 - 0.4 k/uL    Basophils Absolute 0.10 0.0 - 0.2 k/uL   Comprehensive Metabolic Panel   Result Value Ref Range    Glucose 101 (H) 70 - 99 mg/dL    BUN 9 6 - 20 mg/dL    CREATININE 0.60 0.50 - 0.90 mg/dL    Calcium 10.0 8.6 - 10.4 mg/dL    Sodium 140 135 - 144 mmol/L    Potassium 3.4 (L) 3.7 - 5.3 mmol/L    Chloride 106 98 - 107 mmol/L    CO2 22 20 - 31 mmol/L    Anion Gap 12 9 - 17 mmol/L    Alkaline Phosphatase 109 (H) 35 - 104 U/L    ALT 11 5 - 33 U/L    AST 13 <32 U/L    Total Bilirubin 0.20 (L) 0.3 - 1.2 mg/dL    Total Protein 7.3 6.4 - 8.3 g/dL    Albumin 4.9 3.5 - 5.2 g/dL    Albumin/Globulin Ratio 2.0 1.0 - 2.5    GFR Non-African American >60 >60 mL/min    GFR African American >60 >60 mL/min    GFR Comment         Lipase   Result Value Ref Range    Lipase 22 13 - 60 U/L   Urinalysis with Reflex to Culture    Specimen: Urine, clean catch   Result Value Ref Range    Color, UA Yellow Yellow    Turbidity UA Clear Clear Glucose, Ur NEGATIVE NEGATIVE    Bilirubin Urine NEGATIVE NEGATIVE    Ketones, Urine NEGATIVE NEGATIVE    Specific Gravity, UA 1.015 1.005 - 1.030    Urine Hgb NEGATIVE NEGATIVE    pH, UA 6.5 5.0 - 8.0    Protein, UA NEGATIVE NEGATIVE    Urobilinogen, Urine Normal Normal    Nitrite, Urine NEGATIVE NEGATIVE    Leukocyte Esterase, Urine NEGATIVE NEGATIVE    Urinalysis Comments       Microscopic exam not performed based on chemical results unless requested in original order. Urinalysis Comments          Urinalysis Comments       Utilizing a urinalysis as the only screening method to exclude a potential uropathogen can be unreliable in many patient populations. Rapid screening tests are less sensitive than culture and if UTI is a clinical possibility, culture should be considered despite a negative urinalysis. Pregnancy, Urine   Result Value Ref Range    HCG(Urine) Pregnancy Test NEGATIVE NEGATIVE       EMERGENCY DEPARTMENT COURSE     ED Course as of 06/07/22 2101 Tue Jun 07, 2022 2101 Attending to complete all remaining care, diagnosis and disposition for this patient. We discussed this patient prior to my departure. My note will be refreshed to reflect these details, though I was not actively involved in this patient's care following the time stamp. [TM]      ED Course User Index  [TM] Rosa BEBA Verdugo CNP        Vitals:    Vitals:    06/07/22 2013   BP: 129/73   Pulse: 98   Resp: 16   Temp: 98.8 °F (37.1 °C)   TempSrc: Oral   SpO2: 99%   Weight: 70.3 kg (155 lb)   Height: 4' 11\" (1.499 m)     -------------------------  BP: 129/73, Temp: 98.8 °F (37.1 °C), Heart Rate: 98, Resp: 16      RE-EVALUATION:  See ED Course notes above. CONSULTS:  None    PROCEDURES:  None    FINAL IMPRESSION      1. Generalized abdominal pain          DISPOSITION / PLAN     CONDITION ON DISPOSITION:   Stable    PATIENT REFERRED TO:  Liddie Sovereign, APRN - CNP  [address]  Dr. Selina Fonseca [address] [place] [id number]  [phone]    Call in 1 day      [facility]  [address]  [phone]    If symptoms worsen      DISCHARGE MEDICATIONS:  New Prescriptions    No medications on file       BEBA Foster - [place]   Emergency Medicine Nurse Practitioner    (Please note that portions of this note were completed with a voice recognition program.  Efforts were made to edit the dictations but occasionally words aremis-transcribed.)       BEBA Foster - CNP  06/07/22 [id number]

## 2022-06-08 NOTE — ED NOTES
Pt presents to ED ambulatory a&o x4. Pt comes with complaints of RLQ and RUQ pain started approx 1 week ago but has been more consistent the past few days. Denies urinary symptoms.      Shara Noonan RN  06/07/22 2020

## 2022-06-08 NOTE — TELEPHONE ENCOUNTER
Pt called in about receiving a call from someone to see a gastro specialist for her stomach issues. I do not see where we have placed a referral or made any calls to pt. Pt did have OV with PCP 6/7/22 & ED visit. Please advise if you are going to refer pt to gastro.

## 2022-06-14 ENCOUNTER — OFFICE VISIT (OUTPATIENT)
Dept: GASTROENTEROLOGY | Age: 22
End: 2022-06-14
Payer: COMMERCIAL

## 2022-06-14 VITALS
BODY MASS INDEX: 31.45 KG/M2 | SYSTOLIC BLOOD PRESSURE: 130 MMHG | WEIGHT: 156 LBS | HEART RATE: 131 BPM | HEIGHT: 59 IN | DIASTOLIC BLOOD PRESSURE: 84 MMHG

## 2022-06-14 DIAGNOSIS — R10.13 DYSPEPSIA: Primary | ICD-10-CM

## 2022-06-14 DIAGNOSIS — K80.50 BILIARY COLIC: ICD-10-CM

## 2022-06-14 DIAGNOSIS — R19.7 DIARRHEA, UNSPECIFIED TYPE: ICD-10-CM

## 2022-06-14 PROCEDURE — 1036F TOBACCO NON-USER: CPT | Performed by: INTERNAL MEDICINE

## 2022-06-14 PROCEDURE — G8427 DOCREV CUR MEDS BY ELIG CLIN: HCPCS | Performed by: INTERNAL MEDICINE

## 2022-06-14 PROCEDURE — G8417 CALC BMI ABV UP PARAM F/U: HCPCS | Performed by: INTERNAL MEDICINE

## 2022-06-14 PROCEDURE — 99203 OFFICE O/P NEW LOW 30 MIN: CPT | Performed by: INTERNAL MEDICINE

## 2022-06-14 RX ORDER — CIMETIDINE 800 MG
400 TABLET ORAL 2 TIMES DAILY
Qty: 30 TABLET | Refills: 3 | Status: SHIPPED | OUTPATIENT
Start: 2022-06-14 | End: 2022-11-04

## 2022-06-14 ASSESSMENT — ENCOUNTER SYMPTOMS
CONSTIPATION: 0
SHORTNESS OF BREATH: 1
RECTAL PAIN: 0
COUGH: 1
ANAL BLEEDING: 0
VOMITING: 1
TROUBLE SWALLOWING: 0
ABDOMINAL DISTENTION: 1
VOICE CHANGE: 0
BLOOD IN STOOL: 0
DIARRHEA: 1
WHEEZING: 1
CHOKING: 1
NAUSEA: 1
ABDOMINAL PAIN: 1

## 2022-06-14 NOTE — PROGRESS NOTES
Reason for Referral: abdominal pain, biliary colic      Margaret Jc, APRN - CNP  [address]  [place],  [facility] [address]    Chief Complaint   Patient presents with    New Patient    Abdominal Pain     x 2 weeks, went to ED           HISTORY OF PRESENT ILLNESS: Francisco Meredith is a 25 y.o. female with a past history remarkable for depression, anxiety, postpartum x10 months, intermittent recurrent episodes of biliary colic symptoms, prior history of THC use, intermittently using, referred for evaluation of intermittent biliary colic symptoms. The patient reports associated nausea without any significant emesis. Reports typical crescendo, decrescendo pattern. Recent presentation to [facility] which failed to disclose any laboratory abnormalities. No recent abdominal imaging. No prior history of upper endoscopy. No obvious dietary triggers reported by the patient. No food aversion or secondary weight loss associated with symptoms. No significant lower GI symptoms or other associated GI symptomology. Smoker: Cannibus   Drinking history:  None   Illicit drugs: CBD bath bomb    Abdominal surgeries: None   Prior Colonoscopy: None   Prior EGD: None   FH of GI issues: GM/GF-liver CA, Lung Ca. Past Medical,Family, and Social History reviewed and does contribute to the patient presentingcondition. Patient's PMH/PSH,SH,PSYCH Hx, MEDs, ALLERGIES, and ROS were all reviewed and updated in the appropriate sections.     PAST MEDICAL HISTORY:  Past Medical History:   Diagnosis Date    Mental disorder     depression       Past Surgical History:   Procedure Laterality Date    INTRAUTERINE DEVICE REMOVAL  11/18/2021    dislodged IUD       CURRENT MEDICATIONS:    Current Outpatient Medications:     cimetidine (TAGAMET) 800 MG tablet, Take 0.5 tablets by mouth 2 times daily, Disp: 30 tablet, Rfl: 3    propranolol (INDERAL LA) 60 MG extended release capsule, Take 1 capsule by mouth daily, Disp: 30 capsule, Rfl: 0    fluticasone (FLOVENT HFA) 110 MCG/ACT inhaler, Inhale 2 puffs into the lungs 2 times daily, Disp: 12 g, Rfl: 3    butalbital-acetaminophen-caffeine (FIORICET, ESGIC) -40 MG per tablet, Take 1 tablet by mouth every 4 hours as needed for Headaches, Disp: 30 tablet, Rfl: 3    Prenatal Vit-Fe Fumarate-FA (PRENATAL/FOLIC ACID PO), Take by mouth, Disp: , Rfl:     rizatriptan (MAXALT) 10 MG tablet, Take 1 tablet by mouth once as needed for Migraine May repeat in 2 hours if needed, Disp: 30 tablet, Rfl: 3    medroxyPROGESTERone (DEPO-PROVERA) 150 MG/ML injection, Inject 1 mL into the muscle once for 1 dose, Disp: 1 mL, Rfl: 3    sertraline (ZOLOFT) 50 MG tablet, Take 50 mg by mouth daily (Patient not taking: Reported on 6/14/2022), Disp: , Rfl:     ALLERGIES:   No Known Allergies    FAMILY HISTORY: No family history on file. SOCIAL HISTORY:   Social History     Socioeconomic History    Marital status: Single     Spouse name: Not on file    Number of children: Not on file    Years of education: Not on file    Highest education level: Not on file   Occupational History    Not on file   Tobacco Use    Smoking status: Never Smoker    Smokeless tobacco: Never Used   Vaping Use    Vaping Use: Never used   Substance and Sexual Activity    Alcohol use: Not Currently    Drug use: Not Currently     Types: Marijuana Myrtis Regulus)     Comment: \"EVERY ONCE IN A WHILE- 2X'S/WEEK\" 7/22/2021    Sexual activity: Not on file   Other Topics Concern    Not on file   Social History Narrative    Not on file     Social Determinants of Health     Financial Resource Strain: Low Risk     Difficulty of Paying Living Expenses: Not hard at all   Food Insecurity: No Food Insecurity    Worried About Running Out of Food in the Last Year: Never true    Angelina of Food in the Last Year: Never true   Transportation Needs:     Lack of Transportation (Medical):  Not on file    Lack of Transportation (Non-Medical): Not on file   Physical Activity:     Days of Exercise per Week: Not on file    Minutes of Exercise per Session: Not on file   Stress:     Feeling of Stress : Not on file   Social Connections:     Frequency of Communication with Friends and Family: Not on file    Frequency of Social Gatherings with Friends and Family: Not on file    Attends Hinduism Services: Not on file    Active Member of [address] [place] [address] or Organizations: Not on file    Attends Club or Organization Meetings: Not on file    Marital Status: Not on file   Intimate Partner Violence:     Fear of Current or Ex-Partner: Not on file    Emotionally Abused: Not on file    Physically Abused: Not on file    Sexually Abused: Not on file   Housing Stability:     Unable to Pay for Housing in the Last Year: Not on file    Number of [place] in the Last Year: Not on file    Unstable Housing in the Last Year: Not on file         REVIEW OF SYSTEMS: A 12-point review of systems was obtained and pertinent positives and negatives were listed below. REVIEW OF SYSTEMS:     Constitutional: No fever, no chills, no lethargy, no weakness. HEENT:  No headache, otalgia, itchy eyes, nasal discharge or sore throat. Cardiac:  No chest pain, dyspnea, orthopnea or PND. Chest:   No cough, phlegm or wheezing. Abdomen:      Detailed by MA   Neuro:  No focal weakness, abnormal movements or seizure like activity. Skin:   No rashes, no itching. :   No hematuria, no pyuria, no dysuria, no flank pain. Extremities:  No swelling or joint pains. ROS was otherwise negative    Review of Systems   Constitutional: Positive for appetite change and unexpected weight change (10 lbs). Negative for fatigue. HENT: Negative for trouble swallowing and voice change. Eyes: Positive for visual disturbance (glasses). Respiratory: Positive for cough, choking, shortness of breath and wheezing.     Cardiovascular: Negative for chest pain, palpitations and leg swelling. Gastrointestinal: Positive for abdominal distention, abdominal pain, diarrhea (soft stools and small balls), nausea and vomiting (near). Negative for anal bleeding, blood in stool, constipation and rectal pain. Genitourinary: Negative for difficulty urinating. Neurological: Positive for dizziness, light-headedness and headaches. Negative for seizures, weakness and numbness. Hematological: Bruises/bleeds easily. Psychiatric/Behavioral: Positive for confusion and sleep disturbance. The patient is nervous/anxious. PHYSICAL EXAMINATION: Vital signs reviewed per the nursing documentation. /84   Pulse (!) 131   Ht 4' 11\" (1.499 m)   Wt 156 lb (70.8 kg)   BMI 31.51 kg/m²   Body mass index is 31.51 kg/m². Physical Exam    Physical Exam   Constitutional: Patient is oriented to person, place, and time. Patient appears well-developed and well-nourished. HENT:   Head: Normocephalic and atraumatic. Eyes: Pupils are equal, round, and reactive to light. EOM are normal.   Neck: Normal range of motion. Neck supple. No JVD present. No tracheal deviation present. No thyromegaly present. Cardiovascular: Normal rate, regular rhythm, normal heart sounds and intact distal pulses. Pulmonary/Chest: Effort normal and breath sounds normal. No stridor. No respiratory distress. He has no wheezes. He has no rales. He exhibits no tenderness. Abdominal: Soft. Bowel sounds are normal. He exhibits no distension and no mass. There is no tenderness. There is no rebound and no guarding. No hernia. Musculoskeletal: Normal range of motion. Lymphadenopathy:    Patient has no cervical adenopathy. Neurological: Patient is alert and oriented to person, place, and time. Psychiatric: Patient has a normal mood and affect.  Patient behavior is normal.       LABORATORY DATA: Reviewed  Lab Results   Component Value Date    WBC 9.9 06/07/2022    HGB 14.2 06/07/2022    HCT 44.1 06/07/2022    MCV 86.8 06/07/2022     06/07/2022     06/07/2022    K 3.4 (L) 06/07/2022     06/07/2022    CO2 22 06/07/2022    BUN 9 06/07/2022    CREATININE 0.60 06/07/2022    LABALBU 4.9 06/07/2022    BILITOT 0.20 (L) 06/07/2022    ALKPHOS 109 (H) 06/07/2022    AST 13 06/07/2022    ALT 11 06/07/2022         Lab Results   Component Value Date    RBC 5.08 06/07/2022    HGB 14.2 06/07/2022    MCV 86.8 06/07/2022    MCH 27.9 06/07/2022    MCHC 32.1 06/07/2022    RDW 14.0 06/07/2022    MPV 7.0 06/07/2022    BASOPCT 1 06/07/2022    LYMPHSABS 2.50 06/07/2022    MONOSABS 0.60 06/07/2022    NEUTROABS 6.50 06/07/2022    EOSABS 0.30 06/07/2022    BASOSABS 0.10 06/07/2022         DIAGNOSTIC TESTING:     XR ABDOMEN (KUB) (SINGLE AP VIEW)    Result Date: 6/7/2022  EXAMINATION: ONE SUPINE XRAY VIEW(S) OF THE ABDOMEN 6/7/2022 5:49 pm COMPARISON: None. HISTORY: ORDERING SYSTEM PROVIDED HISTORY: Nonspecific abdominal pain TECHNOLOGIST PROVIDED HISTORY: constant abdominal pain Reason for Exam: Pt c/o constant generalized abdominal pain and bloody stools FINDINGS: 2 images are presented. Unremarkable bowel gas pattern. No unusual abdominal or pelvic soft tissue or calcific density is seen. Visualized osseous structures appear unremarkable. No radiopaque urinary collecting system calculus evident. Unremarkable bowel gas pattern. XR ACUTE ABD SERIES CHEST 1 VW    Result Date: 6/7/2022  EXAMINATION: TWO XRAY VIEWS OF THE ABDOMEN AND SINGLE  XRAY VIEW OF THE CHEST 6/7/2022 9:05 pm COMPARISON: None. HISTORY: ORDERING SYSTEM PROVIDED HISTORY: abd pain TECHNOLOGIST PROVIDED HISTORY: abd pain Reason for Exam: RIGHT SIDED ABDOMEN PAIN FOR 1 WEEK FINDINGS: The cardiomediastinal and hilar silhouettes appear unremarkable. The lungs appear clear. No pleural effusion or pneumothorax is seen. No acute osseous abnormality is identified. Unremarkable bowel gas pattern. No unusual abdominal or pelvic soft tissue or calcific density is seen. Left-sided pelvic phlebolith noted. Visualized osseous structures appear unremarkable. No pneumoperitoneum on the upright view. No radiographic evidence of acute cardiopulmonary disease. Unremarkable upright and supine AP abdomen. IMPRESSION: Alessio Fagan is a 25 y.o. female with a past history remarkable for depression, anxiety, postpartum x10 months, intermittent recurrent episodes of biliary colic symptoms, prior history of THC use, intermittently using, referred for evaluation of intermittent biliary colic symptoms. The patient reports associated nausea without any significant emesis. Reports typical crescendo, decrescendo pattern. [facility] presentation to [facility] which failed to disclose any laboratory abnormalities. No recent abdominal imaging. No prior history of upper endoscopy. No obvious dietary triggers reported by the patient. No food aversion or secondary weight loss associated with symptoms. No significant lower GI symptoms or other associated GI symptomology aside from mild intermittent loose bowel movements. Assessment  1. Dyspepsia    2. Diarrhea, unspecified type    3. Biliary colic        Tyree Starkey was seen today for new patient and abdominal pain. Diagnoses and all orders for this visit:    Dyspepsia- NUD versus PUD to be worked up, will obtain H. pylori testing followed by started the patient on Tagamet twice daily at low-dose. We will send serological test to evaluate for organic causes. May consider endoscopic evaluation the patient fails to respond to initial management and if initial work-up failed to disclose etiology. -     Celiac Disease Panel; Future  -     IBD Panel; Future  -     Food Comprehensive Panel; Future  -     TSH With Reflex Ft4; Future  -     US ABDOMEN LIMITED; Future  -     H. Pylori Breath Test; Future    Diarrhea, unspecified type- unclear whether or not related to food or dietary choices.   We will send for celiac disease panel.  - Celiac Disease Panel; Future  -     IBD Panel; Future  -     Food Comprehensive Panel; Future  -     TSH With Reflex Ft4; Future  -     H. Pylori Breath Test; Future    Biliary colic-LFTs appear to be within normal limits, will send for gallbladder ultrasound. -     Celiac Disease Panel; Future  -     IBD Panel; Future  -     Food Comprehensive Panel; Future  -     TSH With Reflex Ft4; Future  -     US ABDOMEN LIMITED; Future  -     H. Pylori Breath Test; Future    Other orders  -     cimetidine (TAGAMET) 800 MG tablet; Take 0.5 tablets by mouth 2 times daily         RTC: 2 to 3 months. Additional comments: Thank you for allowing me to participate in the care of Ms. Adria Spatz. For any further questions please do not hesitate to contact me. I have reviewed and agree with the MA/LPN ROS please refer to their documentation from today's encounter on a separate note. Jane Boucher MD, MPH   Board Certified in Gastroenterology  Board Certified in [address] [place] [address] #: [phone]          this note is created with the assistance of a speech recognition program.  While intending to generate a document that actually reflects the content of the visit, the document can still have some errors including those of syntax and sound a like substitutions which may escape proof reading. It such instances, actual meaning can be extrapolated by contextual diversion.

## 2022-06-21 ENCOUNTER — TELEPHONE (OUTPATIENT)
Dept: GASTROENTEROLOGY | Age: 22
End: 2022-06-21

## 2022-06-21 NOTE — TELEPHONE ENCOUNTER
Spoke with patient she wasn't sure if she should start the tagamet before the H-Pylori test, confirmed that she can via [address] Pt contacted central scheduling to set that up.

## 2022-06-22 ENCOUNTER — TELEPHONE (OUTPATIENT)
Dept: OBGYN CLINIC | Age: 22
End: 2022-06-22

## 2022-06-22 NOTE — TELEPHONE ENCOUNTER
Alicia President called asking if the new medication her GI doctor prescribed her is safe to take while breastfeeding-     cimetidine (TAGAMET) 800 MG tablet   Directions are take 0.5 tabs by mouth 2 times daily.

## 2022-06-28 ENCOUNTER — HOSPITAL ENCOUNTER (OUTPATIENT)
Age: 22
Discharge: HOME | End: 2022-06-28
Payer: COMMERCIAL

## 2022-06-28 ENCOUNTER — HOSPITAL ENCOUNTER (OUTPATIENT)
Dept: ULTRASOUND IMAGING | Age: 22
Discharge: HOME | End: 2022-06-30
Payer: COMMERCIAL

## 2022-06-28 DIAGNOSIS — R10.13 DYSPEPSIA: ICD-10-CM

## 2022-06-28 DIAGNOSIS — K80.50 BILIARY COLIC: ICD-10-CM

## 2022-06-28 DIAGNOSIS — R19.7 DIARRHEA, UNSPECIFIED TYPE: ICD-10-CM

## 2022-06-28 LAB — TSH SERPL DL<=0.05 MIU/L-ACNC: 0.79 UIU/ML (ref 0.3–5)

## 2022-06-28 PROCEDURE — 86003 ALLG SPEC IGE CRUDE XTRC EA: CPT

## 2022-06-28 PROCEDURE — 83014 H PYLORI DRUG ADMIN: CPT

## 2022-06-28 PROCEDURE — 83520 IMMUNOASSAY QUANT NOS NONAB: CPT

## 2022-06-28 PROCEDURE — 82784 ASSAY IGA/IGD/IGG/IGM EACH: CPT

## 2022-06-28 PROCEDURE — 86140 C-REACTIVE PROTEIN: CPT

## 2022-06-28 PROCEDURE — 76705 ECHO EXAM OF ABDOMEN: CPT

## 2022-06-28 PROCEDURE — 83013 H PYLORI (C-13) BREATH: CPT

## 2022-06-28 PROCEDURE — 88350 IMFLUOR EA ADDL 1ANTB STN PX: CPT

## 2022-06-28 PROCEDURE — 83516 IMMUNOASSAY NONANTIBODY: CPT

## 2022-06-28 PROCEDURE — 82785 ASSAY OF IGE: CPT

## 2022-06-28 PROCEDURE — 82397 CHEMILUMINESCENT ASSAY: CPT

## 2022-06-28 PROCEDURE — 81479 UNLISTED MOLECULAR PATHOLOGY: CPT

## 2022-06-28 PROCEDURE — 36415 COLL VENOUS BLD VENIPUNCTURE: CPT

## 2022-06-28 PROCEDURE — 84443 ASSAY THYROID STIM HORMONE: CPT

## 2022-06-28 PROCEDURE — 88346 IMFLUOR 1ST 1ANTB STAIN PX: CPT

## 2022-06-30 LAB
ALLERGEN BARLEY IGE: <0.1 KU/L (ref 0–0.34)
ALLERGEN BEEF: <0.1 KU/L (ref 0–0.34)
ALLERGEN CABBAGE IGE: <0.1 KU/L (ref 0–0.34)
ALLERGEN CARROT IGE: <0.1 KU/L (ref 0–0.34)
ALLERGEN CHICKEN IGE: <0.1 KU/L (ref 0–0.34)
ALLERGEN CODFISH IGE: <0.1 KU/L (ref 0–0.34)
ALLERGEN CORN IGE: <0.1 KU/L (ref 0–0.34)
ALLERGEN COW MILK IGE: <0.1 KU/L (ref 0–0.34)
ALLERGEN CRAB IGE: <0.1 KU/L (ref 0–0.34)
ALLERGEN EGG WHITE IGE: <0.1 KU/L (ref 0–0.34)
ALLERGEN GRAPE IGE: <0.1 KU/L (ref 0–0.34)
ALLERGEN LETTUCE IGE: <0.1 KU/L (ref 0–0.34)
ALLERGEN NAVY BEAN: <0.1 KU/L (ref 0–0.34)
ALLERGEN OAT: <0.1 KU/L (ref 0–0.34)
ALLERGEN ORANGE IGE: <0.1 KU/L (ref 0–0.34)
ALLERGEN PEANUT (F13) IGE: <0.1 KU/L (ref 0–0.34)
ALLERGEN PEPPER C. ANNUUM IGE: <0.1 KU/L (ref 0–0.34)
ALLERGEN PORK: <0.1 KU/L (ref 0–0.34)
ALLERGEN RICE IGE: <0.1 KU/L (ref 0–0.34)
ALLERGEN RYE IGE: <0.1 KU/L (ref 0–0.34)
ALLERGEN SOYBEAN IGE: <0.1 KU/L (ref 0–0.34)
ALLERGEN TOMATO IGE: <0.1 KU/L (ref 0–0.34)
ALLERGEN TUNA IGE: <0.1 KU/L (ref 0–0.34)
ALLERGEN WHEAT IGE: <0.1 KU/L (ref 0–0.34)
GLIADIN DEAMINIDATED PEPTIDE AB IGA: 0.5 U/ML
GLIADIN DEAMINIDATED PEPTIDE AB IGG: 2.7 U/ML
H PYLORI BREATH TEST: NEGATIVE
IGA: 165 MG/DL (ref 70–400)
IGE: 172 IU/ML
POTATO, IGE: <0.1 KU/L (ref 0–0.34)
SHRIMP: <0.1 KU/L (ref 0–0.34)
TISSUE TRANSGLUTAMINASE IGA: 0.3 U/ML

## 2022-07-02 LAB — IBD PANEL: NORMAL

## 2022-07-26 ENCOUNTER — NURSE ONLY (OUTPATIENT)
Dept: OBGYN CLINIC | Age: 22
End: 2022-07-26
Payer: COMMERCIAL

## 2022-07-26 DIAGNOSIS — Z30.42 DEPO-PROVERA CONTRACEPTIVE STATUS: ICD-10-CM

## 2022-07-26 DIAGNOSIS — N93.9 ABNORMAL UTERINE BLEEDING (AUB): Primary | ICD-10-CM

## 2022-07-26 PROCEDURE — 96372 THER/PROPH/DIAG INJ SC/IM: CPT | Performed by: NURSE PRACTITIONER

## 2022-07-26 RX ORDER — MEDROXYPROGESTERONE ACETATE 150 MG/ML
150 INJECTION, SUSPENSION INTRAMUSCULAR ONCE
Status: COMPLETED | OUTPATIENT
Start: 2022-07-26 | End: 2022-07-26

## 2022-07-26 RX ADMIN — MEDROXYPROGESTERONE ACETATE 150 MG: 150 INJECTION, SUSPENSION INTRAMUSCULAR at 10:11

## 2022-07-26 NOTE — PROGRESS NOTES
Depo injection- right deltoid. Tolerated well. Having GI issues- seeing GI and going through testing, likes the depo but trying to rule out her issues. She has lost weight. Originally depo was making her gain weight. Documented in MAR tab.

## 2022-08-02 ENCOUNTER — TELEPHONE (OUTPATIENT)
Dept: GASTROENTEROLOGY | Age: 22
End: 2022-08-02

## 2022-08-02 DIAGNOSIS — R11.2 NAUSEA AND VOMITING, INTRACTABILITY OF VOMITING NOT SPECIFIED, UNSPECIFIED VOMITING TYPE: Primary | ICD-10-CM

## 2022-08-02 NOTE — TELEPHONE ENCOUNTER
Patient has an appt on 8/16/22 with Dr. Demetri Stewart. She is wondering what the next steps are going to be. Will she need scoped? ?  Writer advised that writer would put a message out to physician but he is out of office this week and will probably discuss at her appointment on 8/16/22. She verbalized understanding but did state she is just tired of vomiting all the time and being nauseated.

## 2022-08-10 ENCOUNTER — TELEPHONE (OUTPATIENT)
Dept: GASTROENTEROLOGY | Age: 22
End: 2022-08-10

## 2022-09-22 ENCOUNTER — OFFICE VISIT (OUTPATIENT)
Dept: PRIMARY CARE CLINIC | Age: 22
End: 2022-09-22
Payer: COMMERCIAL

## 2022-09-22 VITALS
HEART RATE: 84 BPM | HEIGHT: 59 IN | SYSTOLIC BLOOD PRESSURE: 120 MMHG | DIASTOLIC BLOOD PRESSURE: 70 MMHG | OXYGEN SATURATION: 99 % | BODY MASS INDEX: 29.48 KG/M2 | WEIGHT: 146.25 LBS

## 2022-09-22 DIAGNOSIS — M54.41 ACUTE BILATERAL LOW BACK PAIN WITH BILATERAL SCIATICA: ICD-10-CM

## 2022-09-22 DIAGNOSIS — J03.91 RECURRENT TONSILLITIS: ICD-10-CM

## 2022-09-22 DIAGNOSIS — R10.13 DYSPEPSIA: ICD-10-CM

## 2022-09-22 DIAGNOSIS — F32.A MILD DEPRESSION: ICD-10-CM

## 2022-09-22 DIAGNOSIS — M54.42 ACUTE BILATERAL LOW BACK PAIN WITH BILATERAL SCIATICA: ICD-10-CM

## 2022-09-22 DIAGNOSIS — H92.03 OTALGIA OF BOTH EARS: Primary | ICD-10-CM

## 2022-09-22 PROCEDURE — G8427 DOCREV CUR MEDS BY ELIG CLIN: HCPCS | Performed by: NURSE PRACTITIONER

## 2022-09-22 PROCEDURE — G8417 CALC BMI ABV UP PARAM F/U: HCPCS | Performed by: NURSE PRACTITIONER

## 2022-09-22 PROCEDURE — 1036F TOBACCO NON-USER: CPT | Performed by: NURSE PRACTITIONER

## 2022-09-22 PROCEDURE — 99214 OFFICE O/P EST MOD 30 MIN: CPT | Performed by: NURSE PRACTITIONER

## 2022-09-22 RX ORDER — COLISTIN SULFATE, NEOMYCIN SULFATE, THONZONIUM BROMIDE AND HYDROCORTISONE ACETATE 3; 3.3; .5; 1 MG/ML; MG/ML; MG/ML; MG/ML
3 SUSPENSION AURICULAR (OTIC) 4 TIMES DAILY
Qty: 10 ML | Refills: 0 | Status: SHIPPED | OUTPATIENT
Start: 2022-09-22 | End: 2022-10-02

## 2022-09-22 RX ORDER — FAMOTIDINE 40 MG/1
40 TABLET, FILM COATED ORAL EVERY EVENING
Qty: 30 TABLET | Refills: 3 | Status: SHIPPED | OUTPATIENT
Start: 2022-09-22

## 2022-09-22 RX ORDER — CYCLOBENZAPRINE HCL 10 MG
10 TABLET ORAL 3 TIMES DAILY PRN
Qty: 21 TABLET | Refills: 0 | Status: SHIPPED | OUTPATIENT
Start: 2022-09-22 | End: 2022-10-02

## 2022-09-22 RX ORDER — ONDANSETRON 4 MG/1
4 TABLET, ORALLY DISINTEGRATING ORAL 3 TIMES DAILY PRN
Qty: 21 TABLET | Refills: 0 | Status: SHIPPED | OUTPATIENT
Start: 2022-09-22

## 2022-09-22 RX ORDER — SERTRALINE HYDROCHLORIDE 100 MG/1
100 TABLET, FILM COATED ORAL DAILY
Qty: 30 TABLET | Refills: 2 | Status: SHIPPED | OUTPATIENT
Start: 2022-09-22 | End: 2022-10-25 | Stop reason: SINTOL

## 2022-09-22 RX ORDER — NAPROXEN 500 MG/1
500 TABLET ORAL 2 TIMES DAILY WITH MEALS
Qty: 60 TABLET | Refills: 5 | Status: SHIPPED | OUTPATIENT
Start: 2022-09-22

## 2022-09-22 NOTE — LETTER
[facility]  [address] [facility]  Phone: [phone]  Fax: [phone]    BEBA Gerardo CNP        September 22, 2022     Patient: Ellaree Bloch   YOB: 2000   Date of Visit: 9/22/2022       To Whom It May Concern: It is my medical opinion that Román Mckinney may return to work on 9/23/22 and excuse abscense on 9/21/22. If you have any questions or concerns, please don't hesitate to call.     Sincerely,        BEBA Gerardo CNP

## 2022-09-22 NOTE — PROGRESS NOTES
[facility]  [facility] [address] [facility] [address]  [address] [address]  Dept: [phone]  Dept Fax: [phone]    Carla Stewart is a 25 y.o. female who presents today for her medical conditions/complaintsas noted below. Carla Stewart is c/o of Otalgia (Shin pain on right leg , back pain on right side)        HPI:     Pt presents for a same day appointment  Bp stable  Weight is stable    Pt presents for a same day appointment with multiple concerns. C/o right shin pain. C/o right lower back pain. Legs and feet will go numb. Constant. Worse at work. Denies any alleviating factors. This has been going on for a few months. The one spot just started a few days ago. She has not been taking anything for the pain. Tylenol never helps her. She is working as an [employer], so she does a lot of bending, twisting and lifting. She is back to work. She did break up with her boyfriend. There is a protection order. She is staying with her dad. Wants a referral to have her tonsils out. No longer breastfeeding. Wants to restart pepcid. She has been nauseated every day. Occasionally vomits. C/o ear pain that has been going on since her last ear infection. Hurts to touch her ear at times. Has never really gone away. Past Medical History:   Diagnosis Date    Mental disorder     depression      Past Surgical History:   Procedure Laterality Date    INTRAUTERINE DEVICE REMOVAL  2021    dislodged IUD       History reviewed. No pertinent family history.     Social History     Tobacco Use    Smoking status: Never    Smokeless tobacco: Never   Substance Use Topics    Alcohol use: Not Currently      Current Outpatient Medications   Medication Sig Dispense Refill    famotidine (PEPCID) 40 MG tablet Take 1 tablet by mouth every evening 30 tablet 3    cyclobenzaprine (FLEXERIL) 10 MG tablet Take 1 tablet by mouth 3 times daily as needed for Muscle spasms 21 tablet 0 naproxen (NAPROSYN) 500 MG tablet Take 1 tablet by mouth 2 times daily (with meals) 60 tablet 5    ondansetron (ZOFRAN-ODT) 4 MG disintegrating tablet Take 1 tablet by mouth 3 times daily as needed for Nausea or Vomiting 21 tablet 0    sertraline (ZOLOFT) 100 MG tablet Take 1 tablet by mouth daily 30 tablet 2    neomycin-colistin-hydrocortisone-thonzonium (CORTISPORIN-TC) 3.3-3-10-0.5 MG/ML otic suspension Place 3 drops into both ears 4 times daily for 10 days 10 mL 0    cimetidine (TAGAMET) 800 MG tablet Take 0.5 tablets by mouth 2 times daily 30 tablet 3    propranolol (INDERAL LA) 60 MG extended release capsule Take 1 capsule by mouth daily 30 capsule 0    fluticasone (FLOVENT HFA) 110 MCG/ACT inhaler Inhale 2 puffs into the lungs 2 times daily 12 g 3    butalbital-acetaminophen-caffeine (FIORICET, ESGIC) -40 MG per tablet Take 1 tablet by mouth every 4 hours as needed for Headaches 30 tablet 3    Prenatal Vit-Fe Fumarate-FA (PRENATAL/FOLIC ACID PO) Take by mouth      rizatriptan (MAXALT) 10 MG tablet Take 1 tablet by mouth once as needed for Migraine May repeat in 2 hours if needed 30 tablet 3    medroxyPROGESTERone (DEPO-PROVERA) 150 MG/ML injection Inject 1 mL into the muscle once for 1 dose 1 mL 3     No current facility-administered medications for this visit.      No Known Allergies    Health Maintenance   Topic Date Due    COVID-19 Vaccine (1) Never done    Pneumococcal 0-64 years Vaccine (1 - PCV) 04/16/2007    HIV screen  Never done    Pap smear  Never done    Flu vaccine (1) 09/01/2022    Chlamydia screen  10/05/2022    Depression Monitoring  06/07/2023    DTaP/Tdap/Td vaccine (8 - Td or Tdap) 06/24/2031    Hepatitis A vaccine  Completed    Hepatitis B vaccine  Completed    Hib vaccine  Completed    HPV vaccine  Completed    Varicella vaccine  Completed    Meningococcal (ACWY) vaccine  Completed    Hepatitis C screen  Completed       :     Review of Systems   Constitutional:  Negative for chills, fatigue and fever. HENT:  Positive for ear pain. Negative for ear discharge, sinus pressure, sinus pain, sore throat and trouble swallowing. Eyes:  Negative for discharge, redness and itching. Respiratory:  Negative for cough, chest tightness, shortness of breath and wheezing. Cardiovascular:  Negative for chest pain. Gastrointestinal:  Positive for abdominal pain and nausea. Negative for diarrhea and vomiting. Genitourinary:  Negative for difficulty urinating. Musculoskeletal:  Positive for arthralgias and back pain. Negative for neck pain. Skin:  Negative for rash. Neurological:  Positive for numbness. Negative for dizziness, weakness, light-headedness and headaches. All other systems reviewed and are negative. Objective:     Physical Exam  Constitutional:       Appearance: Normal appearance. She is normal weight. HENT:      Head: Normocephalic and atraumatic. Right Ear: Tympanic membrane and ear canal normal.      Left Ear: Tympanic membrane and ear canal normal.      Ears:      Comments: Pain with movement of pinna     Nose: Nose normal.   Eyes:      Extraocular Movements: Extraocular movements intact. Conjunctiva/sclera: Conjunctivae normal.      Pupils: Pupils are equal, round, and reactive to light. Cardiovascular:      Rate and Rhythm: Normal rate and regular rhythm. Pulses: Normal pulses. Heart sounds: Normal heart sounds. Pulmonary:      Effort: Pulmonary effort is normal.      Breath sounds: Normal breath sounds. Abdominal:      General: Abdomen is flat. Palpations: Abdomen is soft. Musculoskeletal:         General: Normal range of motion. Cervical back: Normal and neck supple. Thoracic back: Normal.      Lumbar back: Tenderness and bony tenderness present. Negative right straight leg raise test and negative left straight leg raise test.   Skin:     General: Skin is warm and dry.       Capillary Refill: Capillary refill takes by mouth 2 times daily (with meals)     Dispense:  60 tablet     Refill:  5    ondansetron (ZOFRAN-ODT) 4 MG disintegrating tablet     Sig: Take 1 tablet by mouth 3 times daily as needed for Nausea or Vomiting     Dispense:  21 tablet     Refill:  0    sertraline (ZOLOFT) 100 MG tablet     Sig: Take 1 tablet by mouth daily     Dispense:  30 tablet     Refill:  2    neomycin-colistin-hydrocortisone-thonzonium (CORTISPORIN-TC) 3.3-3-10-0.5 MG/ML otic suspension     Sig: Place 3 drops into both ears 4 times daily for 10 days     Dispense:  10 mL     Refill:  0       Patient given educational materials - seepatient instructions. Discussed use, benefit, and side effects of prescribed medications. All patient questions answered. Pt voiced understanding. Reviewed health maintenance. Instructed to continue current medications, diet and exercise. Patient agreedwith treatment plan. Follow up as directed.       Electronically signed by BEBA Lopez CNP on 9/23/2022at 6:08 AM

## 2022-09-23 ENCOUNTER — TELEPHONE (OUTPATIENT)
Dept: PRIMARY CARE CLINIC | Age: 22
End: 2022-09-23

## 2022-09-23 PROBLEM — F32.A MILD DEPRESSION: Status: ACTIVE | Noted: 2022-09-23

## 2022-09-23 ASSESSMENT — ENCOUNTER SYMPTOMS
EYE ITCHING: 0
SINUS PAIN: 0
NAUSEA: 1
EYE DISCHARGE: 0
CHEST TIGHTNESS: 0
COUGH: 0
WHEEZING: 0
SHORTNESS OF BREATH: 0
BACK PAIN: 1
SORE THROAT: 0
ABDOMINAL PAIN: 1
DIARRHEA: 0
EYE REDNESS: 0
SINUS PRESSURE: 0
VOMITING: 0
TROUBLE SWALLOWING: 0

## 2022-09-23 NOTE — TELEPHONE ENCOUNTER
Patient called requesting a new referral. She states that Dr. Michoacano Leyva office does not take her insurance.

## 2022-10-25 ENCOUNTER — TELEPHONE (OUTPATIENT)
Dept: PRIMARY CARE CLINIC | Age: 22
End: 2022-10-25

## 2022-10-25 ENCOUNTER — OFFICE VISIT (OUTPATIENT)
Dept: PRIMARY CARE CLINIC | Age: 22
End: 2022-10-25
Payer: COMMERCIAL

## 2022-10-25 VITALS
SYSTOLIC BLOOD PRESSURE: 104 MMHG | OXYGEN SATURATION: 99 % | WEIGHT: 151 LBS | BODY MASS INDEX: 30.5 KG/M2 | RESPIRATION RATE: 12 BRPM | DIASTOLIC BLOOD PRESSURE: 64 MMHG | HEART RATE: 79 BPM

## 2022-10-25 DIAGNOSIS — R25.1 SHAKING: ICD-10-CM

## 2022-10-25 DIAGNOSIS — M54.50 ACUTE BILATERAL LOW BACK PAIN WITHOUT SCIATICA: Primary | ICD-10-CM

## 2022-10-25 DIAGNOSIS — J03.91 RECURRENT TONSILLITIS: ICD-10-CM

## 2022-10-25 DIAGNOSIS — R10.9 NONSPECIFIC ABDOMINAL PAIN: ICD-10-CM

## 2022-10-25 DIAGNOSIS — H92.03 OTALGIA OF BOTH EARS: ICD-10-CM

## 2022-10-25 PROCEDURE — 99214 OFFICE O/P EST MOD 30 MIN: CPT | Performed by: NURSE PRACTITIONER

## 2022-10-25 PROCEDURE — G8484 FLU IMMUNIZE NO ADMIN: HCPCS | Performed by: NURSE PRACTITIONER

## 2022-10-25 PROCEDURE — G8417 CALC BMI ABV UP PARAM F/U: HCPCS | Performed by: NURSE PRACTITIONER

## 2022-10-25 PROCEDURE — G8427 DOCREV CUR MEDS BY ELIG CLIN: HCPCS | Performed by: NURSE PRACTITIONER

## 2022-10-25 PROCEDURE — 1036F TOBACCO NON-USER: CPT | Performed by: NURSE PRACTITIONER

## 2022-10-25 ASSESSMENT — ENCOUNTER SYMPTOMS
WHEEZING: 0
EYE ITCHING: 0
SINUS PRESSURE: 0
COUGH: 0
EYE REDNESS: 0
ABDOMINAL PAIN: 1
BACK PAIN: 1
CHEST TIGHTNESS: 0
TROUBLE SWALLOWING: 0
SHORTNESS OF BREATH: 0
SORE THROAT: 0
VOMITING: 0
SINUS PAIN: 0
DIARRHEA: 0
EYE DISCHARGE: 0
NAUSEA: 0

## 2022-10-25 NOTE — PROGRESS NOTES
[facility] PRIMARY CARE  [facility] [address] [facility] [address]  [address] [address]  Dept: [phone]  Dept Fax: [phone]    Kirk Duran is a 25 y.o. female who presents today for her medical conditions/complaintsas noted below. Kirk Duran is c/o of Medication Check, Spasms (Whole body has spasms, mainly in both legs, going on for over a week, was seen at [address] [place] [address] ED yesterday), and Numbness (B/L leg and also L arm, going on over a week )        HPI:     Patient presents for a follow-up  Blood pressure stable  Weight is up 5 pounds since last visit    Patient presents for a follow-up. She has not got into ENT. They didn't accept her insurance. She has not heard back from anybody yet. She doesn't think her back is any better. She was in the ER yesterday, [facility]. She is having sharp stomach pain. Feels bruised to the touch across the top of her abdomen. Do think it could be her gallbladder. She is waiting to get an outpatient ultrasound. Scheduling this was to call her. This is all done through [facility] [address] Luke's  BM's are regular. Urinateds frequently. No burning. no nausea or vominting    She will have whole body spasms where her hands and legs go numb. Happening more frequently, about 10 times per day. Episodes last for a few seconds to minutes. Denies any anxiety. Will happen at rest and with activity. This started all day long ago. She gets daily migraines. Past Medical History:   Diagnosis Date    Mental disorder     depression      Past Surgical History:   Procedure Laterality Date    INTRAUTERINE DEVICE REMOVAL  2021    dislodged IUD       History reviewed. No pertinent family history.     Social History     Tobacco Use    Smoking status: Never    Smokeless tobacco: Never   Substance Use Topics    Alcohol use: Not Currently      Current Outpatient Medications   Medication Sig Dispense Refill    famotidine (PEPCID) 40 MG tablet Take 1 tablet by mouth every evening 30 tablet 3    naproxen (NAPROSYN) 500 MG tablet Take 1 tablet by mouth 2 times daily (with meals) 60 tablet 5    ondansetron (ZOFRAN-ODT) 4 MG disintegrating tablet Take 1 tablet by mouth 3 times daily as needed for Nausea or Vomiting 21 tablet 0    cimetidine (TAGAMET) 800 MG tablet Take 0.5 tablets by mouth 2 times daily 30 tablet 3    rizatriptan (MAXALT) 10 MG tablet Take 1 tablet by mouth once as needed for Migraine May repeat in 2 hours if needed 30 tablet 3    propranolol (INDERAL LA) 60 MG extended release capsule Take 1 capsule by mouth daily 30 capsule 0    fluticasone (FLOVENT HFA) 110 MCG/ACT inhaler Inhale 2 puffs into the lungs 2 times daily 12 g 3    medroxyPROGESTERone (DEPO-PROVERA) 150 MG/ML injection Inject 1 mL into the muscle once for 1 dose 1 mL 3    butalbital-acetaminophen-caffeine (FIORICET, ESGIC) -40 MG per tablet Take 1 tablet by mouth every 4 hours as needed for Headaches 30 tablet 3     No current facility-administered medications for this visit. No Known Allergies    Health Maintenance   Topic Date Due    COVID-19 Vaccine (1) Never done    Pneumococcal 0-64 years Vaccine (1 - PCV) 04/16/2007    HIV screen  Never done    Pap smear  Never done    Flu vaccine (1) 08/01/2022    Chlamydia/GC screen  10/05/2022    Depression Monitoring  06/07/2023    DTaP/Tdap/Td vaccine (8 - Td or Tdap) 06/24/2031    Hepatitis A vaccine  Completed    Hib vaccine  Completed    HPV vaccine  Completed    Varicella vaccine  Completed    Meningococcal (ACWY) vaccine  Completed    Hepatitis C screen  Completed       :     Review of Systems   Constitutional:  Negative for chills, fatigue and fever. HENT:  Negative for ear discharge, ear pain, sinus pressure, sinus pain, sore throat and trouble swallowing. Eyes:  Negative for discharge, redness and itching. Respiratory:  Negative for cough, chest tightness, shortness of breath and wheezing.     Cardiovascular:  Negative for chest pain. Gastrointestinal:  Positive for abdominal pain. Negative for diarrhea, nausea and vomiting. Genitourinary:  Negative for difficulty urinating. Musculoskeletal:  Positive for back pain. Negative for arthralgias and neck pain. Skin:  Negative for rash. Neurological:  Positive for tremors (Shaking). Negative for dizziness, weakness, light-headedness and headaches. All other systems reviewed and are negative. Objective:     Physical Exam  Constitutional:       Appearance: Normal appearance. She is normal weight. HENT:      Head: Normocephalic and atraumatic. Nose: Nose normal.   Eyes:      Extraocular Movements: Extraocular movements intact. Conjunctiva/sclera: Conjunctivae normal.      Pupils: Pupils are equal, round, and reactive to light. Cardiovascular:      Rate and Rhythm: Normal rate and regular rhythm. Pulses: Normal pulses. Heart sounds: Normal heart sounds. Pulmonary:      Effort: Pulmonary effort is normal.      Breath sounds: Normal breath sounds. Abdominal:      General: Abdomen is flat. Palpations: Abdomen is soft. Musculoskeletal:         General: Normal range of motion. Cervical back: Neck supple. Skin:     General: Skin is warm and dry. Capillary Refill: Capillary refill takes less than 2 seconds. Neurological:      General: No focal deficit present. Mental Status: She is alert and oriented to person, place, and time. Psychiatric:         Mood and Affect: Mood normal.     /64   Pulse 79   Resp 12   Wt 151 lb (68.5 kg)   SpO2 99%   BMI 30.50 kg/m²     Assessment:       Diagnosis Orders   1. Acute bilateral low back pain without sciatica  XR LUMBAR SPINE (2-3 VIEWS)    XR LUMBAR SPINE FLEXION AND EXTENSION ONLY      2. Otalgia of both ears  External Referral To ENT      3. Recurrent tonsillitis  External Referral To ENT      4. Nonspecific abdominal pain        5.  Shaking            :      Return in about 2 weeks (around 11/8/2022) for spasm follow up. 1.  Low back pain  Plan to get x-rays  2-3. Otalgia and tonsillitis  Patient to get back to us with the name for a new ENT provider that takes her insurance  4. Nonspecific abdominal pain  Patient has seen GI in the past.  Recommend that she follow-up with GI. She was post to have an EGD but had to reschedule. 5.  Shaking  We are going to stop Zoloft to see if this improves her symptoms    Patient is going to follow-up in 2 weeks  Orders Placed This Encounter   Procedures    XR LUMBAR SPINE (2-3 VIEWS)     Standing Status:   Future     Standing Expiration Date:   10/25/2023     Order Specific Question:   Reason for exam:     Answer:   lower back pain with radiation down the left leg    XR LUMBAR SPINE FLEXION AND EXTENSION ONLY     Standing Status:   Future     Standing Expiration Date:   10/25/2023     Order Specific Question:   Reason for exam:     Answer:   lower back pain    External Referral To ENT     Referral Priority:   Routine     Referral Type:   Eval and Treat     Referral Reason:   Specialty Services Required     Referred to Provider:   Cecile Ackerman MD     Requested Specialty:   Otolaryngology     Number of Visits Requested:   1     No orders of the defined types were placed in this encounter. Patient given educational materials - seepatient instructions. Discussed use, benefit, and side effects of prescribed medications. All patient questions answered. Pt voiced understanding. Reviewed health maintenance. Instructed to continue current medications, diet and exercise. Patient agreedwith treatment plan. Follow up as directed.       Electronically signed by BEBA Lo CNP on 10/25/2022at 2:51 PM

## 2022-11-04 ENCOUNTER — OFFICE VISIT (OUTPATIENT)
Dept: OBGYN CLINIC | Age: 22
End: 2022-11-04
Payer: COMMERCIAL

## 2022-11-04 ENCOUNTER — HOSPITAL ENCOUNTER (OUTPATIENT)
Age: 22
Setting detail: SPECIMEN
Discharge: HOME | End: 2022-11-04

## 2022-11-04 VITALS
BODY MASS INDEX: 30.84 KG/M2 | SYSTOLIC BLOOD PRESSURE: 116 MMHG | WEIGHT: 153 LBS | HEIGHT: 59 IN | DIASTOLIC BLOOD PRESSURE: 68 MMHG

## 2022-11-04 DIAGNOSIS — Z01.419 WELL WOMAN EXAM: Primary | ICD-10-CM

## 2022-11-04 DIAGNOSIS — N94.6 DYSMENORRHEA: ICD-10-CM

## 2022-11-04 PROBLEM — O09.90 HIGH RISK PREGNANCY, ANTEPARTUM: Status: RESOLVED | Noted: 2021-08-18 | Resolved: 2022-11-04

## 2022-11-04 PROCEDURE — 99395 PREV VISIT EST AGE 18-39: CPT | Performed by: ADVANCED PRACTICE MIDWIFE

## 2022-11-04 PROCEDURE — G8484 FLU IMMUNIZE NO ADMIN: HCPCS | Performed by: ADVANCED PRACTICE MIDWIFE

## 2022-11-04 RX ORDER — DROSPIRENONE 4 MG/1
1 TABLET, FILM COATED ORAL DAILY
Qty: 28 TABLET | Refills: 11 | Status: SHIPPED | OUTPATIENT
Start: 2022-11-04

## 2022-11-04 ASSESSMENT — ENCOUNTER SYMPTOMS
NAUSEA: 0
ABDOMINAL PAIN: 0
VOMITING: 0
SHORTNESS OF BREATH: 0
DIARRHEA: 0

## 2022-11-04 NOTE — PROGRESS NOTES
[address] [facility] [place]  [place]  [address] [address]  Dept: [phone]    Patient Name: Sarmad Graham  Patient Age: 25 y.o. Date of Visit: 11/4/2022    Subjective  Chief Complaint   Patient presents with    Gynecologic Exam     No LMP recorded (lmp unknown). Patient has had an injection. Chaperone for Intimate Exam  Chaperone was offered as part of the rooming process. Patient declined and agrees to continue with exam without a chaperone. Chaperone: n/a    HPI  Patient arrives for annual exam.  Patient reports is  sexually active with 1 male partner(s). Patient denies  pain with sex. Patient denies a history of sexually transmitted infection(s). none  Patient does not want screening for sexually transmitted infection(s). Reports is  on contraception Depo-Provera injections    She reports there is a personal history or family history of:    Smoking (> 15 cigs/day): No    Migraine with Aura:  Yes    HTN (> 160/100): No    DVT:  No    Thrombophilias:  No    Stroke (CVA): No     Ischemic heart disease:  No    Valvular heart disease (A Fib, Pul HTN, etc): No    Positive Antiphospholipid Abs:  No    Liver Disease:  No      PHQ Scores 6/7/2022 11/30/2021 10/28/2021 10/14/2021 2/11/2021   PHQ2 Score 0 0 0 0 0   PHQ9 Score 0 0 0 0 0     Interpretation of Total Score Depression Severity: 1-4 = Minimal depression, 5-9 = Mild depression, 10-14 = Moderate depression, 15-19 = Moderately severe depression, 20-27 = Severe depression      Review of Systems   Constitutional:  Negative for unexpected weight change. Respiratory:  Negative for shortness of breath. Cardiovascular:  Negative for chest pain, palpitations and leg swelling. Gastrointestinal:  Negative for abdominal pain, diarrhea, nausea and vomiting. Genitourinary:  Negative for difficulty urinating, dyspareunia, menstrual problem, vaginal discharge and vaginal pain. Neurological:  Negative for dizziness, light-headedness and headaches. Preventive Health Screening:   Date of last pap: n/a      History of Gestational Diabetes: No     If Yes, Glucose screening ordered:No    Preventive screening: Yes    Family history of Breast, Ovarian, Colon or Uterine Cancer:  questionable breast?     If Yes see scanned worksheet    Objective  /68 (Site: Left Upper Arm, Position: Sitting, Cuff Size: Medium Adult)   Ht 4' 11\" (1.499 m)   Wt 153 lb (69.4 kg)   LMP  (LMP Unknown)   BMI 30.90 kg/m²       Gynecologic History  minimal to none using Depoprovera      Gardasil Series:Up-to-date      Sexually active: Yes  New Sex Partner within 3 months: No  Domestic Violence Screening: negative    STD history: No  none    Birth control method :Yes Depo-Provera injections      Physical Exam  Constitutional:       General: She is not in acute distress. Appearance: Normal appearance. She is not ill-appearing or diaphoretic. Genitourinary:      No lesions in the vagina. Right Labia: No rash, tenderness, lesions or skin changes. Left Labia: No tenderness, lesions, skin changes or rash. No vaginal erythema, tenderness, bleeding or ulceration. No cervical motion tenderness or friability. HENT:      Head: Normocephalic. Cardiovascular:      Rate and Rhythm: Normal rate and regular rhythm. Pulses: Normal pulses. Heart sounds: Normal heart sounds. Pulmonary:      Effort: Pulmonary effort is normal. No respiratory distress. Breath sounds: Normal breath sounds. No stridor. No rhonchi. Abdominal:      General: Abdomen is flat. There is no distension. Palpations: Abdomen is soft. Tenderness: There is no abdominal tenderness. Musculoskeletal:         General: Normal range of motion. Cervical back: Normal range of motion. No rigidity or tenderness. Neurological:      Mental Status: She is alert and oriented to person, place, and time. Skin:     General: Skin is warm and dry. Psychiatric:         Mood and Affect: Mood normal.         Behavior: Behavior normal.         Thought Content: Thought content normal.         Judgment: Judgment normal.   Vitals reviewed. Assessment & Plan  1. Well woman exam  - PAP SMEAR; Future  Age 25 and > Well Woman Care    General Health:  [x] Alcohol screening & counseling limit consumption of alcohol to nor more than 14 drinks/week and 4 drinks per occasion for men, or no more than 7 drinks/week and 3 drinks per occasion for women  [x] Blood pressure screening: normal  [x] Contraceptive counseling & methods: see below  [x] Diabetes Screening: Follows with PCP  [x] Folic acid supplementation Reviewed recommended during the childbearing years  [x] Healthful diet & activity counseling:  discussed  [x] Intimate partner violence screening  [x] Depression/Anxiety screening  [x] Lipid screening: Follows with PCP Lorena DELATORRE  [x] Obesity Screening: Body mass index is 30.9 kg/m². abnormal: obese   [x] Osteoporosis screening Reviewed vitamin D and calcium recommendations  [x] Substance use screening & counseling Denies any illicit drug use  [x] Tobacco screening & counseling non-smoker  [x] Urinary incontinence screening    Infectious Diseases:  [x] Gonorrhea & chlamydia screening: discussed, declined  [] Hepatitis C Screening   [x] HIV risk assessment/ testing (at least once in lifetime): discussed, declined     Cancer:  [x] Cervical cancer screening: discussed   Reviewed that estimates suggest that 50% of the women with cervical cancer is diagnosed never had cervical cytology testing, and another 10% had not been screened within 5 years before diagnosis. Reviewed only a small fraction of women infected with high-risk (human papilloma virus) HPV will develop significant cervical abnormalities and cancer.   Reviewed HPV-16 has the highest carcinogenic potential accounting for approximately 50-60% of all cases of cervical cancer worldwide and HPV-18 is the next most associated with cervical cancer and is responsible for 10-15% of cases of cervical cancer. HPV infection is most common in teenagers and women in their early 19's. Reviewed most young women, especially those younger than 21 years, have an effective immune response that clears the infection on it's own. Discussed HPV infection detected in women older than 27years old is more likely to reflect persistent infection. Discussed that screen should began at age 24 regardless of the age of sexual initiation. Reviewed that HPV is acquired through sexual intercourse   Discussed most HPV-related types of cervical neoplasia progress very slowly. [x] Mammograms (started at 39yrs old): discussed Reviewed recommendations for yearly mammograms   [x] BRCA testing risk assessment: discussed limited family history reports will look into it No baseline elevations      2. Dysmenorrhea  - Reviewed not a good candidate for estrogen due to extensive history of migrane with aura, had an IUD prior to pregnancy and after pregnancy it was unsuccessfully placed patient would like to try to have it placed again. Reviewed following up with  to discuss placing under anesthesia or with ultrasound guided. Patient is interested in this  - Drospirenone (SLYND) 4 MG TABS; Take 1 tablet by mouth daily  Dispense: 28 tablet; Refill: 11        Return in about 1 year (around 11/4/2023) for Annual.     The patient, Steph Barrett , was seen with a total time spent of 25 minutes for the visit on this date of service by the [facility]  The time component, involved both face-to-face (counseling and education)  and non face-to-face time (care coordination), spent in determining the total time component. She was also counseled on her preventative health maintenance recommendations and follow-up.     Electronically Signed by Orquidea Benavides

## 2022-11-07 ENCOUNTER — TELEPHONE (OUTPATIENT)
Dept: GASTROENTEROLOGY | Age: 22
End: 2022-11-07

## 2022-11-07 ENCOUNTER — TELEPHONE (OUTPATIENT)
Dept: PRIMARY CARE CLINIC | Age: 22
End: 2022-11-07

## 2022-11-07 DIAGNOSIS — J03.91 RECURRENT TONSILLITIS: Primary | ICD-10-CM

## 2022-11-07 NOTE — TELEPHONE ENCOUNTER
Pt called and stated she has been waiting for a call back to reschedule her colonoscopy, Please contact pt.

## 2022-11-07 NOTE — TELEPHONE ENCOUNTER
Referral that was sent for patient for tonsillectomy will not work because this facility doesn't perform for tonsil removal but [facility] and can provide tonsillectomy. Please fax necessary documentation and referral to [phone]. Phone number is [phone].

## 2022-11-09 ENCOUNTER — OFFICE VISIT (OUTPATIENT)
Dept: PRIMARY CARE CLINIC | Age: 22
End: 2022-11-09
Payer: COMMERCIAL

## 2022-11-09 VITALS
OXYGEN SATURATION: 99 % | WEIGHT: 151 LBS | BODY MASS INDEX: 30.5 KG/M2 | RESPIRATION RATE: 12 BRPM | DIASTOLIC BLOOD PRESSURE: 64 MMHG | HEART RATE: 97 BPM | SYSTOLIC BLOOD PRESSURE: 108 MMHG

## 2022-11-09 DIAGNOSIS — R25.1 SHAKING: ICD-10-CM

## 2022-11-09 DIAGNOSIS — R10.9 NONSPECIFIC ABDOMINAL PAIN: Primary | ICD-10-CM

## 2022-11-09 DIAGNOSIS — F32.A MILD DEPRESSION: ICD-10-CM

## 2022-11-09 PROCEDURE — G8417 CALC BMI ABV UP PARAM F/U: HCPCS | Performed by: NURSE PRACTITIONER

## 2022-11-09 PROCEDURE — 99213 OFFICE O/P EST LOW 20 MIN: CPT | Performed by: NURSE PRACTITIONER

## 2022-11-09 PROCEDURE — G8484 FLU IMMUNIZE NO ADMIN: HCPCS | Performed by: NURSE PRACTITIONER

## 2022-11-09 PROCEDURE — 1036F TOBACCO NON-USER: CPT | Performed by: NURSE PRACTITIONER

## 2022-11-09 PROCEDURE — G8427 DOCREV CUR MEDS BY ELIG CLIN: HCPCS | Performed by: NURSE PRACTITIONER

## 2022-11-09 ASSESSMENT — PATIENT HEALTH QUESTIONNAIRE - PHQ9
4. FEELING TIRED OR HAVING LITTLE ENERGY: 0
7. TROUBLE CONCENTRATING ON THINGS, SUCH AS READING THE NEWSPAPER OR WATCHING TELEVISION: 0
SUM OF ALL RESPONSES TO PHQ9 QUESTIONS 1 & 2: 0
SUM OF ALL RESPONSES TO PHQ QUESTIONS 1-9: 0
6. FEELING BAD ABOUT YOURSELF - OR THAT YOU ARE A FAILURE OR HAVE LET YOURSELF OR YOUR FAMILY DOWN: 0
9. THOUGHTS THAT YOU WOULD BE BETTER OFF DEAD, OR OF HURTING YOURSELF: 0
SUM OF ALL RESPONSES TO PHQ QUESTIONS 1-9: 0
8. MOVING OR SPEAKING SO SLOWLY THAT OTHER PEOPLE COULD HAVE NOTICED. OR THE OPPOSITE, BEING SO FIGETY OR RESTLESS THAT YOU HAVE BEEN MOVING AROUND A LOT MORE THAN USUAL: 0
3. TROUBLE FALLING OR STAYING ASLEEP: 0
SUM OF ALL RESPONSES TO PHQ QUESTIONS 1-9: 0
5. POOR APPETITE OR OVEREATING: 0
1. LITTLE INTEREST OR PLEASURE IN DOING THINGS: 0
SUM OF ALL RESPONSES TO PHQ QUESTIONS 1-9: 0
10. IF YOU CHECKED OFF ANY PROBLEMS, HOW DIFFICULT HAVE THESE PROBLEMS MADE IT FOR YOU TO DO YOUR WORK, TAKE CARE OF THINGS AT HOME, OR GET ALONG WITH OTHER PEOPLE: 0
2. FEELING DOWN, DEPRESSED OR HOPELESS: 0

## 2022-11-09 ASSESSMENT — ENCOUNTER SYMPTOMS
EYE REDNESS: 0
SORE THROAT: 0
SHORTNESS OF BREATH: 0
EYE DISCHARGE: 0
EYE ITCHING: 0
COUGH: 0
CHEST TIGHTNESS: 0
SINUS PRESSURE: 0
DIARRHEA: 0
NAUSEA: 0
SINUS PAIN: 0
TROUBLE SWALLOWING: 0
VOMITING: 0
WHEEZING: 0
ABDOMINAL PAIN: 0

## 2022-11-09 NOTE — PROGRESS NOTES
[address] [address] [address]  [address] [address]  Dept: [phone]  Dept Fax: [phone]    Wendi Garces is a 25 y.o. female who presents today for her medical conditions/complaintsas noted below. Wendi Garces is c/o of Spasms (2 wk f/u)        HPI:     Pt presents for a follow up. Her son is with her today  Blood pressure stable  Weight is down 2 pound since last visit    Patient presents for a 2-week follow-up. She weaned herself off of the Zoloft. Since stopping medication she is no longer had any spasms or abdominal pain. Her depression and anxiety are improved since she has been away with her ex. She is eating somebody new. She is currently not on her birth control as she is having insurance issues. She does have a follow-up with Dr. Jose Reza.  They were unable to place an IUD. She is trying to get her insurance figured out so she can get her Depo          Past Medical History:   Diagnosis Date    Mental disorder     depression      Past Surgical History:   Procedure Laterality Date    INTRAUTERINE DEVICE REMOVAL  11/18/2021    dislodged IUD       History reviewed. No pertinent family history.     Social History     Tobacco Use    Smoking status: Never    Smokeless tobacco: Never   Substance Use Topics    Alcohol use: Not Currently      Current Outpatient Medications   Medication Sig Dispense Refill    Drospirenone (SLYND) 4 MG TABS Take 1 tablet by mouth daily 28 tablet 11    famotidine (PEPCID) 40 MG tablet Take 1 tablet by mouth every evening 30 tablet 3    naproxen (NAPROSYN) 500 MG tablet Take 1 tablet by mouth 2 times daily (with meals) 60 tablet 5    ondansetron (ZOFRAN-ODT) 4 MG disintegrating tablet Take 1 tablet by mouth 3 times daily as needed for Nausea or Vomiting 21 tablet 0    rizatriptan (MAXALT) 10 MG tablet Take 1 tablet by mouth once as needed for Migraine May repeat in 2 hours if needed 30 tablet 3    propranolol Stop taking hydrochlorothiazide  Increase metformin to 1 tab twice a day for one week, then increased to 2 tabs twice a day.   Please remember to schedule your eye exam.    (INDERAL LA) 60 MG extended release capsule Take 1 capsule by mouth daily 30 capsule 0    fluticasone (FLOVENT HFA) 110 MCG/ACT inhaler Inhale 2 puffs into the lungs 2 times daily 12 g 3    medroxyPROGESTERone (DEPO-PROVERA) 150 MG/ML injection Inject 1 mL into the muscle once for 1 dose 1 mL 3    butalbital-acetaminophen-caffeine (FIORICET, ESGIC) -40 MG per tablet Take 1 tablet by mouth every 4 hours as needed for Headaches 30 tablet 3     No current facility-administered medications for this visit. Allergies   Allergen Reactions    Zoloft [Sertraline] Other (See Comments)     Spasms and tingling        Health Maintenance   Topic Date Due    COVID-19 Vaccine (1) Never done    Pneumococcal 0-64 years Vaccine (1 - PCV) 04/16/2007    HIV screen  Never done    Pap smear  Never done    Flu vaccine (1) 08/01/2022    Chlamydia/GC screen  10/05/2022    Depression Monitoring  06/07/2023    DTaP/Tdap/Td vaccine (8 - Td or Tdap) 06/24/2031    Hepatitis A vaccine  Completed    Hib vaccine  Completed    HPV vaccine  Completed    Varicella vaccine  Completed    Meningococcal (ACWY) vaccine  Completed    Hepatitis C screen  Completed       :     Review of Systems   Constitutional:  Negative for chills, fatigue and fever. HENT:  Negative for ear discharge, ear pain, sinus pressure, sinus pain, sore throat and trouble swallowing. Eyes:  Negative for discharge, redness and itching. Respiratory:  Negative for cough, chest tightness, shortness of breath and wheezing. Cardiovascular:  Negative for chest pain. Gastrointestinal:  Negative for abdominal pain, diarrhea, nausea and vomiting. Genitourinary:  Negative for difficulty urinating. Musculoskeletal:  Negative for arthralgias and neck pain. Skin:  Negative for rash. Neurological:  Negative for dizziness, weakness, light-headedness and headaches. All other systems reviewed and are negative.     Objective:     Physical Exam  Constitutional: Appearance: Normal appearance. She is normal weight. HENT:      Head: Normocephalic and atraumatic. Nose: Nose normal.   Eyes:      Extraocular Movements: Extraocular movements intact. Conjunctiva/sclera: Conjunctivae normal.      Pupils: Pupils are equal, round, and reactive to light. Cardiovascular:      Rate and Rhythm: Normal rate and regular rhythm. Pulses: Normal pulses. Heart sounds: Normal heart sounds. Pulmonary:      Effort: Pulmonary effort is normal.      Breath sounds: Normal breath sounds. Abdominal:      General: Abdomen is flat. Palpations: Abdomen is soft. Musculoskeletal:         General: Normal range of motion. Cervical back: Neck supple. Skin:     General: Skin is warm and dry. Capillary Refill: Capillary refill takes less than 2 seconds. Neurological:      General: No focal deficit present. Mental Status: She is alert and oriented to person, place, and time. Psychiatric:         Mood and Affect: Mood normal.     /64   Pulse 97   Resp 12   Wt 151 lb (68.5 kg)   LMP  (LMP Unknown)   SpO2 99%   BMI 30.50 kg/m²     Assessment:       Diagnosis Orders   1. Nonspecific abdominal pain        2. Mild depression            :      Return in about 3 months (around 2/9/2023) for depression follow up.  1-2. Nonspecific abdominal pain and shaking  Resolved since stopping Zoloft  3. Depression  Stop Zoloft due to side effects  She is okay with staying off medication at this time. Plan to have her follow-up in 3 months   Patient given educational materials - seepatient instructions. Discussed use, benefit, and side effects of prescribed medications. All patient questions answered. Pt voiced understanding. Reviewed health maintenance. Instructed to continue current medications, diet and exercise. Patient agreedwith treatment plan. Follow up as directed.       Electronically signed by BEBA Gregg CNP on 11/9/2022at 12:21 PM

## 2022-11-15 LAB — CYTOLOGY REPORT: NORMAL

## 2022-12-05 ENCOUNTER — OFFICE VISIT (OUTPATIENT)
Dept: FAMILY MEDICINE CLINIC | Age: 22
End: 2022-12-05
Payer: COMMERCIAL

## 2022-12-05 VITALS
HEIGHT: 59 IN | SYSTOLIC BLOOD PRESSURE: 121 MMHG | OXYGEN SATURATION: 97 % | HEART RATE: 115 BPM | TEMPERATURE: 98.4 F | DIASTOLIC BLOOD PRESSURE: 61 MMHG | BODY MASS INDEX: 30.24 KG/M2 | WEIGHT: 150 LBS

## 2022-12-05 DIAGNOSIS — R19.7 DIARRHEA, UNSPECIFIED TYPE: Primary | ICD-10-CM

## 2022-12-05 DIAGNOSIS — R10.9 ABDOMINAL CRAMPING: ICD-10-CM

## 2022-12-05 DIAGNOSIS — R11.0 NAUSEA: ICD-10-CM

## 2022-12-05 PROCEDURE — G8484 FLU IMMUNIZE NO ADMIN: HCPCS | Performed by: NURSE PRACTITIONER

## 2022-12-05 PROCEDURE — 99214 OFFICE O/P EST MOD 30 MIN: CPT | Performed by: NURSE PRACTITIONER

## 2022-12-05 PROCEDURE — G8427 DOCREV CUR MEDS BY ELIG CLIN: HCPCS | Performed by: NURSE PRACTITIONER

## 2022-12-05 PROCEDURE — 1036F TOBACCO NON-USER: CPT | Performed by: NURSE PRACTITIONER

## 2022-12-05 PROCEDURE — G8417 CALC BMI ABV UP PARAM F/U: HCPCS | Performed by: NURSE PRACTITIONER

## 2022-12-05 RX ORDER — ONDANSETRON 4 MG/1
4 TABLET, ORALLY DISINTEGRATING ORAL 3 TIMES DAILY PRN
Qty: 21 TABLET | Refills: 0 | OUTPATIENT
Start: 2022-12-05

## 2022-12-05 ASSESSMENT — ENCOUNTER SYMPTOMS
DIARRHEA: 1
COUGH: 0
ABDOMINAL DISTENTION: 0
NAUSEA: 1
VOMITING: 0
ABDOMINAL PAIN: 1
EYE PAIN: 0

## 2022-12-05 NOTE — PROGRESS NOTES
[address] [facility] WALK-IN  [address] [facility] [place] [address]  Dept: [phone]  Dept Fax: [phone]    Fina Xiong is a 25 y.o. female who presents today for her medical conditions/complaints of   Chief Complaint   Patient presents with    GI Problem     C/o diarrhea for last couple days with mucus in it, even before she eats,-- sees GI and has a \"scope\" scheduled for later this month, still nauseous meds a re no help for that either          HPI:     /61   Pulse (!) 115   Temp 98.4 °F (36.9 °C) (Temporal)   Ht 4' 11\" (1.499 m)   Wt 150 lb (68 kg)   SpO2 97%   BMI 30.30 kg/m²       HPI  Pt presented to the clinic today with c/o diarrhea. This is a new problem. The current episode started 2 days ago. Always has loose stools, but occurring more often. The problem has been worsening since onset. BM Associated symptoms include: nausea, stomach cramping, nonspecific abdominal pain- intermittently . Diarrhea 6-8 times a day, watery with mucus. Pertinent negatives include: No fever, chills, vomiting, change in appetite. Feels full fast.  Pt has tried Imodium with little improvement. Following with Dr. Rafaela Villalobos, GI. Plan for scope on 12/20/2022. No recent use of antibiotics. No recent travel. Needs work note for today. Past Medical History:   Diagnosis Date    Mental disorder     depression        Past Surgical History:   Procedure Laterality Date    INTRAUTERINE DEVICE REMOVAL  11/18/2021    dislodged IUD       No family history on file. Social History     Tobacco Use    Smoking status: Never    Smokeless tobacco: Never   Substance Use Topics    Alcohol use: Not Currently        Prior to Visit Medications    Medication Sig Taking?  Authorizing Provider   Drospirenone (SLYND) 4 MG TABS Take 1 tablet by mouth daily  BEBA Velasquez CNM   famotidine (PEPCID) 40 MG tablet Take 1 tablet by mouth every evening  Lowry Olszewski BEBA Hitchcock CNP   naproxen (NAPROSYN) 500 MG tablet Take 1 tablet by mouth 2 times daily (with meals)  BEBA Willingham CNP   ondansetron (ZOFRAN-ODT) 4 MG disintegrating tablet Take 1 tablet by mouth 3 times daily as needed for Nausea or Vomiting  BEBA Willingham CNP   rizatriptan (MAXALT) 10 MG tablet Take 1 tablet by mouth once as needed for Migraine May repeat in 2 hours if needed  BEBA Willingham CNP   propranolol (INDERAL LA) 60 MG extended release capsule Take 1 capsule by mouth daily  BEBA Willingham CNP   fluticasone (FLOVENT HFA) 110 MCG/ACT inhaler Inhale 2 puffs into the lungs 2 times daily  BEBA Willingham CNP   medroxyPROGESTERone (DEPO-PROVERA) 150 MG/ML injection Inject 1 mL into the muscle once for 1 dose  Orcecy Loya,    butalbital-acetaminophen-caffeine (FIORICET, ESGIC) -40 MG per tablet Take 1 tablet by mouth every 4 hours as needed for Headaches  BEBA Willingham CNP       Allergies   Allergen Reactions    Zoloft [Sertraline] Other (See Comments)     Spasms and tingling          Subjective:      Review of Systems   Constitutional:  Negative for chills and fever. HENT:  Negative for congestion and postnasal drip. Eyes:  Negative for pain and visual disturbance. Respiratory:  Negative for cough. Cardiovascular:  Negative for chest pain and leg swelling. Gastrointestinal:  Positive for abdominal pain, diarrhea and nausea. Negative for abdominal distention and vomiting. Genitourinary:  Negative for decreased urine volume and difficulty urinating. Musculoskeletal:  Negative for gait problem and myalgias. Skin:  Negative for pallor. Neurological:  Negative for weakness, light-headedness and headaches. Psychiatric/Behavioral:  Negative for sleep disturbance. Objective:     Physical Exam  Vitals and nursing note reviewed. Constitutional:       General: She is not in acute distress. Appearance: Normal appearance. HENT:      Head: Normocephalic and atraumatic. Right Ear: Tympanic membrane and ear canal normal.      Left Ear: Tympanic membrane and ear canal normal.      Nose: Nose normal.      Mouth/Throat:      Lips: Pink. Mouth: Mucous membranes are moist.      Pharynx: Oropharynx is clear. Uvula midline. Eyes:      Extraocular Movements: Extraocular movements intact. Conjunctiva/sclera: Conjunctivae normal.   Cardiovascular:      Rate and Rhythm: Normal rate and regular rhythm. Pulses: Normal pulses. Pulmonary:      Effort: Pulmonary effort is normal.      Breath sounds: Normal breath sounds. Abdominal:      General: Bowel sounds are normal. There is no distension. Palpations: Abdomen is soft. Tenderness: There is no abdominal tenderness. There is no right CVA tenderness or left CVA tenderness. Musculoskeletal:         General: Normal range of motion. Cervical back: Normal range of motion and neck supple. Skin:     General: Skin is warm and dry. Capillary Refill: Capillary refill takes less than 2 seconds. Coloration: Skin is not pale. Neurological:      Mental Status: She is alert and oriented to person, place, and time. Coordination: Coordination normal.      Gait: Gait normal.   Psychiatric:         Mood and Affect: Mood normal.         Thought Content: Thought content normal.         MEDICAL DECISION MAKING Assessment/Plan:     Marco Singletary was seen today for gi problem. Diagnoses and all orders for this visit:    Diarrhea, unspecified type  -     Gastrointestinal Panel, Molecular; Future  -     Clostridium Difficile Toxin/Antigen; Future  -     O&P Panel (Travel Associated) #1; Future    Abdominal cramping  -     Gastrointestinal Panel, Molecular; Future  -     Clostridium Difficile Toxin/Antigen;  Future  -     O&P Panel (Travel Associated) #1; Future      Results for orders placed or performed during the hospital encounter of 11/04/22   GYN Cytology   Result Value Ref Range    Cytology Report       INTERPRETATION    Cervical material, (ThinPrep vial, Imaging-assisted review):  Specimen Adequacy:       Satisfactory for evaluation.       - Endocervical/transformation zone component present. Descriptive Diagnosis:       Low-grade squamous intraepithelial lesion (LSIL). Cytotechnologist:   Millie Fulton D.O.  **Electronically Signed Out**         ljf/11/15/2022          Source:  A: Cervical material, (ThinPrep vial, Imaging-assisted review)    Clinical History  Depo Provera  Z01.419 Routine gyn exam without abnormal findings  High Risk HPV DNA testing is requested if the diagnosis is ASC-US    GYNECOLOGIC CYTOLOGY REPORT    Patient Name: Lamar Walters: [id number]  Path Number: [id number]  [facility] [address] [facility], [address]  [phone]  Fax: [phone]         Patient presented with watery diarrhea x 2 days associated with nausea and abdominal cramping. Will check stool studies. She is maintaining hydration and is non toxic in appearance. Zofran PRN nausea. Pt to return if symptoms are not improving or worsening. Go to the ER for any emergent concern. Work note provided. Patient given educational materials - see patientinstructions. Discussed use, benefit, and side effects of prescribed medications. All patient questions answered. Pt verbalized understanding. Instructed to continue current medications, diet and exercise. Patient agreed with treatment plan. Follow up as directed.      Electronically signed by BEBA Wang CNP on 12/5/2022 at 3:45 PM

## 2022-12-05 NOTE — LETTER
[facility]  [address] [facility]  Phone: [phone]  Fax: [address], APRN - CNP        December 5, 2022     Patient: Macrina Shi   YOB: 2000   Date of Visit: 12/5/2022       To Whom it May Concern:    Sj Goldstein was seen in my clinic on 12/5/2022. She may return to work on 12/6/2022. .    If you have any questions or concerns, please don't hesitate to call.     Sincerely,         Kirstie Jo, BEBA - CNP

## 2022-12-08 ENCOUNTER — HOSPITAL ENCOUNTER (OUTPATIENT)
Age: 22
Setting detail: SPECIMEN
Discharge: HOME | End: 2022-12-08
Payer: COMMERCIAL

## 2022-12-08 DIAGNOSIS — R19.7 DIARRHEA, UNSPECIFIED TYPE: ICD-10-CM

## 2022-12-08 DIAGNOSIS — R10.9 ABDOMINAL CRAMPING: ICD-10-CM

## 2022-12-08 PROCEDURE — 87015 SPECIMEN INFECT AGNT CONCNTJ: CPT

## 2022-12-08 PROCEDURE — 87329 GIARDIA AG IA: CPT

## 2022-12-08 PROCEDURE — 87209 SMEAR COMPLEX STAIN: CPT

## 2022-12-08 PROCEDURE — 87210 SMEAR WET MOUNT SALINE/INK: CPT

## 2022-12-08 PROCEDURE — 87506 IADNA-DNA/RNA PROBE TQ 6-11: CPT

## 2022-12-08 PROCEDURE — 87449 NOS EACH ORGANISM AG IA: CPT

## 2022-12-08 PROCEDURE — 87324 CLOSTRIDIUM AG IA: CPT

## 2022-12-08 PROCEDURE — 87328 CRYPTOSPORIDIUM AG IA: CPT

## 2022-12-09 LAB
C DIFF AG + TOXIN: NEGATIVE
CAMPYLOBACTER PCR: NORMAL
CRYPTOSPORIDIUM ANTIGEN STOOL: NEGATIVE
E COLI ENTEROTOXIGENIC PCR: NORMAL
GIARDIA ANTIGEN STOOL: NEGATIVE
PLESIOMONAS SHIGELLOIDES PCR: NORMAL
SALMONELLA PCR: NORMAL
SHIGATOXIN GENE PCR: NORMAL
SHIGELLA SP PCR: NORMAL
SOURCE: NORMAL
SPECIMEN DESCRIPTION: NORMAL
SPECIMEN DESCRIPTION: NORMAL
VIBRIO PCR: NORMAL
YERSINIA ENTEROCOLITICA PCR: NORMAL

## 2022-12-15 NOTE — DISCHARGE INSTRUCTIONS
Normal changes you may experience after a EGD:  Activity   You have had anesthesia today  Do not drive, operate heavy equipment, consume alcoholic beverages, or make any important decisions  for 24 hours   Take your time changing positions today. You may feel light headed or dizzy if you move too quickly. Rest for the next 24 hours. Diet   You can eat your normal diet when you feel well. You should start off with bland foods like chicken soup, toast, or yogurt. Then advance as tolerated. Drink plenty of fluids (unless your doctor tells you not to). Your urine should be very lightly colored without a strong odor. Medicines   Continue your home medications as ordered by your physician.      Call your doctor now or seek immediate medical care if: ([phone]  You are passing blood rectally or vomiting blood (color of blood may be red or black)  You have coffee ground looking vomit  Severe abdominal pain or tenderness    You have a fever, chills or excessive sweating   You have persistent nausea or vomiting   Redness or swelling at the IV site

## 2022-12-16 ENCOUNTER — ANESTHESIA EVENT (OUTPATIENT)
Dept: OPERATING ROOM | Age: 22
End: 2022-12-16
Payer: COMMERCIAL

## 2022-12-20 ENCOUNTER — HOSPITAL ENCOUNTER (OUTPATIENT)
Age: 22
Setting detail: OUTPATIENT SURGERY
Discharge: HOME | End: 2022-12-20
Attending: INTERNAL MEDICINE | Admitting: INTERNAL MEDICINE
Payer: COMMERCIAL

## 2022-12-20 ENCOUNTER — ANESTHESIA (OUTPATIENT)
Dept: OPERATING ROOM | Age: 22
End: 2022-12-20
Payer: COMMERCIAL

## 2022-12-20 VITALS
BODY MASS INDEX: 30.44 KG/M2 | SYSTOLIC BLOOD PRESSURE: 110 MMHG | WEIGHT: 151 LBS | OXYGEN SATURATION: 99 % | HEIGHT: 59 IN | DIASTOLIC BLOOD PRESSURE: 63 MMHG | RESPIRATION RATE: 17 BRPM | TEMPERATURE: 97.2 F | HEART RATE: 91 BPM

## 2022-12-20 DIAGNOSIS — R11.2 NAUSEA AND VOMITING, UNSPECIFIED VOMITING TYPE: ICD-10-CM

## 2022-12-20 PROBLEM — K29.70 GASTRITIS WITHOUT BLEEDING: Status: ACTIVE | Noted: 2022-12-20

## 2022-12-20 LAB — HCG, PREGNANCY URINE (POC): NEGATIVE

## 2022-12-20 PROCEDURE — 7100000000 HC PACU RECOVERY - FIRST 15 MIN: Performed by: INTERNAL MEDICINE

## 2022-12-20 PROCEDURE — 3700000000 HC ANESTHESIA ATTENDED CARE: Performed by: INTERNAL MEDICINE

## 2022-12-20 PROCEDURE — 7100000010 HC PHASE II RECOVERY - FIRST 15 MIN: Performed by: INTERNAL MEDICINE

## 2022-12-20 PROCEDURE — 6360000002 HC RX W HCPCS

## 2022-12-20 PROCEDURE — 7100000001 HC PACU RECOVERY - ADDTL 15 MIN: Performed by: INTERNAL MEDICINE

## 2022-12-20 PROCEDURE — 81025 URINE PREGNANCY TEST: CPT

## 2022-12-20 PROCEDURE — 88305 TISSUE EXAM BY PATHOLOGIST: CPT

## 2022-12-20 PROCEDURE — 43239 EGD BIOPSY SINGLE/MULTIPLE: CPT | Performed by: INTERNAL MEDICINE

## 2022-12-20 PROCEDURE — 3609012400 HC EGD TRANSORAL BIOPSY SINGLE/MULTIPLE: Performed by: INTERNAL MEDICINE

## 2022-12-20 PROCEDURE — 3700000001 HC ADD 15 MINUTES (ANESTHESIA): Performed by: INTERNAL MEDICINE

## 2022-12-20 PROCEDURE — 2500000003 HC RX 250 WO HCPCS

## 2022-12-20 PROCEDURE — 2580000003 HC RX 258

## 2022-12-20 PROCEDURE — 2709999900 HC NON-CHARGEABLE SUPPLY: Performed by: INTERNAL MEDICINE

## 2022-12-20 PROCEDURE — 7100000011 HC PHASE II RECOVERY - ADDTL 15 MIN: Performed by: INTERNAL MEDICINE

## 2022-12-20 RX ORDER — SODIUM CHLORIDE, SODIUM LACTATE, POTASSIUM CHLORIDE, CALCIUM CHLORIDE 600; 310; 30; 20 MG/100ML; MG/100ML; MG/100ML; MG/100ML
INJECTION, SOLUTION INTRAVENOUS CONTINUOUS PRN
Status: DISCONTINUED | OUTPATIENT
Start: 2022-12-20 | End: 2022-12-20 | Stop reason: SDUPTHER

## 2022-12-20 RX ORDER — SODIUM CHLORIDE 9 MG/ML
INJECTION, SOLUTION INTRAVENOUS PRN
Status: DISCONTINUED | OUTPATIENT
Start: 2022-12-20 | End: 2022-12-20 | Stop reason: HOSPADM

## 2022-12-20 RX ORDER — SODIUM CHLORIDE 0.9 % (FLUSH) 0.9 %
5-40 SYRINGE (ML) INJECTION PRN
Status: DISCONTINUED | OUTPATIENT
Start: 2022-12-20 | End: 2022-12-20 | Stop reason: HOSPADM

## 2022-12-20 RX ORDER — DIPHENHYDRAMINE HYDROCHLORIDE 50 MG/ML
12.5 INJECTION INTRAMUSCULAR; INTRAVENOUS
Status: DISCONTINUED | OUTPATIENT
Start: 2022-12-20 | End: 2022-12-20 | Stop reason: HOSPADM

## 2022-12-20 RX ORDER — MEPERIDINE HYDROCHLORIDE 50 MG/ML
12.5 INJECTION INTRAMUSCULAR; INTRAVENOUS; SUBCUTANEOUS ONCE
Status: DISCONTINUED | OUTPATIENT
Start: 2022-12-20 | End: 2022-12-20 | Stop reason: HOSPADM

## 2022-12-20 RX ORDER — SODIUM CHLORIDE 0.9 % (FLUSH) 0.9 %
5-40 SYRINGE (ML) INJECTION EVERY 12 HOURS SCHEDULED
Status: DISCONTINUED | OUTPATIENT
Start: 2022-12-20 | End: 2022-12-20 | Stop reason: HOSPADM

## 2022-12-20 RX ORDER — SODIUM CHLORIDE 9 MG/ML
INJECTION, SOLUTION INTRAVENOUS CONTINUOUS
Status: DISCONTINUED | OUTPATIENT
Start: 2022-12-20 | End: 2022-12-20 | Stop reason: HOSPADM

## 2022-12-20 RX ORDER — ONDANSETRON 2 MG/ML
4 INJECTION INTRAMUSCULAR; INTRAVENOUS
Status: DISCONTINUED | OUTPATIENT
Start: 2022-12-20 | End: 2022-12-20 | Stop reason: HOSPADM

## 2022-12-20 RX ORDER — LIDOCAINE HYDROCHLORIDE 10 MG/ML
INJECTION, SOLUTION INFILTRATION; PERINEURAL PRN
Status: DISCONTINUED | OUTPATIENT
Start: 2022-12-20 | End: 2022-12-20 | Stop reason: SDUPTHER

## 2022-12-20 RX ORDER — SODIUM CHLORIDE 9 MG/ML
25 INJECTION, SOLUTION INTRAVENOUS PRN
Status: DISCONTINUED | OUTPATIENT
Start: 2022-12-20 | End: 2022-12-20 | Stop reason: HOSPADM

## 2022-12-20 RX ORDER — MORPHINE SULFATE 2 MG/ML
1 INJECTION, SOLUTION INTRAMUSCULAR; INTRAVENOUS EVERY 5 MIN PRN
Status: DISCONTINUED | OUTPATIENT
Start: 2022-12-20 | End: 2022-12-20 | Stop reason: HOSPADM

## 2022-12-20 RX ORDER — SODIUM CHLORIDE, SODIUM LACTATE, POTASSIUM CHLORIDE, CALCIUM CHLORIDE 600; 310; 30; 20 MG/100ML; MG/100ML; MG/100ML; MG/100ML
INJECTION, SOLUTION INTRAVENOUS CONTINUOUS
Status: DISCONTINUED | OUTPATIENT
Start: 2022-12-20 | End: 2022-12-20 | Stop reason: HOSPADM

## 2022-12-20 RX ORDER — PROPOFOL 10 MG/ML
INJECTION, EMULSION INTRAVENOUS PRN
Status: DISCONTINUED | OUTPATIENT
Start: 2022-12-20 | End: 2022-12-20 | Stop reason: SDUPTHER

## 2022-12-20 RX ADMIN — PROPOFOL 50 MG: 10 INJECTION, EMULSION INTRAVENOUS at 08:34

## 2022-12-20 RX ADMIN — PROPOFOL 50 MG: 10 INJECTION, EMULSION INTRAVENOUS at 08:30

## 2022-12-20 RX ADMIN — LIDOCAINE HYDROCHLORIDE 40 MG: 10 INJECTION, SOLUTION INFILTRATION; PERINEURAL at 08:27

## 2022-12-20 RX ADMIN — PROPOFOL 50 MG: 10 INJECTION, EMULSION INTRAVENOUS at 08:28

## 2022-12-20 RX ADMIN — PROPOFOL 50 MG: 10 INJECTION, EMULSION INTRAVENOUS at 08:32

## 2022-12-20 RX ADMIN — SODIUM CHLORIDE, POTASSIUM CHLORIDE, SODIUM LACTATE AND CALCIUM CHLORIDE: 600; 310; 30; 20 INJECTION, SOLUTION INTRAVENOUS at 08:27

## 2022-12-20 RX ADMIN — PROPOFOL 100 MG: 10 INJECTION, EMULSION INTRAVENOUS at 08:27

## 2022-12-20 ASSESSMENT — PAIN - FUNCTIONAL ASSESSMENT: PAIN_FUNCTIONAL_ASSESSMENT: NONE - DENIES PAIN

## 2022-12-20 NOTE — H&P
Procedure History and Physical    Pre-Procedural Diagnosis:  Dyspepsia and biliary colic    Indications:  same    Procedure Planned: endoscopy     History Obtained From:  patient    HISTORY OF PRESENT ILLNESS:       The patient is a 25 y.o. female who presents for the above procedure. Past Medical History:    Past Medical History:   Diagnosis Date    Mental disorder     depression       Past Surgical History:    Past Surgical History:   Procedure Laterality Date    INTRAUTERINE DEVICE REMOVAL  11/18/2021    dislodged IUD       Medications:  Current Facility-Administered Medications   Medication Dose Route Frequency Provider Last Rate Last Admin    0.9 % sodium chloride infusion   IntraVENous Continuous Alejandra Nagel MD        lactated ringers infusion   IntraVENous Continuous Alejandra Nagel MD        sodium chloride flush 0.9 % injection 5-40 mL  5-40 mL IntraVENous 2 times per day Alejandra Nagel MD        sodium chloride flush 0.9 % injection 5-40 mL  5-40 mL IntraVENous PRN Alejandra Nagel MD        0.9 % sodium chloride infusion   IntraVENous PRN Alejandra Nagel MD           Allergies: Allergies   Allergen Reactions    Zoloft [Sertraline] Other (See Comments)     Spasms and tingling                  Social   Social History     Tobacco Use    Smoking status: Never    Smokeless tobacco: Never   Substance Use Topics    Alcohol use: Not Currently        PSYCH HISTORY:  Depression No  Anxiety No  Suicide No       History reviewed. No pertinent family history. No family history of colon cancer, Crohn's disease, or ulcerative colitis    Problems with Sedation/Anesthesia in the past? no    REVIEW OF SYSTEMS:  12 point review of systems negative other than mentioned above.       PHYSICAL EXAM:    Vitals:  /65   Pulse 82   Temp (!) 96.6 °F (35.9 °C) (Infrared)   Resp 17   Ht 4' 11\" (1.499 m)   Wt 151 lb (68.5 kg)   SpO2 97%   BMI 30.50 kg/m²     Focused Exam related to procedure:    General appearance: NAD, conversant Eyes: anicteric sclerae, moist conjunctivae; no lid-lag; PERRLA   Lungs: CTA, with normal respiratory effort and no intercostal retractions   CV: RRR, no MRGs   Abdomen: Soft, non-tender; no masses or HSM   Skin: Normal temperature, turgor and texture; no rash, ulcers or subcutaneous nodules     DATA:  CBC:   Lab Results   Component Value Date    WBC 9.9 06/07/2022    HGB 14.2 06/07/2022    HCT 44.1 06/07/2022    MCV 86.8 06/07/2022     06/07/2022     BUN/Cr:   Lab Results   Component Value Date    BUN 9 06/07/2022   ,   Lab Results   Component Value Date    CREATININE 0.60 06/07/2022     Potassium:   Lab Results   Component Value Date    K 3.4 (L) 06/07/2022     PT/INR: No results found for: INR, PROTIME    ASSESSMENT AND PLAN:       1. Patient is a 25 y.o. female with above specified procedure planned. Expected Sedation/Anesthesia Type: MAC    2. ASA ([address] Anesthesiology) Anesthesia Status: Class 2 - A normal healthy patient with mild systemic disease    3. Mallampati: II (soft palate, uvula, fauces visible)  4. Procedure options, risks and benefits reviewed with Patient. Patient expresses understanding.     5.  Consent has been signed:  Yes    Urbano Castellano MD

## 2022-12-20 NOTE — ANESTHESIA POSTPROCEDURE EVALUATION
Department of Anesthesiology  Postprocedure Note    Patient: Javi Hughes  MRN: [id number]  YOB: 2000  Date of evaluation: 12/20/2022      Procedure Summary     Date: 12/20/22 Room / Location: [place] [address] / [facility]    Anesthesia Start: 2421 Anesthesia Stop: 3682    Procedure: EGD BIOPSY Diagnosis:       Nausea and vomiting, unspecified vomiting type      (Nausea and vomiting, unspecified vomiting type [R11.2])    Surgeons: David Mcgowan MD Responsible Provider: Amado Ortiz MD    Anesthesia Type: MAC ASA Status: 2          Anesthesia Type: No value filed.     Hiren Phase I: Hiren Score: 6    Hiren Phase II: Hiren Score: 10      Anesthesia Post Evaluation POST- ANESTHESIA EVALUATION       Pt Name: Javi Hughes  MRN: [id number]  YOB: 2000  Date of evaluation: 12/20/2022  Time:  10:33 AM      /63   Pulse 91   Temp 97.2 °F (36.2 °C)   Resp 17   Ht 4' 11\" (1.499 m)   Wt 151 lb (68.5 kg)   SpO2 99%   BMI 30.50 kg/m²      Consciousness Level  Awake  Cardiopulmonary Status  Stable  Pain Adequately Treated YES  Nausea / Vomiting  NO  Adequate Hydration  YES  Anesthesia Related Complications NONE      Electronically signed by Amado Ortiz MD on 12/20/2022 at 10:33 AM

## 2022-12-20 NOTE — OP NOTE
Operative Note      Patient: Vinay Nance  YOB: 2000  MRN: [id number]    Date of Procedure: 12/20/2022    Pre-Op Diagnosis: Nausea and vomiting, unspecified vomiting type [R11.2]    Post-Op Diagnosis: Mild gastritis       Procedure(s):  EGD BIOPSY    Surgeon(s):  Faustino Goff MD    Assistant:   * No surgical staff found *    Anesthesia: Monitor Anesthesia Care    Estimated Blood Loss (mL): Minimal    Complications: None    Specimens:   ID Type Source Tests Collected by Time Destination   A : STOMACH BIOPSY R/O H. PYLORI  Tissue Stomach SURGICAL PATHOLOGY Faustino Goff MD 12/20/2022 0541        Implants:  * No implants in log *      Drains: * No LDAs found *              [facility] ENDOSCOPY    EGD    PROCEDURE DATE: 12/20/22    REFERRING PHYSICIAN: No ref. provider found     PRIMARY CARE PROVIDER: BEBA Zhu - [facility]    ATTENDING PHYSICIAN: Faustino Goff MD     HISTORY: Ms. Vinay Nance is a 25 y.o. female who presents to the  Endoscopy unit for upper endoscopy. The patient's clinical history is remarkable for asthma, depression, intermittent nause and emesis, referred for EGD. She is currently medically stable and appropriate for the planned procedure. PREOPERATIVE DIAGNOSIS: Dyspepsia and vomiting. PROCEDURES:   1) Transoral Upper Endoscopy with cold biopsy. POSTOPERATIVE DIAGNOSIS:     1) Normal appearing GEJ. No esophagitis. No hiatal hernia  2) Mild erythema in the antrum and body, no ulcerations, no erosions. Cold biopsy taken to evaluate for H. Pylori  3) Normal appearing duodenal mucosa     MEDICATIONS:   MAC per anesthesia     EBL:  <10cc    INSTRUMENT: Olympus GIF-H190  flexible Gastroscope. PREPARATION: The nature and character of the procedure as well as risks, benefits, and alternatives were discussed with the patient and informed consent was obtained.  Complications were said to include, but were not limited to: medication allergy, medication reaction, cardiovascular and respiratory problems, bleeding, perforation, infection, and/or missed diagnosis. Following arrival in the endoscopy room, the patient was placed in the left lateral decubitus position and final time-out accomplished in the presence of the nursing staff. Baseline vital signs were obtained and reviewed, and IV sedation was subsequently initiated. FINDINGS:   Esophagus: The esophagus was inspected to the Z-line. The endoscopic exam showed normal appearing esophageal mucosa. Stomach: The stomach was inspected in both forward and retroflex fashion and was appropriately distensible. The cardia, fundus, incisura, antrum and pylorus were identified via direct visualization. The endoscopic exam showed mild gastritis. Duodenum: The proximal small bowel was inspected through the bulb, sweep, and second portion of the duodenum. The endoscopic exam showed normal appearing. IMPRESSION:    1) Normal appearing GEJ. No esophagitis. No hiatal hernia  2) Mild erythema in the antrum and body, no ulcerations, no erosions. Cold biopsy taken to evaluate for H. Pylori  3) Normal appearing duodenal mucosa       RECOMMENDATIONS:   1) Follow up path in GI clinic. 2) Continue with anti-reflux therapy and lifestyle. [facility]  Gastroenterology   12/20/22    this note is created with the assistance of a speech recognition program.  While intending to generate a document that actually reflects the content of the visit, the document can still have some errors including those of syntax and sound a like substitutions which may escape proof reading. It such instances, actual meaning can be extrapolated by contextual diversion. The patient was counseled at length about the risks of flex Covid-19 during their perioperative period and any recovery window from their procedure.   The patient was made aware that flex Covid-19  may worsen their prognosis for recovering from their procedure  and lend to a higher morbidity and/or mortality risk. All material risks, benefits, and reasonable alternatives including postponing the procedure were discussed. The patient DOES wish to proceed with the procedure at this time.      Electronically signed by Miroslava Bernstein MD on 12/20/2022 at 8:45 AM

## 2022-12-20 NOTE — ANESTHESIA PRE PROCEDURE
Department of Anesthesiology  Preprocedure Note       Name:  Madyson Jackson   Age:  25 y.o.  :  2000                                          MRN:  [id number]         Date:  2022      Surgeon: Leana Botello):  Willie Alcaraz MD    Procedure: Procedure(s):  EGD BIOPSY    Medications prior to admission:   Prior to Admission medications    Medication Sig Start Date End Date Taking?  Authorizing Provider   ondansetron (ZOFRAN-ODT) 4 MG disintegrating tablet Take 1 tablet by mouth 3 times daily as needed for Nausea or Vomiting 22   BEBA Lawrence CNP   Drospirenone (SLYND) 4 MG TABS Take 1 tablet by mouth daily 22   BEBA Garcia CNM   famotidine (PEPCID) 40 MG tablet Take 1 tablet by mouth every evening 22   BEBA Hess CNP   naproxen (NAPROSYN) 500 MG tablet Take 1 tablet by mouth 2 times daily (with meals) 22   BEBA Hess CNP   rizatriptan (MAXALT) 10 MG tablet Take 1 tablet by mouth once as needed for Migraine May repeat in 2 hours if needed 22  BEBA Hess CNP   propranolol (INDERAL LA) 60 MG extended release capsule Take 1 capsule by mouth daily 22   BEBA Hess CNP   fluticasone (FLOVENT HFA) 110 MCG/ACT inhaler Inhale 2 puffs into the lungs 2 times daily 12/3/21 12/3/22  BEBA Hess CNP   medroxyPROGESTERone (DEPO-PROVERA) 150 MG/ML injection Inject 1 mL into the muscle once for 1 dose 21  Arabella Drummond DO   butalbital-acetaminophen-caffeine (FIORICET, ESGIC) -75 MG per tablet Take 1 tablet by mouth every 4 hours as needed for Headaches 10/28/21   BEBA Hess CNP       Current medications:    Current Facility-Administered Medications   Medication Dose Route Frequency Provider Last Rate Last Admin    0.9 % sodium chloride infusion   IntraVENous Continuous Paris Shrestha MD        lactated ringers infusion   IntraVENous Continuous Paris Shrestha MD        sodium chloride flush 0.9 % injection 5-40 mL  5-40 mL IntraVENous 2 times per day Dre Nance MD        sodium chloride flush 0.9 % injection 5-40 mL  5-40 mL IntraVENous PRN Dre Nance MD        0.9 % sodium chloride infusion   IntraVENous PRN Dre Nance MD           Allergies: Allergies   Allergen Reactions    Zoloft [Sertraline] Other (See Comments)     Spasms and tingling        Problem List:    Patient Active Problem List   Diagnosis Code    Asthma J45.909    FHx Chiari malformation type I and cleft lip G93.5    THC use F12.10    Rubella NI O99.892, Z28.39    Atrial septal defect of fetus affecting antepartum care of mother O35. BXX0    Family history of autism Z81.8     21 M 6#8 Apg  O80    Preeclampsia in postpartum period O14.95    Mild depression F32. A       Past Medical History:        Diagnosis Date    Mental disorder     depression       Past Surgical History:        Procedure Laterality Date    INTRAUTERINE DEVICE REMOVAL  2021    dislodged IUD       Social History:    Social History     Tobacco Use    Smoking status: Never    Smokeless tobacco: Never   Substance Use Topics    Alcohol use: Not Currently                                Counseling given: Not Answered      Vital Signs (Current):   Vitals:    22 0747   BP: 116/65   Pulse: 82   Resp: 17   Temp: (!) 96.6 °F (35.9 °C)   TempSrc: Infrared   SpO2: 97%   Weight: 151 lb (68.5 kg)   Height: 4' 11\" (1.499 m)                                              BP Readings from Last 3 Encounters:   22 116/65   22 121/61   22 108/64       NPO Status:                                                                                 BMI:   Wt Readings from Last 3 Encounters:   22 151 lb (68.5 kg)   22 150 lb (68 kg)   22 151 lb (68.5 kg)     Body mass index is 30.5 kg/m².     CBC:   Lab Results   Component Value Date/Time    WBC 9.9 2022 08:25 PM    RBC 5.08 2022 08:25 PM    HGB 14.2 2022 08:25 PM HCT 44.1 06/07/2022 08:25 PM    MCV 86.8 06/07/2022 08:25 PM    RDW 14.0 06/07/2022 08:25 PM     06/07/2022 08:25 PM       CMP:   Lab Results   Component Value Date/Time     06/07/2022 08:25 PM    K 3.4 06/07/2022 08:25 PM     06/07/2022 08:25 PM    CO2 22 06/07/2022 08:25 PM    BUN 9 06/07/2022 08:25 PM    CREATININE 0.60 06/07/2022 08:25 PM    GFRAA >60 06/07/2022 08:25 PM    LABGLOM >60 06/07/2022 08:25 PM    GLUCOSE 101 06/07/2022 08:25 PM    PROT 7.3 06/07/2022 08:25 PM    CALCIUM 10.0 06/07/2022 08:25 PM    BILITOT 0.20 06/07/2022 08:25 PM    ALKPHOS 109 06/07/2022 08:25 PM    AST 13 06/07/2022 08:25 PM    ALT 11 06/07/2022 08:25 PM       POC Tests: No results for input(s): POCGLU, POCNA, POCK, POCCL, POCBUN, POCHEMO, POCHCT in the last 72 hours.     Coags: No results found for: PROTIME, INR, APTT    HCG (If Applicable): 08/72/30  Lab Results   Component Value Date    PREGTESTUR NEGATIVE 06/07/2022    HCG NEGATIVE 12/20/2022    HCGQUANT 69,455 (H) 02/21/2021        ABGs: No results found for: PHART, PO2ART, PUR9GNJ, HFP9IOS, BEART, K5CRWRQJ     Type & Screen (If Applicable):  No results found for: LABABO, LABRH    Drug/Infectious Status (If Applicable):  No results found for: HIV, HEPCAB    COVID-19 Screening (If Applicable):   Lab Results   Component Value Date/Time    COVID19 Not Detected 08/18/2021 07:51 AM           Anesthesia Evaluation  Patient summary reviewed and Nursing notes reviewed no history of anesthetic complications:   Airway: Mallampati: I  TM distance: >3 FB   Neck ROM: full  Mouth opening: > = 3 FB   Dental: normal exam         Pulmonary:normal exam  breath sounds clear to auscultation  (+) asthma:                            Cardiovascular:    (+) hypertension: no interval change,         Rhythm: regular  Rate: normal                    Neuro/Psych:   (+) psychiatric history:depression/anxiety             GI/Hepatic/Renal: Neg GI/Hepatic/Renal ROS           ROS comment: Nausea and vomiting. Endo/Other: Negative Endo/Other ROS                     ROS comment: FHx Chiari malformation type I and cleft lip Abdominal:       Abdomen: soft. Vascular: negative vascular ROS. Other Findings:           Anesthesia Plan      MAC     ASA 2             Anesthetic plan and risks discussed with patient. Plan discussed with CRNA.                     Ethan Bruno MD   12/20/2022

## 2022-12-21 LAB — SURGICAL PATHOLOGY REPORT: NORMAL

## 2023-01-03 ENCOUNTER — OFFICE VISIT (OUTPATIENT)
Dept: PRIMARY CARE CLINIC | Age: 23
End: 2023-01-03
Payer: COMMERCIAL

## 2023-01-03 ENCOUNTER — HOSPITAL ENCOUNTER (OUTPATIENT)
Age: 23
Setting detail: SPECIMEN
Discharge: HOME | End: 2023-01-03

## 2023-01-03 ENCOUNTER — PATIENT MESSAGE (OUTPATIENT)
Dept: PRIMARY CARE CLINIC | Age: 23
End: 2023-01-03

## 2023-01-03 VITALS
OXYGEN SATURATION: 98 % | HEART RATE: 141 BPM | BODY MASS INDEX: 29.45 KG/M2 | SYSTOLIC BLOOD PRESSURE: 136 MMHG | DIASTOLIC BLOOD PRESSURE: 78 MMHG | WEIGHT: 145.8 LBS | RESPIRATION RATE: 14 BRPM

## 2023-01-03 DIAGNOSIS — R00.0 TACHYCARDIA: ICD-10-CM

## 2023-01-03 DIAGNOSIS — R10.11 RUQ PAIN: Primary | ICD-10-CM

## 2023-01-03 DIAGNOSIS — Z80.9 FAMILY HISTORY OF CANCER: ICD-10-CM

## 2023-01-03 DIAGNOSIS — Z34.90 EARLY STAGE OF PREGNANCY: Primary | ICD-10-CM

## 2023-01-03 DIAGNOSIS — M54.50 ACUTE BILATERAL LOW BACK PAIN WITHOUT SCIATICA: ICD-10-CM

## 2023-01-03 DIAGNOSIS — N62 LARGE BREASTS: ICD-10-CM

## 2023-01-03 DIAGNOSIS — Z34.90 EARLY STAGE OF PREGNANCY: ICD-10-CM

## 2023-01-03 DIAGNOSIS — M54.9 ACUTE BACK PAIN, UNSPECIFIED BACK LOCATION, UNSPECIFIED BACK PAIN LATERALITY: ICD-10-CM

## 2023-01-03 LAB
ANION GAP SERPL CALCULATED.3IONS-SCNC: 15 MMOL/L (ref 9–17)
BUN BLDV-MCNC: 14 MG/DL (ref 6–20)
CALCIUM SERPL-MCNC: 9.8 MG/DL (ref 8.6–10.4)
CHLORIDE BLD-SCNC: 105 MMOL/L (ref 98–107)
CO2: 22 MMOL/L (ref 20–31)
CREAT SERPL-MCNC: 0.67 MG/DL (ref 0.5–0.9)
GFR SERPL CREATININE-BSD FRML MDRD: >60 ML/MIN/1.73M2
GLUCOSE BLD-MCNC: 94 MG/DL (ref 70–99)
HCG QUANTITATIVE: <1 MIU/ML
HCT VFR BLD CALC: 46.9 % (ref 36.3–47.1)
HEMOGLOBIN: 14.7 G/DL (ref 11.9–15.1)
MCH RBC QN AUTO: 28.5 PG (ref 25.2–33.5)
MCHC RBC AUTO-ENTMCNC: 31.3 G/DL (ref 28.4–34.8)
MCV RBC AUTO: 90.9 FL (ref 82.6–102.9)
NRBC AUTOMATED: 0 PER 100 WBC
PDW BLD-RTO: 12.8 % (ref 11.8–14.4)
PLATELET # BLD: 342 K/UL (ref 138–453)
PMV BLD AUTO: 9.5 FL (ref 8.1–13.5)
POTASSIUM SERPL-SCNC: 4.3 MMOL/L (ref 3.7–5.3)
RBC # BLD: 5.16 M/UL (ref 3.95–5.11)
SODIUM BLD-SCNC: 142 MMOL/L (ref 135–144)
WBC # BLD: 7.1 K/UL (ref 4.5–13.5)

## 2023-01-03 PROCEDURE — 99214 OFFICE O/P EST MOD 30 MIN: CPT | Performed by: NURSE PRACTITIONER

## 2023-01-03 PROCEDURE — 1036F TOBACCO NON-USER: CPT | Performed by: NURSE PRACTITIONER

## 2023-01-03 PROCEDURE — G8417 CALC BMI ABV UP PARAM F/U: HCPCS | Performed by: NURSE PRACTITIONER

## 2023-01-03 PROCEDURE — G8484 FLU IMMUNIZE NO ADMIN: HCPCS | Performed by: NURSE PRACTITIONER

## 2023-01-03 PROCEDURE — G8427 DOCREV CUR MEDS BY ELIG CLIN: HCPCS | Performed by: NURSE PRACTITIONER

## 2023-01-03 ASSESSMENT — ENCOUNTER SYMPTOMS
BACK PAIN: 1
EYE ITCHING: 0
WHEEZING: 0
VOMITING: 0
CHEST TIGHTNESS: 0
COUGH: 0
NAUSEA: 0
EYE REDNESS: 0
TROUBLE SWALLOWING: 0
ABDOMINAL PAIN: 1
EYE DISCHARGE: 0
DIARRHEA: 0
SINUS PRESSURE: 0
SHORTNESS OF BREATH: 0
SINUS PAIN: 0
SORE THROAT: 0

## 2023-01-03 ASSESSMENT — PATIENT HEALTH QUESTIONNAIRE - PHQ9: DEPRESSION UNABLE TO ASSESS: PT REFUSES

## 2023-01-03 NOTE — LETTER
[facility]  [address] [facility] [address]  Phone: [phone]  Fax: [phone]    BEBA Abdul CNP        January 3, 2023     Patient: Livia Mirza   YOB: 2000   Date of Visit: 1/3/2023       To Whom It May Concern: It is my medical opinion that Dalila Garcia may return to work on 1/5/23. If you have any questions or concerns, please don't hesitate to call.     Sincerely,        BEBA Abdul CNP

## 2023-01-03 NOTE — TELEPHONE ENCOUNTER
From: Dereck Bowens  To: Luis Felipe Montague  Sent: 1/3/2023 2:38 PM EST  Subject: Note for work    [employer] i forgot to ask you, but could i get a work note for today? At least until i can get into get that heart monitor tomorrow?  I feel before i came in today and my whole body is even more sore thank isra

## 2023-01-03 NOTE — PROGRESS NOTES
[address] PRIMARY CARE  [address] [facility] [address]  [address] [address]  Dept: [phone]  Dept Fax: [phone]    Maximus Berger is a 25 y.o. female who presents today for her medical conditions/complaintsas noted below. Maximus Berger is c/o of Abdominal Pain (URQ pain, getting worse, moving to under back of ribs the past couple of days, feels nausea when pressure is applied to area), Back Pain (Upper back and shoulder pain started 3 days ago), and Tachycardia (Heart rate would spike randomly and legs would become numb)        HPI:     Pt presents for an appointment  Bp stable  Weight is stable  HR is elevated    Pt presents for an appointment with multiple concerns. She is really upset b/c her abdominal pain is not getting any better. EGD done   Stomach pain is getting worse. She is tearful talking about it. She is in pain everyday. Any movement causes pain. Stabbing or dull. Moves to her ribs on the side. Shoulders have started to hurt now in her upper back. Feels like no one is helping her    Her heart rate has been spiking while sitting at rest. Takes awhile for it to go back. Her legs are going numb still. She will feel light headed. She hasnt been able to eat much but drinks a lot of water. Denies feeling overly anxious. 1216 + pregnancy test, repeated a few days ago negative, then + then negative. She is having intermittent cramping. Has not started her period since she stopped nursing was at least a year ago. No period in 2 years since before she got pregnant with her son. Gyn order lab work to be done today     Family history of cancer. Grandfather  of liver cancer  Grandmother also had cancer along with a lot of cyst. Concerned she is at high risk for cancer    Back pain is getting worse and moving up her back.  She thinks its due to her large breast.       Past Medical History:   Diagnosis Date    Mental disorder     depression      Past Surgical History:   Procedure Laterality Date    ESOPHAGOGASTRODUODENOSCOPY  12/20/2022    EGD BIOPSY    INTRAUTERINE DEVICE REMOVAL  11/18/2021    dislodged IUD    UPPER GASTROINTESTINAL ENDOSCOPY N/A 12/20/2022    EGD BIOPSY performed by Hussein Yoder MD at [facility] [address]       History reviewed. No pertinent family history. Social History     Tobacco Use    Smoking status: Never    Smokeless tobacco: Never   Substance Use Topics    Alcohol use: Not Currently      Current Outpatient Medications   Medication Sig Dispense Refill    ondansetron (ZOFRAN-ODT) 4 MG disintegrating tablet Take 1 tablet by mouth 3 times daily as needed for Nausea or Vomiting 21 tablet 0    Drospirenone (SLYND) 4 MG TABS Take 1 tablet by mouth daily 28 tablet 11    famotidine (PEPCID) 40 MG tablet Take 1 tablet by mouth every evening 30 tablet 3    naproxen (NAPROSYN) 500 MG tablet Take 1 tablet by mouth 2 times daily (with meals) 60 tablet 5    rizatriptan (MAXALT) 10 MG tablet Take 1 tablet by mouth once as needed for Migraine May repeat in 2 hours if needed 30 tablet 3    propranolol (INDERAL LA) 60 MG extended release capsule Take 1 capsule by mouth daily 30 capsule 0    fluticasone (FLOVENT HFA) 110 MCG/ACT inhaler Inhale 2 puffs into the lungs 2 times daily 12 g 3    medroxyPROGESTERone (DEPO-PROVERA) 150 MG/ML injection Inject 1 mL into the muscle once for 1 dose 1 mL 3    butalbital-acetaminophen-caffeine (FIORICET, ESGIC) -40 MG per tablet Take 1 tablet by mouth every 4 hours as needed for Headaches 30 tablet 3     No current facility-administered medications for this visit.      Allergies   Allergen Reactions    Zoloft [Sertraline] Other (See Comments)     Spasms and tingling        Health Maintenance   Topic Date Due    COVID-19 Vaccine (1) Never done    Pneumococcal 0-64 years Vaccine (1 - PCV) 04/16/2007    HIV screen  Never done    Flu vaccine (1) 08/01/2022    Chlamydia/GC screen  10/05/2022    Depression Monitoring  11/09/2023    Pap smear  11/04/2025    DTaP/Tdap/Td vaccine (8 - Td or Tdap) 06/24/2031    Hepatitis A vaccine  Completed    Hib vaccine  Completed    HPV vaccine  Completed    Varicella vaccine  Completed    Meningococcal (ACWY) vaccine  Completed    Hepatitis C screen  Completed       :     Review of Systems   Constitutional:  Negative for chills, fatigue and fever. HENT:  Negative for ear discharge, ear pain, sinus pressure, sinus pain, sore throat and trouble swallowing. Eyes:  Negative for discharge, redness and itching. Respiratory:  Negative for cough, chest tightness, shortness of breath and wheezing. Cardiovascular:  Positive for palpitations. Negative for chest pain. Gastrointestinal:  Positive for abdominal pain. Negative for diarrhea, nausea and vomiting. Genitourinary:  Negative for difficulty urinating. Musculoskeletal:  Positive for back pain. Negative for arthralgias and neck pain. Skin:  Negative for rash. Neurological:  Negative for dizziness, weakness, light-headedness and headaches. All other systems reviewed and are negative. Objective:     Physical Exam  Constitutional:       Appearance: Normal appearance. She is normal weight. HENT:      Head: Normocephalic and atraumatic. Nose: Nose normal.   Eyes:      Extraocular Movements: Extraocular movements intact. Conjunctiva/sclera: Conjunctivae normal.      Pupils: Pupils are equal, round, and reactive to light. Cardiovascular:      Rate and Rhythm: Regular rhythm. Tachycardia present. Pulses: Normal pulses. Heart sounds: Normal heart sounds. Pulmonary:      Effort: Pulmonary effort is normal.      Breath sounds: Normal breath sounds. Abdominal:      General: Abdomen is flat. Palpations: Abdomen is soft. Musculoskeletal:         General: Normal range of motion. Cervical back: Neck supple. Skin:     General: Skin is warm and dry.       Capillary Refill: Capillary refill takes less than 2 seconds. Neurological:      General: No focal deficit present. Mental Status: She is alert and oriented to person, place, and time. Psychiatric:         Mood and Affect: Mood normal. Affect is tearful. /78   Pulse (!) 141   Resp 14   Wt 145 lb 12.8 oz (66.1 kg)   SpO2 98%   BMI 29.45 kg/m²     Assessment:       Diagnosis Orders   1. RUQ pain  [place] - Jaun Mora DO, General Surgery, [place]      2. Family history of cancer  [address], [facility], [facility] [place]      3. Large breasts  Emanuel Wolfe MD, Plastic Surgery, [place]      4. Acute bilateral low back pain without sciatica  Emanuel Wolfe MD, Plastic Surgery, [place]      5. Acute back pain, unspecified back location, unspecified back pain laterality  Emanuel Wolfe MD, Plastic Surgery, [place]      6. Tachycardia  Basic Metabolic Panel    CBC    Holter Monitor 48 Hour          :      Return in about 1 week (around 1/10/2023) for follow up . 1.  Right upper quadrant pain  Patient had an ultrasound done almost a year ago that was negative. Still has right upper quadrant pain. Will refer to general surgery for evaluation of cholecystectomy  2. Family history of cancer  Referral to genetics counselor  3. Large breast  Referral to plastic surgery for breast reduction  4-5. Back pain  Referral to plastic surgery for breast reduction. Continue with supportive care for back pain  6. Tachycardia  Discussed the importance of adequate hydration. We will add on additional lab work.   Rx for Holter monitor    Patient is going to follow-up next week  Orders Placed This Encounter   Procedures    Basic Metabolic Panel     Standing Status:   Future     Number of Occurrences:   1     Standing Expiration Date:   1/3/2024    CBC     Standing Status:   Future     Number of Occurrences:   1     Standing Expiration Date:   1/3/2024    Karlo Jessica DO, General Surgery, [place]     Referral Priority:   Routine     Referral Type:   Eval and Treat     Referral Reason:   Specialty Services Required     Referred to Provider:   Kimi Alonso DO     Requested Specialty:   General Surgery     Number of Visits Requested:   [address] [facility], [facility], [facility] [place], [place]     Referral Priority:   Routine     Referral Type:   Eval and Treat     Referral Reason:   Specialty Services Required     Referred to Provider:   Esme Fleming     Requested Specialty:   Genetic Counselor     Number of Visits Requested:   Isra Leiva MD, Plastic Surgery, [place]     Referral Priority:   Routine     Referral Type:   Eval and Treat     Referral Reason:   Specialty Services Required     Referred to Provider:   Becca Huang MD     Requested Specialty:   Plastic Surgery     Number of Visits Requested:   1    Holter Monitor 48 Hour     Standing Status:   Future     Standing Expiration Date:   1/3/2024     Order Specific Question:   Reason for Exam?     Answer: Tachycardia     No orders of the defined types were placed in this encounter. Patient given educational materials - seepatient instructions. Discussed use, benefit, and side effects of prescribed medications. All patient questions answered. Pt voiced understanding. Reviewed health maintenance. Instructed to continue current medications, diet and exercise. Patient agreedwith treatment plan. Follow up as directed.       Electronically signed by BEBA Yancey CNP on 1/3/2023at 12:41 PM

## 2023-01-04 ENCOUNTER — HOSPITAL ENCOUNTER (OUTPATIENT)
Dept: NON INVASIVE DIAGNOSTICS | Age: 23
Discharge: HOME | End: 2023-01-06
Payer: COMMERCIAL

## 2023-01-04 ENCOUNTER — PATIENT MESSAGE (OUTPATIENT)
Dept: OBGYN CLINIC | Age: 23
End: 2023-01-04

## 2023-01-04 ENCOUNTER — TELEPHONE (OUTPATIENT)
Dept: PRIMARY CARE CLINIC | Age: 23
End: 2023-01-04

## 2023-01-04 ENCOUNTER — HOSPITAL ENCOUNTER (OUTPATIENT)
Dept: GENERAL RADIOLOGY | Age: 23
Discharge: HOME | End: 2023-01-06
Payer: COMMERCIAL

## 2023-01-04 ENCOUNTER — TELEPHONE (OUTPATIENT)
Dept: SURGERY | Age: 23
End: 2023-01-04

## 2023-01-04 ENCOUNTER — HOSPITAL ENCOUNTER (OUTPATIENT)
Age: 23
Discharge: HOME | End: 2023-01-06
Payer: COMMERCIAL

## 2023-01-04 DIAGNOSIS — M54.50 ACUTE BILATERAL LOW BACK PAIN WITHOUT SCIATICA: ICD-10-CM

## 2023-01-04 DIAGNOSIS — R00.0 TACHYCARDIA: ICD-10-CM

## 2023-01-04 DIAGNOSIS — R10.11 RUQ PAIN: Primary | ICD-10-CM

## 2023-01-04 PROCEDURE — 72100 X-RAY EXAM L-S SPINE 2/3 VWS: CPT

## 2023-01-04 PROCEDURE — 93225 XTRNL ECG REC<48 HRS REC: CPT

## 2023-01-04 PROCEDURE — 72120 X-RAY BEND ONLY L-S SPINE: CPT

## 2023-01-04 PROCEDURE — 93226 XTRNL ECG REC<48 HR SCAN A/R: CPT

## 2023-01-04 NOTE — TELEPHONE ENCOUNTER
From: Lydia Rubin  To: Damon Yan  Sent: 1/4/2023 8:37 AM EST  Subject: Periods     So yesterday I had went and got blood work done because I believe there was a possibility I was pregnant and due to the fact of receiving two positive pregnancy test. My blood work came back in so far is showing that I'm not pregnant. Before I had Early Lee whenever I got my periods, they were irregular once every couple months ever since I have delivered Early Lee I have not had a period. It has been about 2 years. Is there a chance i could go into early metapause or even started it already? I'm just scared I'm not gonna get the chance to have more children. My pap came back abnormal when i got it, and the scarring in my uterus, im not sure i just feel like it's all connected in some way. I just don't want this to be the end for me or something be wrong.

## 2023-01-05 NOTE — TELEPHONE ENCOUNTER
I would recommend appt to discuss this especially not having period in 2 years, although reviewing chart she was on depo provera injection when she came in for PAP with Neymar Solomon in November 2022. Amenorrhea is typical with depo provera injections and her periods could be irregular for months up to 12 months after stopping depo provera. Typically hcg would not drop that fast with a miscarriage, she could have had false positive, spike in [facility]. Again recommend appt to discuss concerns. It looks like she has VV with dr. Khalida Mccain 1/9/23 to discuss birth control?

## 2023-01-06 ENCOUNTER — TELEPHONE (OUTPATIENT)
Dept: SURGERY | Age: 23
End: 2023-01-06

## 2023-01-06 NOTE — TELEPHONE ENCOUNTER
Patient lvm on surgery scheduler voicemail that she would like to schedule HIDA scan and would like the time that we had available.   Did anyone from here speak with her or does she need to call Central scheduling?

## 2023-01-06 NOTE — TELEPHONE ENCOUNTER
Spoke with Dr. Gurdeep Naqvi staff. Patient needs to contact central scheduling to schedule HIDA scan.

## 2023-01-11 ENCOUNTER — OFFICE VISIT (OUTPATIENT)
Dept: PRIMARY CARE CLINIC | Age: 23
End: 2023-01-11
Payer: COMMERCIAL

## 2023-01-11 VITALS
RESPIRATION RATE: 14 BRPM | DIASTOLIC BLOOD PRESSURE: 66 MMHG | OXYGEN SATURATION: 97 % | HEART RATE: 108 BPM | SYSTOLIC BLOOD PRESSURE: 124 MMHG

## 2023-01-11 DIAGNOSIS — R10.11 RUQ PAIN: ICD-10-CM

## 2023-01-11 DIAGNOSIS — G43.819 OTHER MIGRAINE WITHOUT STATUS MIGRAINOSUS, INTRACTABLE: ICD-10-CM

## 2023-01-11 DIAGNOSIS — R00.0 TACHYCARDIA: Primary | ICD-10-CM

## 2023-01-11 PROCEDURE — G8417 CALC BMI ABV UP PARAM F/U: HCPCS | Performed by: NURSE PRACTITIONER

## 2023-01-11 PROCEDURE — 99214 OFFICE O/P EST MOD 30 MIN: CPT | Performed by: NURSE PRACTITIONER

## 2023-01-11 PROCEDURE — G8484 FLU IMMUNIZE NO ADMIN: HCPCS | Performed by: NURSE PRACTITIONER

## 2023-01-11 PROCEDURE — 1036F TOBACCO NON-USER: CPT | Performed by: NURSE PRACTITIONER

## 2023-01-11 PROCEDURE — G8427 DOCREV CUR MEDS BY ELIG CLIN: HCPCS | Performed by: NURSE PRACTITIONER

## 2023-01-11 RX ORDER — ONDANSETRON 4 MG/1
4 TABLET, FILM COATED ORAL DAILY PRN
Qty: 30 TABLET | Refills: 0 | Status: SHIPPED | OUTPATIENT
Start: 2023-01-11

## 2023-01-11 ASSESSMENT — ENCOUNTER SYMPTOMS
EYE ITCHING: 0
ABDOMINAL PAIN: 1
TROUBLE SWALLOWING: 0
DIARRHEA: 0
SORE THROAT: 0
EYE REDNESS: 0
CHEST TIGHTNESS: 0
EYE DISCHARGE: 0
COUGH: 0
SHORTNESS OF BREATH: 0
NAUSEA: 0
WHEEZING: 0
VOMITING: 1
SINUS PAIN: 0
SINUS PRESSURE: 0

## 2023-01-11 ASSESSMENT — PATIENT HEALTH QUESTIONNAIRE - PHQ9: DEPRESSION UNABLE TO ASSESS: PT REFUSES

## 2023-01-11 NOTE — PROGRESS NOTES
[facility] PRIMARY CARE  [address]  [address]  [address] [address]  Dept: [phone]  Dept Fax: [phone]    Sarmad Graham is a 25 y.o. female who presents today for her medical conditions/complaintsas noted below. Sarmad Graham is c/o of Abdominal Pain (RUQ, 1 wk f/u, heard a \"gurgle or growl\" in stomach while in quiet room) and Tachycardia (Heart racing still, was 165 at work which caused pt to vomit)        HPI:     Patient presents for follow-up  Blood pressure stable  Declined weight    Patient presents for abdominal pain follow-up. She is scheduled to have a HIDA scan on . Fu with surgery on . Her heart started racing at work. Went up to 165. This then caused her to vomit. This has been happening intermittently. Completed holter monitor. Results pending     Weird stomach noise with breathing. She will have random pains in different places of her abdomen. Only on one side. Random times. Feels like a baby kicking. Past Medical History:   Diagnosis Date    Mental disorder     depression      Past Surgical History:   Procedure Laterality Date    ESOPHAGOGASTRODUODENOSCOPY  2022    EGD BIOPSY    INTRAUTERINE DEVICE REMOVAL  2021    dislodged IUD    UPPER GASTROINTESTINAL ENDOSCOPY N/A 2022    EGD BIOPSY performed by Gabriela King MD at [facility] [address] [facility] [address]       History reviewed. No pertinent family history.     Social History     Tobacco Use    Smoking status: Never    Smokeless tobacco: Never   Substance Use Topics    Alcohol use: Not Currently      Current Outpatient Medications   Medication Sig Dispense Refill    ondansetron (ZOFRAN) 4 MG tablet Take 1 tablet by mouth daily as needed for Nausea or Vomiting 30 tablet 0    Drospirenone (SLYND) 4 MG TABS Take 1 tablet by mouth daily 28 tablet 11    famotidine (PEPCID) 40 MG tablet Take 1 tablet by mouth every evening 30 tablet 3    naproxen (NAPROSYN) 500 MG tablet Take 1 tablet by mouth 2 times daily (with meals) 60 tablet 5    rizatriptan (MAXALT) 10 MG tablet Take 1 tablet by mouth once as needed for Migraine May repeat in 2 hours if needed 30 tablet 3    propranolol (INDERAL LA) 60 MG extended release capsule Take 1 capsule by mouth daily 30 capsule 0    fluticasone (FLOVENT HFA) 110 MCG/ACT inhaler Inhale 2 puffs into the lungs 2 times daily 12 g 3    medroxyPROGESTERone (DEPO-PROVERA) 150 MG/ML injection Inject 1 mL into the muscle once for 1 dose 1 mL 3    butalbital-acetaminophen-caffeine (FIORICET, ESGIC) -40 MG per tablet Take 1 tablet by mouth every 4 hours as needed for Headaches 30 tablet 3     No current facility-administered medications for this visit. Allergies   Allergen Reactions    Zoloft [Sertraline] Other (See Comments)     Spasms and tingling        Health Maintenance   Topic Date Due    COVID-19 Vaccine (1) Never done    Pneumococcal 0-64 years Vaccine (1 - PCV) 04/16/2007    HIV screen  Never done    Flu vaccine (1) 08/01/2022    Chlamydia/GC screen  10/05/2022    Depression Monitoring  11/09/2023    Pap smear  11/04/2025    DTaP/Tdap/Td vaccine (8 - Td or Tdap) 06/24/2031    Hepatitis A vaccine  Completed    Hib vaccine  Completed    HPV vaccine  Completed    Varicella vaccine  Completed    Meningococcal (ACWY) vaccine  Completed    Hepatitis C screen  Completed       :     Review of Systems   Constitutional:  Negative for chills, fatigue and fever. HENT:  Negative for ear discharge, ear pain, sinus pressure, sinus pain, sore throat and trouble swallowing. Eyes:  Negative for discharge, redness and itching. Respiratory:  Negative for cough, chest tightness, shortness of breath and wheezing. Cardiovascular:  Positive for palpitations. Negative for chest pain. Gastrointestinal:  Positive for abdominal pain and vomiting. Negative for diarrhea and nausea. Genitourinary:  Negative for difficulty urinating.    Musculoskeletal:  Negative for arthralgias and neck pain. Skin:  Negative for rash. Neurological:  Negative for dizziness, weakness, light-headedness and headaches. All other systems reviewed and are negative. Objective:     Physical Exam  Constitutional:       Appearance: Normal appearance. She is normal weight. HENT:      Head: Normocephalic and atraumatic. Nose: Nose normal.   Eyes:      Extraocular Movements: Extraocular movements intact. Conjunctiva/sclera: Conjunctivae normal.      Pupils: Pupils are equal, round, and reactive to light. Cardiovascular:      Rate and Rhythm: Normal rate and regular rhythm. Pulses: Normal pulses. Heart sounds: Normal heart sounds. Pulmonary:      Effort: Pulmonary effort is normal.      Breath sounds: Normal breath sounds. Abdominal:      General: Abdomen is flat. Palpations: Abdomen is soft. Musculoskeletal:         General: Normal range of motion. Cervical back: Neck supple. Skin:     General: Skin is warm and dry. Capillary Refill: Capillary refill takes less than 2 seconds. Neurological:      General: No focal deficit present. Mental Status: She is alert and oriented to person, place, and time. Psychiatric:         Mood and Affect: Mood normal.     /66   Pulse (!) 108   Resp 14   SpO2 97%     Assessment:       Diagnosis Orders   1. Tachycardia  AFL - Percy Jeremias, CNP, Cardiology, [place]      2. RUQ pain        3. Other migraine without status migrainosus, intractable            :      Return if symptoms worsen or fail to improve. 1.  Tachycardia  Holter monitor results pending  Concern for possible POTS, NCS or SVT. Referral to cardiology  2. Right upper quadrant pain  Due to have HIDA scan. She has a follow-up appointment in the month with general surgery. Rx for Zofran 4 mg daily as needed. She does not tolerate the oral dissolvable ones. 3.  Migraine  Patient is on propanolol 60 mg sign release daily.   She states that she has noticed no change in her headaches since starting this medication. We will keep her on it at this time and reevaluate at her next appointment    Patient to follow-up in 1 month. We will reevaluate at that time  Orders Placed This Encounter   Procedures    VICTOR HUGO - Arcelia Rodrigues, [facility], Cardiology, [place]     Referral Priority:   Routine     Referral Type:   Eval and Treat     Referral Reason:   Specialty Services Required     Referred to Provider:   BEBA Lauren CNP     Requested Specialty:   Certified Nurse Practitioner     Number of Visits Requested:   1     Orders Placed This Encounter   Medications    ondansetron (ZOFRAN) 4 MG tablet     Sig: Take 1 tablet by mouth daily as needed for Nausea or Vomiting     Dispense:  30 tablet     Refill:  0       Patient given educational materials - seepatient instructions. Discussed use, benefit, and side effects of prescribed medications. All patient questions answered. Pt voiced understanding. Reviewed health maintenance. Instructed to continue current medications, diet and exercise. Patient agreedwith treatment plan. Follow up as directed.       Electronically signed by BEBA Schwartz CNP on 1/11/2023at 7:31 PM

## 2023-01-18 ENCOUNTER — PATIENT MESSAGE (OUTPATIENT)
Dept: PRIMARY CARE CLINIC | Age: 23
End: 2023-01-18

## 2023-01-18 DIAGNOSIS — R19.7 ACUTE DIARRHEA: Primary | ICD-10-CM

## 2023-01-18 NOTE — LETTER
[facility]  [address]  Phone: [phone]  Fax: [phone]    BEBA Galicia CNP        January 19, 2023     Patient: Esperanza Bernabe   YOB: 2000   Date of Visit: 1/18/2023       To Whom It May Concern: It is my medical opinion that Anel Perry be excused from work on 1/17-1/18/23. .    If you have any questions or concerns, please don't hesitate to call.     Sincerely,        BEBA Galicia CNP

## 2023-01-18 NOTE — TELEPHONE ENCOUNTER
From: Savi Muñoz  To: Cristian Morales  Sent: 1/18/2023 9:17 AM EST  Subject: work note     Adrian Pettit, I was just wondering if there was anyway I could get a work note for yesterday and today (17-18)? patsy got sick at [employer] and he ended up getting me sick, i've been throwing up with diarrhea and a migraine for the past 3 days.

## 2023-01-19 ENCOUNTER — HOSPITAL ENCOUNTER (OUTPATIENT)
Dept: NUCLEAR MEDICINE | Age: 23
Discharge: HOME | End: 2023-01-21
Payer: COMMERCIAL

## 2023-01-19 DIAGNOSIS — R10.11 RUQ PAIN: ICD-10-CM

## 2023-01-19 PROCEDURE — 3430000000 HC RX DIAGNOSTIC RADIOPHARMACEUTICAL: Performed by: SURGERY

## 2023-01-19 PROCEDURE — 78227 HEPATOBIL SYST IMAGE W/DRUG: CPT

## 2023-01-19 PROCEDURE — A9537 TC99M MEBROFENIN: HCPCS | Performed by: SURGERY

## 2023-01-19 PROCEDURE — 2580000003 HC RX 258: Performed by: SURGERY

## 2023-01-19 RX ORDER — SODIUM CHLORIDE 0.9 % (FLUSH) 0.9 %
10 SYRINGE (ML) INJECTION ONCE
Status: COMPLETED | OUTPATIENT
Start: 2023-01-19 | End: 2023-01-19

## 2023-01-19 RX ADMIN — SODIUM CHLORIDE, PRESERVATIVE FREE 10 ML: 5 INJECTION INTRAVENOUS at 10:07

## 2023-01-19 RX ADMIN — Medication 4.2 MILLICURIE: at 10:07

## 2023-01-30 ENCOUNTER — OFFICE VISIT (OUTPATIENT)
Dept: SURGERY | Age: 23
End: 2023-01-30
Payer: COMMERCIAL

## 2023-01-30 VITALS
SYSTOLIC BLOOD PRESSURE: 111 MMHG | HEART RATE: 89 BPM | BODY MASS INDEX: 29.23 KG/M2 | RESPIRATION RATE: 16 BRPM | HEIGHT: 59 IN | DIASTOLIC BLOOD PRESSURE: 62 MMHG | WEIGHT: 145 LBS

## 2023-01-30 DIAGNOSIS — R19.7 DIARRHEA, UNSPECIFIED TYPE: ICD-10-CM

## 2023-01-30 DIAGNOSIS — R10.9 ABDOMINAL PAIN, RIGHT LATERAL: Primary | ICD-10-CM

## 2023-01-30 PROCEDURE — 1036F TOBACCO NON-USER: CPT | Performed by: SURGERY

## 2023-01-30 PROCEDURE — G8427 DOCREV CUR MEDS BY ELIG CLIN: HCPCS | Performed by: SURGERY

## 2023-01-30 PROCEDURE — G8484 FLU IMMUNIZE NO ADMIN: HCPCS | Performed by: SURGERY

## 2023-01-30 PROCEDURE — G8417 CALC BMI ABV UP PARAM F/U: HCPCS | Performed by: SURGERY

## 2023-01-30 PROCEDURE — 99203 OFFICE O/P NEW LOW 30 MIN: CPT | Performed by: SURGERY

## 2023-01-30 NOTE — PROGRESS NOTES
[facility] General Surgery  History & Physical  Rin Al DO    Pt Name: Laura Rene  MRN: [id number]  YOB: 2000  Date of evaluation: 1/30/2023  Primary Care Physician: BEBA Huang CNP    Chief Complaint: right abdominal pain, diarrhea      SUBJECTIVE:    History of Present Illness: This is a 25 y.o.  female who presents for evaluation for the above, has been experiencing these symptoms since the birth of her son two yrs ago. No prior endoscopic workup, pt has undergone imaging workup for her gallbladder as noted below. Pt states she has loose stools up to 8x daily, maternal aunt with history of crohn's although none in her 1st degree relatives. Denies history of blood diarrhea, her abdominal symptoms are unchanged with bowel movements. No prior abdominal surgery. Chart review performed to add information to the HPI: Yes    Past Medical History   has a past medical history of Mental disorder. Past Surgical History   has a past surgical history that includes Intrauterine device removal (11/18/2021); Esophagogastroduodenoscopy (12/20/2022); and Upper gastrointestinal endoscopy (N/A, 12/20/2022). Family History  family history is not on file. Social History  Tobacco use:  reports that she has never smoked. She has never used smokeless tobacco.  Alcohol use:  reports that she does not currently use alcohol. Drug use:  reports that she does not currently use drugs after having used the following drugs: Marijuana Nan Hallie).       Medications  Current Medications:   Current Outpatient Medications   Medication Sig Dispense Refill    ondansetron (ZOFRAN) 4 MG tablet Take 1 tablet by mouth daily as needed for Nausea or Vomiting 30 tablet 0    Drospirenone (SLYND) 4 MG TABS Take 1 tablet by mouth daily 28 tablet 11    famotidine (PEPCID) 40 MG tablet Take 1 tablet by mouth every evening 30 tablet 3    naproxen (NAPROSYN) 500 MG tablet Take 1 tablet by mouth 2 times daily (with meals) 60 tablet 5    rizatriptan (MAXALT) 10 MG tablet Take 1 tablet by mouth once as needed for Migraine May repeat in 2 hours if needed 30 tablet 3    propranolol (INDERAL LA) 60 MG extended release capsule Take 1 capsule by mouth daily 30 capsule 0    fluticasone (FLOVENT HFA) 110 MCG/ACT inhaler Inhale 2 puffs into the lungs 2 times daily 12 g 3    medroxyPROGESTERone (DEPO-PROVERA) 150 MG/ML injection Inject 1 mL into the muscle once for 1 dose 1 mL 3    butalbital-acetaminophen-caffeine (FIORICET, ESGIC) -40 MG per tablet Take 1 tablet by mouth every 4 hours as needed for Headaches 30 tablet 3     No current facility-administered medications for this visit. Home Medications:   Prior to Admission medications    Medication Sig Start Date End Date Taking?  Authorizing Provider   ondansetron (ZOFRAN) 4 MG tablet Take 1 tablet by mouth daily as needed for Nausea or Vomiting 1/11/23   BEBA Matos - CNP   Drospirenone (SLYND) 4 MG TABS Take 1 tablet by mouth daily 11/4/22   BEBA Bauer CNM   famotidine (PEPCID) 40 MG tablet Take 1 tablet by mouth every evening 9/22/22   BEBA Matos - CNP   naproxen (NAPROSYN) 500 MG tablet Take 1 tablet by mouth 2 times daily (with meals) 9/22/22   Geeta Hatch APRCISCO - CNP   rizatriptan (MAXALT) 10 MG tablet Take 1 tablet by mouth once as needed for Migraine May repeat in 2 hours if needed 5/7/22 1/11/23  BEBA Matos - CNP   propranolol (INDERAL LA) 60 MG extended release capsule Take 1 capsule by mouth daily 5/7/22   BEBA Matos - CNP   fluticasone (FLOVENT HFA) 110 MCG/ACT inhaler Inhale 2 puffs into the lungs 2 times daily 12/3/21 1/11/23  BEBA Matos - CNP   medroxyPROGESTERone (DEPO-PROVERA) 150 MG/ML injection Inject 1 mL into the muscle once for 1 dose 11/18/21 1/11/23  Kavon Evans,    butalbital-acetaminophen-caffeine (FIORICET, ESGIC) -40 MG per tablet Take 1 tablet by mouth every 4 hours as needed for Headaches 10/28/21   BEBA Parekh - CNP       Allergies  Zoloft [sertraline]      Review of Systems:  General: Denies any fever, chills. Eyes: Denies any changes in vision, diplopia or eye pain  Ears, Nose, Mouth: Denies changes in hearing/tinnitus or drainage from ears, no rhinorrhea or bloody nose, no difficulty chewing  Throat: no difficulty swallowing, no throat pain  Respiratory: Denies any shortness of breath or cough. Cardiac: Denies any chest pain, palpitations, claudication or edema. Gastrointestinal: R abdominal pain, diarrhea. Genitourinary: Denies any frequency, urgency, hesitancy or incontinence. Musculoskeletal: Denies worsening muscle weakness or recent trauma  Skin: Denies rashes or lesions  Psychiatric: Denies any recent changes in mood or affect  Hematologic: Denies bruising or bleeding easily. PHYSICAL EXAMINATION  Vitals:   Vitals:    01/30/23 1334   BP: 111/62   Pulse: 89   Resp: 16       General Appearance:  awake, alert, no acute distress, well developed, well nourished   Skin:  Skin color, texture, turgor normal. No rashes or lesions. Head/face:  NCAT, face symmetrical  Eyes:  PERRL, no evidence of conjunctivitis or ptosis bilaterally  Ears:  External ears and canals grossly normal, no evidence of otorrhea. Nose/Sinuses:  Nares normal. Septum midline. Mucosa normal. No external drainage noted. Mouth/Neck:  Mucosa moist.  No external oral lesions. Trachea midline. No visible masses. Lungs:  Normal chest expansion, unlabored breathing without accessory muscle use. No audible rales, rhonchi, or wheezing. Cardiovascular: S1S2. No evidence of JVD. No evidence of pulsatile masses in abdomen  Abdomen:  Soft, tender right abdomen just above the umbilical level, otherwise unremarkable. Musculoskeletal: No evidence of bony/muscular deformities, trauma, atrophy of either left/right upper/lower extremity.  No evidence of digital clubbing or cyanosis. Neurologic:  CN 2-12 grossly intact without obvious deficits. Grossly normal sensation in all extremities. Psychiatric: appropriate judgement and insight, appropriate recall of recent and remote memory, no evidence of depression/anxiety/agitation      RADIOLOGY:  The following images and/or reports were personally reviewed with the following significant findings pertinent to the Chief Complaint and/or HPI:             NM HEPATOBILIARY SCAN W EJECTION FRACTION    Result Date: 1/19/2023  EXAMINATION: NUCLEAR MEDICINE HEPATOBILIARY SCINTIGRAPHY (HIDA SCAN) WITH EJECTION FRACTION. TECHNIQUE: Approximately 4.2 millicuries ZM75F Mebrofenin (Choletec) was administered IV. Then, dynamic images of the abdomen were obtained in the anterior projection for 60 mins. Due to a shortage/inavailability of CCK, one can (237 ml) Ensure plus was substitued orally. Images were obtained in the [place] projection and regions of interest were drawn around the gallbladder and ejection fraction was calculated. COMPARISON: No prior for comparison. HISTORY: ORDERING SYSTEM PROVIDED HISTORY: RUQ pain TECHNOLOGIST PROVIDED HISTORY: r/o gallbladder dysfunction Is the patient pregnant?->No Reason for Exam: r/o gallbladder dysfunction, RUQ pain FINDINGS: Upon injection of radiopharmaceutical, there is prompt visualization of the liver. Activity is seen within common duct and small bowel by 10-15 minutes. Activity is seen within the gallbladder by 55 minutes. There is progressive gallbladder filling after gallbladder stimulation. The time activity curve demonstrates up slope as a consequence. No evidence of acute cholecystitis. Evaluation for ejection fraction is technically limited due to the timing of the gallbladder stimulation. DIAGNOSES:   Diagnosis Orders   1. Abdominal pain, right lateral        2. Diarrhea, unspecified type            PLAN:  We had a long discussion regarding the utility of cholecystectomy.  At this time it is unclear whether cholecystectomy would benefit the patient in any clinically significant way, her symptoms are not classically associated with gallbladder dysfunction. 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   I advised the pt to consider her treatment options, we can schedule her for cholecystectomy at any time when she is ready to proceed. Alternatively she can pursue less invasive diagnostic options as well as nonoperative management.   We did discuss the details of cholecystectomy. Risks include bleeding, infection, scarring, bile leak, damage to surrounding tissues, chance of damage to the common bile duct, risk of subtotal cholecystectomy, conversion to open procedure, need for further surgery or procedures. Benefits, alternatives, complications and procedure details were explained to the patient, all questions were answered.        Medical Decision Making: low complexity    Electronically signed by Harley Ruiz DO on 1/30/2023 at 2:16 PM

## 2023-01-30 NOTE — LETTER
[facility]  Surgical Specialties - General Surgery  [address]  [place], [address]  Phone: [phone]  Fax: [phone]      23    Patient: Tameka Pérez  MRN: [id number]  : 2000  Date of visit: 2023    Dear BEBA Tate CNP:      Thank you for requesting consultation for Tameka Pérez in regards to evaluation for abdominal pain. Below are the relevant portions of my assessment and plan of care. If you have questions, please do not hesitate to call me. I look forward to following Tameka Pérez along with you. Sincerely,        Cam Rosas DO      [facility] General Surgery  History & Physical  Cam Rosas DO    Pt Name: Tameka Pérez  MRN: [id number]  YOB: 2000  Date of evaluation: 2023  Primary Care Physician: BEBA Tate CNP    Chief Complaint: right abdominal pain, diarrhea      SUBJECTIVE:    History of Present Illness: This is a 25 y.o.  female who presents for evaluation for the above, has been experiencing these symptoms since the birth of her son two yrs ago. No prior endoscopic workup, pt has undergone imaging workup for her gallbladder as noted below. Pt states she has loose stools up to 8x daily, maternal aunt with history of crohn's although none in her 1st degree relatives. Denies history of blood diarrhea, her abdominal symptoms are unchanged with bowel movements. No prior abdominal surgery. Chart review performed to add information to the HPI: Yes    Past Medical History   has a past medical history of Mental disorder. Past Surgical History   has a past surgical history that includes Intrauterine device removal (2021); Esophagogastroduodenoscopy (2022); and Upper gastrointestinal endoscopy (N/A, 2022). Family History  family history is not on file. Social History  Tobacco use:  reports that she has never smoked.  She has never used smokeless tobacco.  Alcohol use:  reports that she does not currently use alcohol. Drug use:  reports that she does not currently use drugs after having used the following drugs: Marijuana Kenjody Chaseks). Medications  Current Medications:   Current Outpatient Medications   Medication Sig Dispense Refill    ondansetron (ZOFRAN) 4 MG tablet Take 1 tablet by mouth daily as needed for Nausea or Vomiting 30 tablet 0    Drospirenone (SLYND) 4 MG TABS Take 1 tablet by mouth daily 28 tablet 11    famotidine (PEPCID) 40 MG tablet Take 1 tablet by mouth every evening 30 tablet 3    naproxen (NAPROSYN) 500 MG tablet Take 1 tablet by mouth 2 times daily (with meals) 60 tablet 5    rizatriptan (MAXALT) 10 MG tablet Take 1 tablet by mouth once as needed for Migraine May repeat in 2 hours if needed 30 tablet 3    propranolol (INDERAL LA) 60 MG extended release capsule Take 1 capsule by mouth daily 30 capsule 0    fluticasone (FLOVENT HFA) 110 MCG/ACT inhaler Inhale 2 puffs into the lungs 2 times daily 12 g 3    medroxyPROGESTERone (DEPO-PROVERA) 150 MG/ML injection Inject 1 mL into the muscle once for 1 dose 1 mL 3    butalbital-acetaminophen-caffeine (FIORICET, ESGIC) -40 MG per tablet Take 1 tablet by mouth every 4 hours as needed for Headaches 30 tablet 3     No current facility-administered medications for this visit. Home Medications:   Prior to Admission medications    Medication Sig Start Date End Date Taking?  Authorizing Provider   ondansetron (ZOFRAN) 4 MG tablet Take 1 tablet by mouth daily as needed for Nausea or Vomiting 1/11/23   BEBA Beavers CNP   Drospirenone (SLYND) 4 MG TABS Take 1 tablet by mouth daily 11/4/22   BEBA Harman CNM   famotidine (PEPCID) 40 MG tablet Take 1 tablet by mouth every evening 9/22/22   BEBA Beavers CNP   naproxen (NAPROSYN) 500 MG tablet Take 1 tablet by mouth 2 times daily (with meals) 9/22/22   BEBA Beavers CNP   rizatriptan ([place]) 10 MG tablet Take 1 tablet by mouth once as needed for Migraine May repeat in 2 hours if needed 5/7/22 1/11/23  BEBA Gusman CNP   propranolol (INDERAL LA) 60 MG extended release capsule Take 1 capsule by mouth daily 5/7/22   BEBA Gusman CNP   fluticasone [facility] HFA) 110 MCG/ACT inhaler Inhale 2 puffs into the lungs 2 times daily 12/3/21 1/11/23  BEBA Gusman CNP   medroxyPROGESTERone (DEPO-PROVERA) 150 MG/ML injection Inject 1 mL into the muscle once for 1 dose 11/18/21 1/11/23  Ki Colvin,    butalbital-acetaminophen-caffeine (FIORICET, ESGIC) -61 MG per tablet Take 1 tablet by mouth every 4 hours as needed for Headaches 10/28/21   BEBA Gusman CNP       Allergies  Zoloft [sertraline]      Review of Systems:  General: Denies any fever, chills. Eyes: Denies any changes in vision, diplopia or eye pain  Ears, Nose, Mouth: Denies changes in hearing/tinnitus or drainage from ears, no rhinorrhea or bloody nose, no difficulty chewing  Throat: no difficulty swallowing, no throat pain  Respiratory: Denies any shortness of breath or cough. Cardiac: Denies any chest pain, palpitations, claudication or edema. Gastrointestinal: R abdominal pain, diarrhea. Genitourinary: Denies any frequency, urgency, hesitancy or incontinence. Musculoskeletal: Denies worsening muscle weakness or recent trauma  Skin: Denies rashes or lesions  Psychiatric: Denies any recent changes in mood or affect  Hematologic: Denies bruising or bleeding easily. PHYSICAL EXAMINATION  Vitals:   Vitals:    01/30/23 1334   BP: 111/62   Pulse: 89   Resp: 16       General Appearance:  awake, alert, no acute distress, well developed, well nourished   Skin:  Skin color, texture, turgor normal. No rashes or lesions. Head/face:  NCAT, face symmetrical  Eyes:  PERRL, no evidence of conjunctivitis or ptosis bilaterally  Ears:  External ears and canals grossly normal, no evidence of otorrhea.    Nose/Sinuses: Nares normal. Septum midline. Mucosa normal. No external drainage noted. Mouth/Neck:  Mucosa moist.  No external oral lesions. Trachea midline. No visible masses. Lungs:  Normal chest expansion, unlabored breathing without accessory muscle use. No audible rales, rhonchi, or wheezing. Cardiovascular: S1S2. No evidence of JVD. No evidence of pulsatile masses in abdomen  Abdomen:  Soft, tender right abdomen just above the umbilical level, otherwise unremarkable. Musculoskeletal: No evidence of bony/muscular deformities, trauma, atrophy of either left/right upper/lower extremity. No evidence of digital clubbing or cyanosis. Neurologic:  CN 2-12 grossly intact without obvious deficits. Grossly normal sensation in all extremities. Psychiatric: appropriate judgement and insight, appropriate recall of recent and remote memory, no evidence of depression/anxiety/agitation      RADIOLOGY:  The following images and/or reports were personally reviewed with the following significant findings pertinent to the Chief Complaint and/or HPI:             NM HEPATOBILIARY SCAN W EJECTION FRACTION    Result Date: 1/19/2023  EXAMINATION: NUCLEAR MEDICINE HEPATOBILIARY SCINTIGRAPHY (HIDA SCAN) WITH EJECTION FRACTION. TECHNIQUE: Approximately 4.2 millicuries ZJ93J Mebrofenin (Choletec) was administered IV. Then, dynamic images of the abdomen were obtained in the anterior projection for 60 mins. Due to a shortage/inavailability of CCK, one can (237 ml) Ensure plus was substitued orally. Images were obtained in the [place] projection and regions of interest were drawn around the gallbladder and ejection fraction was calculated. COMPARISON: No prior for comparison.  HISTORY: ORDERING SYSTEM PROVIDED HISTORY: RUQ pain TECHNOLOGIST PROVIDED HISTORY: r/o gallbladder dysfunction Is the patient pregnant?->No Reason for Exam: r/o gallbladder dysfunction, RUQ pain FINDINGS: Upon injection of radiopharmaceutical, there is prompt visualization of the liver. Activity is seen within common duct and small bowel by 10-15 minutes. Activity is seen within the gallbladder by 55 minutes. There is progressive gallbladder filling after gallbladder stimulation. The time activity curve demonstrates up slope as a consequence. No evidence of acute cholecystitis. Evaluation for ejection fraction is technically limited due to the timing of the gallbladder stimulation. DIAGNOSES:   Diagnosis Orders   1. Abdominal pain, right lateral        2. Diarrhea, unspecified type            PLAN:  · We had a long discussion regarding the utility of cholecystectomy. At this time it is unclear whether cholecystectomy would benefit the patient in any clinically significant way, her symptoms are not classically associated with gallbladder dysfunction. 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 · I advised the pt to consider her treatment options, we can schedule her for cholecystectomy at any time when she is ready to proceed. Alternatively she can pursue less invasive diagnostic options as well as nonoperative management. · We did discuss the details of cholecystectomy. Risks include bleeding, infection, scarring, bile leak, damage to surrounding tissues, chance of damage to the common bile duct, risk of subtotal cholecystectomy, conversion to open procedure, need for further surgery or procedures. Benefits, alternatives, complications and procedure details were explained to the patient, all questions were answered.   ·       Medical Decision Making: low complexity    Electronically signed by Brian Cervantes DO on 1/30/2023 at 2:16 PM

## 2023-02-09 ENCOUNTER — OFFICE VISIT (OUTPATIENT)
Dept: PRIMARY CARE CLINIC | Age: 23
End: 2023-02-09
Payer: COMMERCIAL

## 2023-02-09 VITALS
HEART RATE: 86 BPM | RESPIRATION RATE: 14 BRPM | BODY MASS INDEX: 29.49 KG/M2 | DIASTOLIC BLOOD PRESSURE: 72 MMHG | SYSTOLIC BLOOD PRESSURE: 98 MMHG | OXYGEN SATURATION: 99 % | WEIGHT: 146 LBS

## 2023-02-09 DIAGNOSIS — J03.91 RECURRENT TONSILLITIS: ICD-10-CM

## 2023-02-09 DIAGNOSIS — R10.11 RUQ PAIN: Primary | ICD-10-CM

## 2023-02-09 PROCEDURE — 1036F TOBACCO NON-USER: CPT | Performed by: NURSE PRACTITIONER

## 2023-02-09 PROCEDURE — G8427 DOCREV CUR MEDS BY ELIG CLIN: HCPCS | Performed by: NURSE PRACTITIONER

## 2023-02-09 PROCEDURE — G8417 CALC BMI ABV UP PARAM F/U: HCPCS | Performed by: NURSE PRACTITIONER

## 2023-02-09 PROCEDURE — G8484 FLU IMMUNIZE NO ADMIN: HCPCS | Performed by: NURSE PRACTITIONER

## 2023-02-09 PROCEDURE — 99213 OFFICE O/P EST LOW 20 MIN: CPT | Performed by: NURSE PRACTITIONER

## 2023-02-09 SDOH — ECONOMIC STABILITY: HOUSING INSECURITY
IN THE LAST 12 MONTHS, WAS THERE A TIME WHEN YOU DID NOT HAVE A STEADY PLACE TO SLEEP OR SLEPT IN A SHELTER (INCLUDING NOW)?: NO

## 2023-02-09 SDOH — ECONOMIC STABILITY: FOOD INSECURITY: WITHIN THE PAST 12 MONTHS, YOU WORRIED THAT YOUR FOOD WOULD RUN OUT BEFORE YOU GOT MONEY TO BUY MORE.: NEVER TRUE

## 2023-02-09 SDOH — ECONOMIC STABILITY: INCOME INSECURITY: HOW HARD IS IT FOR YOU TO PAY FOR THE VERY BASICS LIKE FOOD, HOUSING, MEDICAL CARE, AND HEATING?: NOT HARD AT ALL

## 2023-02-09 SDOH — ECONOMIC STABILITY: FOOD INSECURITY: WITHIN THE PAST 12 MONTHS, THE FOOD YOU BOUGHT JUST DIDN'T LAST AND YOU DIDN'T HAVE MONEY TO GET MORE.: NEVER TRUE

## 2023-02-09 ASSESSMENT — PATIENT HEALTH QUESTIONNAIRE - PHQ9
SUM OF ALL RESPONSES TO PHQ QUESTIONS 1-9: 0
8. MOVING OR SPEAKING SO SLOWLY THAT OTHER PEOPLE COULD HAVE NOTICED. OR THE OPPOSITE, BEING SO FIGETY OR RESTLESS THAT YOU HAVE BEEN MOVING AROUND A LOT MORE THAN USUAL: 0
2. FEELING DOWN, DEPRESSED OR HOPELESS: 0
SUM OF ALL RESPONSES TO PHQ QUESTIONS 1-9: 0
7. TROUBLE CONCENTRATING ON THINGS, SUCH AS READING THE NEWSPAPER OR WATCHING TELEVISION: 0
10. IF YOU CHECKED OFF ANY PROBLEMS, HOW DIFFICULT HAVE THESE PROBLEMS MADE IT FOR YOU TO DO YOUR WORK, TAKE CARE OF THINGS AT HOME, OR GET ALONG WITH OTHER PEOPLE: 0
SUM OF ALL RESPONSES TO PHQ QUESTIONS 1-9: 0
SUM OF ALL RESPONSES TO PHQ QUESTIONS 1-9: 0
SUM OF ALL RESPONSES TO PHQ9 QUESTIONS 1 & 2: 0
1. LITTLE INTEREST OR PLEASURE IN DOING THINGS: 0
4. FEELING TIRED OR HAVING LITTLE ENERGY: 0
5. POOR APPETITE OR OVEREATING: 0
6. FEELING BAD ABOUT YOURSELF - OR THAT YOU ARE A FAILURE OR HAVE LET YOURSELF OR YOUR FAMILY DOWN: 0
9. THOUGHTS THAT YOU WOULD BE BETTER OFF DEAD, OR OF HURTING YOURSELF: 0
3. TROUBLE FALLING OR STAYING ASLEEP: 0

## 2023-02-09 ASSESSMENT — ENCOUNTER SYMPTOMS
SORE THROAT: 0
WHEEZING: 0
CHEST TIGHTNESS: 0
ABDOMINAL PAIN: 0
COUGH: 0
VOMITING: 0
EYE ITCHING: 0
NAUSEA: 0
EYE DISCHARGE: 0
SINUS PAIN: 0
DIARRHEA: 0
SINUS PRESSURE: 0
TROUBLE SWALLOWING: 0
SHORTNESS OF BREATH: 0
EYE REDNESS: 0

## 2023-02-09 NOTE — PROGRESS NOTES
[facility] PRIMARY CARE  [address]  [address]  [address] [address]  Dept: [phone]  Dept Fax: [phone]    Paula Beard is a 25 y.o. female who presents today for her medical conditions/complaintsas noted below. Paula Beard is c/o of Depression (3 mo f/u)        HPI:     Pt presents for a follow up  Bp stable  Weight stable    Pt presents for an abdominal pain follow up. She has seen dr. Laurie POON scan done but it wasn't done right. Surgery is up to her. Pain is still there but not as bad. She is going to wait to have surgery     Her nose is broken and due to have surgery. This will be done soon. She also needs her tonsils out. She did get her cycle. This has been the worst in a  long time. Overall doing well. No new concerns today       Past Medical History:   Diagnosis Date    Mental disorder     depression      Past Surgical History:   Procedure Laterality Date    ESOPHAGOGASTRODUODENOSCOPY  2022    EGD BIOPSY    INTRAUTERINE DEVICE REMOVAL  2021    dislodged IUD    UPPER GASTROINTESTINAL ENDOSCOPY N/A 2022    EGD BIOPSY performed by Gonzalez Medellin MD at [facility] [address]       History reviewed. No pertinent family history.     Social History     Tobacco Use    Smoking status: Never    Smokeless tobacco: Never   Substance Use Topics    Alcohol use: Not Currently      Current Outpatient Medications   Medication Sig Dispense Refill    ondansetron (ZOFRAN) 4 MG tablet Take 1 tablet by mouth daily as needed for Nausea or Vomiting 30 tablet 0    Drospirenone (SLYND) 4 MG TABS Take 1 tablet by mouth daily 28 tablet 11    famotidine (PEPCID) 40 MG tablet Take 1 tablet by mouth every evening 30 tablet 3    naproxen (NAPROSYN) 500 MG tablet Take 1 tablet by mouth 2 times daily (with meals) 60 tablet 5    rizatriptan (MAXALT) 10 MG tablet Take 1 tablet by mouth once as needed for Migraine May repeat in 2 hours if needed 30 tablet 3 propranolol (INDERAL LA) 60 MG extended release capsule Take 1 capsule by mouth daily 30 capsule 0    fluticasone (FLOVENT HFA) 110 MCG/ACT inhaler Inhale 2 puffs into the lungs 2 times daily 12 g 3    butalbital-acetaminophen-caffeine (FIORICET, ESGIC) -40 MG per tablet Take 1 tablet by mouth every 4 hours as needed for Headaches 30 tablet 3     No current facility-administered medications for this visit. Allergies   Allergen Reactions    Zoloft [Sertraline] Other (See Comments)     Spasms and tingling        Health Maintenance   Topic Date Due    COVID-19 Vaccine (1) Never done    Pneumococcal 0-64 years Vaccine (1 - PCV) 04/16/2007    HIV screen  Never done    Flu vaccine (1) 08/01/2022    Chlamydia/GC screen  10/05/2022    Depression Monitoring  11/09/2023    Pap smear  11/04/2025    DTaP/Tdap/Td vaccine (8 - Td or Tdap) 06/24/2031    Hepatitis A vaccine  Completed    Hib vaccine  Completed    HPV vaccine  Completed    Varicella vaccine  Completed    Meningococcal (ACWY) vaccine  Completed    Hepatitis C screen  Completed       :     Review of Systems   Constitutional:  Negative for chills, fatigue and fever. HENT:  Negative for ear discharge, ear pain, sinus pressure, sinus pain, sore throat and trouble swallowing. Eyes:  Negative for discharge, redness and itching. Respiratory:  Negative for cough, chest tightness, shortness of breath and wheezing. Cardiovascular:  Negative for chest pain. Gastrointestinal:  Negative for abdominal pain, diarrhea, nausea and vomiting. Genitourinary:  Negative for difficulty urinating. Musculoskeletal:  Negative for arthralgias and neck pain. Skin:  Negative for rash. Neurological:  Negative for dizziness, weakness, light-headedness and headaches. All other systems reviewed and are negative. Objective:     Physical Exam  Constitutional:       Appearance: Normal appearance. She is normal weight. HENT:      Head: Normocephalic and atraumatic. Nose: Nose normal.   Eyes:      Extraocular Movements: Extraocular movements intact. Conjunctiva/sclera: Conjunctivae normal.      Pupils: Pupils are equal, round, and reactive to light. Cardiovascular:      Rate and Rhythm: Normal rate and regular rhythm. Pulses: Normal pulses. Heart sounds: Normal heart sounds. Pulmonary:      Effort: Pulmonary effort is normal.      Breath sounds: Normal breath sounds. Abdominal:      General: Abdomen is flat. Palpations: Abdomen is soft. Musculoskeletal:         General: Normal range of motion. Cervical back: Neck supple. Skin:     General: Skin is warm and dry. Capillary Refill: Capillary refill takes less than 2 seconds. Neurological:      General: No focal deficit present. Mental Status: She is alert and oriented to person, place, and time. Psychiatric:         Mood and Affect: Mood normal.     BP 98/72   Pulse 86   Resp 14   Wt 146 lb (66.2 kg)   SpO2 99%   BMI 29.49 kg/m²     Assessment:       Diagnosis Orders   1. RUQ pain        2. Recurrent tonsillitis            :      Return in about 3 months (around 5/9/2023) for general follow up. 1.  Right upper quadrant pain  Reviewed Dr. Genny Joseph consult note. Reviewed imaging with ultrasound and HIDA scan. Her pain has improved. Is going to hold off on any sort of surgery. We will continue to monitor  2. Recurrent tonsillitis  Patient plans on getting her nasal fracture repaired by ENT. She is then going to have her tonsils removed    Otherwise doing well. Plan have her follow-up in 3 months   Patient given educational materials - seepatient instructions. Discussed use, benefit, and side effects of prescribed medications. All patient questions answered. Pt voiced understanding. Reviewed health maintenance. Instructed to continue current medications, diet and exercise. Patient agreedwith treatment plan. Follow up as directed.       Electronically signed by Chrissy Ayon Monique Way - CNP on 2/9/2023at 12:55 PM

## 2023-02-20 ENCOUNTER — TELEPHONE (OUTPATIENT)
Dept: PRIMARY CARE CLINIC | Age: 23
End: 2023-02-20

## 2023-02-20 ENCOUNTER — TELEMEDICINE (OUTPATIENT)
Dept: PRIMARY CARE CLINIC | Age: 23
End: 2023-02-20
Payer: COMMERCIAL

## 2023-02-20 DIAGNOSIS — G90.A POTS (POSTURAL ORTHOSTATIC TACHYCARDIA SYNDROME): ICD-10-CM

## 2023-02-20 DIAGNOSIS — N94.6 MENSTRUAL PAIN: ICD-10-CM

## 2023-02-20 DIAGNOSIS — G43.819 OTHER MIGRAINE WITHOUT STATUS MIGRAINOSUS, INTRACTABLE: Primary | ICD-10-CM

## 2023-02-20 PROCEDURE — G8427 DOCREV CUR MEDS BY ELIG CLIN: HCPCS | Performed by: NURSE PRACTITIONER

## 2023-02-20 PROCEDURE — 99214 OFFICE O/P EST MOD 30 MIN: CPT | Performed by: NURSE PRACTITIONER

## 2023-02-20 RX ORDER — IVABRADINE 5 MG/1
5 TABLET, FILM COATED ORAL 2 TIMES DAILY
COMMUNITY
Start: 2023-02-17

## 2023-02-20 RX ORDER — ONDANSETRON 4 MG/1
4 TABLET, FILM COATED ORAL DAILY PRN
Qty: 30 TABLET | Refills: 0 | Status: SHIPPED | OUTPATIENT
Start: 2023-02-20

## 2023-02-20 ASSESSMENT — ENCOUNTER SYMPTOMS
SHORTNESS OF BREATH: 0
SORE THROAT: 0
EYE DISCHARGE: 0
ABDOMINAL PAIN: 1
VOMITING: 0
TROUBLE SWALLOWING: 0
CHEST TIGHTNESS: 0
NAUSEA: 0
SINUS PAIN: 0
WHEEZING: 0
EYE REDNESS: 0
SINUS PRESSURE: 0
EYE ITCHING: 0
DIARRHEA: 0
COUGH: 0

## 2023-02-20 NOTE — LETTER
[address] [facility]  [address] [facility].  Phone: [phone]  Fax: [phone]    BEBA Beavers CNP        February 20, 2023     Patient: Stanislav Beard   YOB: 2000   Date of Visit: 2/20/2023       To Whom It May Concern: It is my medical opinion that Delories Jews may return to work on 2/22/23. If you have any questions or concerns, please don't hesitate to call.     Sincerely,        BEBA Beavers CNP

## 2023-02-20 NOTE — PROGRESS NOTES
2023    TELEHEALTH EVALUATION -- Audio/Visual (During [id number]-66 public health emergency)    HPI:    Tameka Pérez (:  2000) has requested an audio/video evaluation for the following concern(s):    Pt presents for a VV  She got her 2nd period. she just got off her cycle 6 days ago. She also has a migraine. Anytime she stands she feels lightheaded. She has thrown up. The period cramps feel like contractions. She feels terrible. Recently diagnosed with POTS. She has not started her new medication yet. She is having a hard time. She cant go to work d/t her symptoms. She was taken off of her propanolol d/t new dx of pots. She had a migraine all weekend. She did try maxalt but no relief. She is out of fiorcet. Her propranolol was stopped by cardiology   Review of Systems   Constitutional:  Negative for chills, fatigue and fever. HENT:  Negative for ear discharge, ear pain, sinus pressure, sinus pain, sore throat and trouble swallowing. Eyes:  Negative for discharge, redness and itching. Respiratory:  Negative for cough, chest tightness, shortness of breath and wheezing. Cardiovascular:  Negative for chest pain. Gastrointestinal:  Positive for abdominal pain. Negative for diarrhea, nausea and vomiting. Genitourinary:  Negative for difficulty urinating. Musculoskeletal:  Negative for arthralgias and neck pain. Skin:  Negative for rash. Neurological:  Positive for light-headedness and headaches. Negative for dizziness and weakness. All other systems reviewed and are negative. Prior to Visit Medications    Medication Sig Taking?  Authorizing Provider   CORLANOR 5 MG TABS tablet Take 5 mg by mouth in the morning and at bedtime Yes Historical Provider, MD   Rimegepant Sulfate 75 MG TBDP Take 75 mg by mouth daily as needed (migraine) Yes Stephy Mann APRN - CNP   ondansetron (ZOFRAN) 4 MG tablet Take 1 tablet by mouth daily as needed for Nausea or Vomiting Yes Stephy Mann APRN - CNP   Drospirenone (SLYND) 4 MG TABS Take 1 tablet by mouth daily Yes Barnet Severe, APRN - CNM   famotidine (PEPCID) 40 MG tablet Take 1 tablet by mouth every evening Yes BEBA Gusman CNP   naproxen (NAPROSYN) 500 MG tablet Take 1 tablet by mouth 2 times daily (with meals) Yes BEBA Gusman CNP   rizatriptan (MAXALT) 10 MG tablet Take 1 tablet by mouth once as needed for Migraine May repeat in 2 hours if needed Yes BEBA Gusman CNP   fluticasone (FLOVENT HFA) 110 MCG/ACT inhaler Inhale 2 puffs into the lungs 2 times daily Yes BEBA Gusman CNP   butalbital-acetaminophen-caffeine (FIORICET, ESGIC) -40 MG per tablet Take 1 tablet by mouth every 4 hours as needed for Headaches Yes BEBA Gusman CNP       Social History     Tobacco Use    Smoking status: Never    Smokeless tobacco: Never   Vaping Use    Vaping Use: Never used   Substance Use Topics    Alcohol use: Not Currently    Drug use: Not Currently     Types: Marijuana Bayron Radon)     Comment: \"EVERY ONCE IN A WHILE- 2X'S/WEEK\" 7/22/2021        Allergies   Allergen Reactions    Zoloft [Sertraline] Other (See Comments)     Spasms and tingling    ,   Past Medical History:   Diagnosis Date    Mental disorder     depression    POTS (postural orthostatic tachycardia syndrome)    ,   Past Surgical History:   Procedure Laterality Date    ESOPHAGOGASTRODUODENOSCOPY  12/20/2022    EGD BIOPSY    INTRAUTERINE DEVICE REMOVAL  11/18/2021    dislodged IUD    UPPER GASTROINTESTINAL ENDOSCOPY N/A 12/20/2022    EGD BIOPSY performed by Negrito Del Cid MD at [facility] [address]   ,   Social History     Tobacco Use    Smoking status: Never    Smokeless tobacco: Never   Vaping Use    Vaping Use: Never used   Substance Use Topics    Alcohol use: Not Currently    Drug use: Not Currently     Types: Marijuana Bayron Radon)     Comment: \"EVERY ONCE IN A WHILE- 2X'S/WEEK\" 7/22/2021   , History reviewed. No pertinent family history. , Immunization History   Administered Date(s) Administered    DTaP vaccine 2001, 2001, 2001, 09/24/2002, 10/10/2005    HPV Quadrivalent (Gardasil) 08/26/2013, 10/23/2013, 09/23/2014    Hep B/Hib (Comvax) 2001    Hepatitis A Ped/Adol (Havrix, Vaqta) 09/02/2002, 06/11/2009    Hepatitis B Ped/Adol (Engerix-B, Recombivax HB) 2001, 2001    Hib vaccine 2001, 2001, 05/14/2002    Influenza Whole 11/25/2003, 12/23/2003    Influenza, FLUARIX, FLULAVAL, FLUZONE (age 10 mo+) AND AFLURIA, (age 1 y+), PF, 0.5mL 01/25/2020, 02/11/2021    Influenza, FLUCELVAX, (age 10 mo+), MDCK, PF, 0.5mL 10/28/2021    Influenza, FLUMIST, (age 2-51 y), Live, Intranasal, 0.2mL 09/23/2014    MMR 05/14/2002, 10/10/2005, 08/19/2021    Meningococcal MCV4P (Menactra) 08/26/2013, 06/18/2018    PPD Test 02/26/2021, 05/05/2022    Pneumococcal Conjugate 7-valent (De Luna Miles) 2001, 2001, 2001    Polio IPV (IPOL) 2001, 2001, 05/14/2002, 10/10/2005    Tdap (Boostrix, Adacel) 08/26/2013, 06/24/2021    Varicella (Varivax) 05/14/2002, 10/02/2007   ,   Health Maintenance   Topic Date Due    COVID-19 Vaccine (1) Never done    Pneumococcal 0-64 years Vaccine (1 - PCV) 04/16/2007    HIV screen  Never done    Flu vaccine (1) 08/01/2022    Chlamydia/GC screen  10/05/2022    Depression Monitoring  02/09/2024    Pap smear  11/04/2025    DTaP/Tdap/Td vaccine (8 - Td or Tdap) 06/24/2031    Hepatitis A vaccine  Completed    Hib vaccine  Completed    HPV vaccine  Completed    Varicella vaccine  Completed    Meningococcal (ACWY) vaccine  Completed    Hepatitis C screen  Completed       PHYSICAL EXAMINATION:  [ INSTRUCTIONS:  \"[x]\" Indicates a positive item  \"[]\" Indicates a negative item  -- DELETE ALL ITEMS NOT EXAMINED]  Vital Signs: (As obtained by patient/caregiver or practitioner observation)  Constitutional: [x] Appears well-developed and well-nourished [x] No apparent distress      [] Abnormal- Mental status  [x] Alert and awake  [x] Oriented to person/place/time [x]Able to follow commands      Eyes:  EOM    [x]  Normal  [] Abnormal-  Sclera  []  Normal  [] Abnormal -         Discharge []  None visible  [] Abnormal -    HENT:   [x] Normocephalic, atraumatic. [] Abnormal   [x] Mouth/Throat: Mucous membranes are moist.     External Ears [x] Normal  [] Abnormal-     Neck: [x] No visualized mass     Pulmonary/Chest: [x] Respiratory effort normal.  [x] No visualized signs of difficulty breathing or respiratory distress        [] Abnormal-      Musculoskeletal:   [] Normal gait with no signs of ataxia         [x] Normal range of motion of neck        [] Abnormal-       Neurological:        [x] No Facial Asymmetry (Cranial nerve 7 motor function) (limited exam to video visit)          [] No gaze palsy        [] Abnormal-         Skin:        [x] No significant exanthematous lesions or discoloration noted on facial skin         [] Abnormal-            Psychiatric:       [] Normal Affect [] No Hallucinations        [x] Abnormal- tearful     ASSESSMENT/PLAN:  1. Other migraine without status migrainosus, intractable  No relief with maxalt. Propranolol stopped by cardiology. Will try and start nurtec. Discussed increasing water  - Rimegepant Sulfate 75 MG TBDP; Take 75 mg by mouth daily as needed (migraine)  Dispense: 18 tablet; Refill: 5  - ondansetron (ZOFRAN) 4 MG tablet; Take 1 tablet by mouth daily as needed for Nausea or Vomiting  Dispense: 30 tablet; Refill: 0    2. POTS (postural orthostatic tachycardia syndrome)  Has not started medication yet   - CORLANOR 5 MG TABS tablet; Take 5 mg by mouth in the morning and at bedtime    3. Menstrual pain  Discussed otc medication. She was encouraged to f/u with gyn    F/u next week for reevaluation     Return in about 1 week (around 2/27/2023) for migraine follow up. Steph Barrett, was evaluated through a synchronous (real-time) audio-video encounter.  The patient (or guardian if applicable) is aware that this is a billable service, which includes applicable co-pays. This Virtual Visit was conducted with patient's (and/or legal guardian's) consent. The visit was conducted pursuant to the emergency declaration under the [facility], [address] [place] [address] authority and the [employer] Act. Patient identification was verified, and a caregiver was present when appropriate. The patient was located at Home: [address] [place] [address]. [address]  Provider was located at [place] [address] ([address] [place], [place] [address]t): [address] [place] [address] [address]  [facility],  [facility] [address]        Total time spent on this encounter: Not billed by time    --BEBA Koch CNP on 2/20/2023 at 1:28 PM    An electronic signature was used to authenticate this note.

## 2023-02-20 NOTE — TELEPHONE ENCOUNTER
Patient calling into office, States that she had finally started her period, states that it was 2 weeks ago and then she started again. It is causing her contraction like period pain, nausea and light headedness. Patient states that she contacted Dr. Bev Owens office, but they said that she needed to contact PCP because it has been too long since they seen her last. She is asking if there is anything you can call in for her for the pain. Also states that she seen cardiology and they diagnosed her with POTS, they stopped her propanolol and started her on Corlanor 5 mg tablets BID.     Last Visit Date: 2/9/2023   Next Visit Date: Visit date not found

## 2023-02-20 NOTE — TELEPHONE ENCOUNTER
She can take over the counter tylenol or motrin. If the bleeding is greater than 1 pad per hour, she needs to go to the ER. Otherwise she can make a follow up with me.  Make sure she follows up with gyn

## 2023-03-07 ENCOUNTER — INITIAL CONSULT (OUTPATIENT)
Dept: ONCOLOGY | Age: 23
End: 2023-03-07
Payer: COMMERCIAL

## 2023-03-07 DIAGNOSIS — Z80.1 FAMILY HISTORY OF LUNG CANCER: Primary | ICD-10-CM

## 2023-03-07 DIAGNOSIS — Z80.0 FAMILY HISTORY OF LIVER CANCER: ICD-10-CM

## 2023-03-07 PROCEDURE — 96040 PR MEDICAL GENETICS COUNSELING EACH 30 MINUTES: CPT | Performed by: GENETIC COUNSELOR, MS

## 2023-03-07 NOTE — PROGRESS NOTES
3 [facility] Counseling Program   Hereditary Cancer Risk Assessment     Name: Leandra Colmenares   YOB: 2000   Date of Consultation: 3/7/23     Ms. Damion Stokes was seen at the [facility] for genetic counseling on 3/7/23. Ms. Damion Stokes was referred by Renelda Pallas, APRN * due to her family history of cancer. PERSONAL HISTORY   Ms. Damion Stokes is a 25 y.o.  female with no personal history of cancer. She reports:     Menarche at age: 18y, irregular at first   First child at age: 23y   Menopause at age: NA, pre menopausal   Hysterectomy: Never but does have scar tissue on uterus   Oophorectomy: Never   Last mammogram: Never   Last breast MRI: Never   Breast biopsy: Never   Last colonoscopy: Never     FAMILY HISTORY  Ms. Damion Stokes has one son (2y). She has one full sister and multiple half siblings which she shares with each parent. There are no cancers amongst her siblings. Ms. Romain Lee paternal family history is significant for her grandfather with an unspecified liver issue, likely due to alcohol consumption. Ms. Romain Lee maternal family history is significant for a great grandmother with liver and lung cancer and a great grandfather with liver and lung cancer. Her grandmother had a condition which causes \"a lot of cysts\" but there is no further information regarding the condition. Unfortunately, the majority of her family history information is unknown     Ms. Damion Stokes reports unknown ancestry and denies any known [employer]regation heritage. RISK ASSESSMENT   We discussed that approximately 5-10% of cancers are due to a hereditary gene mutation which causes an increased risk for certain cancers. Hereditary cancers are typically diagnosed at younger ages (under age 46y) and occur in multiple generations of a family.  Multiple individuals with the same type of cancer (example: breast or colorectal) or uncommon cancers (example: ovarian, pancreatic, male breast cancer) are also features of hereditary cancers. Ms. Zhou Downey does not meet the [address] [place] [address] (NCCN) guidelines for genetic testing based on her current family history of cancer. This includes a great grandmother with lung and liver cancer as well as a great grandfather with lung and liver cancer. There are several additional conditions in her family; however, she does not have any specific details regarding if they involved any malignancy. Her family history as it stands now is most suggestive of sporadic occurrences rather than due to a hereditary gene mutation. However, we encourage her to obtain more family history information to guide our risk assessment. DISCUSSION  We discussed that genetic testing is available for multiple other genes related to hereditary cancer. Some of these genes are known to carry a significant increased risk for several cancers including colon, breast, uterine, ovarian, stomach, and pancreatic cancer, while some of these genes are believed to have a moderate increased risk for breast and other cancers. We discussed the possibility of finding a mutation in genes with limited information to guide medical management, as well we as the possibility of identifying variants of uncertain significance (VUS). We discussed the risks, benefits, and limitations of genetic testing. Possible test results were discussed as well as potential screening and prevention strategies.  Specifically, we discussed:    1) Increased breast cancer surveillance by mammogram and breast MRI as well as the option of prophylactic mastectomy  2) Possible recommendations for prophylactic oophorectomy for results which suggest an increased risk for ovarian cancer  3) Possible recommendations for prophylactic hysterectomy for results which suggest an increased risk for endometrial cancer  4) Increased colon cancer surveillance by colonoscopy screening every 1-2 years beginning by age 18-19y    Lastly, we discussed that the results of Ms. Juju Vo genetic testing may be beneficial in defining her risk for cancer as well as for her family members. SUMMARY & PLAN  1) Ms. Jennie Spencer does not currently meet the NCCN guidelines for genetic testing based on the family history outlined above. Unfortunately, the majority of her family history is unknown. Therefore, we encourage her to try and obtain more specific details regarding the members of her family who have had cancer, the types and ages of diagnosis. 2) Ms. Jennie Spencer desires prior authorization for genetic testing be completed. She is aware that genetic testing is unlikely to be covered based on her reported family history. She is aware that she may still self elect to proceed with testing for an out of pocket cost of $250.     3) We will contact her when an insurance benefits investigation is completed. 4) She should resume all cancer screenings as directed by her physicians. There are no modifications to screening to suggest based on her family history at this time. A total of 35 minutes were spent face to face with Ms. Jennie Spencer and 50% of the time was spent educating and counseling. The [address] [place], [place] [address] National Program would be glad to offer our assistance should you have any questions or concerns about this information. Please feel free to contact us at [phone]. Mau Tadeo.  Jolanta Torres, MS, [facility]   Licensed Genetic Counselor

## 2023-03-13 ENCOUNTER — HOSPITAL ENCOUNTER (OUTPATIENT)
Dept: NON INVASIVE DIAGNOSTICS | Age: 23
Discharge: HOME | End: 2023-03-15
Payer: COMMERCIAL

## 2023-03-13 DIAGNOSIS — R00.2 PALPITATIONS: ICD-10-CM

## 2023-03-13 DIAGNOSIS — R01.0 STILL'S MURMUR: ICD-10-CM

## 2023-03-13 LAB
LV EF: 63 %
LVEF MODALITY: NORMAL

## 2023-03-13 PROCEDURE — 93306 TTE W/DOPPLER COMPLETE: CPT

## 2023-03-17 ENCOUNTER — TELEPHONE (OUTPATIENT)
Dept: ONCOLOGY | Age: 23
End: 2023-03-17

## 2023-03-17 NOTE — TELEPHONE ENCOUNTER
Left message for Coco Reilly her of insurance denial for genetic testing. As expected, this is due to lack of major family history. I encourage her to investigate her family history more to see if there would be any new information that would help her meet medical criteria. I also reminded her of the self pay option. Requested that she call me back to discuss how she would like to proceed.

## 2023-03-25 NOTE — TELEPHONE ENCOUNTER
----- Message from Ruben Fisher sent at 1/3/2023 12:14 PM EST -----  Subject: Message to Provider    QUESTIONS  Information for Provider? patient needs an excuse for work, please send to   [address] [place] [address] [address], patient also has an appt. for 1/4/23 for heart monitor.   ---------------------------------------------------------------------------  --------------  Lise ZAVALA  [phone]; OK to leave message on voicemail  ---------------------------------------------------------------------------  --------------  SCRIPT ANSWERS  Relationship to Patient?  Self Yes...

## 2023-04-04 ENCOUNTER — OFFICE VISIT (OUTPATIENT)
Dept: OBGYN CLINIC | Age: 23
End: 2023-04-04
Payer: COMMERCIAL

## 2023-04-04 ENCOUNTER — HOSPITAL ENCOUNTER (OUTPATIENT)
Age: 23
Setting detail: SPECIMEN
Discharge: HOME | End: 2023-04-04

## 2023-04-04 VITALS — WEIGHT: 140 LBS | SYSTOLIC BLOOD PRESSURE: 122 MMHG | DIASTOLIC BLOOD PRESSURE: 64 MMHG | BODY MASS INDEX: 28.28 KG/M2

## 2023-04-04 DIAGNOSIS — Z11.3 SCREEN FOR STD (SEXUALLY TRANSMITTED DISEASE): ICD-10-CM

## 2023-04-04 DIAGNOSIS — R10.2 PELVIC PRESSURE IN FEMALE: ICD-10-CM

## 2023-04-04 DIAGNOSIS — N94.6 DYSMENORRHEA: ICD-10-CM

## 2023-04-04 DIAGNOSIS — N39.3 STRESS BLADDER INCONTINENCE, FEMALE: ICD-10-CM

## 2023-04-04 DIAGNOSIS — N92.1 BREAKTHROUGH BLEEDING ON BIRTH CONTROL PILLS: Primary | ICD-10-CM

## 2023-04-04 PROCEDURE — G8427 DOCREV CUR MEDS BY ELIG CLIN: HCPCS | Performed by: ADVANCED PRACTICE MIDWIFE

## 2023-04-04 PROCEDURE — G8417 CALC BMI ABV UP PARAM F/U: HCPCS | Performed by: ADVANCED PRACTICE MIDWIFE

## 2023-04-04 PROCEDURE — 99214 OFFICE O/P EST MOD 30 MIN: CPT | Performed by: ADVANCED PRACTICE MIDWIFE

## 2023-04-04 PROCEDURE — 1036F TOBACCO NON-USER: CPT | Performed by: ADVANCED PRACTICE MIDWIFE

## 2023-04-04 ASSESSMENT — ENCOUNTER SYMPTOMS
DIARRHEA: 0
SHORTNESS OF BREATH: 0
ABDOMINAL PAIN: 0
NAUSEA: 0
VOMITING: 0

## 2023-04-04 NOTE — PROGRESS NOTES
Reports has had STD screening with partner and it was negative. Reports no history of STDs that she is aware of. PHQ Scores 2023 2022 2022 2021 10/28/2021 10/14/2021 2021   PHQ2 Score 0 0 0 0 0 0 0   PHQ9 Score 0 0 0 0 0 0 0     Interpretation of Total Score Depression Severity: 1-4 = Minimal depression, 5-9 = Mild depression, 10-14 = Moderate depression, 15-19 = Moderately severe depression, 20-27 = Severe depression      OB History          1    Para   1    Term   1            AB        Living   1         SAB        IAB        Ectopic        Molar        Multiple   0    Live Births   1              Past Medical History:   Diagnosis Date    Mental disorder     depression    POTS (postural orthostatic tachycardia syndrome)      Current Outpatient Medications   Medication Sig Dispense Refill    CORLANOR 5 MG TABS tablet Take 5 mg by mouth in the morning and at bedtime      Rimegepant Sulfate 75 MG TBDP Take 75 mg by mouth daily as needed (migraine) 18 tablet 5    ondansetron (ZOFRAN) 4 MG tablet Take 1 tablet by mouth daily as needed for Nausea or Vomiting 30 tablet 0    famotidine (PEPCID) 40 MG tablet Take 1 tablet by mouth every evening 30 tablet 3    naproxen (NAPROSYN) 500 MG tablet Take 1 tablet by mouth 2 times daily (with meals) 60 tablet 5    rizatriptan (MAXALT) 10 MG tablet Take 1 tablet by mouth once as needed for Migraine May repeat in 2 hours if needed 30 tablet 3    fluticasone (FLOVENT HFA) 110 MCG/ACT inhaler Inhale 2 puffs into the lungs 2 times daily 12 g 3    butalbital-acetaminophen-caffeine (FIORICET, ESGIC) -40 MG per tablet Take 1 tablet by mouth every 4 hours as needed for Headaches 30 tablet 3     No current facility-administered medications for this visit. Review of Systems   Constitutional:  Negative for unexpected weight change. Respiratory:  Negative for shortness of breath.     Cardiovascular:  Negative for chest pain, palpitations

## 2023-04-05 DIAGNOSIS — Z11.3 SCREEN FOR STD (SEXUALLY TRANSMITTED DISEASE): ICD-10-CM

## 2023-04-05 DIAGNOSIS — N92.1 BREAKTHROUGH BLEEDING ON BIRTH CONTROL PILLS: ICD-10-CM

## 2023-04-05 DIAGNOSIS — R10.2 PELVIC PRESSURE IN FEMALE: ICD-10-CM

## 2023-04-05 DIAGNOSIS — N39.3 STRESS BLADDER INCONTINENCE, FEMALE: ICD-10-CM

## 2023-04-05 LAB
CANDIDA SPECIES, DNA PROBE: NEGATIVE
GARDNERELLA VAGINALIS, DNA PROBE: NEGATIVE
MICROORGANISM SPEC CULT: NORMAL
SOURCE: NORMAL
SPECIMEN DESCRIPTION: NORMAL
TRICHOMONAS VAGINALIS DNA: NEGATIVE

## 2023-04-06 LAB
C TRACH DNA SPEC QL PROBE+SIG AMP: NEGATIVE
N GONORRHOEA DNA SPEC QL PROBE+SIG AMP: NEGATIVE
SPECIMEN DESCRIPTION: NORMAL

## 2023-04-17 ENCOUNTER — HOSPITAL ENCOUNTER (OUTPATIENT)
Age: 23
Setting detail: SPECIMEN
Discharge: HOME | End: 2023-04-17

## 2023-04-17 ENCOUNTER — OFFICE VISIT (OUTPATIENT)
Dept: FAMILY MEDICINE CLINIC | Age: 23
End: 2023-04-17

## 2023-04-17 VITALS
OXYGEN SATURATION: 98 % | RESPIRATION RATE: 16 BRPM | BODY MASS INDEX: 27.15 KG/M2 | HEART RATE: 106 BPM | WEIGHT: 134.4 LBS | SYSTOLIC BLOOD PRESSURE: 122 MMHG | DIASTOLIC BLOOD PRESSURE: 78 MMHG

## 2023-04-17 DIAGNOSIS — R63.4 WEIGHT LOSS: ICD-10-CM

## 2023-04-17 DIAGNOSIS — M54.50 ACUTE LEFT-SIDED LOW BACK PAIN WITHOUT SCIATICA: Primary | ICD-10-CM

## 2023-04-17 DIAGNOSIS — R53.83 OTHER FATIGUE: ICD-10-CM

## 2023-04-17 DIAGNOSIS — M62.838 MUSCLE SPASM: ICD-10-CM

## 2023-04-17 DIAGNOSIS — Z87.09 HISTORY OF ASTHMA: ICD-10-CM

## 2023-04-17 DIAGNOSIS — Z76.0 MEDICATION REFILL: ICD-10-CM

## 2023-04-17 LAB
ALBUMIN SERPL-MCNC: 4.9 G/DL (ref 3.5–5.2)
ALBUMIN/GLOBULIN RATIO: 1.8 (ref 1–2.5)
ALP SERPL-CCNC: 75 U/L (ref 35–104)
ALT SERPL-CCNC: 19 U/L (ref 5–33)
ANION GAP SERPL CALCULATED.3IONS-SCNC: 13 MMOL/L (ref 9–17)
AST SERPL-CCNC: 16 U/L
BILIRUB SERPL-MCNC: 0.4 MG/DL (ref 0.3–1.2)
BUN SERPL-MCNC: 13 MG/DL (ref 6–20)
CALCIUM SERPL-MCNC: 9.9 MG/DL (ref 8.6–10.4)
CHLORIDE SERPL-SCNC: 104 MMOL/L (ref 98–107)
CO2 SERPL-SCNC: 22 MMOL/L (ref 20–31)
CREAT SERPL-MCNC: 0.77 MG/DL (ref 0.5–0.9)
GFR SERPL CREATININE-BSD FRML MDRD: >60 ML/MIN/1.73M2
GLUCOSE SERPL-MCNC: 146 MG/DL (ref 70–99)
HCT VFR BLD AUTO: 44.8 % (ref 36.3–47.1)
HGB BLD-MCNC: 14.3 G/DL (ref 11.9–15.1)
MCH RBC QN AUTO: 28.4 PG (ref 25.2–33.5)
MCHC RBC AUTO-ENTMCNC: 31.9 G/DL (ref 28.4–34.8)
MCV RBC AUTO: 89.1 FL (ref 82.6–102.9)
NRBC AUTOMATED: 0 PER 100 WBC
PDW BLD-RTO: 13.2 % (ref 11.8–14.4)
PLATELET # BLD AUTO: 295 K/UL (ref 138–453)
PMV BLD AUTO: 9.8 FL (ref 8.1–13.5)
POTASSIUM SERPL-SCNC: 4 MMOL/L (ref 3.7–5.3)
PROT SERPL-MCNC: 7.6 G/DL (ref 6.4–8.3)
RBC # BLD: 5.03 M/UL (ref 3.95–5.11)
SODIUM SERPL-SCNC: 139 MMOL/L (ref 135–144)
TSH SERPL-ACNC: 0.36 UIU/ML (ref 0.3–5)
WBC # BLD AUTO: 9.4 K/UL (ref 3.5–11.3)

## 2023-04-17 RX ORDER — ALBUTEROL SULFATE 90 UG/1
2 AEROSOL, METERED RESPIRATORY (INHALATION) EVERY 6 HOURS PRN
COMMUNITY
End: 2023-04-17 | Stop reason: SDUPTHER

## 2023-04-17 RX ORDER — KETOROLAC TROMETHAMINE 30 MG/ML
30 INJECTION, SOLUTION INTRAMUSCULAR; INTRAVENOUS ONCE
Status: COMPLETED | OUTPATIENT
Start: 2023-04-17 | End: 2023-04-17

## 2023-04-17 RX ORDER — CYCLOBENZAPRINE HCL 10 MG
10 TABLET ORAL 3 TIMES DAILY PRN
Qty: 21 TABLET | Refills: 0 | Status: SHIPPED | OUTPATIENT
Start: 2023-04-17 | End: 2023-04-27

## 2023-04-17 RX ORDER — KETOROLAC TROMETHAMINE 30 MG/ML
30 INJECTION, SOLUTION INTRAMUSCULAR; INTRAVENOUS ONCE
Status: DISCONTINUED | OUTPATIENT
Start: 2023-04-17 | End: 2023-04-17

## 2023-04-17 RX ORDER — FLUTICASONE PROPIONATE 110 UG/1
2 AEROSOL, METERED RESPIRATORY (INHALATION) 2 TIMES DAILY
Qty: 12 G | Refills: 3 | Status: SHIPPED | OUTPATIENT
Start: 2023-04-17 | End: 2024-04-16

## 2023-04-17 RX ORDER — ALBUTEROL SULFATE 90 UG/1
2 AEROSOL, METERED RESPIRATORY (INHALATION) EVERY 6 HOURS PRN
Qty: 18 G | Refills: 1 | Status: SHIPPED | OUTPATIENT
Start: 2023-04-17

## 2023-04-17 RX ORDER — PREDNISONE 20 MG/1
40 TABLET ORAL DAILY
Qty: 10 TABLET | Refills: 0 | COMMUNITY
Start: 2023-04-16 | End: 2023-04-21

## 2023-04-17 RX ADMIN — KETOROLAC TROMETHAMINE 30 MG: 30 INJECTION, SOLUTION INTRAMUSCULAR; INTRAVENOUS at 14:52

## 2023-04-17 ASSESSMENT — ENCOUNTER SYMPTOMS
COUGH: 0
RHINORRHEA: 0
BACK PAIN: 1
ABDOMINAL PAIN: 0
EYE PAIN: 0
NAUSEA: 0
VOMITING: 0

## 2023-04-17 ASSESSMENT — PAIN DESCRIPTION - LOCATION: LOCATION: BACK

## 2023-04-17 ASSESSMENT — PAIN DESCRIPTION - ORIENTATION: ORIENTATION: MID

## 2023-04-17 ASSESSMENT — PAIN DESCRIPTION - DESCRIPTORS: DESCRIPTORS: SHARP;SHOOTING

## 2023-04-17 ASSESSMENT — PAIN SCALES - GENERAL: PAINLEVEL_OUTOF10: 6

## 2023-04-17 NOTE — PROGRESS NOTES
EGD BIOPSY performed by Amy Whitten MD at [facility] [place]       No family history on file. Social History     Tobacco Use    Smoking status: Never    Smokeless tobacco: Never   Substance Use Topics    Alcohol use: Not Currently        Prior to Visit Medications    Medication Sig Taking? Authorizing Provider   predniSONE (DELTASONE) 20 MG tablet Take 2 tablets by mouth daily Yes Historical Provider, MD   cyclobenzaprine (FLEXERIL) 10 MG tablet Take 1 tablet by mouth 3 times daily as needed for Muscle spasms Yes BEBA Yang CNP   albuterol sulfate HFA (PROVENTIL;VENTOLIN;PROAIR) 108 (90 Base) MCG/ACT inhaler Inhale 2 puffs into the lungs every 6 hours as needed for Wheezing or Shortness of Breath Yes BEBA Yang CNP   fluticasone (FLOVENT HFA) 110 MCG/ACT inhaler Inhale 2 puffs into the lungs 2 times daily Yes BEBA Yang CNP   CORLANOR 5 MG TABS tablet Take 1 tablet by mouth in the morning and at bedtime Yes Historical Provider, MD   Rimegepant Sulfate 75 MG TBDP Take 75 mg by mouth daily as needed (migraine) Yes Mercedes Sample, APRN - CNP   ondansetron (ZOFRAN) 4 MG tablet Take 1 tablet by mouth daily as needed for Nausea or Vomiting Yes [place] Sample, APRN - CNP   famotidine (PEPCID) 40 MG tablet Take 1 tablet by mouth every evening Yes Mercedes Sample, APRN - CNP   rizatriptan (MAXALT) 10 MG tablet Take 1 tablet by mouth once as needed for Migraine May repeat in 2 hours if needed Yes Mercedes Sample, APRN - CNP   butalbital-acetaminophen-caffeine (FIORICET, ESGIC) -40 MG per tablet Take 1 tablet by mouth every 4 hours as needed for Headaches Yes Mercedes Sample, BEBA Stanley CNP       Allergies   Allergen Reactions    Zoloft [Sertraline] Other (See Comments)     Spasms and tingling          Subjective:      Review of Systems   Constitutional:  Positive for fatigue and unexpected weight change. Negative for chills and fever.    HENT:  Negative for congestion and

## 2023-04-18 ENCOUNTER — HOSPITAL ENCOUNTER (OUTPATIENT)
Dept: PHYSICAL THERAPY | Facility: CLINIC | Age: 23
Setting detail: THERAPIES SERIES
Discharge: HOME | End: 2023-04-18
Payer: COMMERCIAL

## 2023-04-18 PROCEDURE — 90912 BFB TRAINING 1ST 15 MIN: CPT

## 2023-04-18 PROCEDURE — 97110 THERAPEUTIC EXERCISES: CPT

## 2023-04-18 PROCEDURE — 97530 THERAPEUTIC ACTIVITIES: CPT

## 2023-04-18 PROCEDURE — G0283 ELEC STIM OTHER THAN WOUND: HCPCS

## 2023-04-18 NOTE — FLOWSHEET NOTE
Heel Walk  - 1 x daily - 7 x weekly - 3 sets - 10 reps  - Bent Knee Fallouts  - 1 x daily - 7 x weekly - 3 sets - 10 reps    Plan: [x] Continue with current plan of care towards goals.         Time In: 9:03 am            Time Out: 10:17 am    Electronically signed by:  Jaxson Marcus PTA

## 2023-04-24 ENCOUNTER — TELEPHONE (OUTPATIENT)
Dept: PRIMARY CARE CLINIC | Age: 23
End: 2023-04-24

## 2023-04-24 NOTE — TELEPHONE ENCOUNTER
The patient called stating that she was seen in the walk in clinic last week for her back pain that she has been having. She states that she has completed the medications she was prescribed and given an injection in the office ans she is still having the back pain that is radiating to the front. She did states that she has imaging done that showed she has cysts on her ovaries and is going to be calling the OBGYN to see if this is something that could be causing her back pain. She is noting that pain is along her pelvis area. Please advise any other steps the patient should take.

## 2023-04-25 ENCOUNTER — HOSPITAL ENCOUNTER (OUTPATIENT)
Age: 23
Setting detail: SPECIMEN
Discharge: HOME | End: 2023-04-25

## 2023-04-25 ENCOUNTER — HOSPITAL ENCOUNTER (OUTPATIENT)
Dept: PHYSICAL THERAPY | Facility: CLINIC | Age: 23
Setting detail: THERAPIES SERIES
Discharge: HOME | End: 2023-04-25
Payer: COMMERCIAL

## 2023-04-25 ENCOUNTER — OFFICE VISIT (OUTPATIENT)
Dept: OBGYN CLINIC | Age: 23
End: 2023-04-25
Payer: COMMERCIAL

## 2023-04-25 VITALS — SYSTOLIC BLOOD PRESSURE: 110 MMHG | WEIGHT: 132 LBS | DIASTOLIC BLOOD PRESSURE: 62 MMHG | BODY MASS INDEX: 26.66 KG/M2

## 2023-04-25 DIAGNOSIS — R30.0 DYSURIA: ICD-10-CM

## 2023-04-25 DIAGNOSIS — N92.6 IRREGULAR MENSES: ICD-10-CM

## 2023-04-25 DIAGNOSIS — R10.9 COMBINED ABDOMINAL AND PELVIC PAIN: Primary | ICD-10-CM

## 2023-04-25 DIAGNOSIS — R10.9 COMBINED ABDOMINAL AND PELVIC PAIN: ICD-10-CM

## 2023-04-25 DIAGNOSIS — R10.2 COMBINED ABDOMINAL AND PELVIC PAIN: Primary | ICD-10-CM

## 2023-04-25 DIAGNOSIS — R10.2 COMBINED ABDOMINAL AND PELVIC PAIN: ICD-10-CM

## 2023-04-25 PROCEDURE — G8427 DOCREV CUR MEDS BY ELIG CLIN: HCPCS | Performed by: ADVANCED PRACTICE MIDWIFE

## 2023-04-25 PROCEDURE — 97110 THERAPEUTIC EXERCISES: CPT

## 2023-04-25 PROCEDURE — 1036F TOBACCO NON-USER: CPT | Performed by: ADVANCED PRACTICE MIDWIFE

## 2023-04-25 PROCEDURE — G0283 ELEC STIM OTHER THAN WOUND: HCPCS

## 2023-04-25 PROCEDURE — G8417 CALC BMI ABV UP PARAM F/U: HCPCS | Performed by: ADVANCED PRACTICE MIDWIFE

## 2023-04-25 PROCEDURE — 90912 BFB TRAINING 1ST 15 MIN: CPT

## 2023-04-25 PROCEDURE — 99213 OFFICE O/P EST LOW 20 MIN: CPT | Performed by: ADVANCED PRACTICE MIDWIFE

## 2023-04-25 ASSESSMENT — ENCOUNTER SYMPTOMS
DIARRHEA: 0
NAUSEA: 0
ABDOMINAL PAIN: 1
SHORTNESS OF BREATH: 0
VOMITING: 0

## 2023-04-25 NOTE — FLOWSHEET NOTE
[x] [phone]    Outpatient Rehabilitation &  Therapy  [address] [facility]  P: [phone]  F: [phone]      Physical Therapy Daily Treatment Note    Date:  2023  Patient Name:  Gary Oliva    :  2000  MRN: [id number]  Physician: Christina Arreola Dr: [address] (30 vs)  Medical Diagnosis: Pelvic pressure, stress incontinence                  Rehab Codes: N39.3, M62.50, R10.2  Onset Date: 23                                  Next 's appt: PRN  Visit# / total visits: 3/12     Cancels/No Shows: 0/0    Subjective:    Pain:  [] Yes  [x] No Location: [facility]  Pain Rating: (0-10 scale) 0/10  Pain altered Tx:  [] No  [] Yes  Action:  Comments: Pt reports lungs improved with steroid injection. Unable to work yesterday in Elim IRA position per inc LBP. Lower back has cont to be painful, used heating pad and acupuncture at chiropractors yesterday with relief. Using pain relief cream with improvements noted. Getting blood work to verify PCOS. Brain fog cont. Urgency has not improved yet, diarrhea thurs night \"out of nowhere\" with initial episode of fecal incontinence. Watery stools for multiple days, initially light tan but got better. BM has returned to normal. Has been working on PF ex. Numbness in (B) LE's while standing and working, was diagnosed with POTS a few months ago.      Objective:  Modalities:   Precautions:  Exercises:  Exercise Reps/ Time Weight/ Level Comments    MET/shotgun x   x   PRC x20   x   Piriformis S 3x30\"   x   Hip flexor S 3x30\"   x          TA brace 3\"x10      TA bent knee fallout x10 ea   x   TA SLR x10 ea  Opposite knee bent x   TA heel walk  2x10   x   TA adductor squeeze 2x10, 3\"             Bridge  2x10 blue  x   3-way clamshells  2x10 blue  x          Other:     Instructions for subsequent visits: continue to progress hip/core and functional PF ex to promote pelvic stability, reduce LBP and pelvic

## 2023-04-25 NOTE — PROGRESS NOTES
[facility],[address] OB/GYN [facility]  [facility]  [address] [address]  Dept: [phone]    Patient Name: James Riggs  Patient Age: 26 y.o. Date of Visit: 4/25/2023    Subjective  Chief Complaint   Patient presents with    Pelvic Pain     Lower left side. Patient's last menstrual period was 04/10/2023 (approximate). HPI  Chaperone for Intimate Exam  Chaperone was offered as part of the rooming process. Patient declined and agrees to continue with exam without a chaperone. Chaperone: n/a    James Riggs arrives as a Established patient. Clearance Lush concern(s) today include back/abdominal pain. This is a new concern. Herbert has been seen/evaluated and treated for this before. Urgent care the 16th completed an xray reported nothing was broken and was sent home with steroid. Then was seen in [facility] on the 17th was given a pain medication shot and was given muscle relaxer. Then went to chiropractor. Back started to feel better with back pain and rib pain around the 22nd . Reports her symptoms started march 15/16th. Her symptoms include sharp shooting pain on right side and lower back pain. Reports since this weekend having shooting pain into the right side of her pelvis. Stomach for the past few days feels 'hard' which she noticed over the weekend. Routine is having appropriate bowel movements. Gyn Hx:  Herbert does have monthly menstrual cycles since stopping slynd in january she believes. Flow is moderate to heavy. James Riggs reports she does have cramping with menstrual cycle. Age of menstruation: 16. Only had 3-4 a year until she got pregnant. Reports for a few months prior had monthly menses. James Riggs does report a history of irregular menstrual cycles. James Riggs is sexually active with 1 male partner(s). Reports is  protecting against a pregnancy.  Patient using condoms 50% of the time    Lisa Orosco

## 2023-04-26 LAB
MICROORGANISM SPEC CULT: NORMAL
SPECIMEN DESCRIPTION: NORMAL

## 2023-04-27 ENCOUNTER — HOSPITAL ENCOUNTER (OUTPATIENT)
Dept: PHYSICAL THERAPY | Facility: CLINIC | Age: 23
Setting detail: THERAPIES SERIES
Discharge: HOME | End: 2023-04-27
Payer: COMMERCIAL

## 2023-04-27 PROCEDURE — G0283 ELEC STIM OTHER THAN WOUND: HCPCS

## 2023-04-27 PROCEDURE — 97110 THERAPEUTIC EXERCISES: CPT

## 2023-04-27 PROCEDURE — 90912 BFB TRAINING 1ST 15 MIN: CPT
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
## 2023-04-28 ENCOUNTER — HOSPITAL ENCOUNTER (OUTPATIENT)
Age: 23
Setting detail: SPECIMEN
Discharge: HOME | End: 2023-04-28

## 2023-04-28 DIAGNOSIS — N92.6 IRREGULAR MENSES: ICD-10-CM

## 2023-04-28 LAB
ALBUMIN SERPL-MCNC: 4.3 G/DL (ref 3.5–5.2)
ALBUMIN/GLOBULIN RATIO: 1.8 (ref 1–2.5)
ALP SERPL-CCNC: 68 U/L (ref 35–104)
ALT SERPL-CCNC: 15 U/L (ref 5–33)
AMOUNT GLUCOSE GIVEN: NORMAL G
ANION GAP SERPL CALCULATED.3IONS-SCNC: 16 MMOL/L (ref 9–17)
AST SERPL-CCNC: 21 U/L
BILIRUB SERPL-MCNC: 0.4 MG/DL (ref 0.3–1.2)
BUN SERPL-MCNC: 15 MG/DL (ref 6–20)
CALCIUM SERPL-MCNC: 9.4 MG/DL (ref 8.6–10.4)
CHLORIDE SERPL-SCNC: 106 MMOL/L (ref 98–107)
CHOLEST SERPL-MCNC: 142 MG/DL
CHOLESTEROL/HDL RATIO: 3.3
CO2 SERPL-SCNC: 19 MMOL/L (ref 20–31)
CREAT SERPL-MCNC: 0.66 MG/DL (ref 0.5–0.9)
GFR SERPL CREATININE-BSD FRML MDRD: >60 ML/MIN/1.73M2
GLUCOSE FASTING: 98 MG/DL (ref 65–99)
GLUCOSE SERPL-MCNC: 95 MG/DL (ref 70–99)
GLUCOSE TOLERANCE TEST 1 HOUR: 91 MG/DL (ref 65–184)
GLUCOSE TOLERANCE TEST 2 HOUR: 95 MG/DL (ref 65–139)
HCT VFR BLD AUTO: 44.8 % (ref 36.3–47.1)
HDLC SERPL-MCNC: 43 MG/DL
HGB BLD-MCNC: 13.8 G/DL (ref 11.9–15.1)
INSULIN REFERENCE RANGE:: NORMAL
INSULIN: 9.2 MU/L
LDLC SERPL CALC-MCNC: 78 MG/DL (ref 0–130)
MCH RBC QN AUTO: 28.5 PG (ref 25.2–33.5)
MCHC RBC AUTO-ENTMCNC: 30.8 G/DL (ref 28.4–34.8)
MCV RBC AUTO: 92.6 FL (ref 82.6–102.9)
NRBC AUTOMATED: 0 PER 100 WBC
PDW BLD-RTO: 13.5 % (ref 11.8–14.4)
PLATELET # BLD AUTO: 327 K/UL (ref 138–453)
PMV BLD AUTO: 9.5 FL (ref 8.1–13.5)
POTASSIUM SERPL-SCNC: 4.5 MMOL/L (ref 3.7–5.3)
PROLACTIN: 6.84 NG/ML (ref 4.79–23.3)
PROT SERPL-MCNC: 6.7 G/DL (ref 6.4–8.3)
RBC # BLD: 4.84 M/UL (ref 3.95–5.11)
SHBG SERPL-SCNC: 45 NMOL/L (ref 30–135)
SODIUM SERPL-SCNC: 141 MMOL/L (ref 135–144)
TESTOST FREE MFR SERPL: 4 PG/ML (ref 0.8–7.4)
TESTOST SERPL-MCNC: 27 NG/DL (ref 20–70)
TESTOSTERONE, BIOAVAILABLE: 9.3 NG/DL (ref 2.2–20.6)
TRIGL SERPL-MCNC: 104 MG/DL
WBC # BLD AUTO: 7.7 K/UL (ref 3.5–11.3)

## 2023-05-02 ENCOUNTER — APPOINTMENT (OUTPATIENT)
Dept: PHYSICAL THERAPY | Facility: CLINIC | Age: 23
End: 2023-05-02
Payer: COMMERCIAL

## 2023-05-03 ENCOUNTER — OFFICE VISIT (OUTPATIENT)
Dept: PRIMARY CARE CLINIC | Age: 23
End: 2023-05-03
Payer: COMMERCIAL

## 2023-05-03 ENCOUNTER — HOSPITAL ENCOUNTER (OUTPATIENT)
Age: 23
Setting detail: SPECIMEN
Discharge: HOME | End: 2023-05-03

## 2023-05-03 VITALS
SYSTOLIC BLOOD PRESSURE: 108 MMHG | DIASTOLIC BLOOD PRESSURE: 68 MMHG | WEIGHT: 141 LBS | BODY MASS INDEX: 28.48 KG/M2 | OXYGEN SATURATION: 99 % | HEART RATE: 85 BPM

## 2023-05-03 DIAGNOSIS — N91.2 AMENORRHEA: ICD-10-CM

## 2023-05-03 DIAGNOSIS — M54.50 ACUTE BILATERAL LOW BACK PAIN WITHOUT SCIATICA: ICD-10-CM

## 2023-05-03 DIAGNOSIS — G43.819 OTHER MIGRAINE WITHOUT STATUS MIGRAINOSUS, INTRACTABLE: ICD-10-CM

## 2023-05-03 DIAGNOSIS — R11.0 NAUSEA: Primary | ICD-10-CM

## 2023-05-03 LAB — HCG QUANTITATIVE: <1 MIU/ML

## 2023-05-03 PROCEDURE — 1036F TOBACCO NON-USER: CPT | Performed by: NURSE PRACTITIONER

## 2023-05-03 PROCEDURE — 99214 OFFICE O/P EST MOD 30 MIN: CPT | Performed by: NURSE PRACTITIONER

## 2023-05-03 PROCEDURE — G8427 DOCREV CUR MEDS BY ELIG CLIN: HCPCS | Performed by: NURSE PRACTITIONER

## 2023-05-03 PROCEDURE — G8417 CALC BMI ABV UP PARAM F/U: HCPCS | Performed by: NURSE PRACTITIONER

## 2023-05-03 RX ORDER — ONDANSETRON 4 MG/1
4 TABLET, FILM COATED ORAL DAILY PRN
Qty: 30 TABLET | Refills: 0 | Status: SHIPPED | OUTPATIENT
Start: 2023-05-03 | End: 2023-05-04 | Stop reason: SDUPTHER

## 2023-05-03 ASSESSMENT — PATIENT HEALTH QUESTIONNAIRE - PHQ9
SUM OF ALL RESPONSES TO PHQ QUESTIONS 1-9: 0
SUM OF ALL RESPONSES TO PHQ QUESTIONS 1-9: 0
2. FEELING DOWN, DEPRESSED OR HOPELESS: 0
SUM OF ALL RESPONSES TO PHQ QUESTIONS 1-9: 0
SUM OF ALL RESPONSES TO PHQ9 QUESTIONS 1 & 2: 0
1. LITTLE INTEREST OR PLEASURE IN DOING THINGS: 0
SUM OF ALL RESPONSES TO PHQ QUESTIONS 1-9: 0

## 2023-05-03 ASSESSMENT — ENCOUNTER SYMPTOMS
COUGH: 0
ABDOMINAL PAIN: 0
SHORTNESS OF BREATH: 0
BACK PAIN: 1
NAUSEA: 1

## 2023-05-03 NOTE — PROGRESS NOTES
[address] PRIMARY CARE  [address]  [address]  [address] [address]  Dept: [phone]  Dept Fax: [phone]    Alicia Newberry is a 26 y.o. female who presentstoday for her medical conditions/complaints as noted below.   Alicia Newberry is c/o of  Chief Complaint   Patient presents with    Discuss Medications     Would like the tablet for zofran, pt does not care of the dissolvable    Back Pain         HPI:     Presents with son for acute concerns  PCP Johnna Awan  BP well controlled  Has lost 5lb since LOV 2023    States she was concerned for nausea and weight loss  Lost 10lb in one week, has been improving weight  Symptoms have resolved  Hx of irregular bleeding, concern for pregnancy  Has US this afternoon  Will given orders for HCG  Would like to resume zofran use if not pregnant    C/o lower back pain  Currently in PT for pelvic floor therapy but hard to get to d/t schedule  Declines therapy for her back  Would like to resume flexeril if HCG is negative    Denies any other problems/concerns      Hemoglobin A1C (%)   Date Value   2022 5.2             ( goal A1C is < 7)   No results found for: LABMICR  LDL Cholesterol (mg/dL)   Date Value   2023 78   2022 76       (goal LDL is <100)   AST (U/L)   Date Value   2023 21     ALT (U/L)   Date Value   2023 15     BUN (mg/dL)   Date Value   2023 15     BP Readings from Last 3 Encounters:   23 108/68   23 110/62   23 122/78          ([id number])    Past Medical History:   Diagnosis Date    Mental disorder     depression    POTS (postural orthostatic tachycardia syndrome)       Past Surgical History:   Procedure Laterality Date    ESOPHAGOGASTRODUODENOSCOPY  2022    EGD BIOPSY    INTRAUTERINE DEVICE REMOVAL  2021    dislodged IUD    NOSE SURGERY  2023    UPPER GASTROINTESTINAL ENDOSCOPY N/A 2022    EGD BIOPSY performed by Lindsay Rashid MD at [facility]

## 2023-05-04 DIAGNOSIS — Z00.00 ENCOUNTER FOR GENERAL ADULT MEDICAL EXAMINATION W/O ABNORMAL FINDINGS: Primary | ICD-10-CM

## 2023-05-04 DIAGNOSIS — G43.819 OTHER MIGRAINE WITHOUT STATUS MIGRAINOSUS, INTRACTABLE: ICD-10-CM

## 2023-05-04 LAB — 17 OH PROGESTERONE: 65.23 NG/DL

## 2023-05-04 RX ORDER — CYCLOBENZAPRINE HCL 5 MG
5 TABLET ORAL 3 TIMES DAILY PRN
Qty: 30 TABLET | Refills: 0 | Status: SHIPPED | OUTPATIENT
Start: 2023-05-04 | End: 2023-06-07 | Stop reason: SDUPTHER

## 2023-05-04 RX ORDER — ONDANSETRON 4 MG/1
4 TABLET, FILM COATED ORAL DAILY PRN
Qty: 30 TABLET | Refills: 0 | Status: SHIPPED | OUTPATIENT
Start: 2023-05-04

## 2023-05-05 ENCOUNTER — APPOINTMENT (OUTPATIENT)
Dept: PHYSICAL THERAPY | Facility: CLINIC | Age: 23
End: 2023-05-05
Payer: COMMERCIAL

## 2023-05-17 ENCOUNTER — HOSPITAL ENCOUNTER (OUTPATIENT)
Dept: PHYSICAL THERAPY | Facility: CLINIC | Age: 23
Setting detail: THERAPIES SERIES
Discharge: HOME | End: 2023-05-17
Payer: COMMERCIAL

## 2023-05-17 PROCEDURE — G0283 ELEC STIM OTHER THAN WOUND: HCPCS

## 2023-05-17 PROCEDURE — 97530 THERAPEUTIC ACTIVITIES: CPT

## 2023-05-17 PROCEDURE — 97110 THERAPEUTIC EXERCISES: CPT
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
## 2023-06-07 ENCOUNTER — TELEPHONE (OUTPATIENT)
Dept: PRIMARY CARE CLINIC | Age: 23
End: 2023-06-07

## 2023-06-07 ENCOUNTER — PATIENT MESSAGE (OUTPATIENT)
Dept: PRIMARY CARE CLINIC | Age: 23
End: 2023-06-07

## 2023-06-07 RX ORDER — RIZATRIPTAN BENZOATE 10 MG/1
10 TABLET ORAL
Qty: 30 TABLET | Refills: 3 | Status: SHIPPED | OUTPATIENT
Start: 2023-06-07 | End: 2023-06-07

## 2023-06-07 RX ORDER — CYCLOBENZAPRINE HCL 5 MG
5 TABLET ORAL 3 TIMES DAILY PRN
Qty: 30 TABLET | Refills: 0 | Status: SHIPPED | OUTPATIENT
Start: 2023-06-07 | End: 2023-06-17

## 2023-06-07 RX ORDER — CYCLOBENZAPRINE HCL 5 MG
TABLET ORAL
Qty: 30 TABLET | Refills: 3 | Status: SHIPPED | OUTPATIENT
Start: 2023-06-07

## 2023-06-07 RX ORDER — RIZATRIPTAN BENZOATE 10 MG/1
10 TABLET ORAL
Qty: 1 TABLET | Refills: 0 | Status: SHIPPED | OUTPATIENT
Start: 2023-06-07 | End: 2023-06-07

## 2023-06-07 NOTE — TELEPHONE ENCOUNTER
From: Sarmad Graham  To: Bren Ellis  Sent: 6/7/2023 9:27 AM EDT  Subject: medication    i was looking to refill my Cyclobenzaprine and it says i am all out.  was wondering if you could send in new script to pharmacy

## 2023-06-07 NOTE — TELEPHONE ENCOUNTER
Pharmacy called into office,  states that they need a clarification on quantity of the rizatriptan that was prescribed, as it does not match the sig.  Please advise

## 2023-06-23 RX ORDER — NAPROXEN 500 MG/1
TABLET ORAL
Qty: 60 TABLET | Refills: 5 | Status: SHIPPED | OUTPATIENT
Start: 2023-06-23

## 2023-06-23 RX ORDER — FAMOTIDINE 40 MG/1
40 TABLET, FILM COATED ORAL EVERY EVENING
Qty: 30 TABLET | Refills: 3 | Status: SHIPPED | OUTPATIENT
Start: 2023-06-23

## 2023-06-26 ENCOUNTER — HOSPITAL ENCOUNTER (OUTPATIENT)
Dept: PHYSICAL THERAPY | Facility: CLINIC | Age: 23
Setting detail: THERAPIES SERIES
Discharge: HOME | End: 2023-06-26
Payer: COMMERCIAL

## 2023-06-26 ENCOUNTER — TELEPHONE (OUTPATIENT)
Dept: GASTROENTEROLOGY | Age: 23
End: 2023-06-26

## 2023-06-26 PROCEDURE — G0283 ELEC STIM OTHER THAN WOUND: HCPCS

## 2023-06-26 PROCEDURE — 97110 THERAPEUTIC EXERCISES: CPT

## 2023-06-26 NOTE — TELEPHONE ENCOUNTER
Patient stated she was returning a missed call and can be reached at [phone]. Okay to leave a message.

## 2023-07-03 ENCOUNTER — HOSPITAL ENCOUNTER (OUTPATIENT)
Dept: PHYSICAL THERAPY | Facility: CLINIC | Age: 23
Setting detail: THERAPIES SERIES
Discharge: HOME | End: 2023-07-03
Payer: COMMERCIAL

## 2023-07-03 PROCEDURE — G0283 ELEC STIM OTHER THAN WOUND: HCPCS

## 2023-07-03 PROCEDURE — 90912 BFB TRAINING 1ST 15 MIN: CPT

## 2023-07-03 PROCEDURE — 97110 THERAPEUTIC EXERCISES: CPT
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
## 2023-07-11 ENCOUNTER — OFFICE VISIT (OUTPATIENT)
Dept: OBGYN CLINIC | Age: 23
End: 2023-07-11

## 2023-07-11 VITALS
HEIGHT: 59 IN | SYSTOLIC BLOOD PRESSURE: 110 MMHG | WEIGHT: 133 LBS | DIASTOLIC BLOOD PRESSURE: 70 MMHG | BODY MASS INDEX: 26.81 KG/M2

## 2023-07-11 DIAGNOSIS — R10.2 COMBINED ABDOMINAL AND PELVIC PAIN: ICD-10-CM

## 2023-07-11 DIAGNOSIS — R10.9 COMBINED ABDOMINAL AND PELVIC PAIN: ICD-10-CM

## 2023-07-11 DIAGNOSIS — R19.8 ALTERNATING CONSTIPATION AND DIARRHEA: ICD-10-CM

## 2023-07-11 DIAGNOSIS — E28.2 PCOS (POLYCYSTIC OVARIAN SYNDROME): Primary | ICD-10-CM

## 2023-07-11 RX ORDER — DICYCLOMINE HYDROCHLORIDE 10 MG/1
10 CAPSULE ORAL 3 TIMES DAILY PRN
Qty: 30 CAPSULE | Refills: 0 | Status: SHIPPED | OUTPATIENT
Start: 2023-07-11

## 2023-07-11 RX ORDER — LORATADINE 10 MG
1 TABLET ORAL DAILY
Qty: 30 TABLET | Refills: 1 | Status: SHIPPED | OUTPATIENT
Start: 2023-07-11

## 2023-07-11 ASSESSMENT — ENCOUNTER SYMPTOMS
NAUSEA: 0
SHORTNESS OF BREATH: 0
ABDOMINAL PAIN: 1
CONSTIPATION: 1
DIARRHEA: 1
ABDOMINAL DISTENTION: 1
VOMITING: 0

## 2023-07-11 NOTE — PROGRESS NOTES
[address] [facility],[address] OB/GYN [address]  [place] [address]  Dept: [phone]    Patient Name: Vlad Martinez  Patient Age: 26 y.o. Date of Visit: 2023    Subjective  Chief Complaint   Patient presents with    Other     3 mos follow up     Patient's last menstrual period was 2023. Other  Associated symptoms include abdominal pain. Pertinent negatives include no chest pain, headaches, nausea or vomiting. Chaperone for Intimate Exam  Chaperone was offered as part of the rooming process. Patient declined and agrees to continue with exam without a chaperone. Chaperone: n/a    Arrives for PCOS/abdominal pain/pelvic pain follow up. Reports overall doing well. Some intermittent right sided pain. Continued abdominal bloating and difficulty with bowel movements including diarrhea and constipation. No menstrual concerns, does not desire medical intervention for PCOS.      PHQ Scores 5/3/2023 2023 2022 2022 2021 10/28/2021 10/14/2021   PHQ2 Score 0 0 0 0 0 0 0   PHQ9 Score 0 0 0 0 0 0 0     Interpretation of Total Score Depression Severity: 1-4 = Minimal depression, 5-9 = Mild depression, 10-14 = Moderate depression, 15-19 = Moderately severe depression, 20-27 = Severe depression      OB History          1    Para   1    Term   1            AB        Living   1         SAB        IAB        Ectopic        Molar        Multiple   0    Live Births   1              Past Medical History:   Diagnosis Date    Mental disorder     depression    POTS (postural orthostatic tachycardia syndrome)      Current Outpatient Medications   Medication Sig Dispense Refill    dicyclomine (BENTYL) 10 MG capsule Take 1 capsule by mouth 3 times daily as needed (abdominal pain/cramping) 30 capsule 0    Fiber Select Gummies CHEW Take 1 each by mouth daily 30 tablet 1    rizatriptan (MAXALT) 10 MG tablet Take 1 tablet by mouth once

## 2023-07-26 ENCOUNTER — HOSPITAL ENCOUNTER (OUTPATIENT)
Dept: PHYSICAL THERAPY | Facility: CLINIC | Age: 23
Setting detail: THERAPIES SERIES
Discharge: HOME | End: 2023-07-26
Payer: COMMERCIAL

## 2023-07-26 PROCEDURE — 97110 THERAPEUTIC EXERCISES: CPT

## 2023-07-26 NOTE — FLOWSHEET NOTE
hold  - Supine Butterfly Groin Stretch  - 1 x daily - 7 x weekly - 3 sets - 30 hold  - Supine Pelvic Floor Stretch  - 1 x daily - 7 x weekly - 3 sets - 30 hold  - Supine Pelvic Floor Contraction  - 1 x daily - 7 x weekly - 1 sets - 10 reps - 3 hold  - Supine Pelvic Floor Contract and Release  - 1 x daily - 7 x weekly - 1 sets - 10 reps - 2 hold  - Supine Bridge with Resistance Band  - 1 x daily - 7 x weekly - 3 sets - 10 reps  - Clamshell with Resistance  - 1 x daily - 7 x weekly - 3 sets - 10 reps  - Hooklying Clamshell with Resistance  - 1 x daily - 7 x weekly - 3 sets - 10 reps  - Active Straight Leg Raise with Quad Set  - 1 x daily - 7 x weekly - 3 sets - 10 reps  - Supine Hip Adduction Isometric with Ball  - 1 x daily - 7 x weekly - 3 sets - 10 reps  - Hooklying Heel Walk  - 1 x daily - 7 x weekly - 3 sets - 10 reps  - Bent Knee Fallouts  - 1 x daily - 7 x weekly - 3 sets - 10 reps    Plan: [x] Continue with current plan of care towards goals.         Time In:9:30 am            Time Out: 10:20 am    Electronically signed by:  Randy Dillard PTA

## 2023-08-02 ENCOUNTER — HOSPITAL ENCOUNTER (OUTPATIENT)
Dept: PHYSICAL THERAPY | Facility: CLINIC | Age: 23
Setting detail: THERAPIES SERIES
Discharge: HOME | End: 2023-08-02

## 2023-08-02 ENCOUNTER — OFFICE VISIT (OUTPATIENT)
Dept: GASTROENTEROLOGY | Age: 23
End: 2023-08-02
Payer: COMMERCIAL

## 2023-08-02 ENCOUNTER — HOSPITAL ENCOUNTER (OUTPATIENT)
Age: 23
Setting detail: SPECIMEN
Discharge: HOME | End: 2023-08-02

## 2023-08-02 VITALS
DIASTOLIC BLOOD PRESSURE: 70 MMHG | BODY MASS INDEX: 26.57 KG/M2 | HEIGHT: 59 IN | SYSTOLIC BLOOD PRESSURE: 115 MMHG | WEIGHT: 131.8 LBS

## 2023-08-02 DIAGNOSIS — R10.13 DYSPEPSIA: Primary | ICD-10-CM

## 2023-08-02 DIAGNOSIS — R19.7 DIARRHEA, UNSPECIFIED TYPE: ICD-10-CM

## 2023-08-02 DIAGNOSIS — K80.50 BILIARY COLIC: ICD-10-CM

## 2023-08-02 DIAGNOSIS — R10.13 DYSPEPSIA: ICD-10-CM

## 2023-08-02 LAB
CRP SERPL HS-MCNC: <3 MG/L (ref 0–5)
ERYTHROCYTE [SEDIMENTATION RATE] IN BLOOD BY PHOTOMETRIC METHOD: 1 MM/HR (ref 0–20)

## 2023-08-02 PROCEDURE — 99214 OFFICE O/P EST MOD 30 MIN: CPT | Performed by: INTERNAL MEDICINE

## 2023-08-02 PROCEDURE — G8428 CUR MEDS NOT DOCUMENT: HCPCS | Performed by: INTERNAL MEDICINE

## 2023-08-02 PROCEDURE — 1036F TOBACCO NON-USER: CPT | Performed by: INTERNAL MEDICINE

## 2023-08-02 PROCEDURE — G8417 CALC BMI ABV UP PARAM F/U: HCPCS | Performed by: INTERNAL MEDICINE

## 2023-08-02 RX ORDER — DICYCLOMINE HYDROCHLORIDE 10 MG/1
10 CAPSULE ORAL 4 TIMES DAILY PRN
Qty: 360 CAPSULE | Refills: 1 | Status: SHIPPED | OUTPATIENT
Start: 2023-08-02

## 2023-08-02 ASSESSMENT — ENCOUNTER SYMPTOMS
NAUSEA: 1
WHEEZING: 0
ABDOMINAL DISTENTION: 1
CONSTIPATION: 0
TROUBLE SWALLOWING: 0
VOMITING: 1
ANAL BLEEDING: 0
ABDOMINAL PAIN: 1
VOICE CHANGE: 0
COUGH: 0
BLOOD IN STOOL: 0
RECTAL PAIN: 0
SHORTNESS OF BREATH: 0
DIARRHEA: 1
RESPIRATORY NEGATIVE: 1
CHOKING: 0

## 2023-08-02 NOTE — PROGRESS NOTES
Reason for Referral: abdominal pain, biliary colic      No referring provider defined for this encounter. Chief Complaint   Patient presents with    Follow-up     Mucus in stool Zerita Kirks ,cramping stomach   Eats a few bites and she's full            HISTORY OF PRESENT ILLNESS: Jos Lee is a 26 y.o. female with a past history remarkable for depression, anxiety, postpartum x10 months, intermittent recurrent episodes of biliary colic symptoms, prior history of THC use, intermittently using, referred for evaluation of intermittent biliary colic symptoms. The patient reports associated nausea without any significant emesis. Reports typical crescendo, decrescendo pattern. Recent presentation to emerge apartment which failed to disclose any laboratory abnormalities. No recent abdominal imaging. No prior history of upper endoscopy. No obvious dietary triggers reported by the patient. No food aversion or secondary weight loss associated with symptoms. No significant lower GI symptoms or other associated GI symptomology. Smoker: Cannibus   Drinking history:  None   Illicit drugs: CBD bath bomb    Abdominal surgeries: None   Prior Colonoscopy: None   Prior EGD: None   FH of GI issues: GM/GF-liver CA, Lung Ca. Past Medical,Family, and Social History reviewed and does contribute to the patient presentingcondition. Patient's PMH/PSH,SH,PSYCH Hx, MEDs, ALLERGIES, and ROS were all reviewed and updated in the appropriate sections.     PAST MEDICAL HISTORY:  Past Medical History:   Diagnosis Date    Mental disorder     depression    POTS (postural orthostatic tachycardia syndrome)        Past Surgical History:   Procedure Laterality Date    ESOPHAGOGASTRODUODENOSCOPY  12/20/2022    EGD BIOPSY    INTRAUTERINE DEVICE REMOVAL  11/18/2021    dislodged IUD    NOSE SURGERY  03/2023    UPPER GASTROINTESTINAL ENDOSCOPY N/A 12/20/2022    EGD BIOPSY performed by Lisa Stratton MD at [facility] [place]

## 2023-08-02 NOTE — FLOWSHEET NOTE
[] [facility] - [facility] &  Therapy  [address] [facility].    [phone]  F: [phone]   [] [address] [facility]  [address]   [address]  P: [phone]  F: [phone]  [] [address] &  Therapy  [address] [facility]  P: [phone]  F: [phone] [] [facility]  P: [phone]  F: [phone]  [] [address] [facility]   [address]   [place]: [phone]  F: [phone]   [] [address] [facility]  [address] [facility] [address] [facility] [address]  [place]: [phone]   F: [phone]     Physical Therapy Cancel/No Show note    Date: 2023  Patient: Anita Bennett  : 2000  MRN: [id number]    Cancels/No Shows to date:     For today's appointment patient:    [x]  Cancelled    [] Rescheduled appointment    [] No-show     Reason given by patient:    []  Patient ill    []  Conflicting appointment    [] No transportation      [x] Conflict with work    [] No reason given    [] Weather related    [] COVID-19    [] Other:      Comments:        [] Next appointment was confirmed    Electronically signed by: Dina Langley

## 2023-08-03 ENCOUNTER — TELEPHONE (OUTPATIENT)
Dept: GASTROENTEROLOGY | Age: 23
End: 2023-08-03

## 2023-08-03 RX ORDER — POLYETHYLENE GLYCOL 3350 17 G/17G
POWDER, FOR SOLUTION ORAL
Qty: 238 G | Refills: 0 | Status: SHIPPED | OUTPATIENT
Start: 2023-08-03

## 2023-08-03 RX ORDER — BISACODYL 5 MG/1
TABLET, DELAYED RELEASE ORAL
Qty: 4 TABLET | Refills: 0 | Status: SHIPPED | OUTPATIENT
Start: 2023-08-03

## 2023-08-03 NOTE — TELEPHONE ENCOUNTER
Colonoscopy Yara ARROYO 8/24/23 @ 11:15am    Written/verbal instructions given @ visit    Miralax/dulcolax

## 2023-08-10 ENCOUNTER — HOSPITAL ENCOUNTER (OUTPATIENT)
Age: 23
Setting detail: SPECIMEN
Discharge: HOME | End: 2023-08-10
Payer: COMMERCIAL

## 2023-08-10 DIAGNOSIS — R10.13 DYSPEPSIA: ICD-10-CM

## 2023-08-10 DIAGNOSIS — R19.7 DIARRHEA, UNSPECIFIED TYPE: ICD-10-CM

## 2023-08-10 PROCEDURE — 83520 IMMUNOASSAY QUANT NOS NONAB: CPT

## 2023-08-10 PROCEDURE — 83993 ASSAY FOR CALPROTECTIN FECAL: CPT

## 2023-08-13 LAB — CALPROTECTIN, FECAL: 62 UG/G

## 2023-08-15 LAB — FECAL PANCREATIC ELASTASE-1: >800 UG/G

## 2023-08-15 NOTE — DISCHARGE INSTRUCTIONS

## 2023-08-22 NOTE — PRE-PROCEDURE INSTRUCTIONS
VM left with pre-colonoscopy instructions:     Date/time/location of procedure     NPO after MN status     Need for       Need to complete bowel prep/instructions per Dr. Jaki Cassidy    PAT phone number [phone] provided for pt to call with any further questions prior to procedure.

## 2023-08-23 ENCOUNTER — ANESTHESIA EVENT (OUTPATIENT)
Dept: OPERATING ROOM | Age: 23
End: 2023-08-23
Payer: COMMERCIAL

## 2023-08-24 ENCOUNTER — HOSPITAL ENCOUNTER (OUTPATIENT)
Age: 23
Setting detail: OUTPATIENT SURGERY
Discharge: HOME | End: 2023-08-24
Attending: INTERNAL MEDICINE | Admitting: INTERNAL MEDICINE
Payer: COMMERCIAL

## 2023-08-24 ENCOUNTER — ANESTHESIA (OUTPATIENT)
Dept: OPERATING ROOM | Age: 23
End: 2023-08-24
Payer: COMMERCIAL

## 2023-08-24 VITALS
TEMPERATURE: 97.2 F | BODY MASS INDEX: 25.36 KG/M2 | SYSTOLIC BLOOD PRESSURE: 102 MMHG | HEIGHT: 59 IN | OXYGEN SATURATION: 100 % | RESPIRATION RATE: 18 BRPM | WEIGHT: 125.8 LBS | DIASTOLIC BLOOD PRESSURE: 68 MMHG | HEART RATE: 81 BPM

## 2023-08-24 DIAGNOSIS — R19.7 DIARRHEA, UNSPECIFIED TYPE: ICD-10-CM

## 2023-08-24 PROCEDURE — 3609010300 HC COLONOSCOPY W/BIOPSY SINGLE/MULTIPLE: Performed by: INTERNAL MEDICINE

## 2023-08-24 PROCEDURE — 88305 TISSUE EXAM BY PATHOLOGIST: CPT

## 2023-08-24 PROCEDURE — 45380 COLONOSCOPY AND BIOPSY: CPT | Performed by: INTERNAL MEDICINE

## 2023-08-24 PROCEDURE — 7100000010 HC PHASE II RECOVERY - FIRST 15 MIN: Performed by: INTERNAL MEDICINE

## 2023-08-24 PROCEDURE — 6360000002 HC RX W HCPCS: Performed by: NURSE ANESTHETIST, CERTIFIED REGISTERED

## 2023-08-24 PROCEDURE — 7100000011 HC PHASE II RECOVERY - ADDTL 15 MIN: Performed by: INTERNAL MEDICINE

## 2023-08-24 PROCEDURE — 3700000000 HC ANESTHESIA ATTENDED CARE: Performed by: INTERNAL MEDICINE

## 2023-08-24 PROCEDURE — 2580000003 HC RX 258: Performed by: ANESTHESIOLOGY

## 2023-08-24 PROCEDURE — 3700000001 HC ADD 15 MINUTES (ANESTHESIA): Performed by: INTERNAL MEDICINE

## 2023-08-24 PROCEDURE — 2709999900 HC NON-CHARGEABLE SUPPLY: Performed by: INTERNAL MEDICINE

## 2023-08-24 RX ORDER — SODIUM CHLORIDE 0.9 % (FLUSH) 0.9 %
5-40 SYRINGE (ML) INJECTION PRN
Status: DISCONTINUED | OUTPATIENT
Start: 2023-08-24 | End: 2023-08-24 | Stop reason: HOSPADM

## 2023-08-24 RX ORDER — ONDANSETRON 2 MG/ML
4 INJECTION INTRAMUSCULAR; INTRAVENOUS
Status: CANCELLED | OUTPATIENT
Start: 2023-08-24 | End: 2023-08-25

## 2023-08-24 RX ORDER — SODIUM CHLORIDE, SODIUM LACTATE, POTASSIUM CHLORIDE, CALCIUM CHLORIDE 600; 310; 30; 20 MG/100ML; MG/100ML; MG/100ML; MG/100ML
INJECTION, SOLUTION INTRAVENOUS CONTINUOUS
Status: DISCONTINUED | OUTPATIENT
Start: 2023-08-24 | End: 2023-08-24 | Stop reason: HOSPADM

## 2023-08-24 RX ORDER — OXYCODONE HYDROCHLORIDE AND ACETAMINOPHEN 5; 325 MG/1; MG/1
2 TABLET ORAL
Status: CANCELLED | OUTPATIENT
Start: 2023-08-24 | End: 2023-08-25

## 2023-08-24 RX ORDER — SODIUM CHLORIDE 9 MG/ML
INJECTION, SOLUTION INTRAVENOUS PRN
Status: DISCONTINUED | OUTPATIENT
Start: 2023-08-24 | End: 2023-08-24 | Stop reason: HOSPADM

## 2023-08-24 RX ORDER — SODIUM CHLORIDE 0.9 % (FLUSH) 0.9 %
5-40 SYRINGE (ML) INJECTION EVERY 12 HOURS SCHEDULED
Status: CANCELLED | OUTPATIENT
Start: 2023-08-24

## 2023-08-24 RX ORDER — IPRATROPIUM BROMIDE AND ALBUTEROL SULFATE 2.5; .5 MG/3ML; MG/3ML
1 SOLUTION RESPIRATORY (INHALATION)
Status: CANCELLED | OUTPATIENT
Start: 2023-08-24 | End: 2023-08-25

## 2023-08-24 RX ORDER — SODIUM CHLORIDE 0.9 % (FLUSH) 0.9 %
5-40 SYRINGE (ML) INJECTION PRN
Status: CANCELLED | OUTPATIENT
Start: 2023-08-24

## 2023-08-24 RX ORDER — SODIUM CHLORIDE 9 MG/ML
INJECTION, SOLUTION INTRAVENOUS PRN
Status: CANCELLED | OUTPATIENT
Start: 2023-08-24

## 2023-08-24 RX ORDER — METOCLOPRAMIDE HYDROCHLORIDE 5 MG/ML
10 INJECTION INTRAMUSCULAR; INTRAVENOUS
Status: CANCELLED | OUTPATIENT
Start: 2023-08-24 | End: 2023-08-25

## 2023-08-24 RX ORDER — MORPHINE SULFATE 2 MG/ML
2 INJECTION, SOLUTION INTRAMUSCULAR; INTRAVENOUS EVERY 5 MIN PRN
Status: CANCELLED | OUTPATIENT
Start: 2023-08-24

## 2023-08-24 RX ORDER — GLYCOPYRROLATE 0.2 MG/ML
0.4 INJECTION INTRAMUSCULAR; INTRAVENOUS ONCE
Status: CANCELLED | OUTPATIENT
Start: 2023-08-24 | End: 2023-08-24

## 2023-08-24 RX ORDER — OXYCODONE HYDROCHLORIDE AND ACETAMINOPHEN 5; 325 MG/1; MG/1
1 TABLET ORAL
Status: CANCELLED | OUTPATIENT
Start: 2023-08-24 | End: 2023-08-25

## 2023-08-24 RX ORDER — SODIUM CHLORIDE 0.9 % (FLUSH) 0.9 %
5-40 SYRINGE (ML) INJECTION EVERY 12 HOURS SCHEDULED
Status: DISCONTINUED | OUTPATIENT
Start: 2023-08-24 | End: 2023-08-24 | Stop reason: HOSPADM

## 2023-08-24 RX ORDER — HYDRALAZINE HYDROCHLORIDE 20 MG/ML
10 INJECTION INTRAMUSCULAR; INTRAVENOUS
Status: CANCELLED | OUTPATIENT
Start: 2023-08-24

## 2023-08-24 RX ORDER — LIDOCAINE HYDROCHLORIDE 10 MG/ML
1 INJECTION, SOLUTION INFILTRATION; PERINEURAL
Status: DISCONTINUED | OUTPATIENT
Start: 2023-08-24 | End: 2023-08-24 | Stop reason: HOSPADM

## 2023-08-24 RX ORDER — MEPERIDINE HYDROCHLORIDE 50 MG/ML
12.5 INJECTION INTRAMUSCULAR; INTRAVENOUS; SUBCUTANEOUS EVERY 5 MIN PRN
Status: CANCELLED | OUTPATIENT
Start: 2023-08-24

## 2023-08-24 RX ORDER — PROPOFOL 10 MG/ML
INJECTION, EMULSION INTRAVENOUS PRN
Status: DISCONTINUED | OUTPATIENT
Start: 2023-08-24 | End: 2023-08-24 | Stop reason: SDUPTHER

## 2023-08-24 RX ORDER — PROMETHAZINE HYDROCHLORIDE 25 MG/ML
6.25 INJECTION, SOLUTION INTRAMUSCULAR; INTRAVENOUS EVERY 5 MIN PRN
Status: CANCELLED | OUTPATIENT
Start: 2023-08-24

## 2023-08-24 RX ORDER — LABETALOL HYDROCHLORIDE 5 MG/ML
10 INJECTION, SOLUTION INTRAVENOUS
Status: CANCELLED | OUTPATIENT
Start: 2023-08-24

## 2023-08-24 RX ORDER — DIPHENHYDRAMINE HYDROCHLORIDE 50 MG/ML
12.5 INJECTION INTRAMUSCULAR; INTRAVENOUS
Status: CANCELLED | OUTPATIENT
Start: 2023-08-24 | End: 2023-08-25

## 2023-08-24 RX ORDER — MIDAZOLAM HYDROCHLORIDE 2 MG/2ML
2 INJECTION, SOLUTION INTRAMUSCULAR; INTRAVENOUS
Status: CANCELLED | OUTPATIENT
Start: 2023-08-24 | End: 2023-08-25

## 2023-08-24 RX ADMIN — SODIUM CHLORIDE, POTASSIUM CHLORIDE, SODIUM LACTATE AND CALCIUM CHLORIDE: 600; 310; 30; 20 INJECTION, SOLUTION INTRAVENOUS at 10:48

## 2023-08-24 RX ADMIN — PROPOFOL 320 MG: 10 INJECTION, EMULSION INTRAVENOUS at 12:55

## 2023-08-24 ASSESSMENT — PAIN - FUNCTIONAL ASSESSMENT: PAIN_FUNCTIONAL_ASSESSMENT: 0-10

## 2023-08-24 NOTE — OP NOTE
Operative Note      Patient: Anita Bennett  YOB: 2000  MRN: [id number]    Date of Procedure: 8/24/2023    Pre-Op Diagnosis Codes:     * Diarrhea, unspecified type [R19.7]    Post-Op Diagnosis: FAIR PREP, no evidence of colitis or inflammation. Small  internal hemorrhoids       Procedure(s):  COLONOSCOPY WITH BIOPSY    Surgeon(s):  Max Sow MD    Assistant:   * No surgical staff found *    Anesthesia: Monitor Anesthesia Care    Estimated Blood Loss (mL): Minimal    Complications: None    Specimens:   ID Type Source Tests Collected by Time Destination   A : RANDOM RIGHT COLON BIOPIES  Tissue Colon SURGICAL PATHOLOGY Max Sow MD 8/24/2023 1303    B : RANDOM LEFT COLON BIOPIES  Tissue Colon SURGICAL PATHOLOGY Max Sow MD 8/24/2023 1303        Implants:  * No implants in log *      Drains: * No LDAs found *              [place] [facility]     COLONOSCOPY    PROCEDURE DATE: 08/24/23    REFERRING PHYSICIAN: No ref. provider found     PRIMARY CARE PROVIDER: BEBA Peterson - JHONATAN    ATTENDING PHYSICIAN: Max Sow MD     HISTORY: Ms. Anita Bennett is a 26 y.o. female who presents to the  endoscopy unit for colonoscopy. The patient's clinical history is remarkable for depression, diarrhea, asthma, referred for dx colonoscopy. She is currently medically stable and appropriate for the planned procedure. PREOPERATIVE DIAGNOSIS: diarrhea. PROCEDURES:   Transanal Colonoscopy with biopsy. POSTPROCEDURE DIAGNOSIS:    Fair prep    1-no evidence of colitis, inflammation, ulcerations. Normal-appearing haustral folds. Normal vascular pattern of the superficial mucosa. Terminal ileum was intubated up to 10 cm. No evidence of aphthous ulcer.   2-random right colon and left colon cold biopsies were taken to evaluate for microscopic colitis  3-small internal hemorrhoids    MEDICATIONS:     MAC per anesthesia     EBL <10cc      INSTRUMENT: Olympus CF-H190 AL Pediatric flexible

## 2023-08-24 NOTE — H&P
Procedure History and Physical    Pre-Procedural Diagnosis:  dyspepsia, diarrhea    Indications:  same    Procedure Planned: colonoscopy     History Obtained From:  patient    HISTORY OF PRESENT ILLNESS:       The patient is a 26 y.o. female who presents for the above procedure. Past Medical History:    Past Medical History:   Diagnosis Date    Mental disorder     depression    POTS (postural orthostatic tachycardia syndrome)        Past Surgical History:    Past Surgical History:   Procedure Laterality Date    ESOPHAGOGASTRODUODENOSCOPY  12/20/2022    EGD BIOPSY    INTRAUTERINE DEVICE REMOVAL  11/18/2021    dislodged IUD    NOSE SURGERY  03/2023    UPPER GASTROINTESTINAL ENDOSCOPY N/A 12/20/2022    EGD BIOPSY performed by Zaid Covarrubias MD at [facility] OR       Medications:  Current Facility-Administered Medications   Medication Dose Route Frequency Provider Last Rate Last Admin    lidocaine 1 % injection 1 mL  1 mL IntraDERmal Once PRN Tierra Boland MD        lactated ringers IV soln infusion   IntraVENous Continuous Tierra Boland MD        sodium chloride flush 0.9 % injection 5-40 mL  5-40 mL IntraVENous 2 times per day Tierra Boland MD        sodium chloride flush 0.9 % injection 5-40 mL  5-40 mL IntraVENous PRN Tierra Boland MD        0.9 % sodium chloride infusion   IntraVENous PRN Tierra Boland MD           Allergies: Allergies   Allergen Reactions    Zoloft [Sertraline] Other (See Comments)     Spasms and tingling                  Social   Social History     Tobacco Use    Smoking status: Never    Smokeless tobacco: Never   Substance Use Topics    Alcohol use: Not Currently        PSYCH HISTORY:  Depression No  Anxiety No  Suicide No       History reviewed. No pertinent family history.    No family history of colon cancer, Crohn's disease, or ulcerative colitis    Problems with Sedation/Anesthesia in the past? no    REVIEW OF SYSTEMS:  12 point review of

## 2023-08-24 NOTE — ANESTHESIA PRE PROCEDURE
Department of Anesthesiology  Preprocedure Note       Name:  Larry Smart   Age:  26 y.o.  :  2000                                          MRN:  [id number]         Date:  2023      Surgeon: Pedro Mcdonald):  Lisa Stratton MD    Procedure: Procedure(s):  COLONOSCOPY DIAGNOSTIC    Medications prior to admission:   Prior to Admission medications    Medication Sig Start Date End Date Taking?  Authorizing Provider   rizatriptan (MAXALT) 10 MG tablet Take 1 tablet by mouth once as needed for Migraine May repeat in 2 hours if needed 23  BEBA Ramon CNP   polyethylene glycol [place]) 17 GM/SCOOP powder Take as directed for bowel prep 8/3/23   Lisa Stratton MD   bisacodyl 5 MG EC tablet Take as directed for bowel prep 8/3/23   Lisa Stratton MD   dicyclomine (BENTYL) 10 MG capsule Take 1 capsule by mouth 4 times daily as needed (Dyspepsia) 23   Lisa Stratton MD   Ivabradine HCl (CORLANOR) 7.5 MG TABS Take 1 tablet by mouth in the morning and at bedtime 23   BEBA Aguirre CNP   dicyclomine (BENTYL) 10 MG capsule Take 1 capsule by mouth 3 times daily as needed (abdominal pain/cramping) 23   BEBA Blackwood CNM   Fiber Select Gummies CHEW Take 1 each by mouth daily 23   BEBA Blackwood CNM   naproxen (NAPROSYN) 500 MG tablet TAKE 1 TABLET BY MOUTH TWICE DAILY WITH MEALS 23   BEBA Whyte CNP   famotidine (PEPCID) 40 MG tablet TAKE 1 TABLET BY MOUTH EVERY EVENING 23   BEBA Whyte CNP   rizatriptan (MAXALT) 10 MG tablet Take 1 tablet by mouth once as needed for Migraine May repeat in 2 hours if needed 23  BEBA Ramon CNP   cyclobenzaprine (FLEXERIL) 5 MG tablet TAKE 1 TABLET BY MOUTH THREE TIMES DAILY AS NEEDED FOR MUSCLE SPASMS 23   BEBA Ramon CNP   ondansetron (ZOFRAN) 4 MG tablet Take 1 tablet by mouth daily as needed for Nausea or Vomiting 23

## 2023-08-24 NOTE — ANESTHESIA POSTPROCEDURE EVALUATION
Department of Anesthesiology  Postprocedure Note    Patient: Joceline Croft  MRN: [id number]  YOB: 2000  Date of evaluation: 8/24/2023      Procedure Summary     Date: 08/24/23 Room / Location: [address] / [facility]) [facility]    Anesthesia Start: 3629 Anesthesia Stop: 1962    Procedure: COLONOSCOPY WITH BIOPSY Diagnosis:       Diarrhea, unspecified type      (Diarrhea, unspecified type [R19.7])    Surgeons: Gin Sarmiento MD Responsible Provider: Myra Gilman MD    Anesthesia Type: MAC ASA Status: 2          Anesthesia Type: No value filed.     Hiren Phase I: Hiren Score: 10    Hiren Phase II: Hiren Score: 10      Anesthesia Post Evaluation    Patient location during evaluation: PACU  Patient participation: complete - patient participated  Level of consciousness: awake and alert  Airway patency: patent  Nausea & Vomiting: no nausea and no vomiting  Complications: no  Cardiovascular status: blood pressure returned to baseline  Respiratory status: acceptable and room air  Hydration status: euvolemic  Pain management: adequate and satisfactory to patient

## 2023-08-28 LAB — SURGICAL PATHOLOGY REPORT: NORMAL

## 2023-09-01 ENCOUNTER — HOSPITAL ENCOUNTER (OUTPATIENT)
Dept: NUCLEAR MEDICINE | Age: 23
Discharge: HOME | End: 2023-09-03
Attending: INTERNAL MEDICINE

## 2023-09-01 DIAGNOSIS — R10.13 DYSPEPSIA: ICD-10-CM

## 2023-09-01 DIAGNOSIS — R19.7 DIARRHEA, UNSPECIFIED TYPE: ICD-10-CM

## 2023-09-11 ENCOUNTER — HOSPITAL ENCOUNTER (OUTPATIENT)
Age: 23
Discharge: HOME | End: 2023-09-13
Payer: COMMERCIAL

## 2023-09-11 ENCOUNTER — HOSPITAL ENCOUNTER (OUTPATIENT)
Dept: GENERAL RADIOLOGY | Age: 23
Discharge: HOME | End: 2023-09-13
Payer: COMMERCIAL

## 2023-09-11 ENCOUNTER — OFFICE VISIT (OUTPATIENT)
Dept: FAMILY MEDICINE CLINIC | Age: 23
End: 2023-09-11
Payer: COMMERCIAL

## 2023-09-11 ENCOUNTER — TELEPHONE (OUTPATIENT)
Dept: FAMILY MEDICINE CLINIC | Age: 23
End: 2023-09-11

## 2023-09-11 VITALS
TEMPERATURE: 98.3 F | WEIGHT: 126 LBS | SYSTOLIC BLOOD PRESSURE: 102 MMHG | RESPIRATION RATE: 16 BRPM | HEART RATE: 88 BPM | DIASTOLIC BLOOD PRESSURE: 60 MMHG | OXYGEN SATURATION: 99 % | BODY MASS INDEX: 25.45 KG/M2

## 2023-09-11 DIAGNOSIS — S20.212A RIB CONTUSION, LEFT, INITIAL ENCOUNTER: ICD-10-CM
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
## 2023-09-11 DIAGNOSIS — G89.11 ACUTE SHOULDER PAIN DUE TO TRAUMA, RIGHT: ICD-10-CM

## 2023-09-11 DIAGNOSIS — S20.212A RIB CONTUSION, LEFT, INITIAL ENCOUNTER: Primary | ICD-10-CM

## 2023-09-11 DIAGNOSIS — M25.511 ACUTE SHOULDER PAIN DUE TO TRAUMA, RIGHT: ICD-10-CM

## 2023-09-11 PROCEDURE — 1036F TOBACCO NON-USER: CPT | Performed by: NURSE PRACTITIONER

## 2023-09-11 PROCEDURE — 99214 OFFICE O/P EST MOD 30 MIN: CPT | Performed by: NURSE PRACTITIONER

## 2023-09-11 PROCEDURE — G8417 CALC BMI ABV UP PARAM F/U: HCPCS | Performed by: NURSE PRACTITIONER

## 2023-09-11 PROCEDURE — G8427 DOCREV CUR MEDS BY ELIG CLIN: HCPCS | Performed by: NURSE PRACTITIONER

## 2023-09-11 PROCEDURE — 71101 X-RAY EXAM UNILAT RIBS/CHEST: CPT

## 2023-09-11 PROCEDURE — 73030 X-RAY EXAM OF SHOULDER: CPT

## 2023-09-11 ASSESSMENT — ENCOUNTER SYMPTOMS
ABDOMINAL PAIN: 0
RESPIRATORY NEGATIVE: 1
BOWEL INCONTINENCE: 0
GASTROINTESTINAL NEGATIVE: 1
BACK PAIN: 1

## 2023-09-11 NOTE — PATIENT INSTRUCTIONS
Ice/heat  Bengay or other muscle rubs  Tylenol over the counter  Continue the naproxen as prescribed

## 2023-09-11 NOTE — TELEPHONE ENCOUNTER
Pt notified of Radiologist Preliminary read of left rib xrays and rt shoulder xray. Both negative for acuity. Will call pt back if any change on Final read.   Pt verbalized agreement and understanding
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
## 2023-10-06 RX ORDER — ONDANSETRON 4 MG/1
TABLET, ORALLY DISINTEGRATING ORAL
Qty: 21 TABLET | Refills: 0 | Status: SHIPPED | OUTPATIENT
Start: 2023-10-06

## 2023-10-26 ENCOUNTER — CLINICAL DOCUMENTATION (OUTPATIENT)
Dept: PHYSICAL THERAPY | Facility: CLINIC | Age: 23
End: 2023-10-26

## 2023-10-26 NOTE — THERAPY DISCHARGE
[x] [facility]  Outpatient Rehabilitation &  Therapy  [address]  P: [phone]  F: [phone]     Physical Therapy Discharge Note    Date: 10/26/2023     Patient: Vy Navarro  : 2000  MRN: [id number]    Physician: [address]: Edrie Inch (30 vs)  Medical Diagnosis: Pelvic pressure, stress incontinence                  Rehab Codes: N39.3, M62.50, R10.2  Onset Date: 23        Total visits attended:  Cancels/No shows:2  Date of initial visit: 23                Date of final visit: 23          Discharge Status:                               [] Pt recovered from conditions. Treatment goals were met. [] Pt received maximum benefit. No further therapy indicated at this time. [x] Pt to continue exercise/home instructions independently. [] Therapy interrupted due to:                          [] Pt has 2 or more no shows/cancels, is discontinued per our policy. [] Pt has completed prescribed number of treatment sessions. [x] Patient did not schedule anymore appointments. Status unknown.                          [] Other:      [x] Discharge PT at this time        Electronically signed by Andrew Brooks PT on 10/26/2023 at 3:02 PM      If you have any questions or concerns, please don't hesitate to call.   Thank you for your referral.

## 2023-11-08 ENCOUNTER — OFFICE VISIT (OUTPATIENT)
Dept: OBGYN CLINIC | Age: 23
End: 2023-11-08
Payer: COMMERCIAL

## 2023-11-08 ENCOUNTER — HOSPITAL ENCOUNTER (OUTPATIENT)
Age: 23
Setting detail: SPECIMEN
Discharge: HOME | End: 2023-11-08

## 2023-11-08 VITALS
SYSTOLIC BLOOD PRESSURE: 126 MMHG | DIASTOLIC BLOOD PRESSURE: 72 MMHG | HEIGHT: 59 IN | WEIGHT: 125 LBS | BODY MASS INDEX: 25.2 KG/M2

## 2023-11-08 DIAGNOSIS — E28.2 PCOS (POLYCYSTIC OVARIAN SYNDROME): ICD-10-CM

## 2023-11-08 DIAGNOSIS — Z01.419 WELL WOMAN EXAM: Primary | ICD-10-CM

## 2023-11-08 DIAGNOSIS — Z87.42 HX OF ABNORMAL CERVICAL PAP SMEAR: ICD-10-CM

## 2023-11-08 PROBLEM — Z28.39 RUBELLA NONIMMUNE STATUS, DELIVERED, CURRENT HOSPITALIZATION: Status: RESOLVED | Noted: 2021-07-06 | Resolved: 2023-11-08

## 2023-11-08 PROCEDURE — G8484 FLU IMMUNIZE NO ADMIN: HCPCS | Performed by: ADVANCED PRACTICE MIDWIFE

## 2023-11-08 PROCEDURE — 99385 PREV VISIT NEW AGE 18-39: CPT | Performed by: ADVANCED PRACTICE MIDWIFE

## 2023-11-08 ASSESSMENT — ENCOUNTER SYMPTOMS
NAUSEA: 0
ABDOMINAL PAIN: 0
SHORTNESS OF BREATH: 0
VOMITING: 0
DIARRHEA: 0

## 2023-11-08 NOTE — PROGRESS NOTES
[address] [facility],[address] OB/GYN [facility]   [facility] [place]  [place] [address]  Dept: [phone]    Patient Name: Zara Danielle  Patient Age: 26 y.o. Date of Visit: 2023    Subjective  Chief Complaint   Patient presents with    Annual Exam     Patient's last menstrual period was 10/19/2023 (approximate). Chaperone for Intimate Exam  Chaperone was offered as part of the rooming process. Patient declined and agrees to continue with exam without a chaperone. Chaperone: n/a    HPI  Patient arrives for annual exam as a established patient to the practice, and established to me. Zara Danielle does not admit to any concerns today. Reported previous gyn history:  Menstrual cycles began at the age of 12/13  Zara Danielle reports periods use to be irregular but have become more regular since having her kid. Reports every 21-25 days. Reports menstrual cycles usually last 6-7 days. Reports menstrual flow during cycle is overall heavy  Zara Danielle does report cramping with menstrual cycle  Zara Danielle does not currently have concerns about her menstrual cycle. Zara Danielle reports a history of polycystic ovarian syndrome (PCOS). And her current treatment plan includes  expectant management  . Patient reports is  sexually active with 1 male partner(s). Patient denies  pain with sex. Reports is  protecting against a pregnancy. Current method(s) including using condoms 50% of the time  Patient is using a barrier method with sexual encounters   Patient denies a history of sexually transmitted infection(s)   If yes, including none  Patient does not want screening for sexually transmitted infection(s). Zara Danielle requesting testing for n/a    Reported previous OB history:   Zara Danielle .  Hx of postpartum preeclampsia     Pertinent Chronic Health Conditions/Medications: Yes  Hx of urinary incontinence- completed pelvic floor therapy

## 2023-11-13 LAB — CYTOLOGY REPORT: NORMAL

## 2023-11-29 ENCOUNTER — OFFICE VISIT (OUTPATIENT)
Dept: OBGYN CLINIC | Age: 23
End: 2023-11-29
Payer: COMMERCIAL

## 2023-11-29 ENCOUNTER — HOSPITAL ENCOUNTER (OUTPATIENT)
Age: 23
Setting detail: SPECIMEN
Discharge: HOME | End: 2023-11-29

## 2023-11-29 ENCOUNTER — TELEPHONE (OUTPATIENT)
Dept: OBGYN CLINIC | Age: 23
End: 2023-11-29

## 2023-11-29 VITALS
DIASTOLIC BLOOD PRESSURE: 60 MMHG | WEIGHT: 121 LBS | BODY MASS INDEX: 24.39 KG/M2 | HEIGHT: 59 IN | SYSTOLIC BLOOD PRESSURE: 110 MMHG

## 2023-11-29 DIAGNOSIS — Z87.59 HISTORY OF PRE-ECLAMPSIA: ICD-10-CM

## 2023-11-29 DIAGNOSIS — N92.6 MISSED MENSES: ICD-10-CM

## 2023-11-29 DIAGNOSIS — R11.2 NAUSEA AND VOMITING, UNSPECIFIED VOMITING TYPE: ICD-10-CM

## 2023-11-29 DIAGNOSIS — N92.6 MISSED MENSES: Primary | ICD-10-CM

## 2023-11-29 LAB
AMPHET UR QL SCN: NEGATIVE
BARBITURATES UR QL SCN: NEGATIVE
BENZODIAZ UR QL: NEGATIVE
CANNABINOIDS UR QL SCN: POSITIVE
COCAINE UR QL SCN: NEGATIVE
CONTROL: ABNORMAL
FENTANYL UR QL: NEGATIVE
METHADONE UR QL: NEGATIVE
OPIATES UR QL SCN: NEGATIVE
OXYCODONE UR QL SCN: NEGATIVE
PCP UR QL SCN: NEGATIVE
PREGNANCY TEST URINE, POC: POSITIVE
TEST INFORMATION: ABNORMAL

## 2023-11-29 PROCEDURE — G8427 DOCREV CUR MEDS BY ELIG CLIN: HCPCS | Performed by: ADVANCED PRACTICE MIDWIFE

## 2023-11-29 PROCEDURE — 81025 URINE PREGNANCY TEST: CPT | Performed by: ADVANCED PRACTICE MIDWIFE

## 2023-11-29 PROCEDURE — 1036F TOBACCO NON-USER: CPT | Performed by: ADVANCED PRACTICE MIDWIFE

## 2023-11-29 PROCEDURE — 99214 OFFICE O/P EST MOD 30 MIN: CPT | Performed by: ADVANCED PRACTICE MIDWIFE

## 2023-11-29 PROCEDURE — G8484 FLU IMMUNIZE NO ADMIN: HCPCS | Performed by: ADVANCED PRACTICE MIDWIFE

## 2023-11-29 PROCEDURE — G8420 CALC BMI NORM PARAMETERS: HCPCS | Performed by: ADVANCED PRACTICE MIDWIFE

## 2023-11-29 RX ORDER — ONDANSETRON 4 MG/1
4 TABLET, ORALLY DISINTEGRATING ORAL 3 TIMES DAILY PRN
Qty: 21 TABLET | Refills: 0 | Status: SHIPPED | OUTPATIENT
Start: 2023-11-29

## 2023-11-29 RX ORDER — CALCIUM CARBONATE 300MG(750)
1 TABLET,CHEWABLE ORAL DAILY
Qty: 30 TABLET | Refills: 11 | Status: SHIPPED | OUTPATIENT
Start: 2023-11-29

## 2023-11-29 ASSESSMENT — ANXIETY QUESTIONNAIRES
5. BEING SO RESTLESS THAT IT IS HARD TO SIT STILL: 1
GAD7 TOTAL SCORE: 2
1. FEELING NERVOUS, ANXIOUS, OR ON EDGE: 0
IF YOU CHECKED OFF ANY PROBLEMS ON THIS QUESTIONNAIRE, HOW DIFFICULT HAVE THESE PROBLEMS MADE IT FOR YOU TO DO YOUR WORK, TAKE CARE OF THINGS AT HOME, OR GET ALONG WITH OTHER PEOPLE: NOT DIFFICULT AT ALL
4. TROUBLE RELAXING: 0
7. FEELING AFRAID AS IF SOMETHING AWFUL MIGHT HAPPEN: 0
2. NOT BEING ABLE TO STOP OR CONTROL WORRYING: 0
3. WORRYING TOO MUCH ABOUT DIFFERENT THINGS: 0
6. BECOMING EASILY ANNOYED OR IRRITABLE: 1

## 2023-11-29 NOTE — TELEPHONE ENCOUNTER
Jr Blackwell calling reporting a positive pregnancy test.    Jr Blackwell is not a new patient. This is not Sammy Soja first pregnancy. ** Please complete if patient has previous birth history, please delete is not applicable**  Number of pregnancies: 3  Mode of delivery (vaginal//both) vaginal  If NEW patient please instruct patient will need prior OBGYN records. Jr Blackwell last menstrual cycle: 23  Based on LMP patient is 12w2d weeks     Dose patient have any concerns? [x]YES  []NO  If yes, please discuss concern with provider to determine if patient needs additional appointment. Hyperemesis      Scheduling Instructions and Education  []If patient less than 8 weeks please schedule missed menses (30 mins) between 6-8 weeks. ALSO scheduled USN w/ IPV (w/ NURSE) to follow 2-3 weeks after missed menses   Patient education: Initial missed menses appointment will consist of a pregnancy test and to establish care and review previous births **if applicable**. There will NOT be an ultrasound at this first visit. Please review that the USN and IPV visit will be 2-3 weeks after patient's missed menses. At this appointment dating ultrasound will be complete, prenatal labs ordered, prenatal education completed, pelvic exam if indicated   [x]If patient and GREATER than 8 weeks please schedule USN w/ missed menses (30 min) to follow and IPV (30 minutes) 2-4 weeks after (w/ NURSE)  Patient education: Dating USN will be completed prior to missed menses appointment. Missed menses appointment will be to establish care and past medical/surgical/obgyn history. Prenatal labs will be ordered.  IPV that is scheduled 3-4 weeks after missed menses will consist of prenatal education/exam/pelvic if indicated  []If patient if believed to be greater than 12 weeks, please consult provider prior to scheduling   Provider [facility] recommendations appt today    Please document appointments scheduled during phone

## 2023-11-29 NOTE — PROGRESS NOTES
[address] [facility],[address] OB/GYN [address]  [place] [address]  Dept: [phone]    Patient Name: Tyrel Solomon  Patient Age: 26 y.o. Date of Visit: 2023    SUBJECTIVE:    Chief Complaint:  Chief Complaint   Patient presents with    Amenorrhea       HPI:    Yesenia Schaeffer  is being seen today for missed menses. She reports a  positive pregnancy test on  took in October and negative. This  is not a planned pregnancy. She is  accepting at this time. LMP: Patient's last menstrual period was 2023 (exact date). Sure and definite: No    28 day cycle: No    She was not on a contraceptive at the time of conception.   Estimated weeks gestation today : ?  Tentative THERON: ?    Relationship with FOB: Dougie    Patient reports concerns: yes - nausea     OB History          2    Para   1    Term   1            AB        Living   1         SAB        IAB        Ectopic        Molar        Multiple   0    Live Births   1              Past Medical History:   Diagnosis Date    Mental disorder     depression    POTS (postural orthostatic tachycardia syndrome)      Current Outpatient Medications   Medication Sig Dispense Refill    ondansetron (ZOFRAN-ODT) 4 MG disintegrating tablet Take 1 tablet by mouth 3 times daily as needed for Nausea or Vomiting 21 tablet 0    Prenatal MV-Min-FA-Omega-3 (PRENATAL GUMMIES/DHA & FA) 0.4-32.5 MG CHEW Take 1 tablet by mouth daily 30 tablet 11    rizatriptan (MAXALT) 10 MG tablet Take 1 tablet by mouth once as needed for Migraine May repeat in 2 hours if needed 1 tablet 0    CORLANOR 5 MG TABS tablet TAKE 1 TABLET BY MOUTH TWICE DAILY (Patient not taking: Reported on 2023) 60 tablet 1    bisacodyl 5 MG EC tablet Take as directed for bowel prep (Patient not taking: Reported on 2023) 4 tablet 0    dicyclomine (BENTYL) 10 MG capsule Take 1 capsule by mouth 4 times daily as needed (Dyspepsia)
times (3)  [] 5-6 times (4)  [x] 7 or more times (5)    Total: 14 severe nausea and vomiting in pregnancy    Mild NVP: 6 or less  Moderate NVP: 7-12  Severe NVP: 13 or more  (Adapted from [address] [place] [address] [address])       Postpartum Depression: Not on file (8/20/2021)          No data to display              Interpretation of AKIRA-7 score: 5-9 = mild anxiety, 10-14 = moderate anxiety, 15+ = severe anxiety. Recommend referral to behavioral health for scores 10 or greater.

## 2023-11-30 LAB
CHLAMYDIA DNA UR QL NAA+PROBE: NEGATIVE
N GONORRHOEA DNA UR QL NAA+PROBE: NEGATIVE
SPECIMEN DESCRIPTION: NORMAL

## 2023-12-04 ENCOUNTER — TELEPHONE (OUTPATIENT)
Dept: OBGYN CLINIC | Age: 23
End: 2023-12-04

## 2023-12-04 NOTE — TELEPHONE ENCOUNTER
Pharmacy called and stated they can not get a pnv with dha in gummy or chewable- she can not get any dha in a chewable or gummy- wondering what they bare minimum is we are looking for or she can get the gummy pnv but will have to have pill form of dha

## 2023-12-05 NOTE — TELEPHONE ENCOUNTER
Please just update patient cannot get DHA in gummy form and as long as she is okay with that can proceed with gummy PNV only. Thanks.

## 2023-12-06 NOTE — TELEPHONE ENCOUNTER
Spoke with pharmacy and multivitamin PNV omega 3 soft gels with DHA    [id number] of folic come with this PNV wants to make sure provider is ok with this

## 2023-12-13 NOTE — PROGRESS NOTES
Education Checklist initiated and documented above. The patient was counseled on carrier screening by provider at prior visit. She declined CF/SMA/Fragile X testing. See scanned form. The patient has been counseled on the option for 1st trimester screen vs NIPs by provider at prior visit. She verbalized understanding and would like to proceed with: NIPT. See scanned form. The patient was informed that the providers only delivers at [facility] and is agreeable to delivery at [facility]. She denies tobacco use. The patient was counseled on the risks of tobacco abuse, both maternal and fetal. She was instructed to stop smoking if currently using tobacco. Morbidity, mortality, and cessation programs were reviewed. The risks include but are not limited to increased risks of  labor,  delivery, premature rupture of membranes, intrauterine growth restriction, intrauterine fetal demise and abruptio placenta. Secondary smoke risks were also reviewed. Increases in cancer, respiratory problems, and sudden infant death syndrome were reviewed as well. Swelling: yes - wearing compression socks  Bleeding: no  Discharge: no   Dysuria: no  Nausea: yes - encouraged protein, encouraged small frequent meals, and vitamin B6 and unisom if needed. Heartburn: no  Epigastric pain: yes - Tums     Diagnosis Orders   1. 8 weeks gestation of pregnancy              Office protocol reviewed, After hours number provided. Diet and exercise reviewed  Warning signs reviewed  Testing reviewed     Q&A  Discussed in detail referral/orders for  for 1st trimester screen vs NIPSinfo provided for screening  Discussed dates and orders for Anatomy USd and fetal echo if indicated. Breastfeeding education. She was counseled on Toxoplasmosis/Listeriosis (cats/raw meat). Prenatal Vitamins recommended. Prenatal labs and dating us ordered.        Covid:declines  Flu:declines    Baby asa after 12 weeks

## 2023-12-14 ENCOUNTER — HOSPITAL ENCOUNTER (OUTPATIENT)
Age: 23
Setting detail: SPECIMEN
Discharge: HOME | End: 2023-12-14

## 2023-12-14 ENCOUNTER — NURSE ONLY (OUTPATIENT)
Dept: OBGYN CLINIC | Age: 23
End: 2023-12-14
Payer: COMMERCIAL

## 2023-12-14 VITALS
WEIGHT: 120.6 LBS | BODY MASS INDEX: 24.36 KG/M2 | DIASTOLIC BLOOD PRESSURE: 56 MMHG | SYSTOLIC BLOOD PRESSURE: 98 MMHG | HEART RATE: 84 BPM

## 2023-12-14 DIAGNOSIS — Z3A.08 8 WEEKS GESTATION OF PREGNANCY: Primary | ICD-10-CM

## 2023-12-14 DIAGNOSIS — Z3A.08 8 WEEKS GESTATION OF PREGNANCY: ICD-10-CM

## 2023-12-14 PROCEDURE — 99211 OFF/OP EST MAY X REQ PHY/QHP: CPT | Performed by: ADVANCED PRACTICE MIDWIFE

## 2023-12-14 RX ORDER — ASPIRIN 81 MG/1
81 TABLET ORAL DAILY
Qty: 90 TABLET | Refills: 1 | Status: SHIPPED | OUTPATIENT
Start: 2023-12-14

## 2023-12-14 ASSESSMENT — ANXIETY QUESTIONNAIRES
6. BECOMING EASILY ANNOYED OR IRRITABLE: 2
7. FEELING AFRAID AS IF SOMETHING AWFUL MIGHT HAPPEN: 0
1. FEELING NERVOUS, ANXIOUS, OR ON EDGE: 0
2. NOT BEING ABLE TO STOP OR CONTROL WORRYING: 0
4. TROUBLE RELAXING: 0
3. WORRYING TOO MUCH ABOUT DIFFERENT THINGS: 0
GAD7 TOTAL SCORE: 3
5. BEING SO RESTLESS THAT IT IS HARD TO SIT STILL: 1

## 2023-12-15 LAB
MICROORGANISM SPEC CULT: NORMAL
SPECIMEN DESCRIPTION: NORMAL

## 2023-12-28 ENCOUNTER — HOSPITAL ENCOUNTER (OUTPATIENT)
Age: 23
Setting detail: SPECIMEN
Discharge: HOME | End: 2023-12-28

## 2023-12-28 DIAGNOSIS — Z3A.08 8 WEEKS GESTATION OF PREGNANCY: ICD-10-CM

## 2023-12-28 LAB
ABO + RH BLD: NORMAL
ALBUMIN SERPL-MCNC: 4.4 G/DL (ref 3.5–5.2)
ALBUMIN/GLOB SERPL: 2 {RATIO} (ref 1–2.5)
ALP SERPL-CCNC: 67 U/L (ref 35–104)
ALT SERPL-CCNC: 10 U/L (ref 5–33)
ANION GAP SERPL CALCULATED.3IONS-SCNC: 14 MMOL/L (ref 9–17)
AST SERPL-CCNC: 14 U/L
BILIRUB SERPL-MCNC: 0.2 MG/DL (ref 0.3–1.2)
BLOOD GROUP ANTIBODIES SERPL: NEGATIVE
BUN SERPL-MCNC: 10 MG/DL (ref 6–20)
CALCIUM SERPL-MCNC: 9 MG/DL (ref 8.6–10.4)
CHLORIDE SERPL-SCNC: 100 MMOL/L (ref 98–107)
CO2 SERPL-SCNC: 23 MMOL/L (ref 20–31)
CREAT SERPL-MCNC: 0.4 MG/DL (ref 0.5–0.9)
GFR SERPL CREATININE-BSD FRML MDRD: >60 ML/MIN/1.73M2
GLUCOSE SERPL-MCNC: 66 MG/DL (ref 70–99)
HCV AB SERPL QL IA: NONREACTIVE
HIV 1+2 AB+HIV1 P24 AG SERPL QL IA: NONREACTIVE
POTASSIUM SERPL-SCNC: 4.1 MMOL/L (ref 3.7–5.3)
PROT SERPL-MCNC: 6.6 G/DL (ref 6.4–8.3)
SODIUM SERPL-SCNC: 137 MMOL/L (ref 135–144)

## 2023-12-29 LAB
BASOPHILS # BLD: 0.06 K/UL (ref 0–0.2)
BASOPHILS NFR BLD: 1 % (ref 0–2)
CHLAMYDIA DNA UR QL NAA+PROBE: NEGATIVE
EOSINOPHIL # BLD: 0.13 K/UL (ref 0–0.44)
EOSINOPHILS RELATIVE PERCENT: 1 % (ref 1–4)
ERYTHROCYTE [DISTWIDTH] IN BLOOD BY AUTOMATED COUNT: 13.9 % (ref 11.8–14.4)
HBV SURFACE AG SERPL QL IA: NONREACTIVE
HCT VFR BLD AUTO: 40.3 % (ref 36.3–47.1)
HGB BLD-MCNC: 12.6 G/DL (ref 11.9–15.1)
IMM GRANULOCYTES # BLD AUTO: 0.06 K/UL (ref 0–0.3)
IMM GRANULOCYTES NFR BLD: 1 %
LYMPHOCYTES NFR BLD: 2.15 K/UL (ref 1.1–3.7)
LYMPHOCYTES RELATIVE PERCENT: 24 % (ref 24–43)
MCH RBC QN AUTO: 29.6 PG (ref 25.2–33.5)
MCHC RBC AUTO-ENTMCNC: 31.3 G/DL (ref 28.4–34.8)
MCV RBC AUTO: 94.8 FL (ref 82.6–102.9)
MONOCYTES NFR BLD: 0.56 K/UL (ref 0.1–1.2)
MONOCYTES NFR BLD: 6 % (ref 3–12)
N GONORRHOEA DNA UR QL NAA+PROBE: NEGATIVE
NEUTROPHILS NFR BLD: 67 % (ref 36–65)
NEUTS SEG NFR BLD: 6.03 K/UL (ref 1.5–8.1)
NRBC BLD-RTO: 0 PER 100 WBC
PLATELET # BLD AUTO: 307 K/UL (ref 138–453)
PMV BLD AUTO: 8.9 FL (ref 8.1–13.5)
RBC # BLD AUTO: 4.25 M/UL (ref 3.95–5.11)
RUBV IGG SERPL QL IA: 131.4 IU/ML
SPECIMEN DESCRIPTION: NORMAL
T PALLIDUM AB SER QL IA: NONREACTIVE
VZV IGG SER QL IA: 0.63
WBC OTHER # BLD: 9 K/UL (ref 3.5–11.3)

## 2023-12-31 LAB
CREAT UR-MCNC: 62.6 MG/DL (ref 28–217)
TOTAL PROTEIN, URINE: 5 MG/DL
URINE TOTAL PROTEIN CREATININE RATIO: 0.08 (ref 0–0.2)

## 2024-01-04 NOTE — FLOWSHEET NOTE
No care due was identified.  [facility] Embedded Care Due Messages. Reference number: [id number].   1/04/2024 3:32:57 AM CST   Patient presents to L&D after being seen in ED for N/V/D. She states she does not feel nauseated anymore and is feeling better. She denies any ctx, vaginal bleeding, LOF. States positive fetal movement.  Dr. Estiven Trivedi notified of patient arrival.

## 2024-01-10 ENCOUNTER — ROUTINE PRENATAL (OUTPATIENT)
Dept: OBGYN CLINIC | Age: 24
End: 2024-01-10
Payer: COMMERCIAL

## 2024-01-10 VITALS — SYSTOLIC BLOOD PRESSURE: 112 MMHG | WEIGHT: 127 LBS | DIASTOLIC BLOOD PRESSURE: 58 MMHG | BODY MASS INDEX: 25.65 KG/M2

## 2024-01-10 DIAGNOSIS — Z3A.11 11 WEEKS GESTATION OF PREGNANCY: ICD-10-CM

## 2024-01-10 DIAGNOSIS — Z34.91 NORMAL PREGNANCY IN FIRST TRIMESTER: Primary | ICD-10-CM

## 2024-01-10 DIAGNOSIS — Z87.59 HISTORY OF PRE-ECLAMPSIA: ICD-10-CM

## 2024-01-10 PROCEDURE — 1036F TOBACCO NON-USER: CPT | Performed by: NURSE PRACTITIONER

## 2024-01-10 PROCEDURE — G8427 DOCREV CUR MEDS BY ELIG CLIN: HCPCS | Performed by: NURSE PRACTITIONER

## 2024-01-10 PROCEDURE — G8484 FLU IMMUNIZE NO ADMIN: HCPCS | Performed by: NURSE PRACTITIONER

## 2024-01-10 PROCEDURE — 99213 OFFICE O/P EST LOW 20 MIN: CPT | Performed by: NURSE PRACTITIONER

## 2024-01-10 PROCEDURE — G8417 CALC BMI ABV UP PARAM F/U: HCPCS | Performed by: NURSE PRACTITIONER

## 2024-01-10 NOTE — PROGRESS NOTES
IPV  - baby asa after 12 weeks        Return 4 WEEKS        PTL signs reviewed, if indicated  Kick Count Instructions given if indicated:  The patient was instructed on fetal kick counts and was given a kick sheet to complete every 8 hours. This is to begin at 28 weeks gestation. She was instructed that the baby should move at a minimum of ten times within one hour after a meal. The patient was instructed to lay down on her left side twenty minutes after eating and count movements for up to one hour with a target value of ten movements.   She was instructed to notify the office if she did not make that target after two attempts or if after any attempt there was less than four movements.  After hour numbers reviewed , along with labor signs if indicated.   Contractions/cramping between 5-7 minutes and persisting even with attempts of increased water and laying on side.   Fluid leakage, bleeding    The patient was counseled on Labor & Delivery.   Route of delivery and counseling on vaginal, operative vaginal, and  sections were completed with the risks of each to both the patient as well as her baby. The possibility of a blood transfusion was discussed as well. The patient was not opposed to receiving a transfusion if needed. The patient was counseled on types of analgesia during labor and is considering either Regional or IV medication the risks, benefits and alternatives were discussed.   The patient was counseled on the mandatory call ahead policy. She has been instructed to call the office at anytime prior to going into the hospital so the on-call physician may direct her to the appropriate facility for care. Exceptions were reviewed including but not limited to: Decreased fetal movement, vaginal Bleeding or hemorrhage, trauma, readily expectant delivery, or any instance where she feels 911 should be utilized.      Of the 20 minute duration appointment visit, Daphne Jacobs CNP spent at least 50% of

## 2024-01-17 DIAGNOSIS — R11.2 NAUSEA AND VOMITING, UNSPECIFIED VOMITING TYPE: ICD-10-CM

## 2024-01-17 RX ORDER — ONDANSETRON 4 MG/1
TABLET, ORALLY DISINTEGRATING ORAL
Qty: 21 TABLET | Refills: 0 | Status: SHIPPED | OUTPATIENT
Start: 2024-01-17

## 2024-01-17 NOTE — TELEPHONE ENCOUNTER
Medication Request/Refill Telephone Documentation:  Medication Requested: zofran   Provider last filled by: ariel  Last visit: 1/10/24  Last annual/pap smear: 11/8/23    (    Nausea and vomiting medication request:   Is patient a new patient? No  If yes, please schedule appointment   If no, has patient had missed menses apt? Yes  If yes, follow to next question  If no, please send to Jillian for scheduling and follow to next question  2. How far along is patient? 12w6d    If patient is less than 20 weeks please go to question number 3   recommendations by end of office day  3. Patients PUQE score: 13 severe  Tylor Lakhani is  taking any antiemetic medication   If yes what medication: zofran    On average in a day, for how long do you feel nauseated or sick to your stomach?  [] Not at all (1)  [] 1 hr or less (2)  [] 2-3 hrs (3)  [x] 4-6 hrs (4)  [] More than 6 hrs (5)  On average in a day, how many times do you vomit or throw up?  [] 7 or more times (5)  [x] 5-6 times (4)  [] 3-4 times (3)  [] 1-2 times (2)  [] I did not throw up (1)  On average in a day, how many times do you have retching or dry heaves without bringing anything up?  [] None (1)  [] 1-2 times (2)  [] 3-4 times (3)  [] 5-6 times (4)  [x] 7 or more times (5)    Total: 13 severe nausea and vomiting in pregnancy    Mild NVP: 6 or less  Moderate NVP: 7-12  Severe NVP: 13 or more  (Adapted from ACOG Practice Bulletin 189)

## 2024-02-07 ENCOUNTER — ROUTINE PRENATAL (OUTPATIENT)
Dept: OBGYN CLINIC | Age: 24
End: 2024-02-07
Payer: COMMERCIAL

## 2024-02-07 VITALS — SYSTOLIC BLOOD PRESSURE: 110 MMHG | DIASTOLIC BLOOD PRESSURE: 62 MMHG | BODY MASS INDEX: 25.25 KG/M2 | WEIGHT: 125 LBS

## 2024-02-07 DIAGNOSIS — J45.909 MILD ASTHMA, UNSPECIFIED WHETHER COMPLICATED, UNSPECIFIED WHETHER PERSISTENT: ICD-10-CM

## 2024-02-07 DIAGNOSIS — Z87.59 HISTORY OF PRE-ECLAMPSIA: ICD-10-CM

## 2024-02-07 DIAGNOSIS — Z3A.15 15 WEEKS GESTATION OF PREGNANCY: Primary | ICD-10-CM

## 2024-02-07 DIAGNOSIS — Z23 FLU VACCINE NEED: ICD-10-CM

## 2024-02-07 DIAGNOSIS — O09.93 HIGH-RISK PREGNANCY IN THIRD TRIMESTER: ICD-10-CM

## 2024-02-07 PROBLEM — R19.7 DIARRHEA: Status: RESOLVED | Noted: 2023-08-24 | Resolved: 2024-02-07

## 2024-02-07 PROBLEM — O35.BXX0 ATRIAL SEPTAL DEFECT OF FETUS AFFECTING ANTEPARTUM CARE OF MOTHER: Status: RESOLVED | Noted: 2021-07-08 | Resolved: 2024-02-07

## 2024-02-07 PROBLEM — R11.2 NAUSEA AND VOMITING: Status: RESOLVED | Noted: 2022-12-20 | Resolved: 2024-02-07

## 2024-02-07 PROCEDURE — 90674 CCIIV4 VAC NO PRSV 0.5 ML IM: CPT | Performed by: STUDENT IN AN ORGANIZED HEALTH CARE EDUCATION/TRAINING PROGRAM

## 2024-02-07 PROCEDURE — 99212 OFFICE O/P EST SF 10 MIN: CPT | Performed by: STUDENT IN AN ORGANIZED HEALTH CARE EDUCATION/TRAINING PROGRAM

## 2024-02-07 RX ORDER — DIPHENHYDRAMINE HYDROCHLORIDE 25 MG/1
25 CAPSULE ORAL 4 TIMES DAILY
Qty: 90 TABLET | Refills: 0 | Status: SHIPPED | OUTPATIENT
Start: 2024-02-07

## 2024-02-07 NOTE — PROGRESS NOTES
Prenatal Visit    Tylor Lakhani is a 23 y.o. female  at 15w6d    Subjective:  The patient was seen and evaluated. Ongoing nausea. Using zofran. Will add B6 and emily capsules. She reports Negative fetal movements. She denies contractions, vaginal bleeding and leakage of fluid. Signs and symptoms of  labor as well as labor were reviewed. Dates were reviewed with the patient. Estimated Date of Delivery: 24.         Discussed resident involvement in triage and labor and delivery.  Discussed call group.  Patient verbalized understanding.    Vitals:  /62   Wt 56.7 kg (125 lb)   LMP 10/19/2023   BMI 25.25 kg/m²       Assessment & Plan:  Tylor Lakhani is a 23 y.o. female  at 15w6d   - Initial prenatal labs were reviewed   - An 18-22 week anatomy ultrasound has been ordered   - First trimester screening and MSAFP Single Marker/NIPT: NIPT low risk invitae   - Carrier screening declined   - Influenza and Covid vaccinations recommended per ACOG: R/B/A discussed with increased risk of both maternal and fetal morbidity and mortality in unvaccinated pregnant patients.    - Flu vaccine: Patient accepted   - Aspirin indicated yes    - Continue prenatal vitamin    Hx of PreE  - cmp/cbc/p/c ratio need ordered at IPV  - baby asa after 12 weeks   NVP  - zofran 2023 per pt request   -sent B6 on 2024   Asthma  Varicella NonImmune  -Varicella vaccine to be offered postpartum    The problem list reflects the active issues addressed during today's visit    Patient Active Problem List    Diagnosis Date Noted    POTS (postural orthostatic tachycardia syndrome) 2023     Priority: Medium    Gastritis without bleeding 2022     Priority: Medium    Mild depression 2022     Priority: Medium    History of pre-eclampsia 2021     Diagnosed PPD1 elevated BP/elevated pc ratio       22 M 6#8 Apg /9     Family history of autism 2021    FHx Chiari malformation type I

## 2024-02-12 DIAGNOSIS — R11.2 NAUSEA AND VOMITING, UNSPECIFIED VOMITING TYPE: ICD-10-CM

## 2024-02-12 RX ORDER — ONDANSETRON 4 MG/1
TABLET, ORALLY DISINTEGRATING ORAL
Qty: 21 TABLET | Refills: 0 | Status: SHIPPED | OUTPATIENT
Start: 2024-02-12

## 2024-02-12 NOTE — TELEPHONE ENCOUNTER
Medication Request/Refill Telephone Documentation:  Medication Requested: ZOFRAN   Provider last filled by: JOSLYN  Last visit: 2/7/24  NEXT APPT 3/6/24        Nausea and vomiting medication request:   Is patient a new patient? No  If yes, please schedule appointment   If no, has patient had missed menses apt? Yes  If yes, follow to next question  If no, please send to Jillian for scheduling and follow to next question  2. How far along is patient? 16W4D    If patient is less than 20 weeks please go to question number 3   3. Patients PUQE score: 12 moderate  4. Has patient tried unisom and vitamin b6? Yes    Modified Pregnancy-Unique Quantification of Emesis and Nausea    Tylor Lakhani is  taking any antiemetic medication   If yes what medication: ZOFRAN    On average in a day, for how long do you feel nauseated or sick to your stomach?  [] Not at all (1)  [] 1 hr or less (2)  [] 2-3 hrs (3)  [] 4-6 hrs (4)  [x] More than 6 hrs (5)  On average in a day, how many times do you vomit or throw up?  [] 7 or more times (5)  [x] 5-6 times (4)  [] 3-4 times (3)  [] 1-2 times (2)  [] I did not throw up (1)  On average in a day, how many times do you have retching or dry heaves without bringing anything up?  [] None (1)  [] 1-2 times (2)  [x] 3-4 times (3)  [] 5-6 times (4)  [] 7 or more times (5)    Total: 12 moderate nausea and vomiting in pregnancy    Mild NVP: 6 or less  Moderate NVP: 7-12  Severe NVP: 13 or more  (Adapted from ACOG Practice Bulletin 189)

## 2024-03-06 ENCOUNTER — ROUTINE PRENATAL (OUTPATIENT)
Dept: OBGYN CLINIC | Age: 24
End: 2024-03-06
Payer: COMMERCIAL

## 2024-03-06 ENCOUNTER — ROUTINE PRENATAL (OUTPATIENT)
Dept: PERINATAL CARE | Age: 24
End: 2024-03-06
Payer: COMMERCIAL

## 2024-03-06 VITALS
RESPIRATION RATE: 16 BRPM | HEART RATE: 76 BPM | BODY MASS INDEX: 26.61 KG/M2 | TEMPERATURE: 98 F | DIASTOLIC BLOOD PRESSURE: 56 MMHG | WEIGHT: 132 LBS | SYSTOLIC BLOOD PRESSURE: 101 MMHG | HEIGHT: 59 IN

## 2024-03-06 VITALS — DIASTOLIC BLOOD PRESSURE: 60 MMHG | SYSTOLIC BLOOD PRESSURE: 102 MMHG | BODY MASS INDEX: 26.26 KG/M2 | WEIGHT: 130 LBS

## 2024-03-06 DIAGNOSIS — Z3A.19 19 WEEKS GESTATION OF PREGNANCY: ICD-10-CM

## 2024-03-06 DIAGNOSIS — Z34.92 NORMAL PREGNANCY IN SECOND TRIMESTER: Primary | ICD-10-CM

## 2024-03-06 DIAGNOSIS — O99.512 ASTHMA AFFECTING PREGNANCY IN SECOND TRIMESTER: ICD-10-CM

## 2024-03-06 DIAGNOSIS — J45.909 MILD ASTHMA, UNSPECIFIED WHETHER COMPLICATED, UNSPECIFIED WHETHER PERSISTENT: ICD-10-CM

## 2024-03-06 DIAGNOSIS — Z36.86 ENCOUNTER FOR SCREENING FOR RISK OF PRE-TERM LABOR: ICD-10-CM

## 2024-03-06 DIAGNOSIS — Z87.59 HISTORY OF PRE-ECLAMPSIA: ICD-10-CM

## 2024-03-06 DIAGNOSIS — J45.909 ASTHMA AFFECTING PREGNANCY IN SECOND TRIMESTER: ICD-10-CM

## 2024-03-06 DIAGNOSIS — O09.299 CURRENT SINGLETON PREGNANCY WITH HISTORY OF CONGENITAL HEART DISEASE IN PRIOR CHILD, ANTEPARTUM: ICD-10-CM

## 2024-03-06 DIAGNOSIS — O99.412 MATERNAL CARDIOVASCULAR DISEASE AFFECTING PREGNANCY IN SECOND TRIMESTER: Primary | ICD-10-CM

## 2024-03-06 DIAGNOSIS — O09.292 HISTORY OF PRE-ECLAMPSIA IN PRIOR PREGNANCY, CURRENTLY PREGNANT IN SECOND TRIMESTER: ICD-10-CM

## 2024-03-06 DIAGNOSIS — O44.40 LOW IMPLANTATION OF PLACENTA WITHOUT HEMORRHAGE: ICD-10-CM

## 2024-03-06 PROCEDURE — G8427 DOCREV CUR MEDS BY ELIG CLIN: HCPCS | Performed by: NURSE PRACTITIONER

## 2024-03-06 PROCEDURE — 1036F TOBACCO NON-USER: CPT | Performed by: NURSE PRACTITIONER

## 2024-03-06 PROCEDURE — G8417 CALC BMI ABV UP PARAM F/U: HCPCS | Performed by: OBSTETRICS & GYNECOLOGY

## 2024-03-06 PROCEDURE — G8482 FLU IMMUNIZE ORDER/ADMIN: HCPCS | Performed by: NURSE PRACTITIONER

## 2024-03-06 PROCEDURE — G8427 DOCREV CUR MEDS BY ELIG CLIN: HCPCS | Performed by: OBSTETRICS & GYNECOLOGY

## 2024-03-06 PROCEDURE — G8417 CALC BMI ABV UP PARAM F/U: HCPCS | Performed by: NURSE PRACTITIONER

## 2024-03-06 PROCEDURE — G8482 FLU IMMUNIZE ORDER/ADMIN: HCPCS | Performed by: OBSTETRICS & GYNECOLOGY

## 2024-03-06 PROCEDURE — 99213 OFFICE O/P EST LOW 20 MIN: CPT | Performed by: NURSE PRACTITIONER

## 2024-03-06 PROCEDURE — 99244 OFF/OP CNSLTJ NEW/EST MOD 40: CPT | Performed by: OBSTETRICS & GYNECOLOGY

## 2024-03-06 PROCEDURE — 76817 TRANSVAGINAL US OBSTETRIC: CPT | Performed by: OBSTETRICS & GYNECOLOGY

## 2024-03-06 PROCEDURE — 76811 OB US DETAILED SNGL FETUS: CPT | Performed by: OBSTETRICS & GYNECOLOGY

## 2024-03-06 RX ORDER — PNV NO.52/IRON/FA/OMEGA-3/DHA 29-1-200MG
COMBINATION PACKAGE (EA) ORAL
COMMUNITY
Start: 2024-02-12

## 2024-03-06 NOTE — PROGRESS NOTES
8/24/2023    COLONOSCOPY WITH BIOPSY performed by Alexis Paredes MD at [facility] OR    COLONOSCOPY W/ BIOPSIES  08/24/2023    COLONOSCOPY WITH BIOPSY    ESOPHAGOGASTRODUODENOSCOPY  12/20/2022    EGD BIOPSY    INTRAUTERINE DEVICE REMOVAL  11/18/2021    dislodged IUD    NOSE SURGERY  03/2023    UPPER GASTROINTESTINAL ENDOSCOPY N/A 12/20/2022    EGD BIOPSY performed by Alexis Paredes MD at [facility] OR     Headaches: hx migraines feels have improved with pregnancy   Swelling: no  Bleeding: no  Discharge: no   Dysuria: no  Nausea: improved  Heartburn: no  Epigastric pain: no  Contractions: no  Leakage of fluid: no  + fetal movement     1. Normal pregnancy in second trimester 19 weeks gestation of pregnancy  Varicella NonImmune  -Varicella vaccine to be offered postpartum  NIPs Neg  Anatomy US scheduled this afternoon   2. 19 weeks gestation of pregnancy      3. Mild asthma, unspecified whether complicated, unspecified whether persistent  Stable     4. History of pre-eclampsia  - cmp/cbc/p/c ratio need ordered at IPV  - baby asa after 12 weeks        Return 4 WEEKS        PTL signs reviewed, if indicated  Kick Count Instructions given if indicated:  The patient was instructed on fetal kick counts and was given a kick sheet to complete every 8 hours. This is to begin at 28 weeks gestation. She was instructed that the baby should move at a minimum of ten times within one hour after a meal. The patient was instructed to lay down on her left side twenty minutes after eating and count movements for up to one hour with a target value of ten movements.   She was instructed to notify the office if she did not make that target after two attempts or if after any attempt there was less than four movements.  After hour numbers reviewed , along with labor signs if indicated.   Contractions/cramping between 5-7 minutes and persisting even with attempts of increased water and laying on side.   Fluid leakage, bleeding    The

## 2024-03-07 ENCOUNTER — HOSPITAL ENCOUNTER (OUTPATIENT)
Age: 24
Discharge: HOME | End: 2024-03-07
Payer: COMMERCIAL

## 2024-03-07 PROCEDURE — 36415 COLL VENOUS BLD VENIPUNCTURE: CPT

## 2024-03-07 PROCEDURE — 82105 ALPHA-FETOPROTEIN SERUM: CPT

## 2024-03-08 LAB
SEND OUT REPORT: NORMAL
TEST NAME: NORMAL

## 2024-03-10 LAB
AFP INTERPRETATION: NORMAL
AFP MOM: 1.06
AFP SPECIMEN: NORMAL
AFP: 69 NG/ML
DATE OF BIRTH: NORMAL
DATING METHOD: NORMAL
DETERMINED BY: NORMAL
DIABETIC: NEGATIVE
DONOR EGG?: NORMAL
DUE DATE: NORMAL
ESTIMATED DUE DATE: NORMAL
FAMILY HISTORY NTD: NEGATIVE
GESTATIONAL AGE: NORMAL
IN VITRO FERTILIZATION: NORMAL
INSULIN REQ DIABETES: NO
LAST MENSTRUAL PERIOD: NORMAL
MATERNAL AGE AT EDD: 23.3 YR
MATERNAL WEIGHT: 132
MONOCHORIONIC TWINS: NORMAL
NUMBER OF FETUSES: NORMAL
PATIENT WEIGHT UNITS: NORMAL
PATIENT WEIGHT: NORMAL
RACE (MATERNAL): NORMAL
RACE: NORMAL
REPEAT SPECIMEN?: NORMAL
SMOKING: NORMAL
SMOKING: NORMAL
VALPROIC/CARBAMAZEP: NORMAL
ZZ NTE CLEAN UP: HISTORY: NO

## 2024-03-19 ENCOUNTER — TELEPHONE (OUTPATIENT)
Dept: PRIMARY CARE CLINIC | Age: 24
End: 2024-03-19

## 2024-03-19 ENCOUNTER — HOSPITAL ENCOUNTER (OUTPATIENT)
Age: 24
Discharge: HOME | End: 2024-03-19

## 2024-03-19 ENCOUNTER — OFFICE VISIT (OUTPATIENT)
Dept: FAMILY MEDICINE CLINIC | Age: 24
End: 2024-03-19
Payer: COMMERCIAL

## 2024-03-19 ENCOUNTER — TELEPHONE (OUTPATIENT)
Dept: OBGYN CLINIC | Age: 24
End: 2024-03-19

## 2024-03-19 VITALS
DIASTOLIC BLOOD PRESSURE: 62 MMHG | TEMPERATURE: 97 F | OXYGEN SATURATION: 98 % | HEART RATE: 68 BPM | SYSTOLIC BLOOD PRESSURE: 102 MMHG

## 2024-03-19 DIAGNOSIS — L29.9 ITCHING: ICD-10-CM

## 2024-03-19 DIAGNOSIS — R19.5 STOOL CLAY COLOR: Primary | ICD-10-CM

## 2024-03-19 PROCEDURE — 1036F TOBACCO NON-USER: CPT | Performed by: NURSE PRACTITIONER

## 2024-03-19 PROCEDURE — G8427 DOCREV CUR MEDS BY ELIG CLIN: HCPCS | Performed by: NURSE PRACTITIONER

## 2024-03-19 PROCEDURE — G8482 FLU IMMUNIZE ORDER/ADMIN: HCPCS | Performed by: NURSE PRACTITIONER

## 2024-03-19 PROCEDURE — G8417 CALC BMI ABV UP PARAM F/U: HCPCS | Performed by: NURSE PRACTITIONER

## 2024-03-19 PROCEDURE — 99213 OFFICE O/P EST LOW 20 MIN: CPT | Performed by: NURSE PRACTITIONER

## 2024-03-19 ASSESSMENT — ENCOUNTER SYMPTOMS
ABDOMINAL PAIN: 0
TROUBLE SWALLOWING: 0
WHEEZING: 0
COUGH: 0
RHINORRHEA: 0
SHORTNESS OF BREATH: 0
VOMITING: 0
NAUSEA: 0
ROS SKIN COMMENTS: ITCHING
SORE THROAT: 0

## 2024-03-19 NOTE — TELEPHONE ENCOUNTER
Pt called stating one of the labs that was ordered requires her to fast for 8-10 hours. Pt reports between being pregnant and her work schedule she is not able to fast that long.  Please advise

## 2024-03-19 NOTE — TELEPHONE ENCOUNTER
Patient notified and verbalized understanding. Patient states she might stop in today for walk-in clinic, writer informed patient of walk-in clinic hours.

## 2024-03-19 NOTE — PROGRESS NOTES
[facility]  [address]  [address]  [place] [address]    Tylor Lakhani is a 23 y.o. female who presents today for her medical conditions/complaints of Abdominal Pain (Stool is a grey color, went 3-4 times today, stomach was cramping last night)          HPI:     /62   Pulse 68   Temp 97 °F (36.1 °C)   LMP 10/19/2023   SpO2 98%       Pt reports she has a long standing history of bowel issues since she had her first child. However in the past two days she has noted a discoloring of her stool. Today they were a gray-silvia sherman color. She also reports significant pruritus of her palms and feet that itching does not relieve. The pruritus worsens at night time. She has never experienced itching as profound as this in the past.     Past Medical History:   Diagnosis Date    Mental disorder     depression    POTS (postural orthostatic tachycardia syndrome)         Past Surgical History:   Procedure Laterality Date    COLONOSCOPY N/A 2023    COLONOSCOPY WITH BIOPSY performed by Alexis Paredes MD at [facility] OR    COLONOSCOPY W/ BIOPSIES  2023    COLONOSCOPY WITH BIOPSY    ESOPHAGOGASTRODUODENOSCOPY  2022    EGD BIOPSY    INTRAUTERINE DEVICE REMOVAL  2021    dislodged IUD    NOSE SURGERY  2023    UPPER GASTROINTESTINAL ENDOSCOPY N/A 2022    EGD BIOPSY performed by Alexis Paredes MD at [facility] OR       History reviewed. No pertinent family history.    Social History     Tobacco Use    Smoking status: Never    Smokeless tobacco: Never   Substance Use Topics    Alcohol use: Not Currently        Prior to Visit Medications    Medication Sig Taking? Authorizing Provider   Mxwcbq-IeVhi-BoHfw-FA-CA-Omega (COMPLETE  DHA) 29-1-200 & 200 MG MISC TAKE 1 TABLET BY MOUTH DAILY AND TAKE 1 CAPSULE BY MOUTH ONCE DAILY Yes Provider, MD [place]   ondansetron (ZOFRAN-ODT) 4 MG disintegrating tablet DISSOLVE 1 TABLET ON THE TONGUE THREE TIMES DAILY

## 2024-03-19 NOTE — TELEPHONE ENCOUNTER
Pt called and stated feces chalky gray, no other symptoms. Pt staying hydrated. Hasn't taken any new medications or eaten anything different.    Pt advised to call PCP.  
32-year-old male with history of sickle cell anemia presenting with recurrent pain after being discharged earlier this morning from the ER after being treated with several rounds of pain medication.  Previous labs and imaging reviewed from his ER stay earlier this morning.  No concern for acute chest syndrome.  He is well-appearing overall with reassuring vital signs.  Given that he failed outpatient management of this sickle cell crisis, will plan for admission for further pain control.  I discussed with Dr. Monaco from the internal medicine team who is agreeable to admission.  Will send blood cultures given his reports of fevers.  Will continue treating his pain and treat with another IV fluid bolus.

## 2024-03-19 NOTE — TELEPHONE ENCOUNTER
Patient calling into the office, states that since yesterday she has had Radiating abdominal right upper and lower pain. States that her stool is gray and chalky in color today. I do not see an appointment available until 4/10/24. Patient is asking if PCP would want to order her labs to be completed. Please advise    Last Visit Date: 2/20/2023   Next Visit Date: Visit date not found

## 2024-03-19 NOTE — TELEPHONE ENCOUNTER
Patient states she has to work at 5 am and this is not going to work with her  and the closest hospital is 30 minutes away. Writer asked patient if she would like writer to route the provider another message. Patient states \"I don't know, I'll get them when I get them I guess.\"

## 2024-03-21 ENCOUNTER — HOSPITAL ENCOUNTER (OUTPATIENT)
Age: 24
Setting detail: SPECIMEN
Discharge: HOME | End: 2024-03-21

## 2024-03-21 DIAGNOSIS — L29.9 ITCHING: ICD-10-CM

## 2024-03-21 LAB
ALBUMIN SERPL-MCNC: 4 G/DL (ref 3.5–5.2)
ALBUMIN/GLOB SERPL: 1 {RATIO} (ref 1–2.5)
ALP SERPL-CCNC: 60 U/L (ref 35–104)
ALT SERPL-CCNC: 11 U/L (ref 10–35)
ANION GAP SERPL CALCULATED.3IONS-SCNC: 12 MMOL/L (ref 9–16)
AST SERPL-CCNC: 18 U/L (ref 10–35)
BILIRUB SERPL-MCNC: 0.3 MG/DL (ref 0–1.2)
BUN SERPL-MCNC: 10 MG/DL (ref 6–20)
CALCIUM SERPL-MCNC: 8.9 MG/DL (ref 8.6–10.4)
CHLORIDE SERPL-SCNC: 104 MMOL/L (ref 98–107)
CO2 SERPL-SCNC: 21 MMOL/L (ref 20–31)
CREAT SERPL-MCNC: 0.5 MG/DL (ref 0.5–0.9)
GFR SERPL CREATININE-BSD FRML MDRD: >60 ML/MIN/1.73M2
GLUCOSE SERPL-MCNC: 73 MG/DL (ref 74–99)
POTASSIUM SERPL-SCNC: 3.5 MMOL/L (ref 3.7–5.3)
PROT SERPL-MCNC: 6.8 G/DL (ref 6.6–8.7)
SODIUM SERPL-SCNC: 137 MMOL/L (ref 136–145)

## 2024-03-23 LAB — BILE AC SERPL-SCNC: 2 UMOL/L (ref 0–10)

## 2024-04-03 ENCOUNTER — TELEPHONE (OUTPATIENT)
Dept: GASTROENTEROLOGY | Age: 24
End: 2024-04-03

## 2024-04-03 NOTE — TELEPHONE ENCOUNTER
Last seen 08/2023/REGINALDO Paredes/Stool sherman color/Combined abdominal and pelvic pain/Alternating constipation and diarrhea/[place]   Pt canc appt on 4/8/2024. Pt will call back to [facility].

## 2024-04-04 ENCOUNTER — ROUTINE PRENATAL (OUTPATIENT)
Dept: OBGYN CLINIC | Age: 24
End: 2024-04-04
Payer: COMMERCIAL

## 2024-04-04 VITALS
BODY MASS INDEX: 26.21 KG/M2 | SYSTOLIC BLOOD PRESSURE: 128 MMHG | WEIGHT: 130 LBS | HEIGHT: 59 IN | DIASTOLIC BLOOD PRESSURE: 64 MMHG

## 2024-04-04 DIAGNOSIS — Z3A.24 24 WEEKS GESTATION OF PREGNANCY: ICD-10-CM

## 2024-04-04 DIAGNOSIS — Z34.92 NORMAL PREGNANCY IN SECOND TRIMESTER: Primary | ICD-10-CM

## 2024-04-04 DIAGNOSIS — Z87.59 HISTORY OF PRE-ECLAMPSIA: ICD-10-CM

## 2024-04-04 DIAGNOSIS — O26.899 RH NEGATIVE STATE IN ANTEPARTUM PERIOD: ICD-10-CM

## 2024-04-04 DIAGNOSIS — O44.42 LOW-LYING PLACENTA IN SECOND TRIMESTER: ICD-10-CM

## 2024-04-04 DIAGNOSIS — Z67.91 RH NEGATIVE STATE IN ANTEPARTUM PERIOD: ICD-10-CM

## 2024-04-04 PROCEDURE — G8417 CALC BMI ABV UP PARAM F/U: HCPCS | Performed by: ADVANCED PRACTICE MIDWIFE

## 2024-04-04 PROCEDURE — 99213 OFFICE O/P EST LOW 20 MIN: CPT | Performed by: ADVANCED PRACTICE MIDWIFE

## 2024-04-04 PROCEDURE — 1036F TOBACCO NON-USER: CPT | Performed by: ADVANCED PRACTICE MIDWIFE

## 2024-04-04 PROCEDURE — G8427 DOCREV CUR MEDS BY ELIG CLIN: HCPCS | Performed by: ADVANCED PRACTICE MIDWIFE

## 2024-04-04 NOTE — PROGRESS NOTES
Chaperone for Intimate Exam  Chaperone was offered as part of the rooming process. Patient declined and agrees to continue with exam without a chaperone.  Chaperone: n/a

## 2024-04-04 NOTE — PROGRESS NOTES
SUBJECTIVE:    Tylor is here for her return OB visit. denies concerns today.   She reports  feeling fetal movement.  She denies vaginal bleeding.  She denies vaginal discharge.  She denies leaking of fluid.  She denies uterine cramping.  She denies  nausea and/or vomiting.    OBJECTIVE:  Blood pressure 128/64, height 1.499 m (4' 11\"), weight 59 kg (130 lb), last menstrual period 10/19/2023, not currently breastfeeding.    Total weight gain: 4.082 kg (9 lb)      Vaccinations:   Discussed will consider    ASSESSMENT/PLAN:  IUP @ 24+0 weeks  S=D    Normal Repeat Pregnancy  Due date is based on LMP and confirmed with 8w3d early dating ultrasound  Patient's prenatal labs are completed.  Patient's blood type O negative and rhogam is  indicated in the pregnancy.   Early glucola indicated: no  Patient Accepted  genetic screening.   Accepted, cell free DNA testing, and carrier screeningand test(s) are low risk trisomy 21/18/16 (NIPT) and negative for cystic fibrosis (CF), spinal muscle atrophy (SMA), & fragile X    Anatomy scan scheduled 3/6/24 completed. Placenta anterior,normal cord insertion: Yes cervical length 5.41 cm, low lying placenta PRESENT  and 9.1mm from cervical os, no placenta previa . Additional findings: no F/up in 6 weeks scheduled 24  Second trimester 24-28 week labs:   [x] Ordered 2024 BEBA Laws - NELL   Total weight gain in pregnancy reviewed: Yes  Fetal movement was reviewed.  Reviewed signs and symptoms of  labor: Yes  Reviewed signs and symptoms of preeclampsia: Yes  Reviewed signs and symptoms of amie hick contractions:  Yes      2. 24 weeks gestation of pregnancy  - Education reviewed with Tylor Lakhani and given in written form:  [facility]  Tylor Lakhani is  agreeable to glucose screening/testing in pregnancy  ACOG  Labor  Vaccines and Pregnancy  Preventing Pertussis (Whooping Cough)  Lowering your chance of  birth

## 2024-04-17 ENCOUNTER — ROUTINE PRENATAL (OUTPATIENT)
Dept: PERINATAL CARE | Age: 24
End: 2024-04-17
Payer: COMMERCIAL

## 2024-04-17 VITALS
WEIGHT: 132 LBS | RESPIRATION RATE: 16 BRPM | HEART RATE: 82 BPM | SYSTOLIC BLOOD PRESSURE: 102 MMHG | BODY MASS INDEX: 26.61 KG/M2 | TEMPERATURE: 98.4 F | HEIGHT: 59 IN | DIASTOLIC BLOOD PRESSURE: 59 MMHG

## 2024-04-17 DIAGNOSIS — O09.299 CURRENT SINGLETON PREGNANCY WITH HISTORY OF CONGENITAL HEART DISEASE IN PRIOR CHILD, ANTEPARTUM: ICD-10-CM

## 2024-04-17 DIAGNOSIS — Z36.4 ULTRASOUND FOR ANTENATAL SCREENING FOR FETAL GROWTH RESTRICTION: ICD-10-CM

## 2024-04-17 DIAGNOSIS — O35.BXX0 FETAL VENTRICULAR SEPTAL DEFECT AFFECTING ANTEPARTUM CARE OF MOTHER, SINGLE OR UNSPECIFIED FETUS: Primary | ICD-10-CM

## 2024-04-17 DIAGNOSIS — J45.909 ASTHMA AFFECTING PREGNANCY IN SECOND TRIMESTER: ICD-10-CM

## 2024-04-17 DIAGNOSIS — O99.512 ASTHMA AFFECTING PREGNANCY IN SECOND TRIMESTER: ICD-10-CM

## 2024-04-17 DIAGNOSIS — Z3A.25 25 WEEKS GESTATION OF PREGNANCY: ICD-10-CM

## 2024-04-17 DIAGNOSIS — O44.40 LOW IMPLANTATION OF PLACENTA WITHOUT HEMORRHAGE: ICD-10-CM

## 2024-04-17 DIAGNOSIS — O99.412 MATERNAL CARDIOVASCULAR DISEASE AFFECTING PREGNANCY IN SECOND TRIMESTER: ICD-10-CM

## 2024-04-17 DIAGNOSIS — O09.292 HISTORY OF PRE-ECLAMPSIA IN PRIOR PREGNANCY, CURRENTLY PREGNANT IN SECOND TRIMESTER: ICD-10-CM

## 2024-04-17 DIAGNOSIS — Z03.72 SUSPECTED PLACENTAL PROBLEM NOT FOUND: ICD-10-CM

## 2024-04-17 PROCEDURE — 76817 TRANSVAGINAL US OBSTETRIC: CPT | Performed by: OBSTETRICS & GYNECOLOGY

## 2024-04-17 PROCEDURE — 76827 ECHO EXAM OF FETAL HEART: CPT | Performed by: OBSTETRICS & GYNECOLOGY

## 2024-04-17 PROCEDURE — 76825 ECHO EXAM OF FETAL HEART: CPT | Performed by: OBSTETRICS & GYNECOLOGY

## 2024-04-17 PROCEDURE — 93325 DOPPLER ECHO COLOR FLOW MAPG: CPT | Performed by: OBSTETRICS & GYNECOLOGY

## 2024-04-17 PROCEDURE — 99999 PR OFFICE/OUTPT VISIT,PROCEDURE ONLY: CPT | Performed by: OBSTETRICS & GYNECOLOGY

## 2024-04-17 PROCEDURE — 76816 OB US FOLLOW-UP PER FETUS: CPT | Performed by: OBSTETRICS & GYNECOLOGY

## 2024-04-17 NOTE — PROGRESS NOTES
Options with respect to offering the patient invasive genetic prenatal testing and an antepartum referral for pediatric cardiology could not be completed today, as the patient declines a Maternal-Fetal Medicine physician consultation today.     Advise   echocardiogram and pediatric cardiology consultation (given the fetal cardiac findings today).     Patient declines a Maternal-Fetal Medicine complete physician consultation today regarding the fetal and/or maternal medical/obstetrical complications/co-morbidities of pregnancy (suspected fetal cardiac ventricular septal defect).      Maternal-Fetal Medicine (MFM) attending physician will defer all management for these medical/obstetrical complications of pregnancy to the primary attending obstetrical physician/provider, as a result.  Therefore, only an ultrasound evaluation was completed today in the MFM office.      Please refer to Maternal-Fetal Medicine OBGYN resident progress note in EPIC.

## 2024-04-17 NOTE — PROGRESS NOTES
Obstetric/Gynecology Maternal Fetal Medicine Resident Note    Patient updated on new ultrasound findings of suspected ventricular septal defect.      Patient declines formal consult with MFM attending physician for new finding of suspected ventricular septal defect.     DO SAY Kenney Resident, PGY1  [facility]  4/17/2024, 12:24 PM

## 2024-05-01 ENCOUNTER — ROUTINE PRENATAL (OUTPATIENT)
Dept: OBGYN CLINIC | Age: 24
End: 2024-05-01
Payer: COMMERCIAL

## 2024-05-01 ENCOUNTER — HOSPITAL ENCOUNTER (OUTPATIENT)
Age: 24
Setting detail: SPECIMEN
Discharge: HOME | End: 2024-05-01

## 2024-05-01 VITALS — WEIGHT: 134.4 LBS | DIASTOLIC BLOOD PRESSURE: 58 MMHG | BODY MASS INDEX: 27.15 KG/M2 | SYSTOLIC BLOOD PRESSURE: 106 MMHG

## 2024-05-01 DIAGNOSIS — O09.92 HIGH-RISK PREGNANCY IN SECOND TRIMESTER: Primary | ICD-10-CM

## 2024-05-01 DIAGNOSIS — Z67.91 RH NEGATIVE STATE IN ANTEPARTUM PERIOD: ICD-10-CM

## 2024-05-01 DIAGNOSIS — Z3A.24 24 WEEKS GESTATION OF PREGNANCY: ICD-10-CM

## 2024-05-01 DIAGNOSIS — Z3A.27 27 WEEKS GESTATION OF PREGNANCY: ICD-10-CM

## 2024-05-01 DIAGNOSIS — Z87.59 HISTORY OF PRE-ECLAMPSIA: ICD-10-CM

## 2024-05-01 DIAGNOSIS — O26.899 RH NEGATIVE STATE IN ANTEPARTUM PERIOD: ICD-10-CM

## 2024-05-01 DIAGNOSIS — O35.BXX0 FETAL VENTRICULAR SEPTAL DEFECT AFFECTING ANTEPARTUM CARE OF MOTHER, SINGLE OR UNSPECIFIED FETUS: ICD-10-CM

## 2024-05-01 LAB
BLOOD GROUP ANTIBODIES SERPL: NEGATIVE
ERYTHROCYTE [DISTWIDTH] IN BLOOD BY AUTOMATED COUNT: 13.8 % (ref 11.8–14.4)
GLUCOSE 1H P 50 G GLC PO SERPL-MCNC: 108 MG/DL (ref 70–135)
GLUCOSE ADMINISTRATION: NORMAL
HCT VFR BLD AUTO: 39.1 % (ref 36.3–47.1)
HGB BLD-MCNC: 12.1 G/DL (ref 11.9–15.1)
MCH RBC QN AUTO: 30 PG (ref 25.2–33.5)
MCHC RBC AUTO-ENTMCNC: 30.9 G/DL (ref 28.4–34.8)
MCV RBC AUTO: 97 FL (ref 82.6–102.9)
NRBC BLD-RTO: 0 PER 100 WBC
PLATELET # BLD AUTO: 326 K/UL (ref 138–453)
PMV BLD AUTO: 8.8 FL (ref 8.1–13.5)
RBC # BLD AUTO: 4.03 M/UL (ref 3.95–5.11)
WBC OTHER # BLD: 13.3 K/UL (ref 3.5–11.3)

## 2024-05-01 PROCEDURE — G8427 DOCREV CUR MEDS BY ELIG CLIN: HCPCS | Performed by: NURSE PRACTITIONER

## 2024-05-01 PROCEDURE — 1036F TOBACCO NON-USER: CPT | Performed by: NURSE PRACTITIONER

## 2024-05-01 PROCEDURE — G8417 CALC BMI ABV UP PARAM F/U: HCPCS | Performed by: NURSE PRACTITIONER

## 2024-05-01 PROCEDURE — 99214 OFFICE O/P EST MOD 30 MIN: CPT | Performed by: NURSE PRACTITIONER

## 2024-05-01 SDOH — ECONOMIC STABILITY: INCOME INSECURITY: HOW HARD IS IT FOR YOU TO PAY FOR THE VERY BASICS LIKE FOOD, HOUSING, MEDICAL CARE, AND HEATING?: NOT HARD AT ALL

## 2024-05-01 SDOH — ECONOMIC STABILITY: FOOD INSECURITY: WITHIN THE PAST 12 MONTHS, THE FOOD YOU BOUGHT JUST DIDN'T LAST AND YOU DIDN'T HAVE MONEY TO GET MORE.: NEVER TRUE

## 2024-05-01 SDOH — ECONOMIC STABILITY: FOOD INSECURITY: WITHIN THE PAST 12 MONTHS, YOU WORRIED THAT YOUR FOOD WOULD RUN OUT BEFORE YOU GOT MONEY TO BUY MORE.: NEVER TRUE

## 2024-05-01 ASSESSMENT — PATIENT HEALTH QUESTIONNAIRE - PHQ9
SUM OF ALL RESPONSES TO PHQ QUESTIONS 1-9: 0
SUM OF ALL RESPONSES TO PHQ QUESTIONS 1-9: 0
1. LITTLE INTEREST OR PLEASURE IN DOING THINGS: NOT AT ALL
SUM OF ALL RESPONSES TO PHQ QUESTIONS 1-9: 0
3. TROUBLE FALLING OR STAYING ASLEEP: NOT AT ALL
5. POOR APPETITE OR OVEREATING: NOT AT ALL
6. FEELING BAD ABOUT YOURSELF - OR THAT YOU ARE A FAILURE OR HAVE LET YOURSELF OR YOUR FAMILY DOWN: NOT AT ALL
10. IF YOU CHECKED OFF ANY PROBLEMS, HOW DIFFICULT HAVE THESE PROBLEMS MADE IT FOR YOU TO DO YOUR WORK, TAKE CARE OF THINGS AT HOME, OR GET ALONG WITH OTHER PEOPLE: NOT DIFFICULT AT ALL
8. MOVING OR SPEAKING SO SLOWLY THAT OTHER PEOPLE COULD HAVE NOTICED. OR THE OPPOSITE, BEING SO FIGETY OR RESTLESS THAT YOU HAVE BEEN MOVING AROUND A LOT MORE THAN USUAL: NOT AT ALL
4. FEELING TIRED OR HAVING LITTLE ENERGY: NOT AT ALL
SUM OF ALL RESPONSES TO PHQ9 QUESTIONS 1 & 2: 0
9. THOUGHTS THAT YOU WOULD BE BETTER OFF DEAD, OR OF HURTING YOURSELF: NOT AT ALL
2. FEELING DOWN, DEPRESSED OR HOPELESS: NOT AT ALL
7. TROUBLE CONCENTRATING ON THINGS, SUCH AS READING THE NEWSPAPER OR WATCHING TELEVISION: NOT AT ALL
SUM OF ALL RESPONSES TO PHQ QUESTIONS 1-9: 0

## 2024-05-01 NOTE — PATIENT INSTRUCTIONS
After Hours Number: You can call the office [phone]  or [phone]  Call if you have:  1.  Bleeding like a period  2.  Cramps or contractions greater than 2 hours  3.  If you are leaking fluid  4.  If you've a fever greater than 100°  5.  If you feel as if baby is not moving  6. If you have continuous vomiting over 3-4 hours   Counting Your Baby's Kicks: Care Instructions  Your Care Instructions    Counting your baby's kicks is one way your doctor can tell that your baby is healthy. Most women--especially in a first pregnancy--feel their baby move for the first time between 16 and 22 weeks. The movement may feel like flutters rather than kicks. Your baby may move more at certain times of the day. When you are active, you may notice less kicking than when you are resting. At your prenatal visits, your doctor will ask whether the baby is active.  In your last trimester, your doctor may ask you to count the number of times you feel your baby move.  Follow-up care is a key part of your treatment and safety. Be sure to make and go to all appointments, and call your doctor if you are having problems. It's also a good idea to know your test results and keep a list of the medicines you take.  How do you count fetal kicks?  A common method of checking your baby's movement is to count the number of kicks or moves you feel in 1 hour. Ten movements (such as kicks, flutters, or rolls) in 1 hour are normal. Some doctors suggest that you count in the morning until you get to 10 movements. Then you can quit for that day and start again the next day.  Pick your baby's most active time of day to count. This may be any time from morning to evening.  If you do not feel 10 movements in an hour, your baby may be sleeping. Wait for the next hour and count again.  When should you call for help?  Call your doctor now or seek immediate medical care if:    You noticed that your baby has stopped moving or is moving much less than

## 2024-05-01 NOTE — PROGRESS NOTES
Presents for OB visit  Gestation 27w6d  Estimated Date of Delivery: 24    Insurance: [employer]    IPV bag/mug given:2023 Jillian Blanco LPN   Genetic Information given/reviewed: 2023 BEBA Laws CNM   1st trimester education packet given:2023 Jillian Blanco LPN   2nd trimester education packet given: 2024 BEBA Laws CNM   3rd trimester education packet given:      Knows gender girl  FOB Name: Lawrence Constantino    [x]Pap needed at prenatal visit 10/8/23 NILM  [x] Urine collected for culture    23 negative  [x] Urine collected for gc/ct/UDS  Positive Cannabinoid Drug Screen 2023 Jillian Blanco LPN    Genetic Screening:  [x]Accepted NIPS after 10 weeks 2023 BEBA Laws CNM   [x]Completed- NEG NIPs     Carrier Screening:  [x] Accepted   [x] Results in media    Baby aspirin screen:  [x]Positive hx preE  []Negative    Early 1 hour GTT:  []Yes  [x] No    AFP:  []Ordered  []Completed    Anatomy scan:  [x]Referral Sent 2023 Jillian Blanco LPN   [x]Scheduled 3/6/24  [x]Completed Placenta anterior,normal cord insertion: Yes cervical length 5.41 cm, low lying placenta PRESENT  and 9.1mm from cervical os, no placenta previa .   [x] Follow up 24    Fetal Echo:   [] Yes  [x] No    High Risk Factors:  Hx of PreE  - cmp/cbc/p/c ratio need ordered at IPV  - baby asa after 12 weeks- taking 2024 BEBA Laws CNM   NVP RESOLVED   - zofran 2023 per pt request   -sent B6 on 2024   Asthma  Varicella NonImmune  -Varicella vaccine to be offered postpartum    []Placed on High Risk List    Fetal Surveillance:  [] Yes  [] No    Covid Vaccine:  Flu Vaccine:  [x]Given 24 A Berry  [x] Declined 2023 Jillian Blanco LPN   Tdap:  []Given **  [] Declined **  Rhogam: O NEGATIVE   []Given **  [] Not indicated     1hr GTT: ordered 2024 BEBA Laws CNM   [] Results **  3hr GTT:    36 weeks

## 2024-05-02 LAB
MICROORGANISM SPEC CULT: NORMAL
SPECIMEN DESCRIPTION: NORMAL

## 2024-05-13 ENCOUNTER — NURSE ONLY (OUTPATIENT)
Dept: OBGYN CLINIC | Age: 24
End: 2024-05-13
Payer: COMMERCIAL

## 2024-05-13 VITALS
WEIGHT: 139.8 LBS | SYSTOLIC BLOOD PRESSURE: 122 MMHG | HEIGHT: 59 IN | BODY MASS INDEX: 28.18 KG/M2 | DIASTOLIC BLOOD PRESSURE: 68 MMHG | HEART RATE: 80 BPM

## 2024-05-13 DIAGNOSIS — Z67.91 RH NEGATIVE STATE IN ANTEPARTUM PERIOD: Primary | ICD-10-CM

## 2024-05-13 DIAGNOSIS — Z3A.29 29 WEEKS GESTATION OF PREGNANCY: ICD-10-CM

## 2024-05-13 DIAGNOSIS — O26.899 RH NEGATIVE STATE IN ANTEPARTUM PERIOD: Primary | ICD-10-CM

## 2024-05-13 PROCEDURE — 90715 TDAP VACCINE 7 YRS/> IM: CPT | Performed by: NURSE PRACTITIONER

## 2024-05-13 PROCEDURE — 90471 IMMUNIZATION ADMIN: CPT | Performed by: NURSE PRACTITIONER

## 2024-05-13 PROCEDURE — 96372 THER/PROPH/DIAG INJ SC/IM: CPT | Performed by: NURSE PRACTITIONER

## 2024-05-13 NOTE — PROGRESS NOTES
CORAZON is here for RhoGAM. Patient signed consent. Patient Rh negative. Labs drawn 5/1/24 NEGATIVE-OK PER BOB  Given Intramuscular, Left deltoid. Lot Number: [id number]  EXP: 9/21/26  Diagnosis: RH NEGATIVE  Patient tolerated well.     pT is here for Tdap 0.5mL. Patient signed consent. Given Intramuscular, Left deltoid. Lot Number: [id number]  EXP: 08/09/26  Diagnosis: NEED FOR TDAP  Patient tolerated well.

## 2024-05-15 ENCOUNTER — ROUTINE PRENATAL (OUTPATIENT)
Dept: PERINATAL CARE | Age: 24
End: 2024-05-15
Payer: COMMERCIAL

## 2024-05-15 VITALS
DIASTOLIC BLOOD PRESSURE: 65 MMHG | RESPIRATION RATE: 16 BRPM | TEMPERATURE: 98.2 F | HEART RATE: 90 BPM | SYSTOLIC BLOOD PRESSURE: 114 MMHG | HEIGHT: 59 IN | WEIGHT: 141.76 LBS | BODY MASS INDEX: 28.58 KG/M2

## 2024-05-15 DIAGNOSIS — Z36.4 ULTRASOUND FOR ANTENATAL SCREENING FOR FETAL GROWTH RESTRICTION: ICD-10-CM

## 2024-05-15 DIAGNOSIS — O09.299 CURRENT SINGLETON PREGNANCY WITH HISTORY OF CONGENITAL HEART DISEASE IN PRIOR CHILD, ANTEPARTUM: ICD-10-CM

## 2024-05-15 DIAGNOSIS — O99.413 MATERNAL CARDIOVASCULAR DISEASE AFFECTING PREGNANCY IN THIRD TRIMESTER: ICD-10-CM

## 2024-05-15 DIAGNOSIS — Z3A.29 29 WEEKS GESTATION OF PREGNANCY: ICD-10-CM

## 2024-05-15 DIAGNOSIS — O09.293 HISTORY OF PRE-ECLAMPSIA IN PRIOR PREGNANCY, CURRENTLY PREGNANT, THIRD TRIMESTER: ICD-10-CM

## 2024-05-15 DIAGNOSIS — Z36.3 ENCOUNTER FOR ROUTINE SCREENING FOR MALFORMATION USING ULTRASONICS: ICD-10-CM

## 2024-05-15 DIAGNOSIS — O99.513 ASTHMA AFFECTING PREGNANCY IN THIRD TRIMESTER: ICD-10-CM

## 2024-05-15 DIAGNOSIS — O35.BXX0 FETAL VENTRICULAR SEPTAL DEFECT AFFECTING ANTEPARTUM CARE OF MOTHER, SINGLE OR UNSPECIFIED FETUS: Primary | ICD-10-CM

## 2024-05-15 DIAGNOSIS — J45.909 ASTHMA AFFECTING PREGNANCY IN THIRD TRIMESTER: ICD-10-CM

## 2024-05-15 PROCEDURE — 76819 FETAL BIOPHYS PROFIL W/O NST: CPT | Performed by: OBSTETRICS & GYNECOLOGY

## 2024-05-15 PROCEDURE — 99999 PR OFFICE/OUTPT VISIT,PROCEDURE ONLY: CPT | Performed by: OBSTETRICS & GYNECOLOGY

## 2024-05-15 PROCEDURE — 76805 OB US >/= 14 WKS SNGL FETUS: CPT | Performed by: OBSTETRICS & GYNECOLOGY

## 2024-05-30 ENCOUNTER — ROUTINE PRENATAL (OUTPATIENT)
Dept: OBGYN CLINIC | Age: 24
End: 2024-05-30
Payer: COMMERCIAL

## 2024-05-30 VITALS
HEIGHT: 59 IN | WEIGHT: 144 LBS | SYSTOLIC BLOOD PRESSURE: 120 MMHG | DIASTOLIC BLOOD PRESSURE: 62 MMHG | BODY MASS INDEX: 29.03 KG/M2

## 2024-05-30 DIAGNOSIS — Z3A.32 32 WEEKS GESTATION OF PREGNANCY: ICD-10-CM

## 2024-05-30 DIAGNOSIS — Z87.59 HISTORY OF PRE-ECLAMPSIA: ICD-10-CM

## 2024-05-30 DIAGNOSIS — O35.BXX0 FETAL VENTRICULAR SEPTAL DEFECT AFFECTING ANTEPARTUM CARE OF MOTHER, SINGLE OR UNSPECIFIED FETUS: ICD-10-CM

## 2024-05-30 DIAGNOSIS — O26.899 RH NEGATIVE STATE IN ANTEPARTUM PERIOD: ICD-10-CM

## 2024-05-30 DIAGNOSIS — O09.93 HIGH-RISK PREGNANCY IN THIRD TRIMESTER: Primary | ICD-10-CM

## 2024-05-30 DIAGNOSIS — Z67.91 RH NEGATIVE STATE IN ANTEPARTUM PERIOD: ICD-10-CM

## 2024-05-30 PROCEDURE — 1036F TOBACCO NON-USER: CPT | Performed by: ADVANCED PRACTICE MIDWIFE

## 2024-05-30 PROCEDURE — G8427 DOCREV CUR MEDS BY ELIG CLIN: HCPCS | Performed by: ADVANCED PRACTICE MIDWIFE

## 2024-05-30 PROCEDURE — 99213 OFFICE O/P EST LOW 20 MIN: CPT | Performed by: ADVANCED PRACTICE MIDWIFE

## 2024-05-30 PROCEDURE — G8417 CALC BMI ABV UP PARAM F/U: HCPCS | Performed by: ADVANCED PRACTICE MIDWIFE

## 2024-05-30 NOTE — PROGRESS NOTES
SUBJECTIVE:    Tylor is here for her return OB visit. denies concerns today.   She reports  feeling fetal movement.  She denies vaginal bleeding.  She denies vaginal discharge.  She denies leaking of fluid.  She denies uterine cramping.  She denies  nausea and/or vomiting.    OBJECTIVE:  Blood pressure 120/62, height 1.499 m (4' 11\"), weight 65.3 kg (144 lb), last menstrual period 10/19/2023, not currently breastfeeding.    Total weight gain: 10.4 kg (23 lb)      Vaccinations:   [x]Accepted tdap  []Declined     ASSESSMENT/PLAN:  IUP @ 32+0 weeks  S=D    Normal Repeat Pregnancy  Due date is based on LMP and confirmed with 8w3d early dating ultrasound  Patient's prenatal labs are completed.  Patient's blood type O negative and rhogam is  indicated in the pregnancy.   Early glucola indicated: no  Patient Accepted  genetic screening.   Accepted, cell free DNA testing, and carrier screeningand test(s) are low risk trisomy 21/18/16 (NIPT) and negative for cystic fibrosis (CF), spinal muscle atrophy (SMA), & fragile X    Anatomy scan scheduled 3/6/24 completed. Placenta anterior,normal cord insertion: Yes cervical length 5.41 cm, low lying placenta PRESENT  and 9.1mm from cervical os, no placenta previa . Additional findings: no F/up in 6 weeks scheduled 4/1/24  USN 4/17/24 presentation cephalic. EFW 36% AC 16%. NO low lying placenta. Fetal echo completed and suspected fetal cardiac ventricular septal defect   USN 5/15/24 presentation cephalic. HAMZAH 13.57. BPP 8/8. EFW 50%. Suspected VSD again noted.  Second trimester 24-28 week labs:   [x] Ordered  [x] Completed 5/1/24  [x] Glucose screening 108  [x] Recommendations pass  Total weight gain in pregnancy reviewed: Yes  Fetal Kick Count was discussed and explained. She was instructed to lay on her left side 20 minutes after eating and count movements for up to 2 hours with a target value of 10 movements. Instructed to notify office if she does not make the target.  Reviewed

## 2024-05-31 NOTE — TELEPHONE ENCOUNTER
We can set up a VV if she would like.  Or come in the office for a steroid shot     Braxton Gale none

## 2024-06-12 ENCOUNTER — ROUTINE PRENATAL (OUTPATIENT)
Dept: OBGYN CLINIC | Age: 24
End: 2024-06-12

## 2024-06-12 VITALS — WEIGHT: 141.13 LBS | BODY MASS INDEX: 28.5 KG/M2 | DIASTOLIC BLOOD PRESSURE: 60 MMHG | SYSTOLIC BLOOD PRESSURE: 112 MMHG

## 2024-06-12 DIAGNOSIS — O09.93 HIGH-RISK PREGNANCY IN THIRD TRIMESTER: ICD-10-CM

## 2024-06-12 DIAGNOSIS — Z3A.33 33 WEEKS GESTATION OF PREGNANCY: Primary | ICD-10-CM

## 2024-06-15 NOTE — PROGRESS NOTES
Tylor Lakhani is a 24 y.o. female 34w2d        OB History    Para Term  AB Living   2 1 1     1   SAB IAB Ectopic Molar Multiple Live Births           0 1      # Outcome Date GA Lbr Ric/2nd Weight Sex Delivery Anes PTL Lv   2 Current            1 Term 21 38w1d 02:04 / 00:22 2.95 kg (6 lb 8.1 oz) M Vag-Spont EPI N GIANNI       Vitals  BP: 112/60  Weight - Scale: 64 kg (141 lb 2 oz)      The patient was seen and evaluated. There was positive fetal movements. No contractions or leakage of fluid. Signs and symptoms of  labor as well as labor were reviewed. The S/S of Pre-Eclampsia were reviewed with the patient in detail. She is to report any of these if they occur. She currently denies any of these.    The patient had her 28 week labs completed.  No visits with results within 5 Week(s) from this visit.   Latest known visit with results is:   Hospital Outpatient Visit on 2024   Component Date Value Ref Range Status    Antibody Screen 2024 NEGATIVE   Final    GLU ADMN 2024 Glucola   Final    Glucose tolerance screen 50g 2024 108  70 - 135 mg/dL Final    Specimen Description 2024 .CLEAN CATCH URINE   Final    Culture 2024 NO SIGNIFICANT GROWTH   Final    WBC 2024 13.3 (H)  3.5 - 11.3 k/uL Final    RBC 2024 4.03  3.95 - 5.11 m/uL Final    Hemoglobin 2024 12.1  11.9 - 15.1 g/dL Final    Hematocrit 2024 39.1  36.3 - 47.1 % Final    MCV 2024 97.0  82.6 - 102.9 fL Final    MCH 2024 30.0  25.2 - 33.5 pg Final    MCHC 2024 30.9  28.4 - 34.8 g/dL Final    RDW 2024 13.8  11.8 - 14.4 % Final    Platelets 2024 326  138 - 453 k/uL Final    MPV 2024 8.8  8.1 - 13.5 fL Final    NRBC Automated 2024 0.0  0.0 per 100 WBC Final   ]    Insurance: [employer]    IPV bag/mug given:2023 Jillian Blanco LPN   Genetic Information given/reviewed: 2023 BEBA Laws - NELL   1st trimester

## 2024-06-26 ENCOUNTER — ROUTINE PRENATAL (OUTPATIENT)
Dept: PERINATAL CARE | Age: 24
End: 2024-06-26
Payer: COMMERCIAL

## 2024-06-26 ENCOUNTER — HOSPITAL ENCOUNTER (OUTPATIENT)
Age: 24
Setting detail: SPECIMEN
Discharge: HOME | End: 2024-06-26

## 2024-06-26 ENCOUNTER — ROUTINE PRENATAL (OUTPATIENT)
Dept: OBGYN CLINIC | Age: 24
End: 2024-06-26
Payer: COMMERCIAL

## 2024-06-26 VITALS — WEIGHT: 146 LBS | BODY MASS INDEX: 29.49 KG/M2 | DIASTOLIC BLOOD PRESSURE: 64 MMHG | SYSTOLIC BLOOD PRESSURE: 110 MMHG

## 2024-06-26 VITALS
WEIGHT: 146 LBS | HEIGHT: 59 IN | RESPIRATION RATE: 16 BRPM | SYSTOLIC BLOOD PRESSURE: 119 MMHG | BODY MASS INDEX: 29.43 KG/M2 | HEART RATE: 96 BPM | TEMPERATURE: 98.6 F | DIASTOLIC BLOOD PRESSURE: 72 MMHG

## 2024-06-26 DIAGNOSIS — Z3A.35 35 WEEKS GESTATION OF PREGNANCY: ICD-10-CM

## 2024-06-26 DIAGNOSIS — O35.HXX0 SHORT FEMUR OF FETUS ON PRENATAL ULTRASOUND: Primary | ICD-10-CM

## 2024-06-26 DIAGNOSIS — Z3A.35 35 WEEKS GESTATION OF PREGNANCY: Primary | ICD-10-CM

## 2024-06-26 DIAGNOSIS — O09.299 CURRENT SINGLETON PREGNANCY WITH HISTORY OF CONGENITAL HEART DISEASE IN PRIOR CHILD, ANTEPARTUM: ICD-10-CM

## 2024-06-26 DIAGNOSIS — O09.293 HISTORY OF PRE-ECLAMPSIA IN PRIOR PREGNANCY, CURRENTLY PREGNANT, THIRD TRIMESTER: ICD-10-CM

## 2024-06-26 DIAGNOSIS — Z36.4 ULTRASOUND FOR ANTENATAL SCREENING FOR FETAL GROWTH RESTRICTION: ICD-10-CM

## 2024-06-26 DIAGNOSIS — Z36.3 ENCOUNTER FOR ROUTINE SCREENING FOR MALFORMATION USING ULTRASONICS: ICD-10-CM

## 2024-06-26 DIAGNOSIS — O35.BXX0 FETAL VENTRICULAR SEPTAL DEFECT AFFECTING ANTEPARTUM CARE OF MOTHER, SINGLE OR UNSPECIFIED FETUS: ICD-10-CM

## 2024-06-26 DIAGNOSIS — O99.413 MATERNAL CARDIOVASCULAR DISEASE AFFECTING PREGNANCY IN THIRD TRIMESTER: ICD-10-CM

## 2024-06-26 DIAGNOSIS — O99.513 ASTHMA AFFECTING PREGNANCY IN THIRD TRIMESTER: ICD-10-CM

## 2024-06-26 DIAGNOSIS — J45.909 ASTHMA AFFECTING PREGNANCY IN THIRD TRIMESTER: ICD-10-CM

## 2024-06-26 DIAGNOSIS — O09.93 HIGH-RISK PREGNANCY IN THIRD TRIMESTER: ICD-10-CM

## 2024-06-26 LAB
AMPHET UR QL SCN: NEGATIVE
BARBITURATES UR QL SCN: NEGATIVE
BENZODIAZ UR QL: NEGATIVE
CANNABINOIDS UR QL SCN: NEGATIVE
COCAINE UR QL SCN: NEGATIVE
FENTANYL UR QL: NEGATIVE
METHADONE UR QL: NEGATIVE
OPIATES UR QL SCN: NEGATIVE
OXYCODONE UR QL SCN: NEGATIVE
PCP UR QL SCN: NEGATIVE
TEST INFORMATION: NORMAL

## 2024-06-26 PROCEDURE — 99999 PR OFFICE/OUTPT VISIT,PROCEDURE ONLY: CPT | Performed by: OBSTETRICS & GYNECOLOGY

## 2024-06-26 PROCEDURE — 99213 OFFICE O/P EST LOW 20 MIN: CPT | Performed by: OBSTETRICS & GYNECOLOGY

## 2024-06-26 PROCEDURE — G8427 DOCREV CUR MEDS BY ELIG CLIN: HCPCS | Performed by: OBSTETRICS & GYNECOLOGY

## 2024-06-26 PROCEDURE — G8417 CALC BMI ABV UP PARAM F/U: HCPCS | Performed by: OBSTETRICS & GYNECOLOGY

## 2024-06-26 PROCEDURE — 1036F TOBACCO NON-USER: CPT | Performed by: OBSTETRICS & GYNECOLOGY

## 2024-06-26 PROCEDURE — 76816 OB US FOLLOW-UP PER FETUS: CPT | Performed by: OBSTETRICS & GYNECOLOGY

## 2024-06-26 PROCEDURE — 76819 FETAL BIOPHYS PROFIL W/O NST: CPT | Performed by: OBSTETRICS & GYNECOLOGY

## 2024-06-26 NOTE — PROGRESS NOTES
Please refer to non-invasive prenatal testing/NIPT results (via [facility]).   Please refer to completed maternal carrier testing results (with the [place] test from [employer]).        Options with respect to offering the patient invasive genetic prenatal testing, Baltazar's VISTARA screen, and an antepartum referral for pediatric cardiology could not be completed, as the patient again declined a Maternal-Fetal Medicine physician consultation.     Advise   echocardiogram and pediatric cardiology consultation (given above fetal cardiac findings).     Patient again declines a Maternal-Fetal Medicine complete physician consultation today regarding the fetal and/or maternal medical/obstetrical complications/co-morbidities of pregnancy (for new ultrasound finding of shortened fetal long bones and previous finding of fetal cardiac ventricular septal defect).      Maternal-Fetal Medicine (MFM) attending physician will defer all management for these medical/obstetrical complications of pregnancy to the primary attending obstetrical physician/provider, as a result.  Therefore, only an ultrasound evaluation was completed today in the MFM office.      Please refer to Maternal-Fetal Medicine OBGYN resident progress note in EPIC.

## 2024-06-26 NOTE — PROGRESS NOTES
Obstetric/Gynecology Maternal Fetal Medicine Resident Note    Patient is informed of finding of short fetal long bones seen on ultrasound today. Patient declines formal consultation with MFM attending physician for finding today and previous finding of VSD.    Michelle Ayala MD  OBGYN Resident, PGY1  [facility]  6/26/2024, 9:31 AM

## 2024-06-27 NOTE — PROGRESS NOTES
Tylor Lakhani is a 24 y.o. female 36w0d        OB History    Para Term  AB Living   2 1 1     1   SAB IAB Ectopic Molar Multiple Live Births           0 1      # Outcome Date GA Lbr Ric/2nd Weight Sex Delivery Anes PTL Lv   2 Current            1 Term 21 38w1d 02:04 / 00:22 2.95 kg (6 lb 8.1 oz) M Vag-Spont EPI N GIANNI         Vitals  BP: 110/64  Weight - Scale: 66.2 kg (146 lb)      The patient was seen and evaluated. There was positive fetal movements. No contractions or leakage of fluid. Signs and symptoms of labor were reviewed.  The S/S of Pre-Eclampsia were reviewed with the patient in detail. She is to report any of these if they occur. She currently denies any of these.    The patient was instructed on fetal kick counts and was given a kick sheet to complete every 8 hours. She was instructed that the baby should move at a minimum of ten times within one hour after a meal. The patient was instructed to lay down on her left side twenty minutes after eating and count movements for up to one hour with a target value of ten movements.  She was instructed to notify the office if she did not make that target after two attempts or if after any attempt there was less than four movements.    The patient reports that the targets have been made Yes.    Insurance: [employer]    IPV bag/mug given:2023 Jillian Blanco LPN   Genetic Information given/reviewed: 2023 BEBA Laws CNM   1st trimester education packet given:2023 Jillian Blanco LPN   2nd trimester education packet given: 2024 BEBA Laws CNM   3rd trimester education packet given:      Knows gender girl  FOB Name: Lawrence Constantino    [x]Pap needed at prenatal visit 10/8/23 NILM  [x] Urine collected for culture    23 negative  [x] Urine collected for gc/ct/UDS  Positive Cannabinoid Drug Screen 2023 Jillian Blanco LPN    Genetic Screening:  [x]Accepted NIPS after 10 weeks

## 2024-07-02 ENCOUNTER — HOSPITAL ENCOUNTER (OUTPATIENT)
Age: 24
Discharge: HOME | End: 2024-07-03
Attending: OBSTETRICS & GYNECOLOGY | Admitting: OBSTETRICS & GYNECOLOGY
Payer: COMMERCIAL

## 2024-07-02 VITALS
TEMPERATURE: 99.1 F | SYSTOLIC BLOOD PRESSURE: 120 MMHG | RESPIRATION RATE: 16 BRPM | HEART RATE: 103 BPM | OXYGEN SATURATION: 98 % | DIASTOLIC BLOOD PRESSURE: 67 MMHG

## 2024-07-02 PROBLEM — Z3A.36 36 WEEKS GESTATION OF PREGNANCY: Status: ACTIVE | Noted: 2024-07-02

## 2024-07-02 RX ORDER — ACETAMINOPHEN 500 MG
1000 TABLET ORAL EVERY 8 HOURS SCHEDULED
Status: DISCONTINUED | OUTPATIENT
Start: 2024-07-03 | End: 2024-07-03 | Stop reason: HOSPADM

## 2024-07-03 PROCEDURE — 76818 FETAL BIOPHYS PROFILE W/NST: CPT

## 2024-07-03 PROCEDURE — 99213 OFFICE O/P EST LOW 20 MIN: CPT

## 2024-07-03 NOTE — DISCHARGE INSTRUCTIONS
Notify physician if you experience: Dizziness or severe headache  Spots before eyes or blurred vision    Chills and or fever  Vaginal pressure  Epigastric pain  Uterine contractions are regular and 5 minutes apart if 1st baby or 10 minutes apart if not 1st baby  Bag or martinez leaking or gush of fluid from vagina  Any vaginal bleeding that is heavier than a menstrual period  Intermittent low backache  Vomiting or diarrhea for several hours  Decrease or absence of baby movement  Fetal movement card instructions given  Right sided abdominal pain  Increase or change in vaginal discharge  Abdominal or menstrual like cramping that is constant or heavier, comes and goes  Swelling of face, hands, legs or feet not decreased with rest on left side.Notify physician if you experience: Dizziness or severe headache  Spots before eyes or blurred vision

## 2024-07-03 NOTE — H&P
OBSTETRICAL HISTORY AND PHYSICAL  [facility]    Date: 2024       Time: 10:21 PM   Patient Name: Tylor Lakhani     Patient : 2000  Room/Bed: 0709/0709-01    Admission Date/Time: 2024 10:14 PM    CC: Decreased Fetal Movement     HPI: Tylor Lakhani is a 24 y.o.  at 36w5d who presents for decreased fetal movement. Patient reports absence of fetal movement for several hours. Patient denies any fever, chills, N/V, headaches, vision changes, chest pain, shortness of breath, RUQ pain, abdominal pain, and increased swelling/tenderness in bilateral lower extremities. Patient denies any vaginal discharge and any urinary complaints. Denies contractions, denies loss of fluid, denies vaginal bleeding.    DATING:  LMP: Patient's last menstrual period was 10/19/2023.  Estimated Date of Delivery: 24   Based on: LMP    PREGNANCY RISK FACTORS:  Patient Active Problem List   Diagnosis    Asthma    FHx Chiari malformation type I and cleft lip    THC use    Family history of autism     22 M 6#8 Apg 6/9    History of pre-eclampsia    Mild depression    Gastritis without bleeding    POTS (postural orthostatic tachycardia syndrome)      Steroids Given In This Pregnancy:  no     REVIEW OF SYSTEMS:  Constitutional: negative fever, negative chills  HEENT: negative visual disturbances, negative headaches  Respiratory: negative dyspnea, negative cough  Cardiovascular: negative chest pain,  negative palpitations  Gastrointestinal: negative abdominal pain, negative RUQ pain, negative N/V, negative diarrhea, negative constipation  Genitourinary: negative dysuria, negative vaginal discharge, negative vaginal bleeding  Dermatological: negative rash  Hematologic: negative bruising  Immunologic/Lymphatic: negative recent illness, negative recent sick contact  Musculoskeletal: negative back pain, negative myalgias, negative arthralgias  Neurological:  negative dizziness, negative

## 2024-07-03 NOTE — FLOWSHEET NOTE
2214- Patient arrived via w/c to L&D for no fetal movement since 4pm this afternoon.  2218-fetal monitor applied.  Patient denies leaking of fluid, vaginal bleeding, contractions or pain.

## 2024-07-08 ENCOUNTER — ROUTINE PRENATAL (OUTPATIENT)
Dept: OBGYN CLINIC | Age: 24
End: 2024-07-08

## 2024-07-08 ENCOUNTER — HOSPITAL ENCOUNTER (OUTPATIENT)
Age: 24
Setting detail: SPECIMEN
Discharge: HOME | End: 2024-07-08

## 2024-07-08 VITALS — BODY MASS INDEX: 29.29 KG/M2 | SYSTOLIC BLOOD PRESSURE: 122 MMHG | WEIGHT: 145 LBS | DIASTOLIC BLOOD PRESSURE: 70 MMHG

## 2024-07-08 DIAGNOSIS — Z87.59 HISTORY OF PRE-ECLAMPSIA: ICD-10-CM

## 2024-07-08 DIAGNOSIS — Z3A.38 38 WEEKS GESTATION OF PREGNANCY: Primary | ICD-10-CM

## 2024-07-08 DIAGNOSIS — Z3A.38 38 WEEKS GESTATION OF PREGNANCY: ICD-10-CM

## 2024-07-08 DIAGNOSIS — O09.93 HIGH-RISK PREGNANCY IN THIRD TRIMESTER: ICD-10-CM

## 2024-07-08 PROBLEM — K29.70 GASTRITIS WITHOUT BLEEDING: Status: RESOLVED | Noted: 2022-12-20 | Resolved: 2024-07-08

## 2024-07-08 PROBLEM — Z3A.36 36 WEEKS GESTATION OF PREGNANCY: Status: RESOLVED | Noted: 2024-07-02 | Resolved: 2024-07-08

## 2024-07-08 LAB
ALBUMIN SERPL-MCNC: 3.7 G/DL (ref 3.5–5.2)
ALBUMIN/GLOB SERPL: 1 {RATIO} (ref 1–2.5)
ALP SERPL-CCNC: 132 U/L (ref 35–104)
ALT SERPL-CCNC: 9 U/L (ref 10–35)
ANION GAP SERPL CALCULATED.3IONS-SCNC: 13 MMOL/L (ref 9–16)
AST SERPL-CCNC: 20 U/L (ref 10–35)
BILIRUB SERPL-MCNC: 0.3 MG/DL (ref 0–1.2)
BUN SERPL-MCNC: 7 MG/DL (ref 6–20)
CALCIUM SERPL-MCNC: 8.7 MG/DL (ref 8.6–10.4)
CHLORIDE SERPL-SCNC: 104 MMOL/L (ref 98–107)
CO2 SERPL-SCNC: 21 MMOL/L (ref 20–31)
CREAT SERPL-MCNC: 0.4 MG/DL (ref 0.5–0.9)
CREAT UR-MCNC: 17.7 MG/DL (ref 28–217)
ERYTHROCYTE [DISTWIDTH] IN BLOOD BY AUTOMATED COUNT: 14.1 % (ref 11.8–14.4)
GFR, ESTIMATED: >90 ML/MIN/1.73M2
GLUCOSE SERPL-MCNC: 81 MG/DL (ref 74–99)
HCT VFR BLD AUTO: 38.1 % (ref 36.3–47.1)
HGB BLD-MCNC: 11.8 G/DL (ref 11.9–15.1)
MCH RBC QN AUTO: 28.4 PG (ref 25.2–33.5)
MCHC RBC AUTO-ENTMCNC: 31 G/DL (ref 28.4–34.8)
MCV RBC AUTO: 91.6 FL (ref 82.6–102.9)
NRBC BLD-RTO: 0 PER 100 WBC
PLATELET # BLD AUTO: 313 K/UL (ref 138–453)
PMV BLD AUTO: 8.8 FL (ref 8.1–13.5)
POTASSIUM SERPL-SCNC: 3.5 MMOL/L (ref 3.7–5.3)
PROT SERPL-MCNC: 6.7 G/DL (ref 6.6–8.7)
RBC # BLD AUTO: 4.16 M/UL (ref 3.95–5.11)
SODIUM SERPL-SCNC: 138 MMOL/L (ref 136–145)
TOTAL PROTEIN, URINE: <4 MG/DL
URINE TOTAL PROTEIN CREATININE RATIO: ABNORMAL
WBC OTHER # BLD: 11.2 K/UL (ref 3.5–11.3)

## 2024-07-08 PROCEDURE — 99024 POSTOP FOLLOW-UP VISIT: CPT | Performed by: STUDENT IN AN ORGANIZED HEALTH CARE EDUCATION/TRAINING PROGRAM

## 2024-07-08 RX ORDER — METOCLOPRAMIDE 5 MG/1
5 TABLET ORAL 3 TIMES DAILY
Qty: 120 TABLET | Refills: 3 | Status: SHIPPED | OUTPATIENT
Start: 2024-07-08

## 2024-07-08 RX ORDER — MAGNESIUM OXIDE 400 MG/1
400 TABLET ORAL DAILY
Qty: 30 TABLET | Refills: 1 | Status: SHIPPED | OUTPATIENT
Start: 2024-07-08

## 2024-07-08 NOTE — PROGRESS NOTES
Prenatal Visit    Tylor Lakhani is a 24 y.o. female  at 37w4d    The patient was seen and evaluated. There was positive fetal movements. She denies contractions, vaginal bleeding and leakage of fluid. Signs and symptoms of labor were reviewed.  The S/S of Pre-Eclampsia were reviewed with the patient in detail. She is to report any of these if they occur. She currently denies any of these.    Has been continuously nauseous and headache the past few days. Patient Reports feeling overall unwell the past 2 days. Nausea no vomiting. Headache relieved at times with tylenol. Has migraines. No fever, chills. No diarrhea. No VB. BP normotensive.  Patient declines going to the hospital for evaluation. Amenable to stat labs outpatient. Denies vomiting, RUQ pain, headache, epigastric pain, SOB, CP. Understands reasons to go the ER for evaluation.    Discussed resident involvement in triage and labor and delivery.  Discussed call group.  Patient verbalized understanding and agreement.    Allergies:  Zoloft [sertraline]    Vitals:  /70   Wt 65.8 kg (145 lb)   LMP 10/19/2023   BMI 29.29 kg/m²       Assessment & Plan:  Tylor Lakhani is a 24 y.o. female  at 37w4d   - 28 week labs completed   - Influenza and Covid vaccinations recommended per ACOG: R/B/A discussed with increased risk of both maternal and fetal morbidity and mortality in unvaccinated pregnant patients.   - TDAP and RSV vaccinations recommended per ACOG. R/B/A discussed. She understands must received RSV vaccine at pharmacy.   - Continue prenatal vitamin   - GBS collected    Headache/Nausea  -normotensive  -Stat outpatient labs  -Declined ER for eval  -tylenol for headaches. Reglan and Mag oxide to pharmacy    Insurance: [employer]    Knows gender girl  FOB Name: Lawrence Constantino    [x]Pap needed at prenatal visit 10/8/23 NILM  [x] Urine collected for culture    23 negative  [x] Urine collected for gc/ct/UDS  Positive Cannabinoid Drug

## 2024-07-12 LAB
MICROORGANISM SPEC CULT: NORMAL
SERVICE CMNT-IMP: NORMAL
SPECIMEN DESCRIPTION: NORMAL

## 2024-07-17 ENCOUNTER — ROUTINE PRENATAL (OUTPATIENT)
Dept: OBGYN CLINIC | Age: 24
End: 2024-07-17

## 2024-07-17 VITALS — DIASTOLIC BLOOD PRESSURE: 64 MMHG | BODY MASS INDEX: 29.89 KG/M2 | WEIGHT: 148 LBS | SYSTOLIC BLOOD PRESSURE: 118 MMHG

## 2024-07-17 DIAGNOSIS — Z34.93 ENCOUNTER FOR SUPERVISION OF LOW-RISK PREGNANCY IN THIRD TRIMESTER: ICD-10-CM

## 2024-07-17 DIAGNOSIS — Z3A.38 38 WEEKS GESTATION OF PREGNANCY: Primary | ICD-10-CM

## 2024-07-17 DIAGNOSIS — Z87.59 HISTORY OF PRE-ECLAMPSIA: ICD-10-CM

## 2024-07-17 PROCEDURE — 99024 POSTOP FOLLOW-UP VISIT: CPT | Performed by: STUDENT IN AN ORGANIZED HEALTH CARE EDUCATION/TRAINING PROGRAM

## 2024-07-17 NOTE — PROGRESS NOTES
No    AFP: declined  []Ordered  []Completed    Anatomy scan:  [x]Referral Sent 2023 Jillian Blanco LPN   [x]Scheduled 3/6/24  [x]Completed Placenta anterior,normal cord insertion: Yes cervical length 5.41 cm, low lying placenta PRESENT  and 9.1mm from cervical os, no placenta previa .   [x] Follow up 24-low-lying placenta resolved  USN 24 presentation cephalic. EFW 36% AC 16%. NO low lying placenta. Fetal echo completed and suspected fetal cardiac ventricular septal defect   USN 5/15/24 presentation cephalic. HAMZAH 13.57. BPP 8. EFW 50%. Suspected VSD again noted.    Fetal Echo:   [x] Yes  [] No    High Risk Factors:  Shortened fetal long bones  - Femur length <3rd %tile then 1st %tile  -NIPT low risk  Hx of PreE  - cmp/cbc/p/c ratio need ordered at IPV  - baby asa after 12 weeks- taking 2024 BEBA Laws CNM   Asthma  Varicella NonImmune  -Varicella vaccine to be offered postpartum  Suspected fetal VSD  -Noted at MF echo on 24  -MFM every 4 weeks      Fetal Surveillance:  [] Yes  [x] No    Covid Vaccine:  Flu Vaccine:  [x]Given 24 A Berry  [x] Declined 2023 Jillian Blanco LPN   Tdap:  [x]Given 24  [] Declined   Rhogam: O NEGATIVE   [x]Given 24  [] Not indicated     1hr GTT: ordered 2024 BEBA Laws CNM   [x] Results passed 108  3hr GTT:    36 weeks US:  [x]Completed     GBS:  [] Positive   [x] Negative            Counseling   - Warning signs reviewed and recommendations when to call or present to the hospital if she experiences signs or symptoms of  labor and pre-eclampsia were reviewed.   - The patient was counseled on labor and delivery. Route of delivery and counseling on vaginal, operative vaginal, and  sections were completed with the risks of each to both the patient as well as her baby. The possibility of a blood transfusion was discussed as well.   - The patient was counseled on types of analgesia during labor,

## 2024-07-22 ENCOUNTER — ROUTINE PRENATAL (OUTPATIENT)
Dept: OBGYN CLINIC | Age: 24
End: 2024-07-22
Payer: COMMERCIAL

## 2024-07-22 VITALS — SYSTOLIC BLOOD PRESSURE: 110 MMHG | WEIGHT: 148 LBS | DIASTOLIC BLOOD PRESSURE: 60 MMHG | BODY MASS INDEX: 29.89 KG/M2

## 2024-07-22 DIAGNOSIS — Z3A.39 39 WEEKS GESTATION OF PREGNANCY: Primary | ICD-10-CM

## 2024-07-22 PROCEDURE — 99213 OFFICE O/P EST LOW 20 MIN: CPT | Performed by: OBSTETRICS & GYNECOLOGY

## 2024-07-22 PROCEDURE — 1036F TOBACCO NON-USER: CPT | Performed by: OBSTETRICS & GYNECOLOGY

## 2024-07-22 PROCEDURE — G8417 CALC BMI ABV UP PARAM F/U: HCPCS | Performed by: OBSTETRICS & GYNECOLOGY

## 2024-07-22 PROCEDURE — G8427 DOCREV CUR MEDS BY ELIG CLIN: HCPCS | Performed by: OBSTETRICS & GYNECOLOGY

## 2024-07-23 ENCOUNTER — HOSPITAL ENCOUNTER (INPATIENT)
Age: 24
LOS: 2 days | Discharge: HOME | DRG: 560 | End: 2024-07-25
Attending: OBSTETRICS & GYNECOLOGY | Admitting: STUDENT IN AN ORGANIZED HEALTH CARE EDUCATION/TRAINING PROGRAM
Payer: COMMERCIAL

## 2024-07-23 ENCOUNTER — ANESTHESIA EVENT (OUTPATIENT)
Dept: LABOR AND DELIVERY | Age: 24
DRG: 560 | End: 2024-07-23
Payer: COMMERCIAL

## 2024-07-23 ENCOUNTER — ANESTHESIA (OUTPATIENT)
Dept: LABOR AND DELIVERY | Age: 24
DRG: 560 | End: 2024-07-23
Payer: COMMERCIAL

## 2024-07-23 PROBLEM — Z3A.39 39 WEEKS GESTATION OF PREGNANCY: Status: ACTIVE | Noted: 2024-07-23

## 2024-07-23 LAB
ABO + RH BLD: NORMAL
AMPHET UR QL SCN: NEGATIVE
ANTIBODY IDENTIFICATION: NORMAL
ARM BAND NUMBER: NORMAL
BARBITURATES UR QL SCN: NEGATIVE
BASOPHILS # BLD: 0.07 K/UL (ref 0–0.2)
BASOPHILS NFR BLD: 0 % (ref 0–2)
BENZODIAZ UR QL: NEGATIVE
BLOOD BANK SAMPLE EXPIRATION: NORMAL
BLOOD GROUP ANTIBODIES SERPL: POSITIVE
CANNABINOIDS UR QL SCN: NEGATIVE
COCAINE UR QL SCN: NEGATIVE
EOSINOPHIL # BLD: 0.14 K/UL (ref 0–0.44)
EOSINOPHILS RELATIVE PERCENT: 1 % (ref 1–4)
ERYTHROCYTE [DISTWIDTH] IN BLOOD BY AUTOMATED COUNT: 14.6 % (ref 11.8–14.4)
FENTANYL UR QL: NEGATIVE
HCT VFR BLD AUTO: 33.8 % (ref 36.3–47.1)
HGB BLD-MCNC: 10.7 G/DL (ref 11.9–15.1)
IMM GRANULOCYTES # BLD AUTO: 0.19 K/UL (ref 0–0.3)
IMM GRANULOCYTES NFR BLD: 1 %
LYMPHOCYTES NFR BLD: 2.2 K/UL (ref 1.1–3.7)
LYMPHOCYTES RELATIVE PERCENT: 14 % (ref 24–43)
MCH RBC QN AUTO: 27.6 PG (ref 25.2–33.5)
MCHC RBC AUTO-ENTMCNC: 31.7 G/DL (ref 28.4–34.8)
MCV RBC AUTO: 87.3 FL (ref 82.6–102.9)
METHADONE UR QL: NEGATIVE
MONOCYTES NFR BLD: 1.27 K/UL (ref 0.1–1.2)
MONOCYTES NFR BLD: 8 % (ref 3–12)
NEUTROPHILS NFR BLD: 76 % (ref 36–65)
NEUTS SEG NFR BLD: 12.1 K/UL (ref 1.5–8.1)
NRBC BLD-RTO: 0 PER 100 WBC
OPIATES UR QL SCN: NEGATIVE
OXYCODONE UR QL SCN: NEGATIVE
PCP UR QL SCN: NEGATIVE
PLATELET # BLD AUTO: 294 K/UL (ref 138–453)
PMV BLD AUTO: 8.8 FL (ref 8.1–13.5)
RBC # BLD AUTO: 3.87 M/UL (ref 3.95–5.11)
RBC # BLD: ABNORMAL 10*6/UL
T PALLIDUM AB SER QL IA: NONREACTIVE
TEST INFORMATION: NORMAL
WBC OTHER # BLD: 16 K/UL (ref 3.5–11.3)

## 2024-07-23 PROCEDURE — 2580000003 HC RX 258

## 2024-07-23 PROCEDURE — 80307 DRUG TEST PRSMV CHEM ANLYZR: CPT

## 2024-07-23 PROCEDURE — 86870 RBC ANTIBODY IDENTIFICATION: CPT

## 2024-07-23 PROCEDURE — 6370000000 HC RX 637 (ALT 250 FOR IP)

## 2024-07-23 PROCEDURE — 59409 OBSTETRICAL CARE: CPT | Performed by: STUDENT IN AN ORGANIZED HEALTH CARE EDUCATION/TRAINING PROGRAM

## 2024-07-23 PROCEDURE — 94640 AIRWAY INHALATION TREATMENT: CPT

## 2024-07-23 PROCEDURE — 6360000002 HC RX W HCPCS: Performed by: NURSE ANESTHETIST, CERTIFIED REGISTERED

## 2024-07-23 PROCEDURE — 86900 BLOOD TYPING SEROLOGIC ABO: CPT

## 2024-07-23 PROCEDURE — 86850 RBC ANTIBODY SCREEN: CPT

## 2024-07-23 PROCEDURE — 51701 INSERT BLADDER CATHETER: CPT

## 2024-07-23 PROCEDURE — 2500000003 HC RX 250 WO HCPCS: Performed by: NURSE ANESTHETIST, CERTIFIED REGISTERED

## 2024-07-23 PROCEDURE — 85025 COMPLETE CBC W/AUTO DIFF WBC: CPT

## 2024-07-23 PROCEDURE — 10907ZC DRAINAGE OF AMNIOTIC FLUID, THERAPEUTIC FROM PRODUCTS OF CONCEPTION, VIA NATURAL OR ARTIFICIAL OPENING: ICD-10-PCS | Performed by: STUDENT IN AN ORGANIZED HEALTH CARE EDUCATION/TRAINING PROGRAM

## 2024-07-23 PROCEDURE — 6360000002 HC RX W HCPCS

## 2024-07-23 PROCEDURE — 7200000001 HC VAGINAL DELIVERY

## 2024-07-23 PROCEDURE — 36415 COLL VENOUS BLD VENIPUNCTURE: CPT

## 2024-07-23 PROCEDURE — 2500000003 HC RX 250 WO HCPCS

## 2024-07-23 PROCEDURE — 1220000000 HC SEMI PRIVATE OB R&B

## 2024-07-23 PROCEDURE — 86901 BLOOD TYPING SEROLOGIC RH(D): CPT

## 2024-07-23 PROCEDURE — 3700000025 EPIDURAL BLOCK: Performed by: ANESTHESIOLOGY

## 2024-07-23 PROCEDURE — 86780 TREPONEMA PALLIDUM: CPT

## 2024-07-23 RX ORDER — ONDANSETRON 2 MG/ML
4 INJECTION INTRAMUSCULAR; INTRAVENOUS EVERY 6 HOURS PRN
Status: DISCONTINUED | OUTPATIENT
Start: 2024-07-23 | End: 2024-07-23

## 2024-07-23 RX ORDER — SODIUM CHLORIDE 0.9 % (FLUSH) 0.9 %
5-40 SYRINGE (ML) INJECTION EVERY 12 HOURS SCHEDULED
Status: DISCONTINUED | OUTPATIENT
Start: 2024-07-23 | End: 2024-07-25 | Stop reason: HOSPADM

## 2024-07-23 RX ORDER — LIDOCAINE 4 G/G
1 PATCH TOPICAL DAILY PRN
Status: DISCONTINUED | OUTPATIENT
Start: 2024-07-23 | End: 2024-07-25 | Stop reason: HOSPADM

## 2024-07-23 RX ORDER — ACETAMINOPHEN 500 MG
1000 TABLET ORAL EVERY 6 HOURS PRN
Qty: 120 TABLET | Refills: 0 | Status: SHIPPED | OUTPATIENT
Start: 2024-07-23

## 2024-07-23 RX ORDER — DIPHENHYDRAMINE HYDROCHLORIDE 50 MG/ML
25 INJECTION INTRAMUSCULAR; INTRAVENOUS EVERY 4 HOURS PRN
Status: DISCONTINUED | OUTPATIENT
Start: 2024-07-23 | End: 2024-07-23

## 2024-07-23 RX ORDER — SODIUM CHLORIDE 9 MG/ML
INJECTION, SOLUTION INTRAVENOUS PRN
Status: DISCONTINUED | OUTPATIENT
Start: 2024-07-23 | End: 2024-07-25 | Stop reason: HOSPADM

## 2024-07-23 RX ORDER — LIDOCAINE 4 G/G
1 PATCH TOPICAL DAILY
Status: DISCONTINUED | OUTPATIENT
Start: 2024-07-23 | End: 2024-07-23

## 2024-07-23 RX ORDER — ROPIVACAINE HYDROCHLORIDE 2 MG/ML
INJECTION, SOLUTION EPIDURAL; INFILTRATION; PERINEURAL
Status: COMPLETED
Start: 2024-07-23 | End: 2024-07-23

## 2024-07-23 RX ORDER — SENNA AND DOCUSATE SODIUM 50; 8.6 MG/1; MG/1
1 TABLET, FILM COATED ORAL DAILY
Status: DISCONTINUED | OUTPATIENT
Start: 2024-07-23 | End: 2024-07-23

## 2024-07-23 RX ORDER — ONDANSETRON 4 MG/1
4 TABLET, ORALLY DISINTEGRATING ORAL EVERY 6 HOURS PRN
Status: DISCONTINUED | OUTPATIENT
Start: 2024-07-23 | End: 2024-07-25 | Stop reason: HOSPADM

## 2024-07-23 RX ORDER — SODIUM CHLORIDE, SODIUM LACTATE, POTASSIUM CHLORIDE, AND CALCIUM CHLORIDE .6; .31; .03; .02 G/100ML; G/100ML; G/100ML; G/100ML
500 INJECTION, SOLUTION INTRAVENOUS PRN
Status: DISCONTINUED | OUTPATIENT
Start: 2024-07-23 | End: 2024-07-23

## 2024-07-23 RX ORDER — FLUTICASONE PROPIONATE 110 UG/1
2 AEROSOL, METERED RESPIRATORY (INHALATION) 2 TIMES DAILY
Status: DISCONTINUED | OUTPATIENT
Start: 2024-07-23 | End: 2024-07-25 | Stop reason: HOSPADM

## 2024-07-23 RX ORDER — SIMETHICONE 80 MG
80 TABLET,CHEWABLE ORAL EVERY 6 HOURS PRN
Status: DISCONTINUED | OUTPATIENT
Start: 2024-07-23 | End: 2024-07-25 | Stop reason: HOSPADM

## 2024-07-23 RX ORDER — SODIUM CHLORIDE 0.9 % (FLUSH) 0.9 %
5-40 SYRINGE (ML) INJECTION PRN
Status: DISCONTINUED | OUTPATIENT
Start: 2024-07-23 | End: 2024-07-23

## 2024-07-23 RX ORDER — LIDOCAINE HYDROCHLORIDE AND EPINEPHRINE 15; 5 MG/ML; UG/ML
INJECTION, SOLUTION EPIDURAL PRN
Status: DISCONTINUED | OUTPATIENT
Start: 2024-07-23 | End: 2024-07-23 | Stop reason: SDUPTHER

## 2024-07-23 RX ORDER — ALBUTEROL SULFATE 90 UG/1
2 AEROSOL, METERED RESPIRATORY (INHALATION) EVERY 6 HOURS PRN
Status: DISCONTINUED | OUTPATIENT
Start: 2024-07-23 | End: 2024-07-25 | Stop reason: HOSPADM

## 2024-07-23 RX ORDER — SENNA AND DOCUSATE SODIUM 50; 8.6 MG/1; MG/1
2 TABLET, FILM COATED ORAL NIGHTLY
Status: DISCONTINUED | OUTPATIENT
Start: 2024-07-23 | End: 2024-07-25 | Stop reason: HOSPADM

## 2024-07-23 RX ORDER — SODIUM CHLORIDE 9 MG/ML
25 INJECTION, SOLUTION INTRAVENOUS PRN
Status: DISCONTINUED | OUTPATIENT
Start: 2024-07-23 | End: 2024-07-23

## 2024-07-23 RX ORDER — SENNOSIDES A AND B 8.6 MG/1
1 TABLET, FILM COATED ORAL 2 TIMES DAILY
Qty: 60 TABLET | Refills: 1 | Status: SHIPPED | OUTPATIENT
Start: 2024-07-23 | End: 2025-07-23

## 2024-07-23 RX ORDER — ACETAMINOPHEN 500 MG
1000 TABLET ORAL EVERY 6 HOURS PRN
Status: DISCONTINUED | OUTPATIENT
Start: 2024-07-23 | End: 2024-07-24

## 2024-07-23 RX ORDER — DIPHENHYDRAMINE HCL 25 MG
25 TABLET ORAL EVERY 4 HOURS PRN
Status: DISCONTINUED | OUTPATIENT
Start: 2024-07-23 | End: 2024-07-23

## 2024-07-23 RX ORDER — BISACODYL 10 MG
10 SUPPOSITORY, RECTAL RECTAL DAILY PRN
Status: DISCONTINUED | OUTPATIENT
Start: 2024-07-23 | End: 2024-07-25 | Stop reason: HOSPADM

## 2024-07-23 RX ORDER — SODIUM CHLORIDE 0.9 % (FLUSH) 0.9 %
5-40 SYRINGE (ML) INJECTION EVERY 12 HOURS SCHEDULED
Status: DISCONTINUED | OUTPATIENT
Start: 2024-07-23 | End: 2024-07-23

## 2024-07-23 RX ORDER — FERROUS SULFATE 325(65) MG
325 TABLET, DELAYED RELEASE (ENTERIC COATED) ORAL
Status: DISCONTINUED | OUTPATIENT
Start: 2024-07-24 | End: 2024-07-25 | Stop reason: HOSPADM

## 2024-07-23 RX ORDER — ONDANSETRON 2 MG/ML
4 INJECTION INTRAMUSCULAR; INTRAVENOUS EVERY 6 HOURS PRN
Status: DISCONTINUED | OUTPATIENT
Start: 2024-07-23 | End: 2024-07-25 | Stop reason: HOSPADM

## 2024-07-23 RX ORDER — ACETAMINOPHEN 500 MG
1000 TABLET ORAL EVERY 6 HOURS PRN
Status: DISCONTINUED | OUTPATIENT
Start: 2024-07-23 | End: 2024-07-23

## 2024-07-23 RX ORDER — LIDOCAINE HYDROCHLORIDE 10 MG/ML
30 INJECTION, SOLUTION EPIDURAL; INFILTRATION; INTRACAUDAL; PERINEURAL PRN
Status: DISCONTINUED | OUTPATIENT
Start: 2024-07-23 | End: 2024-07-23

## 2024-07-23 RX ORDER — LANOLIN 72 %
OINTMENT (GRAM) TOPICAL PRN
Status: DISCONTINUED | OUTPATIENT
Start: 2024-07-23 | End: 2024-07-25 | Stop reason: HOSPADM

## 2024-07-23 RX ORDER — IBUPROFEN 600 MG/1
600 TABLET ORAL EVERY 6 HOURS PRN
Qty: 120 TABLET | Refills: 0 | Status: SHIPPED | OUTPATIENT
Start: 2024-07-23

## 2024-07-23 RX ORDER — ONDANSETRON 4 MG/1
4 TABLET, ORALLY DISINTEGRATING ORAL EVERY 6 HOURS PRN
Status: DISCONTINUED | OUTPATIENT
Start: 2024-07-23 | End: 2024-07-23

## 2024-07-23 RX ORDER — ROPIVACAINE HYDROCHLORIDE 2 MG/ML
INJECTION, SOLUTION EPIDURAL; INFILTRATION; PERINEURAL PRN
Status: DISCONTINUED | OUTPATIENT
Start: 2024-07-23 | End: 2024-07-23 | Stop reason: SDUPTHER

## 2024-07-23 RX ORDER — SODIUM CHLORIDE, SODIUM LACTATE, POTASSIUM CHLORIDE, CALCIUM CHLORIDE 600; 310; 30; 20 MG/100ML; MG/100ML; MG/100ML; MG/100ML
INJECTION, SOLUTION INTRAVENOUS CONTINUOUS
Status: DISCONTINUED | OUTPATIENT
Start: 2024-07-23 | End: 2024-07-23

## 2024-07-23 RX ORDER — SODIUM CHLORIDE 0.9 % (FLUSH) 0.9 %
5-40 SYRINGE (ML) INJECTION PRN
Status: DISCONTINUED | OUTPATIENT
Start: 2024-07-23 | End: 2024-07-25 | Stop reason: HOSPADM

## 2024-07-23 RX ORDER — IBUPROFEN 600 MG/1
600 TABLET ORAL EVERY 6 HOURS PRN
Status: DISCONTINUED | OUTPATIENT
Start: 2024-07-23 | End: 2024-07-24

## 2024-07-23 RX ORDER — LIDOCAINE HYDROCHLORIDE 10 MG/ML
INJECTION, SOLUTION EPIDURAL; INFILTRATION; INTRACAUDAL; PERINEURAL PRN
Status: DISCONTINUED | OUTPATIENT
Start: 2024-07-23 | End: 2024-07-23 | Stop reason: SDUPTHER

## 2024-07-23 RX ADMIN — SODIUM CHLORIDE, PRESERVATIVE FREE 10 ML: 5 INJECTION INTRAVENOUS at 20:40

## 2024-07-23 RX ADMIN — FLUTICASONE PROPIONATE 2 PUFF: 110 AEROSOL, METERED RESPIRATORY (INHALATION) at 21:04

## 2024-07-23 RX ADMIN — LIDOCAINE HYDROCHLORIDE,EPINEPHRINE BITARTRATE 2 ML: 15; .005 INJECTION, SOLUTION EPIDURAL; INFILTRATION; INTRACAUDAL; PERINEURAL at 07:34

## 2024-07-23 RX ADMIN — FLUTICASONE PROPIONATE 2 PUFF: 110 AEROSOL, METERED RESPIRATORY (INHALATION) at 09:18

## 2024-07-23 RX ADMIN — LIDOCAINE HYDROCHLORIDE 5 ML: 10 INJECTION, SOLUTION EPIDURAL; INFILTRATION; INTRACAUDAL; PERINEURAL at 13:11

## 2024-07-23 RX ADMIN — ACETAMINOPHEN 1000 MG: 500 TABLET ORAL at 16:52

## 2024-07-23 RX ADMIN — IBUPROFEN 600 MG: 600 TABLET, FILM COATED ORAL at 14:34

## 2024-07-23 RX ADMIN — LIDOCAINE HYDROCHLORIDE,EPINEPHRINE BITARTRATE 3 ML: 15; .005 INJECTION, SOLUTION EPIDURAL; INFILTRATION; INTRACAUDAL; PERINEURAL at 07:31

## 2024-07-23 RX ADMIN — ACETAMINOPHEN 1000 MG: 500 TABLET ORAL at 23:05

## 2024-07-23 RX ADMIN — Medication 166.7 ML: at 14:02

## 2024-07-23 RX ADMIN — SODIUM CHLORIDE, POTASSIUM CHLORIDE, SODIUM LACTATE AND CALCIUM CHLORIDE: 600; 310; 30; 20 INJECTION, SOLUTION INTRAVENOUS at 05:55

## 2024-07-23 RX ADMIN — Medication 8 ML: at 07:37

## 2024-07-23 RX ADMIN — Medication 8 ML/HR: at 07:38

## 2024-07-23 RX ADMIN — IBUPROFEN 600 MG: 600 TABLET, FILM COATED ORAL at 20:35

## 2024-07-23 RX ADMIN — SODIUM CHLORIDE, POTASSIUM CHLORIDE, SODIUM LACTATE AND CALCIUM CHLORIDE: 600; 310; 30; 20 INJECTION, SOLUTION INTRAVENOUS at 08:39

## 2024-07-23 RX ADMIN — SENNOSIDES AND DOCUSATE SODIUM 2 TABLET: 50; 8.6 TABLET ORAL at 20:35

## 2024-07-23 RX ADMIN — BENZOCAINE AND LEVOMENTHOL: 200; 5 SPRAY TOPICAL at 16:52

## 2024-07-23 ASSESSMENT — ENCOUNTER SYMPTOMS: SHORTNESS OF BREATH: 0

## 2024-07-23 ASSESSMENT — LIFESTYLE VARIABLES: SMOKING_STATUS: 0

## 2024-07-23 NOTE — ANESTHESIA PROCEDURE NOTES
Epidural Block    Patient location during procedure: OB  Reason for block: labor epidural  Staffing  Performed: resident/CRNA   Resident/CRNA: Ja Banegas APRN - CRNA  Performed by: Ja Banegas APRN - CRNA  Authorized by: Terry Larsen MD    Epidural  Patient position: sitting  Prep: ChloraPrep  Patient monitoring: continuous pulse ox and frequent blood pressure checks  Approach: midline  Location: L4-5  Injection technique: GINETTE saline  Guidance: paresthesia technique  Provider prep: mask and sterile gloves  Needle  Needle type: Tuohy   Needle gauge: 17 G  Needle length: 3.5 in  Needle insertion depth: 7 cm  Catheter type: side hole  Catheter size: 19 G  Catheter at skin depth: 12 cm  Test dose: negativeCatheter Secured: tegaderm and tape  Assessment  Sensory level: T10  Hemodynamics: stable  Attempts: 1  Outcomes: uncomplicated and patient tolerated procedure well  Preanesthetic Checklist  Completed: patient identified, IV checked, site marked, risks and benefits discussed, surgical/procedural consents, equipment checked, pre-op evaluation, timeout performed, anesthesia consent given, oxygen available, monitors applied/VS acknowledged, fire risk safety assessment completed and verbalized and blood product R/B/A discussed and consented

## 2024-07-23 NOTE — DISCHARGE SUMMARY
Obstetric Discharge Summary  [facility]    Patient Name: Tylor Lakhani  Patient : 2000  Primary Care Physician: Jenifer Brambila APRN - CNP  Admit Date: 2024    Principal Diagnosis: IUP at 39w5d, admitted for spontaneous labor     Her pregnancy has been complicated by:   Patient Active Problem List   Diagnosis    Asthma    FHx Chiari malformation type I and cleft lip    THC use    Family history of autism     22 M 6#8 Apg 6/9    History of pre-eclampsia    Mild depression    POTS (postural orthostatic tachycardia syndrome)     24  F Apg 8/9 Wt 6#12    Postpartum state     Infection Present?: No  Hospital Acquired: N/A    Surgical Operations & Procedures:  Analgesia: epidural  Delivery Type: Spontaneous Vaginal Delivery: See Labor and Delivery Summary   Laceration(s): Absent    Consultations: Anesthesia    Pertinent Findings & Procedures:   Tylor Lakhani is a 24 y.o. female  at 39w5d admitted for spontaneous labor; received epidural and AROM.     She delivered by spontaneous vaginal a Live Born infant on 24.       Information for the patient's :  Casie Lakhani [[id number]]   female   Birth Weight: 3.025 kg (6 lb 10.7 oz)     Apgars: 8 at 1 minute and 9 at 5 minutes.     Postpartum course: normal.      Course of patient: uncomplicated    Discharge to: Home    Readmission planned: no     Indication for 6 week PP 2 hour GTT?: no     Eligible for 2 week PP virtual visit? no - private patient    Recommendations on Discharge:     Medications:      Medication List        START taking these medications      acetaminophen 500 MG tablet  Commonly known as: TYLENOL  Take 2 tablets by mouth every 6 hours as needed for Pain     ibuprofen 600 MG tablet  Commonly known as: ADVIL;MOTRIN  Take 1 tablet by mouth every 6 hours as needed for Pain     senna 8.6 MG tablet  Commonly known as: Senokot  Take 1 tablet by mouth 2 times daily            CONTINUE

## 2024-07-23 NOTE — CARE COORDINATION
ANTEPARTUM NOTE    39 weeks gestation of pregnancy [Z3A.39]    Tylor was admitted to L&D on 7/23/24 for contractions @ 39w5d    OB GYN Provider: Dr. Champion    Will meet with patient after delivery to verify name/address/phone/insurance and discuss discharge planning.     Anticipate DC home 2 nights after vaginal delivery or 4 nights after C/S delivery as long as hemodynamically stable.

## 2024-07-23 NOTE — H&P
Diagnosis Date Noted    POTS (postural orthostatic tachycardia syndrome) 2023     Priority: Medium    Mild depression 2022     Priority: Medium    39 weeks gestation of pregnancy 2024    History of pre-eclampsia 2021     Diagnosed PPD1 elevated BP/elevated pc ratio       22 M 6#8 Apg      Family history of autism 2021    FHx Chiari malformation type I and cleft lip 2021     FOB half brother  Anatomy scan wnl  NIPT normal      THC use 2021    Asthma 2021     Plan discussed with Dr. Champion, who is agreeable.     Steroids given this admission: Yes    Risks, benefits, alternatives and possible complications have been discussed in detail with the patient. Admission, and post admission procedures and expectations were discussed in detail. All questions were answered.    Attending's Name: Dr. Akira Escalera DO  Ob/Gyn Resident  2024, 5:54 AM     Resident Physician Statement  I have discussed the case, including pertinent history and exam findings with the above. I have personally seen the patient. I agree with the assessment, plan and orders as documented. I have made changes to the above note as needed. I have discussed the case with above named attending.     Patient is in agreement with plan. All questions answered.    Jama Worthy MD  OB/GYN Resident  2024  9:34 AM

## 2024-07-23 NOTE — L&D DELIVERY NOTE
Michelle Ayala MD, PGY2, Estrella Chang MD, PGY1    Infant Information:   Information for the patient's :  Casie Lakhani [[id number]]      Information for the patient's :  Casie Lakhani [[id number]]        Apgar scores: 8 at 1 minute and 9 at 5 minutes.     Anesthesia:  epidural anesthesia    Application and Delivery:    She was known to be GBS negative and no antibiotic prophylaxis was indicated.     The patient progressed well, received an epidural, became complete.   This resident was called into the room by RN alerting that tones are down. As this resident entered patient room, fetal heart tones in the low 60s. Amniotic membranes are bulging and protruding from the introitus. Attending physician Dr. Champion is called for along with in house attending physician Dr. Sharma. Urinary de la fuente catheter is removed. Fetal heart tones are noted to be in the 70s. Allis clamp is used to rupture bag of membranes and patient is encouraged to push. After pushing with contractions the fetal head delivered Cephalic, left occiput anterior over an intact perineum, nuchal cord was not present.  The anterior, then posterior shoulder delivered easily and atraumatically followed by the rest of the infant. Nose and mouth suctioned with bulb suction, infant was stimulated and dried. Cord was clamped and cut after one minute delayed cord clamping and infant was placed on maternal abdomen, and attended by RN for evaluation. The delivery of the placenta was spontaneous and appeared intact, whole, and that the umbilical cord had three vessels noted. Pitocin was started. The vagina was swept of all clots and debris. The perineum and vagina were evaluated and no laceration was found. Mother and baby tolerated procedure well.     Dr. Champion was present for delivery of placenta.    Delivery Summary:  Labor & Delivery Summary  Dilation Complete Date: 24  Dilation Complete Time: 1346    Specimen: cord blood and

## 2024-07-23 NOTE — FLOWSHEET NOTE
Pt admitted to room 741 per wheelchair in stable condition from L&D  Oriented to room and surroundings  Bed in lowest position, wheels locked, 2/4 side rails up  Call light in reach, room free of clutter, adequate lighting provided  Denies any further questions at this time  Instructed to call out with any questions/concerns/new onset of pain and/or n/v   White board updated  Continue to monitor with hourly rounding  Non-skid socks on/at bedside

## 2024-07-23 NOTE — FLOWSHEET NOTE
505-Patient arrived via w/c to L&D unit, room 707 , with c/o contractions that worsened about 2300 last evening.  Denies leakage of fluid or vaginal bleeding.  Relates pain 7-8/10 of lower \"pelvic area and lower back\".  0511- Monitor applied.  States +FM.

## 2024-07-23 NOTE — ANESTHESIA POSTPROCEDURE EVALUATION
Department of Anesthesiology  Postprocedure Note    Patient: Tylor Lakhani  MRN: [id number]  YOB: 2000  Date of evaluation: 7/23/2024    Procedure Summary       Date: 07/23/24 Room / Location:     Anesthesia Start: 0712 Anesthesia Stop: 1358    Procedure: Labor Analgesia Diagnosis:     Scheduled Providers:  Responsible Provider: Terry Larsen MD    Anesthesia Type: epidural ASA Status: 2            Anesthesia Type: No value filed.    Hiren Phase I:      Hiren Phase II:      Anesthesia Post Evaluation    Patient location during evaluation: floor  Patient participation: complete - patient participated  Level of consciousness: awake and alert  Airway patency: patent  Nausea & Vomiting: no nausea and no vomiting  Cardiovascular status: blood pressure returned to baseline  Respiratory status: acceptable  Hydration status: euvolemic  Pain management: adequate    No notable events documented.

## 2024-07-23 NOTE — ANESTHESIA PRE PROCEDURE
Department of Anesthesiology  Preprocedure Note       Name:  Tylor Lakhani   Age:  24 y.o.  :  2000                                          MRN:  [id number]         Date:  2024      Surgeon: * No surgeons listed *    Procedure: * No procedures listed *    Medications prior to admission:   Prior to Admission medications    Medication Sig Start Date End Date Taking? Authorizing Provider   metoclopramide (REGLAN) 5 MG tablet Take 1 tablet by mouth 3 times daily 24   Charis Champion DO   magnesium oxide (MAG-OX) 400 MG tablet Take 1 tablet by mouth daily 24   Charis Champion DO   Ztgjga-ZmEug-FuWss-FA-CA-Omega (COMPLETE ALE DHA) 29-1-200 & 200 MG MISC TAKE 1 TABLET BY MOUTH DAILY AND TAKE 1 CAPSULE BY MOUTH ONCE DAILY 24   Provider, MD [place]   aspirin 81 MG EC tablet Take 1 tablet by mouth daily 23   Melody Chavarria APRN - CNM   Fiber Select Gummies CHEW Take 1 each by mouth daily 23   Melody Chavarria APRN - CNM   albuterol sulfate HFA (PROVENTIL;VENTOLIN;PROAIR) 108 (90 Base) MCG/ACT inhaler Inhale 2 puffs into the lungs every 6 hours as needed for Wheezing or Shortness of Breath 23   Jaja Johnson APRN - CNP   fluticasone (FLOVENT HFA) 110 MCG/ACT inhaler Inhale 2 puffs into the lungs 2 times daily 23  Jaja Johnson APRN - CNP       Current medications:    Current Facility-Administered Medications   Medication Dose Route Frequency Provider Last Rate Last Admin    albuterol sulfate HFA (PROVENTIL;VENTOLIN;PROAIR) 108 (90 Base) MCG/ACT inhaler 2 puff  2 puff Inhalation Q6H PRN Nohemi Escalera DO        fluticasone (FLOVENT HFA) 110 MCG/ACT inhaler 2 puff  2 puff Inhalation BID Nohemi Escalera DO        lactated ringers IV soln infusion   IntraVENous Continuous Nohemi Escalera  mL/hr at 24 0650 Rate Verify at 24 0650    lactated ringers bolus 500 mL  500 mL IntraVENous PRN Nohemi Escalera, DO        Or

## 2024-07-24 PROBLEM — Z3A.39 39 WEEKS GESTATION OF PREGNANCY: Status: RESOLVED | Noted: 2024-07-23 | Resolved: 2024-07-24

## 2024-07-24 PROCEDURE — 6370000000 HC RX 637 (ALT 250 FOR IP)

## 2024-07-24 PROCEDURE — 99024 POSTOP FOLLOW-UP VISIT: CPT | Performed by: OBSTETRICS & GYNECOLOGY

## 2024-07-24 PROCEDURE — 1220000000 HC SEMI PRIVATE OB R&B

## 2024-07-24 PROCEDURE — 94640 AIRWAY INHALATION TREATMENT: CPT

## 2024-07-24 RX ORDER — ACETAMINOPHEN 500 MG
1000 TABLET ORAL EVERY 6 HOURS SCHEDULED
Status: DISCONTINUED | OUTPATIENT
Start: 2024-07-24 | End: 2024-07-25 | Stop reason: HOSPADM

## 2024-07-24 RX ORDER — IBUPROFEN 600 MG/1
600 TABLET ORAL EVERY 6 HOURS
Status: DISCONTINUED | OUTPATIENT
Start: 2024-07-24 | End: 2024-07-25 | Stop reason: HOSPADM

## 2024-07-24 RX ADMIN — ACETAMINOPHEN 1000 MG: 500 TABLET ORAL at 23:16

## 2024-07-24 RX ADMIN — IBUPROFEN 600 MG: 600 TABLET, FILM COATED ORAL at 23:16

## 2024-07-24 RX ADMIN — FLUTICASONE PROPIONATE 2 PUFF: 110 AEROSOL, METERED RESPIRATORY (INHALATION) at 07:32

## 2024-07-24 RX ADMIN — FLUTICASONE PROPIONATE 2 PUFF: 110 AEROSOL, METERED RESPIRATORY (INHALATION) at 21:24

## 2024-07-24 NOTE — CONSULTS
Breastfeeding education given and reviewed with pt. Pt reports difficulty feeding her first son and she was pumping and syringe feeding in the hospital. She reports a different experience this time with comfortable latch at the breast and regular feedings. Reviewed cluster feeding and normal  feeding patterns the first 24 hours. Encouraged pt to call out as needed for assistance.

## 2024-07-24 NOTE — CARE COORDINATION
SW met with mom and dad at bedside. Mom noted they are doing well and are excited to go home. Mom noted she lives with BF/FOB, Dougie, and their three year old son. Mom denied barriers in transportation, insurance or outside resources. She noted they have all necessary baby items, a strong support system, and safe sleep. Baby's name is Johnna. PCP is Keshia Melendez.     Due to UDS being + in early pregnancy - SW placed call to [facility] intake. Per , baby is safe to d/c home with mom at this time.   SW will remain available for ongoing support.

## 2024-07-24 NOTE — PROGRESS NOTES
Tylor Lakhani is a 24 y.o. female 39w5d        OB History    Para Term  AB Living   2 2 2     2   SAB IAB Ectopic Molar Multiple Live Births           0 2      # Outcome Date GA Lbr Ric/2nd Weight Sex Delivery Anes PTL Lv   2 Term 25 39w5d / 00:12 3.025 kg (6 lb 10.7 oz) F Vag-Spont EPI N GIANNI   1 Term 22 38w1d 02:04 / 00:22 2.95 kg (6 lb 8.1 oz) M Vag-Spont EPI N GIANNI       Vitals  BP: 110/60  Weight - Scale: 67.1 kg (148 lb)  Fundal Height (cm): 38 cm  Fetal HR: 136  Movement: Present  Presentation: Vertex      The patient was seen and evaluated. There was positive fetal movements. No contractions or leakage of fluid. Signs and symptoms of labor were reviewed.  The S/S of Pre-Eclampsia were reviewed with the patient in detail. She is to report any of these if they occur. She currently denies any of these.    The patient was instructed on fetal kick counts and was given a kick sheet to complete every 8 hours. She was instructed that the baby should move at a minimum of ten times within one hour after a meal. The patient was instructed to lay down on her left side twenty minutes after eating and count movements for up to one hour with a target value of ten movements.  She was instructed to notify the office if she did not make that target after two attempts or if after any attempt there was less than four movements.    The patient reports that the targets have been made Yes.    Patient wanting spontaneous labor. Scheduled for 40 week US  Insurance: Santiago    IPV bag/mug given:2023 Jillian Blanco LPN   Genetic Information given/reviewed: 2023 BEBA Laws CNM   1st trimester education packet given:2023 Jillian Blanco LPN   2nd trimester education packet given: 2024 BEBA Laws CNM   3rd trimester education packet given:      Knows gender girl  FOB Name: Lawrence Constantino    [x]Pap needed at prenatal visit 10/8/23 NILM  [x]

## 2024-07-25 VITALS
HEART RATE: 98 BPM | RESPIRATION RATE: 16 BRPM | TEMPERATURE: 97.9 F | OXYGEN SATURATION: 98 % | DIASTOLIC BLOOD PRESSURE: 74 MMHG | SYSTOLIC BLOOD PRESSURE: 118 MMHG

## 2024-07-25 PROCEDURE — 6370000000 HC RX 637 (ALT 250 FOR IP)

## 2024-07-25 PROCEDURE — 94640 AIRWAY INHALATION TREATMENT: CPT

## 2024-07-25 PROCEDURE — 99024 POSTOP FOLLOW-UP VISIT: CPT | Performed by: OBSTETRICS & GYNECOLOGY

## 2024-07-25 RX ADMIN — FLUTICASONE PROPIONATE 2 PUFF: 110 AEROSOL, METERED RESPIRATORY (INHALATION) at 07:43

## 2024-07-25 RX ADMIN — FERROUS SULFATE TAB EC 325 MG (65 MG FE EQUIVALENT) 325 MG: 325 (65 FE) TABLET DELAYED RESPONSE at 08:45

## 2024-07-25 NOTE — PROGRESS NOTES
CLINICAL PHARMACY NOTE: MEDS TO BEDS    Total # of Prescriptions Filled: 1   The following medications were delivered to the patient:  ibuprofen    Additional Documentation:    
CLINICAL PHARMACY NOTE: MEDS TO BEDS    Total # of Prescriptions Filled: 3   The following medications were delivered to the patient:  IBU 600MG   TYLENOL 500MG   SENNA 8.6MG     Additional Documentation:     NATALIE DELIVERED, NO COPAY  
Labor Progress Note    Tylor Lakhani is a 24 y.o. female  at 39w5d  The patient was seen and examined. Her pain is well controlled on an epidural. She reports fetal movement is present, complains of contractions, complains of loss of fluid, denies vaginal bleeding.       Vital Signs:  Vitals:    24 0815 24 0830 24 0900 24 0915   BP: 97/79 (!) 98/55 (!) 90/52    Pulse: 86 84 83    Resp:       Temp:    98.6 °F (37 °C)   TempSrc:    Oral   SpO2: 97% 99% 97%         FHT: 140, moderate variability, accelerations present, decelerations absent  Contractions: regular, every 4 minutes    Chaperone for Intimate Exam: Chaperone was present for entire exam, Chaperone Name: Alisha TRISTAN   Cervical Exam: 4 cm dilated, 90 effaced, -1 station  Pitocin: @ 0 mu/min    Membranes: Intact  Scalp Electrode in place: absent  Intrauterine Pressure Catheter in Place: absent    Interventions: SVE 4    Assessment/Plan:  Tylor Lakhani is a 24 y.o. female  at 39w5d admitted for Contraction > Spontaneous labour   - GBS negative, No indication for GBS prophylaxis   - VSS, afebrile  - cEFM and TOCO   - Cat 1 FHT and TOCO showing regular contractions   - SVE //-1   - Epidural   - Continue expectant management   - Will continue to monitor closely    Attending updated and in agreement with plan    Estrella Chang MD  Ob/Gyn Resident  2024, 10:09 AM   
Labor Progress Note    Tylor Lakhani is a 24 y.o. female  at 39w5d  The patient was seen and examined. Her pain is well controlled. She reports fetal movement is present, complains of contractions, denies loss of fluid, denies vaginal bleeding.       Vital Signs:  Vitals:    24 1402 24 1415 24 1430 24 1500   BP: (!) 121/98 (!) 123/96 117/66 109/73   Pulse:   86 92   Resp:       Temp:       TempSrc:       SpO2:           FHT: 140, moderate variability, accelerations present, decelerations absent  Contractions: regular, every 2-4 minutes    Chaperone for Intimate Exam: Chaperone was present for entire exam, Chaperone Name: AZALEA Brito  Cervical Exam: 10 cm dilated, 100 effaced, +1 station  Pitocin: N/a    Membranes: Intact  Scalp Electrode in place: absent  Intrauterine Pressure Catheter in Place: absent    Interventions: SVE    Assessment/Plan:  Tylor Lakhani is a 24 y.o. female  at 39w5d admitted for spontaneous labor   - GBS negative, No indication for GBS prophylaxis   - Expectant management   - Epidural in place and functioning well   - Patient feeling more pressure and SVE reveals complete dilation with bulging bag   - Anticipate vaginal delivery    Attending updated and in agreement with plan.    Michelle Ayala MD  Ob/Gyn Resident  2024, 1:54 PM    
POST PARTUM DAY # 2    Tylor Lakhani is a 24 y.o. female  This patient was seen & examined today.     Her pregnancy was complicated by:   Patient Active Problem List   Diagnosis    Asthma    FHx Chiari malformation type I and cleft lip    THC use    Family history of autism     22 M 6#8 Apg 6/9    History of pre-eclampsia    Mild depression    POTS (postural orthostatic tachycardia syndrome)     25  F Apg 8/9 Wt 6#12    Postpartum state     Today she is doing well without any chief complaint. Her lochia is light. She denies chest pain, shortness of breath, and headache. She is  breast feeding and she denies any breast tenderness. She is ambulating well. Her voiding pattern is normal. I reviewed signs and symptoms of post partum depression with the patient, she currently denies any of these symptoms. She is tolerating solids.     Vital Signs:  Vitals:    24 0417 24 0756 24 1635 24 1955   BP: 113/68 107/72 126/62 114/70   Pulse: 81 89 78 77   Resp: 16 16 18 16   Temp: 98.4 °F (36.9 °C) 97.9 °F (36.6 °C) 97.6 °F (36.4 °C) 98.2 °F (36.8 °C)   TempSrc: Oral Oral Oral Oral   SpO2:  100% 98% 99%         Physical Exam:  General:  no apparent distress, alert, and cooperative  Neurologic:  alert, oriented, normal speech, no focal findings or movement disorder noted  Lungs:  No increased work of breathing, good air exchange  Heart:  regular rate  Abdomen: abdomen soft, non-distended, non-tender  Fundus: non-tender, normal size, firm, below umbilicus  Extremities:  no calf tenderness, non edematous    Lab:  Lab Results   Component Value Date    HGB 10.7 (L) 2024     Lab Results   Component Value Date    HCT 33.8 (L) 2024       Assessment/Plan:  Tylor Lakhani is a  PPD # 2 s/p    - Doing well, VSS   - female infant in General Care Nursery   - Encourage ambulation  Rh negative/Rubella immune   - Rhogam and MMR not indicated  Breast feeding  - Denies s/s 
run out.   Lifestyle Changes    Having a baby requires significant lifestyle changes. You and your doctor will plan lifestyle changes that will help you recover. Some things to keep in mind include:   It is important to get enough rest so you can recover. Try sleeping when the baby sleeps.   Ask your doctor when you can resume sexual relations. If you have not done so already, talk to your doctor about birth control options.   If you are breastfeeding, consider a breast pump.   Call your obstetrician and/or pediatrician for any questions that arise.   Understand the changes your body is going through as it recovers from childbirth:   Hot and cold flashes as your body adjusts to new hormone and blood flow levels   Urinary or fecal incontinence due to stretched muscles   After pains from uterine contractions as the uterus shrinks   Vaginal bleeding (called lochia), which is heavier than your period (generally stops within two months)   Baby blues, feelings of irritability, sadness, crying, or anxiety. Postpartum depression is more severe, occurring in 10%-20% of new moms. If you experience such symptoms, contact your doctor.   Consider joining a support group for new mothers. You can get encouragement and learn parenting strategies.   Follow-up   Schedule a follow-up appointment as directed by your doctor.   Call Your Doctor If Any of the Following Occurs   It is important for you to monitor your recovery once you leave the hospital. That way, you can alert your doctor to any problems immediately. If any of the following occurs, call your doctor:   Signs of infection, including fever and chills    Increased bleeding: soaking more than one sanitary pad an hour    Wounds that become red, swollen or drain pus    Vaginal discharge that smells foul    New pain, swelling, or tenderness in your legs    Pain that you can't control with the medications you've been given    Pain, burning, urgency or frequency of urination, or

## 2024-07-25 NOTE — LACTATION NOTE
Mom reports she had pumped 11 mls and gave 2 mls to the baby at around 9:30 AM before baby went for testing. She noted she had just given the 9 mls to baby, noting that the baby is very sleepy today due to testing. Encouraged letting baby rest for an hour, then placing the baby skin to skin for an hour to help baby alert well for a feeding. Encouraged her to call out for LC assistance when baby alerts for a feed.

## 2024-07-25 NOTE — PLAN OF CARE
Problem: Pain  Goal: Verbalizes/displays adequate comfort level or baseline comfort level  7/25/2024 1646 by Madeline Chapa, RN  Outcome: HH/HSPC Resolved Met  7/25/2024 0320 by Shawnee Hanley RN  Outcome: Progressing     Problem: Respiratory - Adult  Goal: Achieves optimal ventilation and oxygenation  7/25/2024 1646 by Madeline Chapa, RN  Outcome: HH/HSPC Resolved Met  7/25/2024 0744 by Sariah Baer RCP  Outcome: Progressing  7/25/2024 0320 by Shawnee Hanley, RN  Outcome: Progressing

## 2024-07-25 NOTE — CARE COORDINATION
Discharge Report    [facility]  Clinical Case Management Department  Written by: JAGDISH VERAS RN    Patient Name: Tylor Lakhani  Attending Provider: Charis Champion DO  Admit Date: 2024  5:05 AM  MRN: [id number]  Account: [id number]                     : 2000  Discharge Date: 24      Disposition: home    JAGDISH VERAS RN

## 2024-07-25 NOTE — FLOWSHEET NOTE
Discharge instructions given with understanding voiced.MARYHOSHRUTHI [facility] your life: Post-Birth Warning Signs handout reviewed and given to mother. Understanding verbalized.

## 2024-07-25 NOTE — PLAN OF CARE
Problem: Respiratory - Adult  Goal: Achieves optimal ventilation and oxygenation  7/25/2024 0744 by Sariah Baer, RCCHRISTINA  Outcome: Progressing

## 2024-07-25 NOTE — LACTATION NOTE
Mom had baby on the left breast in sidelying position. Refined baby's position and helped with a deeper latch. Mom noted it felt comfortable. Reviewed feeding patterns. Encouraged to call out as needed.

## 2024-07-25 NOTE — PLAN OF CARE
Problem: Pain  Goal: Verbalizes/displays adequate comfort level or baseline comfort level  Outcome: Progressing     Problem: Respiratory - Adult  Goal: Achieves optimal ventilation and oxygenation  Outcome: Progressing

## 2024-08-12 ENCOUNTER — POSTPARTUM VISIT (OUTPATIENT)
Dept: OBGYN CLINIC | Age: 24
End: 2024-08-12

## 2024-08-12 VITALS — WEIGHT: 140 LBS | SYSTOLIC BLOOD PRESSURE: 116 MMHG | BODY MASS INDEX: 28.28 KG/M2 | DIASTOLIC BLOOD PRESSURE: 60 MMHG

## 2024-08-12 PROBLEM — F12.10 TETRAHYDROCANNABINOL (THC) USE DISORDER, MILD, ABUSE: Status: RESOLVED | Noted: 2021-07-06 | Resolved: 2024-08-12

## 2024-08-12 PROCEDURE — 99024 POSTOP FOLLOW-UP VISIT: CPT | Performed by: STUDENT IN AN ORGANIZED HEALTH CARE EDUCATION/TRAINING PROGRAM

## 2024-08-12 ASSESSMENT — ANXIETY QUESTIONNAIRES
7. FEELING AFRAID AS IF SOMETHING AWFUL MIGHT HAPPEN: NOT AT ALL
5. BEING SO RESTLESS THAT IT IS HARD TO SIT STILL: NOT AT ALL
2. NOT BEING ABLE TO STOP OR CONTROL WORRYING: NOT AT ALL
IF YOU CHECKED OFF ANY PROBLEMS ON THIS QUESTIONNAIRE, HOW DIFFICULT HAVE THESE PROBLEMS MADE IT FOR YOU TO DO YOUR WORK, TAKE CARE OF THINGS AT HOME, OR GET ALONG WITH OTHER PEOPLE: NOT DIFFICULT AT ALL
1. FEELING NERVOUS, ANXIOUS, OR ON EDGE: NOT AT ALL
6. BECOMING EASILY ANNOYED OR IRRITABLE: NOT AT ALL
4. TROUBLE RELAXING: NOT AT ALL
3. WORRYING TOO MUCH ABOUT DIFFERENT THINGS: NOT AT ALL
GAD7 TOTAL SCORE: 0

## 2024-08-12 NOTE — PROGRESS NOTES
Postpartum Visit    Tylor Lakhani is a 24 y.o. female , 2 weeks Post Partum s/p  spontaneous vaginal delivery on 24    The patient was seen. She has no chief complaint today.    She is  breast feeding and denies signs or symptoms of mastitis.  The patient completed the E.P.D.S. Post Partum Depression Evaluation form and EPDS Score of 0. She does not have signs or symptoms of post partum depression. She denies any suicidal thoughts with a plan, intent to harm others, and delusional ideas. She does admit to having good home support. Today her lochia is light she denies any dizziness or shortness of breath.  Her bowels are regular and she denies any urinary tract symptomology. She is using nothing for contraception. History dysmenorrhea. Desires IUD    Her pregnancy was complicated by:  Patient Active Problem List    Diagnosis Date Noted    POTS (postural orthostatic tachycardia syndrome) 2023     Priority: Medium    Mild depression 2022     Priority: Medium     25  F Apg 8/9 Wt 6#12 2024    History of pre-eclampsia 2021     Overview Note:     Diagnosed PPD1 elevated BP/elevated pc ratio       22 M 6#8 Apg 6/9     Family history of autism 2021    FHx Chiari malformation type I and cleft lip 2021     Overview Note:     FOB half brother  Anatomy scan wnl  NIPT normal      Asthma 2021       Vitals:   Blood pressure 116/60, weight 63.5 kg (140 lb), last menstrual period 10/19/2023, currently breastfeeding.     Physical Exam:  Chaperone for Exam: Chaperone was offered and patient declined    General:  no apparent distress, alert, and cooperative  Lungs:  No increased work of breathing, no conversational dyspnea  Heart: Regular rate and rhythm  Abdomen: Abdomen soft, non-tender, no rebound, guarding, rigidity  Fundus: non-tender, normal size, firm, below umbilicus  Perineum: declined exam  Extremities:  no calf tenderness, non edematous, non

## 2024-09-03 ENCOUNTER — POSTPARTUM VISIT (OUTPATIENT)
Dept: OBGYN CLINIC | Age: 24
End: 2024-09-03
Payer: COMMERCIAL

## 2024-09-03 VITALS
BODY MASS INDEX: 28.22 KG/M2 | SYSTOLIC BLOOD PRESSURE: 110 MMHG | DIASTOLIC BLOOD PRESSURE: 72 MMHG | WEIGHT: 140 LBS | HEIGHT: 59 IN

## 2024-09-03 DIAGNOSIS — Z53.8 UNSUCCESSFUL INSERTION OF INTRAUTERINE DEVICE (IUD): ICD-10-CM

## 2024-09-03 DIAGNOSIS — N94.6 DYSMENORRHEA: ICD-10-CM

## 2024-09-03 PROCEDURE — G8427 DOCREV CUR MEDS BY ELIG CLIN: HCPCS | Performed by: NURSE PRACTITIONER

## 2024-09-03 PROCEDURE — 1036F TOBACCO NON-USER: CPT | Performed by: NURSE PRACTITIONER

## 2024-09-03 PROCEDURE — G8417 CALC BMI ABV UP PARAM F/U: HCPCS | Performed by: NURSE PRACTITIONER

## 2024-09-03 RX ORDER — DROSPIRENONE 4 MG/1
1 TABLET, FILM COATED ORAL DAILY
Qty: 28 TABLET | Refills: 3 | Status: SHIPPED | OUTPATIENT
Start: 2024-09-03

## 2024-09-03 ASSESSMENT — ANXIETY QUESTIONNAIRES
1. FEELING NERVOUS, ANXIOUS, OR ON EDGE: NOT AT ALL
7. FEELING AFRAID AS IF SOMETHING AWFUL MIGHT HAPPEN: NOT AT ALL
3. WORRYING TOO MUCH ABOUT DIFFERENT THINGS: NOT AT ALL
4. TROUBLE RELAXING: NOT AT ALL
GAD7 TOTAL SCORE: 0
5. BEING SO RESTLESS THAT IT IS HARD TO SIT STILL: NOT AT ALL
IF YOU CHECKED OFF ANY PROBLEMS ON THIS QUESTIONNAIRE, HOW DIFFICULT HAVE THESE PROBLEMS MADE IT FOR YOU TO DO YOUR WORK, TAKE CARE OF THINGS AT HOME, OR GET ALONG WITH OTHER PEOPLE: NOT DIFFICULT AT ALL
2. NOT BEING ABLE TO STOP OR CONTROL WORRYING: NOT AT ALL
6. BECOMING EASILY ANNOYED OR IRRITABLE: NOT AT ALL

## 2024-09-03 ASSESSMENT — ENCOUNTER SYMPTOMS
NAUSEA: 0
COLOR CHANGE: 0
SHORTNESS OF BREATH: 0
VOMITING: 0
DIARRHEA: 0
COUGH: 0
CONSTIPATION: 0

## 2024-09-03 NOTE — PROGRESS NOTES
[facility] [address]  [facility] [address]  Dept: [phone]  Dept Fax: [phone]    Tylor Lakhani is a 24 y.o. female who presents today for her medical conditions/complaintsas noted below.  Tylor Lakhani is c/o of Postpartum Care        HPI:     Tylor presents for 6 week postpartum check up. Delivered Vaginally on 24 No complaints, of chest pain, S.O.B. Headaches, no abdominal pain, GI or  issues.  Breastfeeding Yes Signs mastitis No  Bleeding No When stopped few weeks ago  Birth control options hx  dysmenorrhea wants IUD states did have malpositioned IUD after last delivery when inserted postpartum.   She has tried depo provera and states did not like it or OCP due to migraines.       The patient completed :   E.P.D.S. Evaluation form and scored 0.   AKIRA 7 and scored 0.  Denies any  suicidal/homicidal ideations or plans or delusions or hallucinations.    Last PAP:2023- NILM     Last HGB:10.7 on 24  GDM:No   gHTN or Preeclampsia: no     OB History    Para Term  AB Living   2 2 2     2   SAB IAB Ectopic Molar Multiple Live Births           0 2      # Outcome Date GA Lbr Ric/2nd Weight Sex Type Anes PTL Lv   2 Term 25 39w5d / 00:12 3.025 kg (6 lb 10.7 oz) F Vag-Spont EPI N GIANNI   1 Term 22 38w1d 02:04 / 00:22 2.95 kg (6 lb 8.1 oz) M Vag-Spont EPI N GIANNI       Past Medical History:   Diagnosis Date    Mental disorder     depression    POTS (postural orthostatic tachycardia syndrome)       Past Surgical History:   Procedure Laterality Date    COLONOSCOPY N/A 2023    COLONOSCOPY WITH BIOPSY performed by Alexis Paredes MD at [facility] OR    COLONOSCOPY W/ BIOPSIES  2023    COLONOSCOPY WITH BIOPSY    ESOPHAGOGASTRODUODENOSCOPY  2022    EGD BIOPSY    INTRAUTERINE DEVICE REMOVAL  2021    dislodged IUD    NOSE SURGERY  2023    UPPER GASTROINTESTINAL

## 2024-09-03 NOTE — PROGRESS NOTES
SUBJECTIVE:    CC:    Chief Complaint   Patient presents with    Postpartum Care       Tylor presents today for insertion of Mirena  IUD for her complaint of  dysmenorrhea.  Tylor understands the risks and benefits of the intrauterine device insertion and desires to proceed with its insertion.    OBJECTIVE:    /72 (Site: Left Upper Arm, Position: Sitting, Cuff Size: Medium Adult)   Ht 1.499 m (4' 11\")   Wt 63.5 kg (140 lb)   LMP 10/19/2023   Breastfeeding Yes   BMI 28.28 kg/m²      pregnancy test: neg   Gyn history: Cultures:declined    Medical history: No known contraindication to progestin use.  She is not a smoker. The patient denies any family member or herself as having a blood clot in their leg, lung, brain or that any member of her family has had a sudden cardiac death under the age of 39 yo.      Procedure:    The patient was positioned comfortably on the exam table.  A sterile speculum was inserted.  The cervix was visualized and prepped with Betadine.  An  allis clamp was applied to the anterior lip of the cervix. A sound was placed through the cervical os in sterile fashion was only able to sound to 4cm and attempted to dilate pt declined to continue with IUD Insertion at this time.     She is to notify the office or go to the nearest Emergency Department if she experiences Abdominal Pain, Temperatures more than 101 F, Odiferous Vaginal Discharge, Dizziness or Shortness of breath.      ASSESSMENT:  Dysmenorrhea- IUD insertion not completed.     PLAN:  IUD counseling was done with Tylor.  She will return for IUD insertion with ultrasound guidance and start Slynd POP for now until then,.

## 2024-09-03 NOTE — PATIENT INSTRUCTIONS
such as writing suicide notes or talking about guns, knives, or pills. Keep the numbers for these national suicide hotlines: 3-935-541-TALK (6-894-186-9679) and 6-513-YKAUFYR (5-331-183-3154). If you or someone you know talks about suicide or feeling hopeless, get help right away.  How can you care for yourself at home?  Be safe with medicines. Take your medicines exactly as prescribed. Call your doctor if you think you are having a problem with your medicine.  Eat a healthy diet so that you can keep up your energy.  Get regular daily exercise, such as walks, to help improve your mood.  Get as much sunlight as possible. Keep your shades and curtains open. Get outside as much as you can.  Avoid using alcohol or other substances to feel better.  Get as much rest and sleep as possible. Avoid doing too much. Being too tired can increase depression.  Play stimulating music throughout your day and soothing music at night.  Schedule outings and visits with friends and family. Ask them to call you regularly, so that you do not feel alone.  Ask for help with preparing food and other daily tasks. Family and friends are often happy to help a mother with a .  Be honest with yourself and those who care about you. Tell them about your struggle.  Join a support group of new mothers. No one can better understand the challenges of caring for a  than other new mothers.  If you feel like life is not worth living or are feeling hopeless, get help right away. Keep the numbers for these national suicide hotlines: -TALK (0-364-509-8281) and 4-038-MVMVVCJ (5-728-346-5510).  When should you call for help?  Call 911 anytime you think you may need emergency care. For example, call if:    You feel you cannot stop from hurting yourself, your baby, or someone else.    Call your doctor now or seek immediate medical care if:    You are having trouble caring for yourself or your baby.     You hear voices.    Watch closely for

## 2024-09-09 ENCOUNTER — TELEPHONE (OUTPATIENT)
Dept: OBGYN CLINIC | Age: 24
End: 2024-09-09

## 2024-09-10 RX ORDER — FLUCONAZOLE 100 MG/1
TABLET ORAL
Qty: 8 TABLET | Refills: 0 | Status: SHIPPED | OUTPATIENT
Start: 2024-09-10

## 2024-09-11 ENCOUNTER — TELEPHONE (OUTPATIENT)
Dept: OBGYN CLINIC | Age: 24
End: 2024-09-11

## 2024-12-02 ENCOUNTER — TELEPHONE (OUTPATIENT)
Dept: OBGYN CLINIC | Age: 24
End: 2024-12-02

## 2024-12-02 DIAGNOSIS — N61.0 MASTITIS: Primary | ICD-10-CM

## 2024-12-02 NOTE — TELEPHONE ENCOUNTER
Called in with mastitis-   Per cameron Keflex QID for 7 days   Phoned into pharmacy please sign RX

## 2024-12-04 RX ORDER — CEPHALEXIN 500 MG/1
500 CAPSULE ORAL 4 TIMES DAILY
Qty: 28 CAPSULE | Refills: 0 | OUTPATIENT
Start: 2024-12-04

## 2024-12-12 DIAGNOSIS — N94.6 DYSMENORRHEA: ICD-10-CM

## 2024-12-12 RX ORDER — DROSPIRENONE 4 MG/1
1 TABLET, FILM COATED ORAL DAILY
Qty: 28 TABLET | Refills: 0 | Status: SHIPPED | OUTPATIENT
Start: 2024-12-12

## 2024-12-12 NOTE — TELEPHONE ENCOUNTER
Medication Request/Refill Telephone Documentation:  Medication Requested: slynd   Provider last filled by: elen  Last visit: 9/3/24  Last annual/pap smear: 11/8/23  NEXT APPT: canceled iud appts

## 2025-01-20 ENCOUNTER — TELEMEDICINE (OUTPATIENT)
Dept: OBGYN CLINIC | Age: 25
End: 2025-01-20

## 2025-01-20 DIAGNOSIS — N94.6 DYSMENORRHEA: Primary | ICD-10-CM

## 2025-01-20 DIAGNOSIS — Z86.69 HISTORY OF MIGRAINE HEADACHES: ICD-10-CM

## 2025-01-20 DIAGNOSIS — Z30.41 ENCOUNTER FOR SURVEILLANCE OF CONTRACEPTIVE PILLS: ICD-10-CM

## 2025-01-20 PROCEDURE — 99214 OFFICE O/P EST MOD 30 MIN: CPT | Performed by: NURSE PRACTITIONER

## 2025-01-20 RX ORDER — DROSPIRENONE 4 MG/1
1 TABLET, FILM COATED ORAL DAILY
Qty: 28 TABLET | Refills: 5 | Status: SHIPPED | OUTPATIENT
Start: 2025-01-20

## 2025-01-20 ASSESSMENT — ENCOUNTER SYMPTOMS
WHEEZING: 0
COLOR CHANGE: 0
SHORTNESS OF BREATH: 0
NAUSEA: 0
VOMITING: 0

## 2025-01-20 NOTE — PROGRESS NOTES
diagnosis and importance of compliance with the treatment plan as well as documenting on the day of the visit.    --Daphne Jacobs, BEBA - CNP

## 2025-01-27 ENCOUNTER — PATIENT MESSAGE (OUTPATIENT)
Dept: PRIMARY CARE CLINIC | Age: 25
End: 2025-01-27

## 2025-01-27 ENCOUNTER — TELEPHONE (OUTPATIENT)
Dept: PRIMARY CARE CLINIC | Age: 25
End: 2025-01-27

## 2025-01-27 ENCOUNTER — E-VISIT (OUTPATIENT)
Dept: PRIMARY CARE CLINIC | Age: 25
End: 2025-01-27
Payer: COMMERCIAL

## 2025-01-27 DIAGNOSIS — K08.89 PAIN, DENTAL: Primary | ICD-10-CM

## 2025-01-27 PROCEDURE — 99422 OL DIG E/M SVC 11-20 MIN: CPT | Performed by: NURSE PRACTITIONER

## 2025-01-27 NOTE — TELEPHONE ENCOUNTER
----- Message from lEe HERRERA sent at 1/27/2025 12:28 PM EST -----  Regarding: ECC Escalation To Practice  ECC Escalation To Practice      Type of Escalation: Acute Care Symptom  --------------------------------------------------------------------------------------------------------------------------     Information for Provider: Jenifer Brambila APRN - CNP  Patient is looking for appointment for: Symptom Migraine  Reasons for Message: Unable to reach practice     Additional Information: Patient also experience tooth pain and would like to schedule an appointment with pcp for tooth pain medication.  --------------------------------------------------------------------------------------------------------------------------    Relationship to Patient: Self     Call Back Info: OK to leave message on voicemail  Preferred Call Back Number: Phone [phone]

## 2025-01-27 NOTE — TELEPHONE ENCOUNTER
Last Visit Date: 2/20/2023   Next Visit Date: Visit date not found   Pt called requested appt sooner than PCP next available for migraines. Caller reported increased frequency and duration of migraines without relief from medication    Please advise

## 2025-01-28 NOTE — TELEPHONE ENCOUNTER
Last Visit Date: 2/20/2023   Next Visit Date: 1/29/2025       Appt made for tomorrow at 10:15am VV

## 2025-01-29 ENCOUNTER — TELEMEDICINE (OUTPATIENT)
Dept: PRIMARY CARE CLINIC | Age: 25
End: 2025-01-29
Payer: COMMERCIAL

## 2025-01-29 DIAGNOSIS — K08.89 PAIN, DENTAL: ICD-10-CM

## 2025-01-29 DIAGNOSIS — R51.9 DAILY HEADACHE: Primary | ICD-10-CM

## 2025-01-29 PROCEDURE — 99214 OFFICE O/P EST MOD 30 MIN: CPT | Performed by: NURSE PRACTITIONER

## 2025-01-29 RX ORDER — AMOXICILLIN 875 MG/1
875 TABLET, COATED ORAL 2 TIMES DAILY
Qty: 20 TABLET | Refills: 0 | Status: SHIPPED | OUTPATIENT
Start: 2025-01-29 | End: 2025-02-08

## 2025-01-29 SDOH — ECONOMIC STABILITY: FOOD INSECURITY: WITHIN THE PAST 12 MONTHS, YOU WORRIED THAT YOUR FOOD WOULD RUN OUT BEFORE YOU GOT MONEY TO BUY MORE.: NEVER TRUE

## 2025-01-29 SDOH — ECONOMIC STABILITY: FOOD INSECURITY: WITHIN THE PAST 12 MONTHS, THE FOOD YOU BOUGHT JUST DIDN'T LAST AND YOU DIDN'T HAVE MONEY TO GET MORE.: NEVER TRUE

## 2025-01-29 ASSESSMENT — PATIENT HEALTH QUESTIONNAIRE - PHQ9
3. TROUBLE FALLING OR STAYING ASLEEP: NOT AT ALL
SUM OF ALL RESPONSES TO PHQ9 QUESTIONS 1 & 2: 0
5. POOR APPETITE OR OVEREATING: NOT AT ALL
SUM OF ALL RESPONSES TO PHQ QUESTIONS 1-9: 0
SUM OF ALL RESPONSES TO PHQ QUESTIONS 1-9: 0
6. FEELING BAD ABOUT YOURSELF - OR THAT YOU ARE A FAILURE OR HAVE LET YOURSELF OR YOUR FAMILY DOWN: NOT AT ALL
8. MOVING OR SPEAKING SO SLOWLY THAT OTHER PEOPLE COULD HAVE NOTICED. OR THE OPPOSITE, BEING SO FIGETY OR RESTLESS THAT YOU HAVE BEEN MOVING AROUND A LOT MORE THAN USUAL: NOT AT ALL
9. THOUGHTS THAT YOU WOULD BE BETTER OFF DEAD, OR OF HURTING YOURSELF: NOT AT ALL
2. FEELING DOWN, DEPRESSED OR HOPELESS: NOT AT ALL
4. FEELING TIRED OR HAVING LITTLE ENERGY: NOT AT ALL
SUM OF ALL RESPONSES TO PHQ QUESTIONS 1-9: 0
7. TROUBLE CONCENTRATING ON THINGS, SUCH AS READING THE NEWSPAPER OR WATCHING TELEVISION: NOT AT ALL
10. IF YOU CHECKED OFF ANY PROBLEMS, HOW DIFFICULT HAVE THESE PROBLEMS MADE IT FOR YOU TO DO YOUR WORK, TAKE CARE OF THINGS AT HOME, OR GET ALONG WITH OTHER PEOPLE: NOT DIFFICULT AT ALL
1. LITTLE INTEREST OR PLEASURE IN DOING THINGS: NOT AT ALL
SUM OF ALL RESPONSES TO PHQ QUESTIONS 1-9: 0

## 2025-01-29 ASSESSMENT — ENCOUNTER SYMPTOMS
ABDOMINAL PAIN: 0
TROUBLE SWALLOWING: 0
SHORTNESS OF BREATH: 0
NAUSEA: 0
EYE REDNESS: 0
SINUS PAIN: 0
SORE THROAT: 0
EYE ITCHING: 0
EYE DISCHARGE: 0
WHEEZING: 0
COUGH: 0
SINUS PRESSURE: 0
VOMITING: 0
CHEST TIGHTNESS: 0
DIARRHEA: 0

## 2025-01-29 NOTE — PROGRESS NOTES
Tylor Lakhani (2000) initiated an asynchronous digital communication through [employer].    HPI: per patient questionnaire     Exam: not applicable    Diagnoses and all orders for this visit:  Diagnoses and all orders for this visit:    Pain, dental    Other orders  -     amoxicillin (AMOXIL) 875 MG tablet; Take 1 tablet by mouth 2 times daily for 10 days      Pt is nursing. Antibiotic sent. F/u with me as needed. F/u with dentist     Time: EV2 - 11-20 minutes were spent on the digital evaluation and management of this patient. 18 min     BEBA Spangler - CNP

## 2025-01-29 NOTE — PROGRESS NOTES
exanthematous lesions or discoloration noted on facial skin         [] Abnormal-            Psychiatric:       [x] Normal Affect [] No Hallucinations        [] Abnormal-       ASSESSMENT/PLAN:  1. Daily headache  She is complained of worsening headaches over the last month or so.  She does have known wisdom teeth issues.  Her medications that she been taking previously are not helping.  She does have a history of chiari malformation.  Will get MRI.  - MRI BRAIN W WO CONTRAST; Future    2. Pain, dental  She saw her dentist this morning.  An antibiotic was sent.  She scheduled have her wisdom teeth removed on February 4      Return in about 1 month (around 2/28/2025) for headache follow up.    Tylor JAVIER Lakhani, was evaluated through a synchronous (real-time) audio-video encounter. The patient (or guardian if applicable) is aware that this is a billable service, which includes applicable co-pays. This Virtual Visit was conducted with patient's (and/or legal guardian's) consent. Patient identification was verified, and a caregiver was present when appropriate.   The patient was located at Home: [address] [place] [address] [address] [place] [address] [place] [address]  Provider was located at Home (Appt Dept State): [place]  Confirm you are appropriately licensed, registered, or certified to deliver care in the state where the patient is located as indicated above. If you are not or unsure, please re-schedule the visit: Yes, I confirm.       Total time spent on this encounter: Not billed by time    --BEBA Spangler - CNP on 1/29/2025 at 10:36 AM    An electronic signature was used to authenticate this note.

## 2025-02-06 ENCOUNTER — HOSPITAL ENCOUNTER (OUTPATIENT)
Dept: MRI IMAGING | Age: 25
Discharge: HOME | End: 2025-02-08
Payer: COMMERCIAL

## 2025-02-06 DIAGNOSIS — R51.9 DAILY HEADACHE: ICD-10-CM

## 2025-02-06 PROCEDURE — A9579 GAD-BASE MR CONTRAST NOS,1ML: HCPCS | Performed by: NURSE PRACTITIONER

## 2025-02-06 PROCEDURE — 70553 MRI BRAIN STEM W/O & W/DYE: CPT

## 2025-02-06 PROCEDURE — 6360000004 HC RX CONTRAST MEDICATION: Performed by: NURSE PRACTITIONER

## 2025-02-06 RX ADMIN — GADOTERIDOL 12 ML: 279.3 INJECTION, SOLUTION INTRAVENOUS at 09:14

## 2025-02-06 RX ADMIN — GADOTERIDOL 12 ML: 279.3 INJECTION, SOLUTION INTRAVENOUS at 10:23

## 2025-02-21 ENCOUNTER — PATIENT MESSAGE (OUTPATIENT)
Dept: PRIMARY CARE CLINIC | Age: 25
End: 2025-02-21

## 2025-02-21 DIAGNOSIS — G93.5 CHIARI MALFORMATION TYPE I (HCC): Primary | ICD-10-CM

## 2025-02-21 DIAGNOSIS — R51.9 DAILY HEADACHE: ICD-10-CM

## 2025-03-31 ENCOUNTER — TELEPHONE (OUTPATIENT)
Dept: PEDIATRIC NEPHROLOGY | Age: 25
End: 2025-03-31

## 2025-03-31 NOTE — TELEPHONE ENCOUNTER
Deya rec'd cl from mom who had questions regarding form.  Mom states she will complete and send today.  Writer will look for the emailed form and complete.

## 2025-04-01 ENCOUNTER — PATIENT MESSAGE (OUTPATIENT)
Dept: PRIMARY CARE CLINIC | Age: 25
End: 2025-04-01

## 2025-04-01 DIAGNOSIS — R11.0 NAUSEA: Primary | ICD-10-CM

## 2025-04-04 ENCOUNTER — OFFICE VISIT (OUTPATIENT)
Dept: PRIMARY CARE CLINIC | Age: 25
End: 2025-04-04
Payer: MEDICAID

## 2025-04-04 ENCOUNTER — HOSPITAL ENCOUNTER (OUTPATIENT)
Age: 25
Setting detail: SPECIMEN
Discharge: HOME | End: 2025-04-04

## 2025-04-04 VITALS
TEMPERATURE: 97.8 F | SYSTOLIC BLOOD PRESSURE: 110 MMHG | DIASTOLIC BLOOD PRESSURE: 72 MMHG | BODY MASS INDEX: 24.2 KG/M2 | OXYGEN SATURATION: 100 % | HEART RATE: 112 BPM | WEIGHT: 119.8 LBS

## 2025-04-04 DIAGNOSIS — R10.9 LEFT FLANK PAIN: ICD-10-CM

## 2025-04-04 DIAGNOSIS — R11.10 VOMITING, UNSPECIFIED VOMITING TYPE, UNSPECIFIED WHETHER NAUSEA PRESENT: ICD-10-CM

## 2025-04-04 DIAGNOSIS — R11.10 VOMITING, UNSPECIFIED VOMITING TYPE, UNSPECIFIED WHETHER NAUSEA PRESENT: Primary | ICD-10-CM

## 2025-04-04 DIAGNOSIS — E16.2 LOW BLOOD SUGAR: ICD-10-CM

## 2025-04-04 DIAGNOSIS — R10.13 EPIGASTRIC ABDOMINAL PAIN: ICD-10-CM

## 2025-04-04 DIAGNOSIS — G90.A POTS (POSTURAL ORTHOSTATIC TACHYCARDIA SYNDROME): ICD-10-CM

## 2025-04-04 LAB
ALBUMIN SERPL-MCNC: 5 G/DL (ref 3.5–5.2)
ALBUMIN/GLOB SERPL: 1.9 {RATIO} (ref 1–2.5)
ALP SERPL-CCNC: 101 U/L (ref 35–104)
ALT SERPL-CCNC: 12 U/L (ref 10–35)
ANION GAP SERPL CALCULATED.3IONS-SCNC: 17 MMOL/L (ref 9–16)
AST SERPL-CCNC: 17 U/L (ref 10–35)
BACTERIA URNS QL MICRO: ABNORMAL
BILIRUB SERPL-MCNC: 0.6 MG/DL (ref 0–1.2)
BILIRUB UR QL STRIP: NEGATIVE
BUN SERPL-MCNC: 12 MG/DL (ref 6–20)
CALCIUM SERPL-MCNC: 10 MG/DL (ref 8.6–10.4)
CASTS #/AREA URNS LPF: ABNORMAL /LPF (ref 0–8)
CHLORIDE SERPL-SCNC: 102 MMOL/L (ref 98–107)
CLARITY UR: ABNORMAL
CO2 SERPL-SCNC: 23 MMOL/L (ref 20–31)
COLOR UR: YELLOW
CONTROL: NORMAL
CREAT SERPL-MCNC: 0.7 MG/DL (ref 0.6–0.9)
EPI CELLS #/AREA URNS HPF: ABNORMAL /HPF (ref 0–5)
ERYTHROCYTE [DISTWIDTH] IN BLOOD BY AUTOMATED COUNT: 12.8 % (ref 11.8–14.4)
EST. AVERAGE GLUCOSE BLD GHB EST-MCNC: 94 MG/DL
GFR, ESTIMATED: >90 ML/MIN/1.73M2
GLUCOSE P FAST SERPL-MCNC: 58 MG/DL (ref 74–99)
GLUCOSE SERPL-MCNC: 58 MG/DL (ref 74–99)
GLUCOSE UR STRIP-MCNC: NEGATIVE MG/DL
HBA1C MFR BLD: 4.9 % (ref 4–6)
HCT VFR BLD AUTO: 43.6 % (ref 36.3–47.1)
HGB BLD-MCNC: 13.4 G/DL (ref 11.9–15.1)
HGB UR QL STRIP.AUTO: NEGATIVE
KETONES UR STRIP-MCNC: ABNORMAL MG/DL
LEUKOCYTE ESTERASE UR QL STRIP: NEGATIVE
LIPASE SERPL-CCNC: 15 U/L (ref 13–60)
MCH RBC QN AUTO: 27.9 PG (ref 25.2–33.5)
MCHC RBC AUTO-ENTMCNC: 30.7 G/DL (ref 28.4–34.8)
MCV RBC AUTO: 90.8 FL (ref 82.6–102.9)
NITRITE UR QL STRIP: NEGATIVE
NRBC BLD-RTO: 0 PER 100 WBC
PH UR STRIP: 5.5 [PH] (ref 5–8)
PLATELET # BLD AUTO: 392 K/UL (ref 138–453)
PMV BLD AUTO: 9.3 FL (ref 8.1–13.5)
POTASSIUM SERPL-SCNC: 3.7 MMOL/L (ref 3.7–5.3)
PREGNANCY TEST URINE, POC: NEGATIVE
PROT SERPL-MCNC: 7.6 G/DL (ref 6.6–8.7)
PROT UR STRIP-MCNC: ABNORMAL MG/DL
RBC # BLD AUTO: 4.8 M/UL (ref 3.95–5.11)
RBC #/AREA URNS HPF: ABNORMAL /HPF (ref 0–4)
SODIUM SERPL-SCNC: 142 MMOL/L (ref 136–145)
SP GR UR STRIP: 1.03 (ref 1–1.03)
UROBILINOGEN UR STRIP-ACNC: NORMAL EU/DL (ref 0–1)
WBC #/AREA URNS HPF: ABNORMAL /HPF (ref 0–5)
WBC OTHER # BLD: 8.4 K/UL (ref 3.5–11.3)

## 2025-04-04 PROCEDURE — 81025 URINE PREGNANCY TEST: CPT | Performed by: INTERNAL MEDICINE

## 2025-04-04 PROCEDURE — 99215 OFFICE O/P EST HI 40 MIN: CPT | Performed by: INTERNAL MEDICINE

## 2025-04-04 RX ORDER — ONDANSETRON 4 MG/1
4 TABLET, ORALLY DISINTEGRATING ORAL EVERY 12 HOURS PRN
Qty: 10 TABLET | Refills: 0 | Status: SHIPPED | OUTPATIENT
Start: 2025-04-04

## 2025-04-04 RX ORDER — FAMOTIDINE 20 MG/1
20 TABLET, FILM COATED ORAL 2 TIMES DAILY
Qty: 15 TABLET | Refills: 0 | Status: SHIPPED | OUTPATIENT
Start: 2025-04-04

## 2025-04-04 SDOH — ECONOMIC STABILITY: FOOD INSECURITY: WITHIN THE PAST 12 MONTHS, YOU WORRIED THAT YOUR FOOD WOULD RUN OUT BEFORE YOU GOT MONEY TO BUY MORE.: NEVER TRUE

## 2025-04-04 SDOH — ECONOMIC STABILITY: FOOD INSECURITY: WITHIN THE PAST 12 MONTHS, THE FOOD YOU BOUGHT JUST DIDN'T LAST AND YOU DIDN'T HAVE MONEY TO GET MORE.: NEVER TRUE

## 2025-04-04 ASSESSMENT — PATIENT HEALTH QUESTIONNAIRE - PHQ9
2. FEELING DOWN, DEPRESSED OR HOPELESS: NOT AT ALL
SUM OF ALL RESPONSES TO PHQ QUESTIONS 1-9: 0
1. LITTLE INTEREST OR PLEASURE IN DOING THINGS: NOT AT ALL
8. MOVING OR SPEAKING SO SLOWLY THAT OTHER PEOPLE COULD HAVE NOTICED. OR THE OPPOSITE, BEING SO FIGETY OR RESTLESS THAT YOU HAVE BEEN MOVING AROUND A LOT MORE THAN USUAL: NOT AT ALL
3. TROUBLE FALLING OR STAYING ASLEEP: NOT AT ALL
5. POOR APPETITE OR OVEREATING: NOT AT ALL
9. THOUGHTS THAT YOU WOULD BE BETTER OFF DEAD, OR OF HURTING YOURSELF: NOT AT ALL
7. TROUBLE CONCENTRATING ON THINGS, SUCH AS READING THE NEWSPAPER OR WATCHING TELEVISION: NOT AT ALL
6. FEELING BAD ABOUT YOURSELF - OR THAT YOU ARE A FAILURE OR HAVE LET YOURSELF OR YOUR FAMILY DOWN: NOT AT ALL
4. FEELING TIRED OR HAVING LITTLE ENERGY: NOT AT ALL
SUM OF ALL RESPONSES TO PHQ QUESTIONS 1-9: 0

## 2025-04-04 NOTE — PROGRESS NOTES
SUBJECTIVE:  Tylor Lakhani is a 24 y.o. female patient who  comes for complaints of   Chief Complaint   Patient presents with    Dizziness     X 1 day   Pain in the chest     Fever     X 1 day    Nausea     X 1 day  Vomiting all night      Vomiting- started about 14hr ago  Feels shaky   Blood sugar 53 when checked at home- had glucometer at home since long time   Dry mouth  Left sided rib pain posteriorly  Known POTS  Zofran helped  Vomitig all night, no vomiting since 5am today, > 10times in the 9hr prior   Now has sore throat from voming, c/o headche  Denies fever  Some diarrhea yesterday, stated has been seeing GI for concern for IBS, has had C-scope before - reviewed c-scopr results from 8/24/23  No urine symp    Kept small amt of snack down today  Keeping water down today    Curretnly breastfeeding- daughter is a 8mth  Not on any meds for POTS currently       Covid and flu were tested this am at work and neg at [facility]       Alcohol/smoking- no alcohol, never smoked tobaccor  Weed smoked- quit 2yr ago,        REVIEW OF SYSTEMS (except Subjective (HPI))  GENERAL: No fevers / chills  RESPIRATORY: Negative for cough, wheezing or shortness of breath  CARDIOVASCULAR: Negative for chest pain or palpitations.  GI: see HPI  NEURO: No history of headaches    Past Medical History:   Diagnosis Date    Mental disorder     depression    POTS (postural orthostatic tachycardia syndrome)        SOCIAL HISTORY:  Social History     Socioeconomic History    Marital status: Single     Spouse name: Not on file    Number of children: Not on file    Years of education: Not on file    Highest education level: Not on file   Occupational History    Not on file   Tobacco Use    Smoking status: Never    Smokeless tobacco: Never   Vaping Use    Vaping status: Never Used   Substance and Sexual Activity    Alcohol use: Not Currently    Drug use: Yes     Frequency: 5.0 times per week     Types: Marijuana (Weed)     Comment: stopped in 1

## 2025-04-07 ENCOUNTER — RESULTS FOLLOW-UP (OUTPATIENT)
Dept: PRIMARY CARE CLINIC | Age: 25
End: 2025-04-07

## 2025-04-07 ENCOUNTER — TELEPHONE (OUTPATIENT)
Dept: PRIMARY CARE CLINIC | Age: 25
End: 2025-04-07

## 2025-04-07 ENCOUNTER — TELEPHONE (OUTPATIENT)
Dept: GASTROENTEROLOGY | Age: 25
End: 2025-04-07

## 2025-04-07 DIAGNOSIS — R11.10 VOMITING, UNSPECIFIED VOMITING TYPE, UNSPECIFIED WHETHER NAUSEA PRESENT: Primary | ICD-10-CM

## 2025-04-07 DIAGNOSIS — E16.2 HYPOGLYCEMIA: Primary | ICD-10-CM

## 2025-04-07 NOTE — RESULT ENCOUNTER NOTE
Blood work results satisfactory; some changes are noted which are likely a result of the vomiting other than because of it  No definite UTI noted on urine test  Fasting blood sugar is low again-recommend specialist evaluation due to recurrent nature of this issue.  Referring to endocrinology.    Also need to check TSH- MA to please call lab to add on. Thank you

## 2025-04-07 NOTE — TELEPHONE ENCOUNTER
Patient called, she saw Dr Rogers and a CT was ordered, was told was going to be ordered STAT and was not.  Patient had a bad weekend with a lot of pain, she is requesting PCP place CT as STAT due to scheduling has to schedule at least 5 days out due to insurance and she cannot wait that long.  Please advise.  Patient would like a call back.

## 2025-04-09 ENCOUNTER — RESULTS FOLLOW-UP (OUTPATIENT)
Dept: PRIMARY CARE CLINIC | Age: 25
End: 2025-04-09

## 2025-04-09 ENCOUNTER — HOSPITAL ENCOUNTER (OUTPATIENT)
Age: 25
Setting detail: SPECIMEN
Discharge: HOME | End: 2025-04-09

## 2025-04-09 DIAGNOSIS — E16.2 HYPOGLYCEMIA: ICD-10-CM

## 2025-04-09 LAB — TSH SERPL DL<=0.05 MIU/L-ACNC: 0.49 UIU/ML (ref 0.27–4.2)

## 2025-05-01 ENCOUNTER — OFFICE VISIT (OUTPATIENT)
Dept: PRIMARY CARE CLINIC | Age: 25
End: 2025-05-01
Payer: MEDICAID

## 2025-05-01 ENCOUNTER — HOSPITAL ENCOUNTER (OUTPATIENT)
Age: 25
Setting detail: SPECIMEN
Discharge: HOME | End: 2025-05-01

## 2025-05-01 VITALS
OXYGEN SATURATION: 99 % | WEIGHT: 119.2 LBS | BODY MASS INDEX: 24.08 KG/M2 | SYSTOLIC BLOOD PRESSURE: 120 MMHG | HEART RATE: 74 BPM | DIASTOLIC BLOOD PRESSURE: 76 MMHG | RESPIRATION RATE: 16 BRPM

## 2025-05-01 DIAGNOSIS — E16.2 HYPOGLYCEMIA: Primary | ICD-10-CM

## 2025-05-01 DIAGNOSIS — R10.13 EPIGASTRIC ABDOMINAL PAIN: ICD-10-CM

## 2025-05-01 DIAGNOSIS — R10.9 FLANK PAIN: ICD-10-CM

## 2025-05-01 DIAGNOSIS — E53.8 VITAMIN B 12 DEFICIENCY: ICD-10-CM

## 2025-05-01 LAB
FOLATE SERPL-MCNC: 7.2 NG/ML (ref 4.8–24.2)
VIT B12 SERPL-MCNC: 787 PG/ML (ref 232–1245)

## 2025-05-01 PROCEDURE — 99214 OFFICE O/P EST MOD 30 MIN: CPT | Performed by: NURSE PRACTITIONER

## 2025-05-01 RX ORDER — ACYCLOVIR 800 MG/1
1 TABLET ORAL
Qty: 4 EACH | Refills: 1 | Status: SHIPPED | OUTPATIENT
Start: 2025-05-01

## 2025-05-01 SDOH — ECONOMIC STABILITY: FOOD INSECURITY: WITHIN THE PAST 12 MONTHS, THE FOOD YOU BOUGHT JUST DIDN'T LAST AND YOU DIDN'T HAVE MONEY TO GET MORE.: NEVER TRUE

## 2025-05-01 SDOH — ECONOMIC STABILITY: FOOD INSECURITY: WITHIN THE PAST 12 MONTHS, YOU WORRIED THAT YOUR FOOD WOULD RUN OUT BEFORE YOU GOT MONEY TO BUY MORE.: NEVER TRUE

## 2025-05-01 ASSESSMENT — PATIENT HEALTH QUESTIONNAIRE - PHQ9
1. LITTLE INTEREST OR PLEASURE IN DOING THINGS: NOT AT ALL
6. FEELING BAD ABOUT YOURSELF - OR THAT YOU ARE A FAILURE OR HAVE LET YOURSELF OR YOUR FAMILY DOWN: NOT AT ALL
9. THOUGHTS THAT YOU WOULD BE BETTER OFF DEAD, OR OF HURTING YOURSELF: NOT AT ALL
10. IF YOU CHECKED OFF ANY PROBLEMS, HOW DIFFICULT HAVE THESE PROBLEMS MADE IT FOR YOU TO DO YOUR WORK, TAKE CARE OF THINGS AT HOME, OR GET ALONG WITH OTHER PEOPLE: NOT DIFFICULT AT ALL
4. FEELING TIRED OR HAVING LITTLE ENERGY: NOT AT ALL
8. MOVING OR SPEAKING SO SLOWLY THAT OTHER PEOPLE COULD HAVE NOTICED. OR THE OPPOSITE, BEING SO FIGETY OR RESTLESS THAT YOU HAVE BEEN MOVING AROUND A LOT MORE THAN USUAL: NOT AT ALL
5. POOR APPETITE OR OVEREATING: NOT AT ALL
2. FEELING DOWN, DEPRESSED OR HOPELESS: NOT AT ALL
SUM OF ALL RESPONSES TO PHQ QUESTIONS 1-9: 0
SUM OF ALL RESPONSES TO PHQ QUESTIONS 1-9: 0
7. TROUBLE CONCENTRATING ON THINGS, SUCH AS READING THE NEWSPAPER OR WATCHING TELEVISION: NOT AT ALL
3. TROUBLE FALLING OR STAYING ASLEEP: NOT AT ALL
SUM OF ALL RESPONSES TO PHQ QUESTIONS 1-9: 0
SUM OF ALL RESPONSES TO PHQ QUESTIONS 1-9: 0

## 2025-05-01 ASSESSMENT — ENCOUNTER SYMPTOMS
EYE REDNESS: 0
CHEST TIGHTNESS: 0
WHEEZING: 0
VOMITING: 0
NAUSEA: 1
COUGH: 0
SORE THROAT: 0
DIARRHEA: 1
EYE ITCHING: 0
SINUS PAIN: 0
EYE DISCHARGE: 0
SHORTNESS OF BREATH: 0
ABDOMINAL PAIN: 0
SINUS PRESSURE: 0
TROUBLE SWALLOWING: 0

## 2025-05-01 NOTE — PROGRESS NOTES
[facility]  [facility] PRIMARY CARE  [address] [place] [address]  [address]  [facility] [address]  Dept: [phone]  Dept Fax: [phone]    Tylor Lakhani is a 25 y.o. female who presentstoday for her medical conditions/complaints as noted below.  Tylor Lakhani is c/o of  Chief Complaint   Patient presents with    GI Problem         HPI:     History of Present Illness  The patient presents for evaluation of gastrointestinal issues, hypoglycemia, and vitamin B12 deficiency.    Significant weight loss is reported, attributed to ongoing gastrointestinal (GI) issues. Bowel movements are irregular, ranging from small, hard stools to long, stringy ones, often accompanied by diarrhea. An increased presence of mucus in the stool, back pain, and cramping are noted. Concerns about potential underlying issues are expressed, given the family history of colon cancer (maternal grandmother) and her daughter's diagnosis of Hirschsprung's disease. Eating is difficult, with persistent feelings of sickness and a lack of appetite. Despite breastfeeding her daughter and ensuring she eats throughout the day, food intake often has to be forced due to nausea. A scheduled appointment with a gastroenterologist is on 05/27/2025. A previous CT scan at [facility] yielded normal results. Fiber supplements have been ineffective in managing symptoms. Medication for a urinary tract infection (UTI) was prescribed due to back pain, but typical UTI symptoms are not exhibited, causing uncertainty about the diagnosis.    A constant feeling of illness and uncertainty about blood sugar levels are reported. Despite eating before a fasting glucose test, blood sugar was recorded at 58. Increased tingling in the hands and feet, numbness in the arms during simple tasks, and overall weakness are experienced. An episode of severe hypoglycemia, with a blood sugar level of 53, is recalled, during which extreme unwellness, profuse sweating, and

## 2025-05-02 LAB
BARLEY IGE QN: <0.1 KU/L (ref 0–0.34)
BEEF IGE QN: <0.1 KU/L (ref 0–0.34)
CABBAGE IGE QN: <0.1 KU/L (ref 0–0.34)
CARROT IGE QN: <0.1 KU/L (ref 0–0.34)
CHICKEN MEAT IGE QN: <0.1 KU/L (ref 0–0.34)
CODFISH IGE QN: <0.1 KU/L (ref 0–0.34)
CORN IGE QN: <0.1 KU/L (ref 0–0.34)
COW MILK IGE QN: 0.11 KU/L (ref 0–0.34)
CRAB IGE QN: <0.1 KU/L (ref 0–0.34)
EGG WHITE IGE QN: <0.1 KU/L (ref 0–0.34)
GRAPE IGE QN: <0.1 KU/L (ref 0–0.34)
IGE SERPL-ACNC: 405 IU/ML (ref 0–100)
LETTUCE IGE QN: <0.1 KU/L (ref 0–0.34)
MICROORGANISM SPEC CULT: NORMAL
OAT IGE QN: <0.1 KU/L (ref 0–0.34)
ORANGE TREE IGE QN: <0.1 KU/L (ref 0–0.34)
PAPRIKA IGE QN: <0.1 KU/L (ref 0–0.34)
PEANUT IGE QN: <0.1 KU/L (ref 0–0.34)
PORK IGE QN: <0.1 KU/L (ref 0–0.34)
POTATO IGE QN: <0.1 KU/L (ref 0–0.34)
RICE IGE QN: <0.1 KU/L (ref 0–0.34)
RYE IGE QN: <0.1 KU/L (ref 0–0.34)
SERVICE CMNT-IMP: NORMAL
SHRIMP IGE QN: <0.1 KU/L (ref 0–0.34)
SOYBEAN IGE QN: <0.1 KU/L (ref 0–0.34)
SPECIMEN DESCRIPTION: NORMAL
TOMATO IGE QN: <0.1 KU/L (ref 0–0.34)
TUNA IGE QN: <0.1 KU/L (ref 0–0.34)
WHEAT IGE QN: <0.1 KU/L (ref 0–0.34)
WHITE BEAN IGE QN: <0.1 KU/L (ref 0–0.34)

## 2025-05-03 ENCOUNTER — RESULTS FOLLOW-UP (OUTPATIENT)
Dept: PRIMARY CARE CLINIC | Age: 25
End: 2025-05-03

## 2025-05-04 LAB
GLIADIN IGA SER IA-ACNC: 1.2 U/ML
GLIADIN IGG SER IA-ACNC: <0.4 U/ML
IGA SERPL-MCNC: 211 MG/DL (ref 70–400)
TTG IGA SER IA-ACNC: 0.9 U/ML

## 2025-05-27 ENCOUNTER — PREP FOR PROCEDURE (OUTPATIENT)
Dept: GASTROENTEROLOGY | Age: 25
End: 2025-05-27

## 2025-05-27 ENCOUNTER — OFFICE VISIT (OUTPATIENT)
Dept: GASTROENTEROLOGY | Age: 25
End: 2025-05-27
Payer: MEDICAID

## 2025-05-27 VITALS
DIASTOLIC BLOOD PRESSURE: 81 MMHG | RESPIRATION RATE: 16 BRPM | OXYGEN SATURATION: 100 % | BODY MASS INDEX: 22.82 KG/M2 | WEIGHT: 113 LBS | HEART RATE: 88 BPM | SYSTOLIC BLOOD PRESSURE: 124 MMHG | TEMPERATURE: 98.8 F

## 2025-05-27 DIAGNOSIS — G90.A POTS (POSTURAL ORTHOSTATIC TACHYCARDIA SYNDROME): Primary | ICD-10-CM

## 2025-05-27 DIAGNOSIS — R10.13 DYSPEPSIA: ICD-10-CM

## 2025-05-27 DIAGNOSIS — R10.84 ABDOMINAL PAIN, GENERALIZED: ICD-10-CM

## 2025-05-27 DIAGNOSIS — R19.8 IRREGULAR BOWEL HABITS: ICD-10-CM

## 2025-05-27 PROCEDURE — 99214 OFFICE O/P EST MOD 30 MIN: CPT | Performed by: INTERNAL MEDICINE

## 2025-05-27 RX ORDER — DICYCLOMINE HYDROCHLORIDE 10 MG/1
10 CAPSULE ORAL
Qty: 360 CAPSULE | Refills: 0 | Status: SHIPPED | OUTPATIENT
Start: 2025-05-27

## 2025-05-27 RX ORDER — DROSPIRENONE 4 MG/1
TABLET, FILM COATED ORAL
COMMUNITY
Start: 2025-05-24

## 2025-05-27 ASSESSMENT — ENCOUNTER SYMPTOMS
SORE THROAT: 0
VOMITING: 1
CONSTIPATION: 1
BLOOD IN STOOL: 0
ABDOMINAL PAIN: 1
VOICE CHANGE: 0
COUGH: 0
TROUBLE SWALLOWING: 1
WHEEZING: 0
DIARRHEA: 1
RECTAL PAIN: 0
NAUSEA: 1
ABDOMINAL DISTENTION: 1
ANAL BLEEDING: 0
CHOKING: 0

## 2025-05-27 NOTE — PROGRESS NOTES
Gastrointestinal:  Positive for abdominal distention, abdominal pain, constipation, diarrhea, nausea and vomiting. Negative for anal bleeding, blood in stool and rectal pain.   Genitourinary:  Negative for flank pain.   Allergic/Immunologic: Negative for food allergies and immunocompromised state.   Neurological:  Negative for dizziness, speech difficulty, weakness, light-headedness and headaches.   Hematological:  Does not bruise/bleed easily.   Psychiatric/Behavioral:  Negative for sleep disturbance.            LABORATORY DATA: Reviewed  Lab Results   Component Value Date    WBC 8.4 04/04/2025    HGB 13.4 04/04/2025    HCT 43.6 04/04/2025    MCV 90.8 04/04/2025     04/04/2025     04/04/2025    K 3.7 04/04/2025     04/04/2025    CO2 23 04/04/2025    BUN 12 04/04/2025    CREATININE 0.7 04/04/2025    BILITOT 0.6 04/04/2025    ALKPHOS 101 04/04/2025    AST 17 04/04/2025    ALT 12 04/04/2025         Lab Results   Component Value Date    RBC 4.80 04/04/2025    HGB 13.4 04/04/2025    MCV 90.8 04/04/2025    MCH 27.9 04/04/2025    MCHC 30.7 04/04/2025    RDW 12.8 04/04/2025    MPV 9.3 04/04/2025    BASOPCT 0 07/23/2024    LYMPHSABS 2.20 07/23/2024    MONOSABS 1.27 (H) 07/23/2024    NEUTROABS 12.10 (H) 07/23/2024    EOSABS 0.14 07/23/2024    BASOSABS 0.07 07/23/2024         DIAGNOSTIC TESTING:     No results found.       PHYSICAL EXAMINATION: Vital signs reviewed per the nursing documentation.     /81   Pulse 88   Temp 98.8 °F (37.1 °C) (Tympanic)   Resp 16   Wt 51.3 kg (113 lb)   SpO2 100%   Breastfeeding Yes   BMI 22.82 kg/m²   Body mass index is 22.82 kg/m².     Physical Exam  Constitutional:       General: She is not in acute distress.     Appearance: She is not ill-appearing.   HENT:      Head: Normocephalic.      Mouth/Throat:      Lips: Pink.      Mouth: Mucous membranes are moist. No oral lesions.   Eyes:      General: No scleral icterus.     Pupils: Pupils are equal, round, and

## 2025-05-28 ENCOUNTER — TELEPHONE (OUTPATIENT)
Dept: GASTROENTEROLOGY | Age: 25
End: 2025-05-28

## 2025-05-28 NOTE — TELEPHONE ENCOUNTER
Patient called in stating they might have to reschedule due to having a cardiologist appointment on the same day.

## 2025-05-28 NOTE — TELEPHONE ENCOUNTER
Procedure scheduled/Dr Alvarez  Procedure:EGD  Dx: POTS (postural orthostatic tachycardia syndrome; Dyspepsia   Date:05/29/2025  Time:11:30 am / Arrive: 9:30 am  Hospital:[facility] phone call:valencia  Bowel Prep instructions given:EGD  In office/via phone:office   Clearance needed:n/a  GLP-1: n/a

## 2025-05-28 NOTE — TELEPHONE ENCOUNTER
Patient called back and needed to reschedule due to  issues. Procedure rescheduled.   Procedure scheduled/Dr Alvarez  Procedure:EGD  Dx: POTS (postural orthostatic tachycardia syndrome; Dyspepsia   Date:07/18/2025  Time:3:45 pm / Arrive: 2:15 pm  Hospital:[facility] phone call:valencia  Bowel Prep instructions given:EGD  In office/via phone:office   Clearance needed:n/a  GLP-1: n/a

## 2025-05-31 ENCOUNTER — HOSPITAL ENCOUNTER (OUTPATIENT)
Age: 25
End: 2025-05-31
Payer: MEDICAID

## 2025-05-31 ENCOUNTER — HOSPITAL ENCOUNTER (OUTPATIENT)
Dept: GENERAL RADIOLOGY | Age: 25
End: 2025-05-31
Payer: MEDICAID

## 2025-05-31 DIAGNOSIS — R19.8 GI SYMPTOM: ICD-10-CM

## 2025-05-31 PROCEDURE — 74018 RADEX ABDOMEN 1 VIEW: CPT

## 2025-06-03 ENCOUNTER — RESULTS FOLLOW-UP (OUTPATIENT)
Dept: GASTROENTEROLOGY | Age: 25
End: 2025-06-03

## 2025-06-03 DIAGNOSIS — K59.00 CONSTIPATION, UNSPECIFIED CONSTIPATION TYPE: Primary | ICD-10-CM

## 2025-06-03 RX ORDER — POLYETHYLENE GLYCOL 3350 17 G/17G
17 POWDER, FOR SOLUTION ORAL DAILY PRN
Qty: 510 G | Refills: 5 | Status: SHIPPED | OUTPATIENT
Start: 2025-06-03

## 2025-06-03 RX ORDER — SODIUM PHOSPHATE, DIBASIC AND SODIUM PHOSPHATE, MONOBASIC 7; 19 G/230ML; G/230ML
1 ENEMA RECTAL
COMMUNITY
Start: 2025-06-03

## 2025-06-03 RX ORDER — PSYLLIUM HUSK/CALCIUM CARB 1 G-60 MG
2 CAPSULE ORAL DAILY
Qty: 60 CAPSULE | Refills: 11 | Status: SHIPPED | OUTPATIENT
Start: 2025-06-03

## 2025-06-04 ENCOUNTER — OFFICE VISIT (OUTPATIENT)
Dept: FAMILY MEDICINE CLINIC | Age: 25
End: 2025-06-04
Payer: MEDICAID

## 2025-06-04 ENCOUNTER — HOSPITAL ENCOUNTER (OUTPATIENT)
Age: 25
Discharge: HOME | End: 2025-06-06
Payer: MEDICAID

## 2025-06-04 ENCOUNTER — HOSPITAL ENCOUNTER (OUTPATIENT)
Dept: GENERAL RADIOLOGY | Age: 25
Discharge: HOME | End: 2025-06-06
Payer: MEDICAID

## 2025-06-04 ENCOUNTER — RESULTS FOLLOW-UP (OUTPATIENT)
Dept: FAMILY MEDICINE CLINIC | Age: 25
End: 2025-06-04

## 2025-06-04 VITALS
RESPIRATION RATE: 16 BRPM | BODY MASS INDEX: 22.26 KG/M2 | WEIGHT: 110.2 LBS | SYSTOLIC BLOOD PRESSURE: 124 MMHG | DIASTOLIC BLOOD PRESSURE: 64 MMHG | HEART RATE: 113 BPM | OXYGEN SATURATION: 99 % | TEMPERATURE: 98.6 F

## 2025-06-04 DIAGNOSIS — M54.2 CHRONIC NECK PAIN: ICD-10-CM

## 2025-06-04 DIAGNOSIS — G89.29 CHRONIC NECK PAIN: ICD-10-CM

## 2025-06-04 DIAGNOSIS — M25.512 ACUTE PAIN OF LEFT SHOULDER: Primary | ICD-10-CM

## 2025-06-04 PROCEDURE — 99213 OFFICE O/P EST LOW 20 MIN: CPT | Performed by: NURSE PRACTITIONER

## 2025-06-04 PROCEDURE — 72040 X-RAY EXAM NECK SPINE 2-3 VW: CPT

## 2025-06-04 RX ORDER — PREDNISONE 20 MG/1
20 TABLET ORAL 2 TIMES DAILY
Qty: 10 TABLET | Refills: 0 | Status: SHIPPED | OUTPATIENT
Start: 2025-06-04 | End: 2025-06-09

## 2025-06-04 SDOH — ECONOMIC STABILITY: FOOD INSECURITY: WITHIN THE PAST 12 MONTHS, THE FOOD YOU BOUGHT JUST DIDN'T LAST AND YOU DIDN'T HAVE MONEY TO GET MORE.: NEVER TRUE

## 2025-06-04 SDOH — ECONOMIC STABILITY: FOOD INSECURITY: WITHIN THE PAST 12 MONTHS, YOU WORRIED THAT YOUR FOOD WOULD RUN OUT BEFORE YOU GOT MONEY TO BUY MORE.: NEVER TRUE

## 2025-06-04 ASSESSMENT — PATIENT HEALTH QUESTIONNAIRE - PHQ9
2. FEELING DOWN, DEPRESSED OR HOPELESS: NOT AT ALL
SUM OF ALL RESPONSES TO PHQ QUESTIONS 1-9: 0
1. LITTLE INTEREST OR PLEASURE IN DOING THINGS: NOT AT ALL
SUM OF ALL RESPONSES TO PHQ QUESTIONS 1-9: 0

## 2025-06-04 NOTE — PROGRESS NOTES
[facility]YSICIANS [facility] WALK-IN  [address]  [address]  [facility] [address]  Dept: [phone]     Tylor Lakhani is a 25 y.o. female Established patient, who presents to the walk-in clinic today with conditions/complaints as noted below:    Chief Complaint   Patient presents with    Shoulder Pain     Left shoulder pain- x3 days, tightness up to neck. Back/neck pain. Burning through shoulder blade. Using heat/ice. Not using anything OTC.          HPI:     Left shoulder pain. No known injury or trauma. States it feels tight. Radiates to neck and shoulder blade.   Chronic neck pain x several years. Pain with movement. Chronic intermittent numbness in both arms. Right worse than left.     Shoulder Pain   The pain is present in the neck and left shoulder. This is a new problem. The current episode started in the past 7 days (3d). There has been no history of extremity trauma. The quality of the pain is described as burning, aching and sharp. Pertinent negatives include no fever, limited range of motion, numbness, stiffness or tingling. She has tried cold and heat for the symptoms.   Neck Pain   This is a chronic problem. The problem occurs daily. The problem has been waxing and waning. The quality of the pain is described as aching, burning and stabbing. Associated symptoms include headaches. Pertinent negatives include no fever, numbness or tingling.       Past Medical History:   Diagnosis Date    Mental disorder     depression    POTS (postural orthostatic tachycardia syndrome)        Current Outpatient Medications   Medication Sig Dispense Refill    predniSONE (DELTASONE) 20 MG tablet Take 1 tablet by mouth 2 times daily for 5 days 10 tablet 0    polyethylene glycol (MIRALAX) 17 g packet Take 1 packet by mouth daily as needed for Constipation 510 g 5    SLYND 4 MG TABS       Continuous Glucose Sensor (FREESTYLE WILFREDO 3 SENSOR) [facility] 1 Device by Does

## 2025-06-05 ENCOUNTER — RESULTS FOLLOW-UP (OUTPATIENT)
Dept: GASTROENTEROLOGY | Age: 25
End: 2025-06-05

## 2025-06-05 ENCOUNTER — HOSPITAL ENCOUNTER (OUTPATIENT)
Dept: NUCLEAR MEDICINE | Age: 25
Discharge: HOME | End: 2025-06-07
Attending: INTERNAL MEDICINE
Payer: MEDICAID

## 2025-06-05 ENCOUNTER — HOSPITAL ENCOUNTER (OUTPATIENT)
Dept: CT IMAGING | Age: 25
Discharge: HOME | End: 2025-06-07
Attending: INTERNAL MEDICINE
Payer: MEDICAID

## 2025-06-05 DIAGNOSIS — R10.13 DYSPEPSIA: ICD-10-CM

## 2025-06-05 DIAGNOSIS — G90.A POTS (POSTURAL ORTHOSTATIC TACHYCARDIA SYNDROME): ICD-10-CM

## 2025-06-05 DIAGNOSIS — R10.84 ABDOMINAL PAIN, GENERALIZED: ICD-10-CM

## 2025-06-05 PROCEDURE — A9541 TC99M SULFUR COLLOID: HCPCS | Performed by: INTERNAL MEDICINE

## 2025-06-05 PROCEDURE — 6360000004 HC RX CONTRAST MEDICATION: Performed by: INTERNAL MEDICINE

## 2025-06-05 PROCEDURE — 78264 GASTRIC EMPTYING IMG STUDY: CPT

## 2025-06-05 PROCEDURE — 74177 CT ABD & PELVIS W/CONTRAST: CPT

## 2025-06-05 PROCEDURE — 3430000000 HC RX DIAGNOSTIC RADIOPHARMACEUTICAL: Performed by: INTERNAL MEDICINE

## 2025-06-05 PROCEDURE — 2500000003 HC RX 250 WO HCPCS: Performed by: INTERNAL MEDICINE

## 2025-06-05 RX ORDER — TECHNETIUM TC 99M SULFUR COLLOID 2 MG
1 KIT MISCELLANEOUS
Status: COMPLETED | OUTPATIENT
Start: 2025-06-05 | End: 2025-06-05

## 2025-06-05 RX ORDER — IOPAMIDOL 755 MG/ML
75 INJECTION, SOLUTION INTRAVASCULAR
Status: COMPLETED | OUTPATIENT
Start: 2025-06-05 | End: 2025-06-05

## 2025-06-05 RX ADMIN — BARIUM SULFATE 225 ML: 20 SUSPENSION ORAL at 11:40

## 2025-06-05 RX ADMIN — Medication 1 MILLICURIE: at 10:00

## 2025-06-05 RX ADMIN — IOPAMIDOL 75 ML: 755 INJECTION, SOLUTION INTRAVENOUS at 11:40

## 2025-06-20 ENCOUNTER — PATIENT MESSAGE (OUTPATIENT)
Dept: PRIMARY CARE CLINIC | Age: 25
End: 2025-06-20

## 2025-06-20 DIAGNOSIS — R11.0 NAUSEA: ICD-10-CM

## 2025-06-20 DIAGNOSIS — R11.10 VOMITING, UNSPECIFIED VOMITING TYPE, UNSPECIFIED WHETHER NAUSEA PRESENT: ICD-10-CM

## 2025-06-20 DIAGNOSIS — R10.13 EPIGASTRIC ABDOMINAL PAIN: Primary | ICD-10-CM

## 2025-06-20 DIAGNOSIS — R10.9 FLANK PAIN: ICD-10-CM

## 2025-06-24 ENCOUNTER — PATIENT MESSAGE (OUTPATIENT)
Dept: PRIMARY CARE CLINIC | Age: 25
End: 2025-06-24

## 2025-06-24 DIAGNOSIS — R51.9 DAILY HEADACHE: Primary | ICD-10-CM

## 2025-07-02 ENCOUNTER — OFFICE VISIT (OUTPATIENT)
Dept: PRIMARY CARE CLINIC | Age: 25
End: 2025-07-02
Payer: MEDICAID

## 2025-07-02 VITALS
BODY MASS INDEX: 22.58 KG/M2 | OXYGEN SATURATION: 98 % | RESPIRATION RATE: 16 BRPM | HEART RATE: 81 BPM | DIASTOLIC BLOOD PRESSURE: 60 MMHG | SYSTOLIC BLOOD PRESSURE: 98 MMHG | WEIGHT: 111.8 LBS

## 2025-07-02 DIAGNOSIS — G93.5 CHIARI MALFORMATION TYPE I (HCC): ICD-10-CM

## 2025-07-02 DIAGNOSIS — G43.709 CHRONIC MIGRAINE WITHOUT AURA WITHOUT STATUS MIGRAINOSUS, NOT INTRACTABLE: ICD-10-CM

## 2025-07-02 DIAGNOSIS — K58.2 IRRITABLE BOWEL SYNDROME WITH BOTH CONSTIPATION AND DIARRHEA: ICD-10-CM

## 2025-07-02 DIAGNOSIS — Q79.60 EDS (EHLERS-DANLOS SYNDROME): Primary | ICD-10-CM

## 2025-07-02 PROCEDURE — 99214 OFFICE O/P EST MOD 30 MIN: CPT | Performed by: NURSE PRACTITIONER

## 2025-07-02 RX ORDER — NORTRIPTYLINE HYDROCHLORIDE 25 MG/1
25 CAPSULE ORAL NIGHTLY
COMMUNITY
Start: 2025-07-01

## 2025-07-02 RX ORDER — RIZATRIPTAN BENZOATE 10 MG/1
10 TABLET, ORALLY DISINTEGRATING ORAL
COMMUNITY
Start: 2025-07-01

## 2025-07-02 SDOH — ECONOMIC STABILITY: FOOD INSECURITY: WITHIN THE PAST 12 MONTHS, THE FOOD YOU BOUGHT JUST DIDN'T LAST AND YOU DIDN'T HAVE MONEY TO GET MORE.: NEVER TRUE

## 2025-07-02 SDOH — ECONOMIC STABILITY: FOOD INSECURITY: WITHIN THE PAST 12 MONTHS, YOU WORRIED THAT YOUR FOOD WOULD RUN OUT BEFORE YOU GOT MONEY TO BUY MORE.: NEVER TRUE

## 2025-07-02 ASSESSMENT — ENCOUNTER SYMPTOMS
TROUBLE SWALLOWING: 0
EYE REDNESS: 0
WHEEZING: 0
NAUSEA: 0
ABDOMINAL PAIN: 1
SINUS PRESSURE: 0
COUGH: 0
SORE THROAT: 0
SHORTNESS OF BREATH: 0
VOMITING: 0
CHEST TIGHTNESS: 0
EYE ITCHING: 0
SINUS PAIN: 0
EYE DISCHARGE: 0
DIARRHEA: 1

## 2025-07-02 ASSESSMENT — PATIENT HEALTH QUESTIONNAIRE - PHQ9
SUM OF ALL RESPONSES TO PHQ QUESTIONS 1-9: 0
8. MOVING OR SPEAKING SO SLOWLY THAT OTHER PEOPLE COULD HAVE NOTICED. OR THE OPPOSITE, BEING SO FIGETY OR RESTLESS THAT YOU HAVE BEEN MOVING AROUND A LOT MORE THAN USUAL: NOT AT ALL
6. FEELING BAD ABOUT YOURSELF - OR THAT YOU ARE A FAILURE OR HAVE LET YOURSELF OR YOUR FAMILY DOWN: NOT AT ALL
7. TROUBLE CONCENTRATING ON THINGS, SUCH AS READING THE NEWSPAPER OR WATCHING TELEVISION: NOT AT ALL
SUM OF ALL RESPONSES TO PHQ QUESTIONS 1-9: 0
9. THOUGHTS THAT YOU WOULD BE BETTER OFF DEAD, OR OF HURTING YOURSELF: NOT AT ALL
5. POOR APPETITE OR OVEREATING: NOT AT ALL
10. IF YOU CHECKED OFF ANY PROBLEMS, HOW DIFFICULT HAVE THESE PROBLEMS MADE IT FOR YOU TO DO YOUR WORK, TAKE CARE OF THINGS AT HOME, OR GET ALONG WITH OTHER PEOPLE: NOT DIFFICULT AT ALL
SUM OF ALL RESPONSES TO PHQ QUESTIONS 1-9: 0
SUM OF ALL RESPONSES TO PHQ QUESTIONS 1-9: 0
1. LITTLE INTEREST OR PLEASURE IN DOING THINGS: NOT AT ALL
3. TROUBLE FALLING OR STAYING ASLEEP: NOT AT ALL
4. FEELING TIRED OR HAVING LITTLE ENERGY: NOT AT ALL
2. FEELING DOWN, DEPRESSED OR HOPELESS: NOT AT ALL

## 2025-07-02 NOTE — PROGRESS NOTES
[facility]  [facility] PRIMARY CARE  [address] [place] [address] DR  [address]  [facility] [address]  Dept: [phone]  Dept Fax: [phone]    Tylor Lakhani is a 25 y.o. female who presentstoday for her medical conditions/complaints as noted below.  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 A follow-up appointment is scheduled in a few months. She was diagnosed with POTS after the birth of her first child and is due for an echocardiogram this month also for her EDS. She has noticed a decrease in muscle mass and suspects malabsorption. Consultations with a cardiologist are ongoing, and a follow-up visit is scheduled. She is eager to resume medication and cease breastfeeding as she finds it challenging to walk outside during the summer d/t the heat and her POTS.    She has recently consulted with a neurologist who prescribed nortriptyline for nightly use and rizatriptan as needed for her migraines. She just started this medication     She has also seen a gastroenterologist and a cardiologist. She has discontinued her birth control pills and is considering a consultation with a gynecologist due to pelvic pain that radiates. Suspecting endometriosis as a potential cause of her pain, she recalls a painful attempt to insert an IUD six weeks postpartum, which was unsuccessful due to uterine scar tissue. Concerns about the possibility of endometriosis spreading elsewhere are present. She has a history of irritable bowel syndrome (IBS) and experiences frequent bowel movements, ranging from 6 to 8 times daily, with varying consistency. An increase in blood and mucus in

## (undated) DEVICE — SPONGE GZ W6XL6.75IN FLUF FOR PRE OP PREPPING CLN BULKEE II

## (undated) DEVICE — ADAPTER CLEANING PORPOISE CLEANING

## (undated) DEVICE — PERRYSBURG ENDO PACK: Brand: MEDLINE INDUSTRIES, INC.

## (undated) DEVICE — FORCEPS BX L240CM JAW DIA2.4MM ORNG L CAP W/ NDL DISP RAD

## (undated) DEVICE — FORCEPS BX L240CM JAW DIA2.8MM L CAP W/ NDL MIC MESH TOOTH

## (undated) DEVICE — Device: Brand: DEFENDO VALVE AND CONNECTOR KIT

## (undated) DEVICE — BITEBLOCK 54FR W/ DENT RIM BLOX

## (undated) DEVICE — 4-PORT MANIFOLD: Brand: NEPTUNE 2